# Patient Record
Sex: FEMALE | Race: WHITE | NOT HISPANIC OR LATINO | Employment: UNEMPLOYED | URBAN - METROPOLITAN AREA
[De-identification: names, ages, dates, MRNs, and addresses within clinical notes are randomized per-mention and may not be internally consistent; named-entity substitution may affect disease eponyms.]

---

## 2017-01-22 DIAGNOSIS — M79.7 FIBROMYALGIA: Primary | ICD-10-CM

## 2017-01-22 NOTE — TELEPHONE ENCOUNTER
cyclobenzaprine (FLEXERIL) 10 MG tablet      Last Written Prescription Date:  11/28/2016  Last Fill Quantity: 30,   # refills: 0  Last Office [facility] with [facility] or [facility] prescribing provider: 11/08/2016 Aaseby-Aguilera  Future Office visit:       Routing refill request to provider for review/approval because:  Drug not on the [facility], [facility] or [facility] refill protocol or controlled substance

## 2017-01-23 RX ORDER — CYCLOBENZAPRINE HCL 10 MG
10 TABLET ORAL PRN
Qty: 30 TABLET | Refills: 0 | Status: SHIPPED | OUTPATIENT
Start: 2017-01-23 | End: 2017-03-03

## 2017-03-03 DIAGNOSIS — M79.7 FIBROMYALGIA: ICD-10-CM

## 2017-03-03 RX ORDER — CYCLOBENZAPRINE HCL 10 MG
10 TABLET ORAL PRN
Qty: 30 TABLET | Refills: 0 | Status: SHIPPED | OUTPATIENT
Start: 2017-03-03 | End: 2017-04-07

## 2017-03-03 NOTE — TELEPHONE ENCOUNTER
Routing refill request to provider for review/approval because:  Drug not on the [facility] refill protocol   Kelvin Thrasher RN, BSN

## 2017-03-03 NOTE — TELEPHONE ENCOUNTER
Pending Prescriptions:                       Disp   Refills    cyclobenzaprine (FLEXERIL) 10 MG tablet   30 tab*0            Sig: Take 1 tablet (10 mg) by mouth as needed for           muscle spasms          Last Written Prescription Date:  01/23/2017  Last Fill Quantity: 30,   # refills: 0  Last Office Visit with [facility], [facility] or [facility] prescribing provider: 11/08/2016  Future Office visit:       Routing refill request to provider for review/approval because:  Drug not on the [facility], [facility] or [facility] refill protocol or controlled substance    Myron DAVIS

## 2017-04-07 DIAGNOSIS — M79.7 FIBROMYALGIA: ICD-10-CM

## 2017-04-10 RX ORDER — CYCLOBENZAPRINE HCL 10 MG
TABLET ORAL
Qty: 30 TABLET | Refills: 0 | Status: SHIPPED | OUTPATIENT
Start: 2017-04-10 | End: 2017-05-12

## 2017-04-10 NOTE — TELEPHONE ENCOUNTER
Routing refill request to provider for review/approval because:  Drug not on the [facility] refill protocol -  Pt very past due for DM f/u     Fatuma Lewis RN      flexoril   Last Written Prescription Date: 3/3/17  Last Fill Quantity: 30,  # refills: 0  Last Office Visit with [facility] or [facility] prescribing provider: 6/2016

## 2017-04-13 DIAGNOSIS — E11.9 TYPE 2 DIABETES MELLITUS WITHOUT COMPLICATION, WITH LONG-TERM CURRENT USE OF INSULIN (H): Primary | ICD-10-CM

## 2017-04-13 DIAGNOSIS — Z79.4 TYPE 2 DIABETES MELLITUS WITHOUT COMPLICATION, WITH LONG-TERM CURRENT USE OF INSULIN (H): Primary | ICD-10-CM

## 2017-04-14 RX ORDER — BLOOD SUGAR DIAGNOSTIC
STRIP MISCELLANEOUS
Qty: 200 STRIP | Refills: 1 | Status: SHIPPED | OUTPATIENT
Start: 2017-04-14 | End: 2017-08-21

## 2017-04-14 NOTE — TELEPHONE ENCOUNTER
Pending Prescriptions:                       Disp   Refills    ACCU-CHEK REI PLUS test strip [Pharmacy*[address] st*0            Sig: USE TO CHECK GLUCOSE TWICE DAILY AS DIRECTED             Last Written Prescription Date: 09/14/2016  Last Fill Quantity: 3 months, # refills: 0  Last Office Visit with [facility], [facility] or [facility] prescribing provider:  11/08/2016        BP Readings from Last 3 Encounters:   11/08/16 120/68   06/13/16 131/88   11/03/15 141/78     Lab Results   Component Value Date    MICROL 20 06/13/2016     Lab Results   Component Value Date    UMALCR 13.85 06/13/2016     Creatinine   Date Value Ref Range Status   11/08/2016 0.69 0.52 - 1.04 mg/dL Final   ]  GFR Estimate   Date Value Ref Range Status   11/08/2016 89 >60 mL/min/1.7m2 Final     Comment:     Non  GFR Calc   06/13/2016 82 >60 mL/min/1.7m2 Final     Comment:     Non  GFR Calc   07/03/2015 >90  Non  GFR Calc   >60 mL/min/1.7m2 Final     GFR Estimate If Black   Date Value Ref Range Status   11/08/2016 >90   GFR Calc   >60 mL/min/1.7m2 Final   06/13/2016 >90   GFR Calc   >60 mL/min/1.7m2 Final   07/03/2015 >90   GFR Calc   >60 mL/min/1.7m2 Final     Lab Results   Component Value Date    CHOL 107 06/13/2016     Lab Results   Component Value Date    HDL 44 06/13/2016     Lab Results   Component Value Date    LDL 41 06/13/2016     Lab Results   Component Value Date    TRIG 110 06/13/2016     Lab Results   Component Value Date    CHOLHDLRATIO 2.6 02/10/2015     Lab Results   Component Value Date    AST 24 11/08/2016     Lab Results   Component Value Date    ALT 52 11/08/2016     Lab Results   Component Value Date    A1C 6.6 06/13/2016    A1C 6.9 11/03/2015    A1C 8.5 09/01/2015    A1C 10.1 07/03/2015    A1C 7.2 02/10/2015     Potassium   Date Value Ref Range Status   11/08/2016 3.7 3.4 - 5.3 mmol/L Final     Myron DAVIS

## 2017-04-14 NOTE — TELEPHONE ENCOUNTER
Prescription approved per [facility] Refill Protocol.    Gabriela Pérez, RN, BSN, PHN

## 2017-04-24 ENCOUNTER — TELEPHONE (OUTPATIENT)
Dept: FAMILY MEDICINE | Facility: CLINIC | Age: 57
End: 2017-04-24

## 2017-04-24 NOTE — TELEPHONE ENCOUNTER
Panel Management Review      Patient has the following on her problem list:     Depression / Dysthymia review  PHQ-9 SCORE 9/1/2015 9/1/2015 6/13/2016   Total Score - - -   Total Score 5 5 8      Patient is due for:  PHQ9    Diabetes    ASA: Passed    Last A1C  Lab Results   Component Value Date    A1C 6.6 06/13/2016    A1C 6.9 11/03/2015    A1C 8.5 09/01/2015    A1C 10.1 07/03/2015    A1C 7.2 02/10/2015     A1C tested: Passed    Last LDL:    Lab Results   Component Value Date    CHOL 107 06/13/2016     Lab Results   Component Value Date    HDL 44 06/13/2016     Lab Results   Component Value Date    LDL 41 06/13/2016     Lab Results   Component Value Date    TRIG 110 06/13/2016     Lab Results   Component Value Date    CHOLHDLRATIO 2.6 02/10/2015     Lab Results   Component Value Date    NHDL 63 06/13/2016       Is the patient on a Statin? YES             Is the patient on Aspirin? YES    Medications     HMG CoA Reductase Inhibitors    atorvastatin (LIPITOR) 20 MG tablet    Salicylates    ASPIRIN 81 MG OR TABS          Last three blood pressure readings:  BP Readings from Last 3 Encounters:   11/08/16 120/68   06/13/16 131/88   11/03/15 141/78       Date of last diabetes office visit: 11/06/2016     Tobacco History:     History   Smoking Status     Light Tobacco Smoker     Packs/day: 0.20     Years: 3.00     Types: Cigarettes     Last attempt to quit: 1/15/2013   Smokeless Tobacco     Never Used     Comment: e cig   - three puffs a day            Composite cancer screening  Chart review shows that this patient is due/due soon for the following Mammogram and Colonoscopy  Summary:    Patient is due/failing the following:   COLONOSCOPY, MAMMOGRAM and PHQ9    Action needed:   Patient needs to do PHQ9. and Patient needs referral/order: mammogram and colonosocpy    Type of outreach:    Sent Pin or Peg message.    Questions for provider review:    None                                                                                                                                     db     Chart routed to Provider .

## 2017-05-12 DIAGNOSIS — M79.7 FIBROMYALGIA: ICD-10-CM

## 2017-05-12 RX ORDER — CYCLOBENZAPRINE HCL 10 MG
TABLET ORAL
Qty: 30 TABLET | Refills: 0 | Status: SHIPPED | OUTPATIENT
Start: 2017-05-12 | End: 2017-06-27

## 2017-05-12 NOTE — TELEPHONE ENCOUNTER
Routing refill request to provider for review/approval because:  Drug not on the [facility] refill protocol -LM for call back.  Pt is due for multiple HCM and did not f/u for 6 month DM check.   Do you want to continue with refills?    CONNER Dixon   Last Written Prescription Date: 4/10/17  Last Fill Quantity: 30,  # refills: 0  Last Office Visit with [facility] or [facility] prescribing provider: 11/2016 for per op  Last DM visit 6/ 13/16

## 2017-05-22 DIAGNOSIS — G43.009 MIGRAINE WITHOUT AURA AND WITHOUT STATUS MIGRAINOSUS, NOT INTRACTABLE: ICD-10-CM

## 2017-05-22 RX ORDER — SUMATRIPTAN 100 MG/1
TABLET, FILM COATED ORAL
Qty: 10 TABLET | Refills: 0 | Status: SHIPPED | OUTPATIENT
Start: 2017-05-22 | End: 2017-08-01

## 2017-05-22 NOTE — TELEPHONE ENCOUNTER
Pending Prescriptions:                       Disp   Refills    SUMAtriptan (IMITREX) 100 MG tablet [Phar*10 tab*0            Sig: TAKE 1 TABLET BY MOUTH ONCE DAILY MAY REPEAT IN 2           HOURS IF NEEDED. MAX 2 PER DAY          Last Written Prescription Date: 12/20/2016  Last Fill Quantity: 10, # refills: 1  Last Office Visit with [facility], [facility] or [facility] prescribing provider: 11/08/2016       BP Readings from Last 3 Encounters:   11/08/16 120/68   06/13/16 131/88   11/03/15 141/78     Myron DAVIS

## 2017-05-22 NOTE — TELEPHONE ENCOUNTER
Routing refill request to provider for review/approval because:  Patient needs to be seen because:  Pt has not followed up as previously requested for other refills    Pt due for DM    Kelvin Thrasher RN, BSN

## 2017-06-11 DIAGNOSIS — E11.9 TYPE 2 DIABETES MELLITUS WITHOUT COMPLICATION (H): ICD-10-CM

## 2017-06-11 DIAGNOSIS — I10 BENIGN ESSENTIAL HYPERTENSION: ICD-10-CM

## 2017-06-12 RX ORDER — LISINOPRIL 10 MG/1
TABLET ORAL
Qty: 90 TABLET | Refills: 0 | Status: SHIPPED | OUTPATIENT
Start: 2017-06-12 | End: 2017-06-22

## 2017-06-12 NOTE — TELEPHONE ENCOUNTER
Spoke with patient and scheduled appt for next week.  Pt is out of medication   One time refill sent  Kelvin Thrasher RN, BSN

## 2017-06-12 NOTE — TELEPHONE ENCOUNTER
Pending Prescriptions:                       Disp   Refills    lisinopril (PRINIVIL/ZESTRIL) 10 MG table*90 tab*0            Sig: TAKE ONE TABLET BY MOUTH ONCE DAILY    metFORMIN (GLUCOPHAGE) 500 MG tablet [Pha*360 ta*0            Sig: TAKE 2 TABLETS BY MOUTH TWICE DAILY WITH MEALS    Lisinopril        Last Written Prescription Date: 06/13/2016  Last Fill Quantity: 90, # refills: 3  Last Office Visit with [facility] or [facility] prescribing provider: 11/08/2016       Potassium   Date Value Ref Range Status   11/08/2016 3.7 3.4 - 5.3 mmol/L Final     Creatinine   Date Value Ref Range Status   11/08/2016 0.69 0.52 - 1.04 mg/dL Final     BP Readings from Last 3 Encounters:   11/08/16 120/68   06/13/16 131/88   11/03/15 141/78     Metformin           Last Written Prescription Date: 06/13/2016  Last Fill Quantity: 360, # refills: 3  Last Office Visit with [facility], [facility] or [facility] prescribing provider:          BP Readings from Last 3 Encounters:   11/08/16 120/68   06/13/16 131/88   11/03/15 141/78     Lab Results   Component Value Date    MICROL 20 06/13/2016     Lab Results   Component Value Date    UMALCR 13.85 06/13/2016     Creatinine   Date Value Ref Range Status   11/08/2016 0.69 0.52 - 1.04 mg/dL Final   ]  GFR Estimate   Date Value Ref Range Status   11/08/2016 89 >60 mL/min/1.7m2 Final     Comment:     Non  GFR Calc   06/13/2016 82 >60 mL/min/1.7m2 Final     Comment:     Non  GFR Calc   07/03/2015 >90  Non  GFR Calc   >60 mL/min/1.7m2 Final     GFR Estimate If Black   Date Value Ref Range Status   11/08/2016 >90   GFR Calc   >60 mL/min/1.7m2 Final   06/13/2016 >90   GFR Calc   >60 mL/min/1.7m2 Final   07/03/2015 >90   GFR Calc   >60 mL/min/1.7m2 Final     Lab Results   Component Value Date    CHOL 107 06/13/2016     Lab Results   Component Value Date    HDL 44 06/13/2016     Lab Results   Component Value Date    LDL 41  06/13/2016     Lab Results   Component Value Date    TRIG 110 06/13/2016     Lab Results   Component Value Date    CHOLHDLRATIO 2.6 02/10/2015     Lab Results   Component Value Date    AST 24 11/08/2016     Lab Results   Component Value Date    ALT 52 11/08/2016     Lab Results   Component Value Date    A1C 6.6 06/13/2016    A1C 6.9 11/03/2015    A1C 8.5 09/01/2015    A1C 10.1 07/03/2015    A1C 7.2 02/10/2015     Potassium   Date Value Ref Range Status   11/08/2016 3.7 3.4 - 5.3 mmol/L Final     Myron Hodgson XRT

## 2017-06-18 DIAGNOSIS — E78.5 HYPERLIPIDEMIA LDL GOAL <100: ICD-10-CM

## 2017-06-18 RX ORDER — ATORVASTATIN CALCIUM 20 MG/1
TABLET, FILM COATED ORAL
Qty: 90 TABLET | Refills: 0 | Status: CANCELLED | OUTPATIENT
Start: 2017-06-18

## 2017-06-18 NOTE — TELEPHONE ENCOUNTER
atorvastatin (LIPITOR) 20 MG tablet     Last Written Prescription Date: 06/13/2016  Last Fill Quantity: 90, # refills: 3  Last Office Visit with [facility], [facility] or [facility] prescribing provider: 11/08/2016  Next 5 appointments (look out 90 days)     Jun 20, 2017 11:30 AM CDT   PHYSICAL with Ramona Ann Aaseby-Aguilera, PA-C   [facility]    [address] [place] [id number] [phone]                   Lab Results   Component Value Date    CHOL 107 06/13/2016     Lab Results   Component Value Date    HDL 44 06/13/2016     Lab Results   Component Value Date    LDL 41 06/13/2016     Lab Results   Component Value Date    TRIG 110 06/13/2016     Lab Results   Component Value Date    CHOLHDLRATIO 2.6 02/10/2015

## 2017-06-22 ENCOUNTER — OFFICE VISIT (OUTPATIENT)
Dept: FAMILY MEDICINE | Facility: CLINIC | Age: 57
End: 2017-06-22
Payer: COMMERCIAL

## 2017-06-22 VITALS
OXYGEN SATURATION: 99 % | BODY MASS INDEX: 33.02 KG/M2 | HEART RATE: 71 BPM | SYSTOLIC BLOOD PRESSURE: 121 MMHG | TEMPERATURE: 98.3 F | DIASTOLIC BLOOD PRESSURE: 70 MMHG | WEIGHT: 168.2 LBS | HEIGHT: 60 IN

## 2017-06-22 DIAGNOSIS — M79.7 FIBROMYALGIA: ICD-10-CM

## 2017-06-22 DIAGNOSIS — Z11.51 SCREENING FOR HUMAN PAPILLOMAVIRUS: ICD-10-CM

## 2017-06-22 DIAGNOSIS — L98.9 SKIN LESION: ICD-10-CM

## 2017-06-22 DIAGNOSIS — Z12.31 ENCOUNTER FOR SCREENING MAMMOGRAM FOR BREAST CANCER: ICD-10-CM

## 2017-06-22 DIAGNOSIS — E78.5 HYPERLIPIDEMIA LDL GOAL <100: Primary | ICD-10-CM

## 2017-06-22 DIAGNOSIS — I10 HYPERTENSION GOAL BP (BLOOD PRESSURE) < 140/90: ICD-10-CM

## 2017-06-22 DIAGNOSIS — Z79.4 TYPE 2 DIABETES MELLITUS WITHOUT COMPLICATION, WITH LONG-TERM CURRENT USE OF INSULIN (H): ICD-10-CM

## 2017-06-22 DIAGNOSIS — E11.9 TYPE 2 DIABETES MELLITUS WITHOUT COMPLICATION, WITH LONG-TERM CURRENT USE OF INSULIN (H): ICD-10-CM

## 2017-06-22 DIAGNOSIS — I10 BENIGN ESSENTIAL HYPERTENSION: ICD-10-CM

## 2017-06-22 LAB
ALBUMIN SERPL-MCNC: 4 G/DL (ref 3.4–5)
ALP SERPL-CCNC: 82 U/L (ref 40–150)
ALT SERPL W P-5'-P-CCNC: 44 U/L (ref 0–50)
ANION GAP SERPL CALCULATED.3IONS-SCNC: 6 MMOL/L (ref 3–14)
AST SERPL W P-5'-P-CCNC: 23 U/L (ref 0–45)
BILIRUB SERPL-MCNC: 0.4 MG/DL (ref 0.2–1.3)
BUN SERPL-MCNC: 7 MG/DL (ref 7–30)
CALCIUM SERPL-MCNC: 9.5 MG/DL (ref 8.5–10.1)
CHLORIDE SERPL-SCNC: 104 MMOL/L (ref 94–109)
CHOLEST SERPL-MCNC: 134 MG/DL
CO2 SERPL-SCNC: 30 MMOL/L (ref 20–32)
CREAT SERPL-MCNC: 0.74 MG/DL (ref 0.52–1.04)
ERYTHROCYTE [DISTWIDTH] IN BLOOD BY AUTOMATED COUNT: 13.7 % (ref 10–15)
GFR SERPL CREATININE-BSD FRML MDRD: 81 ML/MIN/1.7M2
GLUCOSE SERPL-MCNC: 141 MG/DL (ref 70–99)
HBA1C MFR BLD: 6.8 % (ref 4.3–6)
HCT VFR BLD AUTO: 41 % (ref 35–47)
HDLC SERPL-MCNC: 48 MG/DL
HGB BLD-MCNC: 13.7 G/DL (ref 11.7–15.7)
LDLC SERPL CALC-MCNC: 58 MG/DL
MCH RBC QN AUTO: 28.5 PG (ref 26.5–33)
MCHC RBC AUTO-ENTMCNC: 33.4 G/DL (ref 31.5–36.5)
MCV RBC AUTO: 85 FL (ref 78–100)
NONHDLC SERPL-MCNC: 86 MG/DL
PLATELET # BLD AUTO: 214 10E9/L (ref 150–450)
POTASSIUM SERPL-SCNC: 3.8 MMOL/L (ref 3.4–5.3)
PROT SERPL-MCNC: 7.4 G/DL (ref 6.8–8.8)
RBC # BLD AUTO: 4.8 10E12/L (ref 3.8–5.2)
SODIUM SERPL-SCNC: 140 MMOL/L (ref 133–144)
TRIGL SERPL-MCNC: 139 MG/DL
TSH SERPL DL<=0.005 MIU/L-ACNC: 1.79 MU/L (ref 0.4–4)
WBC # BLD AUTO: 9 10E9/L (ref 4–11)

## 2017-06-22 PROCEDURE — 80061 LIPID PANEL: CPT | Performed by: PHYSICIAN ASSISTANT

## 2017-06-22 PROCEDURE — 99396 PREV VISIT EST AGE 40-64: CPT | Performed by: PHYSICIAN ASSISTANT

## 2017-06-22 PROCEDURE — G0145 SCR C/V CYTO,THINLAYER,RESCR: HCPCS | Performed by: PHYSICIAN ASSISTANT

## 2017-06-22 PROCEDURE — 85027 COMPLETE CBC AUTOMATED: CPT | Performed by: PHYSICIAN ASSISTANT

## 2017-06-22 PROCEDURE — 36415 COLL VENOUS BLD VENIPUNCTURE: CPT | Performed by: PHYSICIAN ASSISTANT

## 2017-06-22 PROCEDURE — 84443 ASSAY THYROID STIM HORMONE: CPT | Performed by: PHYSICIAN ASSISTANT

## 2017-06-22 PROCEDURE — 80053 COMPREHEN METABOLIC PANEL: CPT | Performed by: PHYSICIAN ASSISTANT

## 2017-06-22 PROCEDURE — 82043 UR ALBUMIN QUANTITATIVE: CPT | Performed by: PHYSICIAN ASSISTANT

## 2017-06-22 PROCEDURE — 83036 HEMOGLOBIN GLYCOSYLATED A1C: CPT | Performed by: PHYSICIAN ASSISTANT

## 2017-06-22 PROCEDURE — 99207 C FOOT EXAM  NO CHARGE: CPT | Mod: 25 | Performed by: PHYSICIAN ASSISTANT

## 2017-06-22 PROCEDURE — 87624 HPV HI-RISK TYP POOLED RSLT: CPT | Performed by: PHYSICIAN ASSISTANT

## 2017-06-22 PROCEDURE — G0124 SCREEN C/V THIN LAYER BY MD: HCPCS | Performed by: PHYSICIAN ASSISTANT

## 2017-06-22 RX ORDER — GABAPENTIN 100 MG/1
200 CAPSULE ORAL 3 TIMES DAILY
Qty: 180 CAPSULE | Refills: 5 | Status: SHIPPED | OUTPATIENT
Start: 2017-06-22 | End: 2017-12-27

## 2017-06-22 RX ORDER — ATORVASTATIN CALCIUM 20 MG/1
20 TABLET, FILM COATED ORAL DAILY
Qty: 90 TABLET | Refills: 3 | Status: SHIPPED | OUTPATIENT
Start: 2017-06-22 | End: 2018-07-16

## 2017-06-22 RX ORDER — GLIPIZIDE 10 MG/1
20 TABLET, FILM COATED, EXTENDED RELEASE ORAL DAILY
Qty: 180 TABLET | Refills: 11 | Status: SHIPPED | OUTPATIENT
Start: 2017-06-22 | End: 2018-07-31

## 2017-06-22 RX ORDER — LISINOPRIL 10 MG/1
10 TABLET ORAL DAILY
Qty: 90 TABLET | Refills: 3 | Status: SHIPPED | OUTPATIENT
Start: 2017-06-22 | End: 2018-01-09

## 2017-06-22 NOTE — Clinical Note
Please abstract the following data from this visit with this patient into the appropriate field in Epic:  Eye exam with ophthalmology on this date: Feb 2016 at [place] [facility]

## 2017-06-22 NOTE — PATIENT INSTRUCTIONS
(E78.5) Hyperlipidemia LDL goal <100  (primary encounter diagnosis)  Comment:   Plan: atorvastatin (LIPITOR) 20 MG tablet            (I10) Hypertension goal BP (blood pressure) < 140/90  Comment:   Plan: stable     (E11.9,  Z79.4) Type 2 diabetes mellitus without complication, with long-term current use of insulin (H)  Comment:   Plan: CBC with platelets, TSH with free T4 reflex,         Comprehensive metabolic panel, Lipid panel         reflex to direct LDL, Albumin Random Urine         Quantitative, Hemoglobin A1c, FOOT EXAM,         metFORMIN (GLUCOPHAGE) 500 MG tablet, glipiZIDE        (GLUCOTROL XL) 10 MG 24 hr tablet, insulin         glargine (LANTUS SOLOSTAR) 100 UNIT/ML         injection            (Z11.51) Screening for human papillomavirus  Comment:   Plan: Pap imaged thin layer screen with HPV -         recommended age 30 - 65 years (select HPV order        below), HPV High Risk Types DNA Cervical            (Z12.31) Encounter for screening mammogram for breast cancer  Comment:   Plan: *MA Screening Digital Bilateral            (L98.9) Skin lesion  Comment: has history of melanoma.  Now has a brwnish/black lesion of labia minor n right  Plan: OB/GYN REFERRAL            (I10) Benign essential hypertension  Comment:   Plan: lisinopril (PRINIVIL/ZESTRIL) 10 MG tablet            (M79.7) Fibromyalgia  Comment:   Plan: gabapentin (NEURONTIN) 100 MG capsule              Preventive Health Recommendations  Female Ages 50 - 64    Yearly exam: See your health care provider every year in order to  o Review health changes.   o Discuss preventive care.    o Review your medicines if your doctor has prescribed any.      Get a Pap test every three years (unless you have an abnormal result and your provider advises testing more often).    If you get Pap tests with HPV test, you only need to test every 5 years, unless you have an abnormal result.     You do not need a Pap test if your uterus was removed (hysterectomy) and  you have not had cancer.    You should be tested each year for STDs (sexually transmitted diseases) if you're at risk.     Have a mammogram every 1 to 2 years.    Have a colonoscopy at age 50, or have a yearly FIT test (stool test). These exams screen for colon cancer.      Have a cholesterol test every 5 years, or more often if advised.    Have a diabetes test (fasting glucose) every three years. If you are at risk for diabetes, you should have this test more often.     If you are at risk for osteoporosis (brittle bone disease), think about having a bone density scan (DEXA).    Shots: Get a flu shot each year. Get a tetanus shot every 10 years.    Nutrition:     Eat at least 5 servings of fruits and vegetables each day.    Eat whole-grain bread, whole-wheat pasta and brown rice instead of white grains and rice.    Talk to your provider about Calcium and Vitamin D.     Lifestyle    Exercise at least 150 minutes a week (30 minutes a day, 5 days a week). This will help you control your weight and prevent disease.    Limit alcohol to one drink per day.    No smoking.     Wear sunscreen to prevent skin cancer.     See your dentist every six months for an exam and cleaning.    See your eye doctor every 1 to 2 years.

## 2017-06-22 NOTE — NURSING NOTE
Chief Complaint   Patient presents with     Physical       Initial /70 (BP Location: Right arm, Patient Position: Chair, Cuff Size: Adult Large)  Pulse 71  Temp 98.3  F (36.8  C) (Oral)  Ht 5' (1.524 m)  Wt 168 lb 3.2 oz (76.3 kg)  SpO2 99%  BMI 32.85 kg/m2 Estimated body mass index is 32.85 kg/(m^2) as calculated from the following:    Height as of this encounter: 5' (1.524 m).    Weight as of this encounter: 168 lb 3.2 oz (76.3 kg).  Medication Reconciliation: complete     Health maintenance   Pt. Declined mammo and colonoscopy at this time  Pt. Had eye exam at [place] feb 2016

## 2017-06-22 NOTE — PROGRESS NOTES
SUBJECTIVE:     CC: Vanessa Mota is an 56 year old woman who presents for preventive health visit.     Healthy Habits:    Do you get at least three servings of calcium containing foods daily (dairy, green leafy vegetables, etc.)? yes    Amount of exercise or daily activities, outside of work: 3 day(s) per week    Problems taking medications regularly No    Medication side effects: No    Have you had an eye exam in the past two years? Feb 2016 at [facility]     Do you see a dentist twice per year? yes    Do you have sleep apnea, excessive snoring or daytime drowsiness?yes - snoring, daytime drowsiness            Today's PHQ-2 Score:   PHQ-2 ( 1999 [employer]) 9/20/2013 9/21/2012   Q1: Little interest or pleasure in doing things 1 3   Q2: Feeling down, depressed or hopeless 1 3   PHQ-2 Score 2 6       Abuse: Current or Past(Physical, Sexual or Emotional)- No  Do you feel safe in your environment - Yes    Social History   Substance Use Topics     Smoking status: Light Tobacco Smoker     Packs/day: 0.20     Years: 3.00     Types: Cigarettes     Last attempt to quit: 1/15/2013     Smokeless tobacco: Never Used      Comment: e cig   - three puffs a day      Alcohol use Yes      Comment: rare     social    Recent Labs   Lab Test  06/13/16   1141  02/10/15   1047  08/26/14   1224   CHOL  107  150  145   HDL  44*  57  49*   LDL  41  71  69   TRIG  110  110  136   CHOLHDLRATIO   --   2.6  3.0   NHDL  63   --    --        Reviewed orders with patient.  Reviewed health maintenance and updated orders accordingly - Yes    Mammo Decision Support:  Patient over age 50, mutual decision to screen reflected in health maintenance.    Pertinent mammograms are reviewed under the imaging tab.  History of abnormal Pap smear: NO - age 30- 65 PAP every 3 years recommended    Reviewed and updated as needed this visit by clinical staff  Tobacco  Allergies  Meds  Med Hx  Surg Hx  Fam Hx  Soc Hx        Reviewed and updated as needed this  visit by Provider            ROS:  C: NEGATIVE for fever, chills, change in weight  I: NEGATIVE for worrisome rashes, moles or lesions  E: NEGATIVE for vision changes or irritation  ENT: NEGATIVE for ear, mouth and throat problems  R: NEGATIVE for significant cough or SOB  B: NEGATIVE for masses, tenderness or discharge  CV: NEGATIVE for chest pain, palpitations or peripheral edema  GI: NEGATIVE for nausea, abdominal pain, heartburn, or change in bowel habits  : NEGATIVE for unusual urinary or vaginal symptoms. No vaginal bleeding.  M: NEGATIVE for significant arthralgias or myalgia  N: NEGATIVE for weakness, dizziness or paresthesias  P: NEGATIVE for changes in mood or affect     BP Readings from Last 3 Encounters:   06/22/17 121/70   11/08/16 120/68   06/13/16 131/88    Wt Readings from Last 3 Encounters:   06/22/17 168 lb 3.2 oz (76.3 kg)   11/08/16 181 lb 1.6 oz (82.1 kg)   06/13/16 187 lb 4.8 oz (85 kg)                  Recent Labs   Lab Test  06/22/17   1221  11/08/16   1320  06/13/16   1141  11/03/15   1152   07/03/15   1137  02/10/15   1047  08/26/14   1224   A1C  6.8*   --   6.6*  6.9*   < >  10.1*  7.2*  7.1*   LDL   --    --   41   --    --    --   71  69   HDL   --    --   44*   --    --    --   57  49*   TRIG   --    --   110   --    --    --   110  136   ALT   --   52*  59*   --    --   95*  90*  77*   CR   --   0.69  0.74   --    --   0.67  0.75  0.78   GFRESTIMATED   --   89  82   --    --   >90  Non  GFR Calc    81  78   GFRESTBLACK   --   >90   GFR Calc    >90   GFR Calc     --    --   >90   GFR Calc    >90   GFR Calc    >90   GFR Calc     POTASSIUM   --   3.7  4.1   --    --   4.0  4.0  3.7   TSH   --    --   1.26   --    --    --   2.56  2.24    < > = values in this interval not displayed.      OBJECTIVE:     /70 (BP Location: Right arm, Patient Position: Chair, Cuff Size: Adult Large)  Pulse  71  Temp 98.3  F (36.8  C) (Oral)  Ht 5' (1.524 m)  Wt 168 lb 3.2 oz (76.3 kg)  SpO2 99%  BMI 32.85 kg/m2  EXAM:  GENERAL APPEARANCE: healthy, alert and no distress  EYES: Eyes grossly normal to inspection, PERRL and conjunctivae and sclerae normal  HENT: ear canals and TM's normal, nose and mouth without ulcers or lesions, oropharynx clear and oral mucous membranes moist  NECK: no adenopathy, no asymmetry, masses, or scars and thyroid normal to palpation  RESP: lungs clear to auscultation - no rales, rhonchi or wheezes  BREAST: normal without masses, tenderness or nipple discharge and no palpable axillary masses or adenopathy  CV: regular rate and rhythm, normal S1 S2, no S3 or S4, no murmur, click or rub, no peripheral edema and peripheral pulses strong  ABDOMEN: soft, nontender, no hepatosplenomegaly, no masses and bowel sounds normal   (female): normal female external genitalia but there is a brownish/black lesion on inner fold of labia on rt, normal urethral meatus, vaginal mucosal atrophy noted, normal cervix, adnexae, and uterus without masses or abnormal discharge  MS: no musculoskeletal defects are noted and gait is age appropriate without ataxia  SKIN: no suspicious lesions or rashes  NEURO: Normal strength and tone, sensory exam grossly normal, mentation intact and speech normal  PSYCH: mentation appears normal and affect normal/bright    ASSESSMENT/PLAN:     1. Hyperlipidemia LDL goal <100    - atorvastatin (LIPITOR) 20 MG tablet; Take 1 tablet (20 mg) by mouth daily  Dispense: 90 tablet; Refill: 3      3. Type 2 diabetes mellitus without complication, with long-term current use of insulin (H)    - CBC with platelets  - TSH with free T4 reflex  - Comprehensive metabolic panel  - Lipid panel reflex to direct LDL  - Albumin Random Urine Quantitative  - Hemoglobin A1c  - FOOT EXAM  - metFORMIN (GLUCOPHAGE) 500 MG tablet; TAKE 2 TABLETS BY MOUTH TWICE DAILY WITH MEALS  Dispense: 360 tablet; Refill:  3  - glipiZIDE (GLUCOTROL XL) 10 MG 24 hr tablet; Take 2 tablets (20 mg) by mouth daily  Dispense: 180 tablet; Refill: 11  - insulin glargine (LANTUS SOLOSTAR) 100 UNIT/ML injection; 78 units at bedtime  Dispense: 1 mL; Refill: 11    4. Screening for human papillomavirus    - Pap imaged thin layer screen with HPV - recommended age 30 - 65 years (select HPV order below)  - HPV High Risk Types DNA Cervical    5. Encounter for screening mammogram for breast cancer    - *MA Screening Digital Bilateral; Future    6. Skin lesion  Because of her former diagnosis of melanoma I would like for her to get the lesion on her vagina evaluated and biopsied  - OB/GYN REFERRAL    7. Benign essential hypertension  Stable and doing well   - lisinopril (PRINIVIL/ZESTRIL) 10 MG tablet; Take 1 tablet (10 mg) by mouth daily  Dispense: 90 tablet; Refill: 3    8. Fibromyalgia  Still having significant pain in feet.  Well increase gabapentin to 2 tabs tid.     - gabapentin (NEURONTIN) 100 MG capsule; Take 2 capsules (200 mg) by mouth 3 times daily  Dispense: 180 capsule; Refill: 5    COUNSELING:   Reviewed preventive health counseling, as reflected in patient instructions       Regular exercise       Healthy diet/nutrition       Vision screening       Hearing screening       Colon cancer screening         reports that she has been smoking Cigarettes.  She has a 0.60 pack-year smoking history. She has never used smokeless tobacco.    Estimated body mass index is 32.85 kg/(m^2) as calculated from the following:    Height as of this encounter: 5' (1.524 m).    Weight as of this encounter: 168 lb 3.2 oz (76.3 kg).   Weight management plan: Discussed healthy diet and exercise guidelines and patient will follow up in 12 months in clinic to re-evaluate.    Counseling Resources:  ATP IV Guidelines  Pooled Cohorts Equation Calculator  Breast Cancer Risk Calculator  FRAX Risk Assessment  ICSI Preventive Guidelines  Dietary Guidelines for Americans,  2010  USDA's MyPlate  ASA Prophylaxis  Lung CA Screening    Ramona Ann Aaseby-Aguilera, PA-C  [facility]

## 2017-06-22 NOTE — MR AVS SNAPSHOT
After Visit Summary   6/22/2017    Vanessa Mota    MRN: [id number]           Patient Information     Date Of Birth          1960        Visit Information        Provider Department      6/22/2017 11:30 AM Aaseby-Aguilera, Ramona Ann, PA-C [facility]        Today's Diagnoses     Hyperlipidemia LDL goal <100    -  1    Hypertension goal BP (blood pressure) < 140/90        Type 2 diabetes mellitus without complication, with long-term current use of insulin (H)        Screening for human papillomavirus        Encounter for screening mammogram for breast cancer        Skin lesion        Benign essential hypertension        Fibromyalgia          Care Instructions      (E78.5) Hyperlipidemia LDL goal <100  (primary encounter diagnosis)  Comment:   Plan: atorvastatin (LIPITOR) 20 MG tablet            (I10) Hypertension goal BP (blood pressure) < 140/90  Comment:   Plan: stable     (E11.9,  Z79.4) Type 2 diabetes mellitus without complication, with long-term current use of insulin (H)  Comment:   Plan: CBC with platelets, TSH with free T4 reflex,         Comprehensive metabolic panel, Lipid panel         reflex to direct LDL, Albumin Random Urine         Quantitative, Hemoglobin A1c, FOOT EXAM,         metFORMIN (GLUCOPHAGE) 500 MG tablet, glipiZIDE        (GLUCOTROL XL) 10 MG 24 hr tablet, insulin         glargine (LANTUS SOLOSTAR) 100 UNIT/ML         injection            (Z11.51) Screening for human papillomavirus  Comment:   Plan: Pap imaged thin layer screen with HPV -         recommended age 30 - 65 years (select HPV order        below), HPV High Risk Types DNA Cervical            (Z12.31) Encounter for screening mammogram for breast cancer  Comment:   Plan: *MA Screening Digital Bilateral            (L98.9) Skin lesion  Comment: has history of melanoma.  Now has a brwnish/black lesion of labia minor n right  Plan: OB/GYN REFERRAL            (I10) Benign essential  hypertension  Comment:   Plan: lisinopril (PRINIVIL/ZESTRIL) 10 MG tablet            (M79.7) Fibromyalgia  Comment:   Plan: gabapentin (NEURONTIN) 100 MG capsule              Preventive Health Recommendations  Female Ages 50 - 64    Yearly exam: See your health care provider every year in order to  o Review health changes.   o Discuss preventive care.    o Review your medicines if your doctor has prescribed any.      Get a Pap test every three years (unless you have an abnormal result and your provider advises testing more often).    If you get Pap tests with HPV test, you only need to test every 5 years, unless you have an abnormal result.     You do not need a Pap test if your uterus was removed (hysterectomy) and you have not had cancer.    You should be tested each year for STDs (sexually transmitted diseases) if you're at risk.     Have a mammogram every 1 to 2 years.    Have a colonoscopy at age 50, or have a yearly FIT test (stool test). These exams screen for colon cancer.      Have a cholesterol test every 5 years, or more often if advised.    Have a diabetes test (fasting glucose) every three years. If you are at risk for diabetes, you should have this test more often.     If you are at risk for osteoporosis (brittle bone disease), think about having a bone density scan (DEXA).    Shots: Get a flu shot each year. Get a tetanus shot every 10 years.    Nutrition:     Eat at least 5 servings of fruits and vegetables each day.    Eat whole-grain bread, whole-wheat pasta and brown rice instead of white grains and rice.    Talk to your provider about Calcium and Vitamin D.     Lifestyle    Exercise at least 150 minutes a week (30 minutes a day, 5 days a week). This will help you control your weight and prevent disease.    Limit alcohol to one drink per day.    No smoking.     Wear sunscreen to prevent skin cancer.     See your dentist every six months for an exam and cleaning.    See your eye doctor every 1 to 2  years.            Follow-ups after your visit        Additional Services     OB/GYN REFERRAL       Your provider has referred you to:  [facility]: [facility] [phone]   [email or web address]    Please be aware that coverage of these services is subject to the terms and limitations of your health insurance plan.  Call member services at your health plan with any benefit or coverage questions.      Please bring the following with you to your appointment:    (1) Any X-Rays, CTs or MRIs which have been performed.  Contact the facility where they were done to arrange for  prior to your scheduled appointment.   (2) List of current medications   (3) This referral request   (4) Any documents/labs given to you for this referral                  Future tests that were ordered for you today     Open Future Orders        Priority Expected Expires Ordered    *MA Screening Digital Bilateral Routine  6/22/2018 6/22/2017            Who to contact     If you have questions or need follow up information about today's clinic visit or your schedule please contact [facility] directly at [phone].  Normal or non-critical lab and imaging results will be communicated to you by [employer], letter or phone within 4 business days after the clinic has received the results. If you do not hear from us within 7 days, please contact the clinic through [employer] or phone. If you have a critical or abnormal lab result, we will notify you by phone as soon as possible.  Submit refill requests through [employer] or call your pharmacy and they will forward the refill request to us. Please allow 3 business days for your refill to be completed.          Additional Information About Your Visit        [employer] Information     [employer] gives you secure access to your electronic health record. If you see a primary care provider, you can also send messages to your care team and make appointments.  If you have questions, please call your primary care clinic.  If you do not have a primary care provider, please call [phone] and they will assist you.        Care EveryWhere ID     This is your Care EveryWhere ID. This could be used by other organizations to access your [facility] medical records  [id number]        Your Vitals Were     Pulse Temperature Height Pulse Oximetry BMI (Body Mass Index)       71 98.3  F (36.8  C) (Oral) 5' (1.524 m) 99% 32.85 kg/m2        Blood Pressure from Last 3 Encounters:   06/22/17 121/70   11/08/16 120/68   06/13/16 131/88    Weight from Last 3 Encounters:   06/22/17 168 lb 3.2 oz (76.3 kg)   11/08/16 181 lb 1.6 oz (82.1 kg)   06/13/16 187 lb 4.8 oz (85 kg)              We Performed the Following     Albumin Random Urine Quantitative     CBC with platelets     Comprehensive metabolic panel     FOOT EXAM     Hemoglobin A1c     HPV High Risk Types DNA Cervical     Lipid panel reflex to direct LDL     OB/GYN REFERRAL     Pap imaged thin layer screen with HPV - recommended age 30 - 65 years (select HPV order below)     TSH with free T4 reflex          Today's Medication Changes          These changes are accurate as of: 6/22/17 12:12 PM.  If you have any questions, ask your nurse or doctor.               These medicines have changed or have updated prescriptions.        Dose/Directions    gabapentin 100 MG capsule   Commonly known as:  NEURONTIN   This may have changed:  how much to take   Used for:  Fibromyalgia   Changed by:  Aaseby-Aguilera, Ramona Ann, PA-C        Dose:  200 mg   Take 2 capsules (200 mg) by mouth 3 times daily   Quantity:  180 capsule   Refills:  5       lisinopril 10 MG tablet   Commonly known as:  PRINIVIL/ZESTRIL   This may have changed:  See the new instructions.   Used for:  Benign essential hypertension   Changed by:  Aaseby-Aguilera, Ramona Ann, PA-C        Dose:  10 mg   Take 1 tablet (10 mg) by mouth daily   Quantity:  90 tablet   Refills:  3        metFORMIN 500 MG tablet   Commonly known as:  GLUCOPHAGE   This may have changed:  See the new instructions.   Used for:  Type 2 diabetes mellitus without complication, with long-term current use of insulin (H)   Changed by:  Aaseby-Aguilera, Ramona Ann, PA-C        TAKE 2 TABLETS BY MOUTH TWICE DAILY WITH MEALS   Quantity:  360 tablet   Refills:  3            Where to get your medicines      These medications were sent to [address] [address] [facility]  [address] [facility] [address]     Phone:  [phone]     atorvastatin 20 MG tablet    gabapentin 100 MG capsule    glipiZIDE 10 MG 24 hr tablet    insulin glargine 100 UNIT/ML injection    lisinopril 10 MG tablet    metFORMIN 500 MG tablet                Primary Care Provider Office Phone # Fax #    Ramona Ann Aaseby-Aguilera, PA-C [phone]       [facility] [address] [facility] [address]        Equal Access to Services     HUSAM ROBERTSON AH: Hadii aad ku hadasho Soomaali, waaxda luqadaha, qaybta kaalmada adeegyada, waxay idiin hayaan trangeg kharamayito noel . So [facility] [phone].    ATENCIÓN: Si habla español, tiene a scott disposición servicios gratuitos de asistencia lingüística. Llame al [phone].    We comply with applicable federal civil rights laws and [place] laws. We do not discriminate on the basis of race, color, national origin, age, disability sex, sexual orientation or gender identity.            Thank you!     Thank you for choosing [facility]  for your care. Our goal is always to provide you with excellent care. Hearing back from our patients is one way we can continue to improve our services. Please take a few minutes to complete the written survey that you may receive in the mail after your visit with us. Thank you!             Your Updated Medication List - Protect others around you: Learn how to safely use, store and throw away your medicines at  www.disposemymeds.org.          This list is accurate as of: 6/22/17 12:12 PM.  Always use your most recent med list.                   Brand Name Dispense Instructions for use Diagnosis    ACE/ARB NOT PRESCRIBED (INTENTIONAL)      by Other route continuous prn.        ALPRAZolam 0.25 MG tablet    XANAX    30 tablet    Take 1 tablet by mouth. As needed    Anxiety       aspirin 81 MG tablet     100    ONE DAILY    Other abnormal glucose       atorvastatin 20 MG tablet    LIPITOR    90 tablet    Take 1 tablet (20 mg) by mouth daily    Hyperlipidemia LDL goal <100       blood glucose lancing device     1 each    Use to test blood sugars 2 times daily or as directed.    Type 2 diabetes, HbA1c goal < 7% (H)       * blood glucose monitoring lancets     1 Box    Use to test blood sugar 2 times daily or as directed.  Dispense per insurance coverage    Type 2 diabetes, HbA1c goal < 7% (H)       * blood glucose monitoring lancets     1 Box    Use to test blood sugar 2 times daily or as directed.  Dispense Accu-check lancets per insurance coverage    Type 2 diabetes, HbA1c goal < 7% (H)       * blood glucose monitoring meter device kit     1 kit    Use to test blood sugars 2 times daily or as directed.    Type 2 diabetes, HbA1c goal < 7% (H)       * ACCU-CHEK COMPLETE Kit     1 Device    1 Device 2 times daily Dispense Accu-Chek meter per insurance coverage    Type 2 diabetes, HbA1c goal < 7% (H)       * blood glucose monitoring test strip    CANDIDA CONTOUR    100 strip    Use to test blood sugars 2 times daily or as directed.    Type 2 diabetes, HbA1c goal < 7% (H)       * blood glucose monitoring test strip    no brand specified    3 Month    Use to test blood sugar 2 times daily or as directed.  Dispense Accu-Chek strips per insurance coverage    Type 2 diabetes mellitus without complication (H)       * ACCU-CHEK ERI PLUS test strip   Generic drug:  blood glucose monitoring     200 strip    USE TO CHECK GLUCOSE TWICE DAILY  AS DIRECTED    Type 2 diabetes mellitus without complication, with long-term current use of insulin (H)       BUSPAR 30 MG Tabs   Generic drug:  BusPIRone HCl     90 tablet    One tablet twice daily  -  60 mg daily    Anxiety, Mild major depression (H)       cyclobenzaprine 10 MG tablet    FLEXERIL    30 tablet    TAKE ONE TABLET BY MOUTH ONCE DAILY AS NEEDED FOR MUSCLE SPASMS    Fibromyalgia       CYMBALTA PO      Take 60 mg by mouth daily Two tablets once a day  -  120 mg        fish oil-omega-3 fatty acids 1000 MG capsule     100    increase to 4000mg per day    Dyslipidemia       gabapentin 100 MG capsule    NEURONTIN    180 capsule    Take 2 capsules (200 mg) by mouth 3 times daily    Fibromyalgia       glipiZIDE 10 MG 24 hr tablet    GLUCOTROL XL    180 tablet    Take 2 tablets (20 mg) by mouth daily    Type 2 diabetes mellitus without complication, with long-term current use of insulin (H)       hydrOXYzine 50 MG capsule    VISTARIL     Take 50 mg by mouth. Two tablets at bedtime        insulin glargine 100 UNIT/ML injection    LANTUS SOLOSTAR    1 mL    78 units at bedtime    Type 2 diabetes mellitus without complication, with long-term current use of insulin (H)       LAMOTRIGINE PO      Take 100 mg by mouth 2 times daily        lisinopril 10 MG tablet    PRINIVIL/ZESTRIL    90 tablet    Take 1 tablet (10 mg) by mouth daily    Benign essential hypertension       metFORMIN 500 MG tablet    GLUCOPHAGE    360 tablet    TAKE 2 TABLETS BY MOUTH TWICE DAILY WITH MEALS    Type 2 diabetes mellitus without complication, with long-term current use of insulin (H)       omeprazole 40 MG capsule    priLOSEC    90 capsule    Take 1 capsule (40 mg) by mouth daily    Heart burn       SUMAtriptan 100 MG tablet    IMITREX    10 tablet    TAKE 1 TABLET BY MOUTH ONCE DAILY MAY REPEAT IN 2 HOURS IF NEEDED. MAX 2 PER DAY    Migraine without aura and without status migrainosus, not intractable       SUMAtriptan 20 MG/ACT nasal  spray    IMITREX    30 Inhaler    Spray 1 spray in nostril as needed for migraine.    Headache(784.0)       ULTICARE MINI 31G X 6 MM   Generic drug:  insulin pen needle     100 each    1 Units daily. Use 1 daily or as directed. At bedtime    Type 2 diabetes, HbA1c goal < 7% (H)       * Notice:  This list has 7 medication(s) that are the same as other medications prescribed for you. Read the directions carefully, and ask your doctor or other care provider to review them with you.

## 2017-06-22 NOTE — LETTER
July 3, 2017    Vanessa Mota  [address] [facility] [address]      Dear ,      This letter is in regards to your recent cervical cancer screening (Pap smear and HPV test).    Your Pap smear result was reported as ASCUS or Atypical Squamous Cells of Undetermined Significance.. This means that there were mildly abnormal cells found in the sample that we collected from your cervix, but no cancer cells were found. The vast majority of patients with this result do not have significant cervical abnormalities.     Your cervical sample was also tested for the presence of Human Papillomavirus (HPV). Your HPV test is NEGATIVE for high risk HPV, meaning that no HPV was found at this time.     Over time, your body can get rid of these abnormal cells, so it is recommended that you repeat your pap and HPV in 3 years.    If you have questions about these results contact [phone]    Please continue to be seen every year for an annual physical exam and other preventative tests.         Sincerely,    Ramona Ann Aaseby-Aguilera, PA-C/[place]

## 2017-06-23 ENCOUNTER — TELEPHONE (OUTPATIENT)
Dept: FAMILY MEDICINE | Facility: CLINIC | Age: 57
End: 2017-06-23

## 2017-06-23 LAB
CREAT UR-MCNC: 264 MG/DL
MICROALBUMIN UR-MCNC: 46 MG/L
MICROALBUMIN/CREAT UR: 17.39 MG/G CR (ref 0–25)

## 2017-06-23 ASSESSMENT — PATIENT HEALTH QUESTIONNAIRE - PHQ9: SUM OF ALL RESPONSES TO PHQ QUESTIONS 1-9: 9

## 2017-06-23 NOTE — TELEPHONE ENCOUNTER
Panel Management Review      Patient has the following on her problem list:     Depression / Dysthymia review  PHQ-9 SCORE 9/1/2015 6/13/2016 6/22/2017   Total Score - - -   Total Score 5 8 9      Patient is due for:  None      Composite cancer screening  Chart review shows that this patient is due/due soon for the following Mammogram and Colonoscopy  Summary:    Patient is due/failing the following:   COLONOSCOPY and MAMMOGRAM    Action needed:   Patient DECLINED mammogram and colonoscopy at this time     Type of outreach:    spoke with patient -  declined mammo and colonoscopy  - did do phq9    Questions for provider review:    None                                                                                                                                    db     Chart routed to  .

## 2017-06-27 DIAGNOSIS — M79.7 FIBROMYALGIA: ICD-10-CM

## 2017-06-27 RX ORDER — CYCLOBENZAPRINE HCL 10 MG
TABLET ORAL
Qty: 30 TABLET | Refills: 0 | Status: SHIPPED | OUTPATIENT
Start: 2017-06-27 | End: 2017-07-23

## 2017-06-27 NOTE — TELEPHONE ENCOUNTER
Pending Prescriptions:                       Disp   Refills    cyclobenzaprine (FLEXERIL) 10 MG tablet [*30 tab*0            Sig: TAKE ONE TABLET BY MOUTH ONCE DAILY AS NEEDED          Last Written Prescription Date:  05/12/2017  Last Fill Quantity: 30,   # refills: 0  Last Office Visit with [facility], [facility] or [facility] prescribing provider: 06/22/2017  Future Office visit:    Next 5 appointments (look out 90 days)     Jun 30, 2017  1:30 PM CDT   Office Visit with Ivanna Vargas CNM   [facility]    [address]  [facility] [address]   [phone]                   Routing refill request to provider for review/approval because:  Drug not on the [facility], [facility] or [facility] refill protocol or controlled substance    Myron SCHULZT

## 2017-06-28 LAB
COPATH REPORT: ABNORMAL
PAP: ABNORMAL

## 2017-06-29 LAB
FINAL DIAGNOSIS: NORMAL
HPV HR 12 DNA CVX QL NAA+PROBE: NEGATIVE
HPV16 DNA SPEC QL NAA+PROBE: NEGATIVE
HPV18 DNA SPEC QL NAA+PROBE: NEGATIVE
SPECIMEN DESCRIPTION: NORMAL

## 2017-06-30 ENCOUNTER — OFFICE VISIT (OUTPATIENT)
Dept: OBGYN | Facility: CLINIC | Age: 57
End: 2017-06-30
Payer: COMMERCIAL

## 2017-06-30 VITALS
WEIGHT: 169 LBS | SYSTOLIC BLOOD PRESSURE: 148 MMHG | BODY MASS INDEX: 33.18 KG/M2 | DIASTOLIC BLOOD PRESSURE: 72 MMHG | HEIGHT: 60 IN

## 2017-06-30 VITALS
HEIGHT: 60 IN | TEMPERATURE: 98.2 F | SYSTOLIC BLOOD PRESSURE: 148 MMHG | BODY MASS INDEX: 33.3 KG/M2 | WEIGHT: 169.6 LBS | DIASTOLIC BLOOD PRESSURE: 72 MMHG

## 2017-06-30 DIAGNOSIS — N90.89 LABIAL LESION: Primary | ICD-10-CM

## 2017-06-30 DIAGNOSIS — N90.89 LESION OF VULVA: Primary | ICD-10-CM

## 2017-06-30 PROCEDURE — 99203 OFFICE O/P NEW LOW 30 MIN: CPT | Mod: 25 | Performed by: ADVANCED PRACTICE MIDWIFE

## 2017-06-30 PROCEDURE — 88305 TISSUE EXAM BY PATHOLOGIST: CPT | Performed by: OBSTETRICS & GYNECOLOGY

## 2017-06-30 PROCEDURE — 56605 BIOPSY OF VULVA/PERINEUM: CPT | Performed by: OBSTETRICS & GYNECOLOGY

## 2017-06-30 NOTE — NURSING NOTE
Chief Complaint   Patient presents with     Cyst     and a lesion in groin        Initial /72 (BP Location: Right arm, Patient Position: Chair, Cuff Size: Adult Large)  Temp 98.2  F (36.8  C) (Oral)  Ht 5' (1.524 m)  Wt 169 lb 9.6 oz (76.9 kg)  BMI 33.12 kg/m2 Estimated body mass index is 33.12 kg/(m^2) as calculated from the following:    Height as of this encounter: 5' (1.524 m).    Weight as of this encounter: 169 lb 9.6 oz (76.9 kg).  BP completed using cuff size: large        The following HM Due: mammogram      The following patient reported/Care Every where data was sent to:  P ABSTRACT QUALITY INITIATIVES [[id number]]       patient has appointment for today

## 2017-06-30 NOTE — MR AVS SNAPSHOT
After Visit Summary   6/30/2017    Vanessa Mota    MRN: [id number]           Patient Information     Date Of Birth          1960        Visit Information        Provider Department      6/30/2017 3:15 PM Brandee Reich MD [facility]        Today's Diagnoses     Labial lesion    -  1       Follow-ups after your visit        Who to contact     If you have questions or need follow up information about today's clinic visit or your schedule please contact [facility] directly at [phone].  Normal or non-critical lab and imaging results will be communicated to you by MyChart, letter or phone within 4 business days after the clinic has received the results. If you do not hear from us within 7 days, please contact the clinic through [employer] or phone. If you have a critical or abnormal lab result, we will notify you by phone as soon as possible.  Submit refill requests through [employer] or call your pharmacy and they will forward the refill request to us. Please allow 3 business days for your refill to be completed.          Additional Information About Your Visit        MyChart Information     [employer] gives you secure access to your electronic health record. If you see a primary care provider, you can also send messages to your care team and make appointments. If you have questions, please call your primary care clinic.  If you do not have a primary care provider, please call [phone] and they will assist you.        Care EveryWhere ID     This is your Care EveryWhere ID. This could be used by other organizations to access your [facility] medical records  [id number]        Your Vitals Were     Height BMI (Body Mass Index)                5' (1.524 m) 33.01 kg/m2           Blood Pressure from Last 3 Encounters:   06/30/17 148/72   06/30/17 148/72   06/22/17 121/70    Weight from Last 3 Encounters:   06/30/17 169 lb (76.7 kg)   06/30/17 169 lb 9.6 oz (76.9 kg)    06/22/17 168 lb 3.2 oz (76.3 kg)              We Performed the Following     BIOPSY VULVA/PERINEUM, ONE LESION        Primary Care Provider Office Phone # Fax #    Ramona Ann Aaseby-Aguilera, PA-C [phone]       [facility] [place] [address]  [facility] [address]        Equal Access to Services     HUSAM ROBERTSON : Hadii aad ku hadasho Soomaali, waaxda luqadaha, qaybta kaalmada adeegyada, waxay idiin hayaan adeeg kharash la'aan ah. So [facility] [phone].    ATENCIÓN: Si habla español, tiene a scott disposición servicios gratuitos de asistencia lingüística. Llame al [phone].    We comply with applicable federal civil rights laws and [place] laws. We do not discriminate on the basis of race, color, national origin, age, disability sex, sexual orientation or gender identity.            Thank you!     Thank you for choosing [facility]  for your care. Our goal is always to provide you with excellent care. Hearing back from our patients is one way we can continue to improve our services. Please take a few minutes to complete the written survey that you may receive in the mail after your visit with us. Thank you!             Your Updated Medication List - Protect others around you: Learn how to safely use, store and throw away your medicines at www.disposemymeds.org.          This list is accurate as of: 6/30/17  3:51 PM.  Always use your most recent med list.                   Brand Name Dispense Instructions for use Diagnosis    ACE/ARB NOT PRESCRIBED (INTENTIONAL)      by Other route continuous prn.        ALPRAZolam 0.25 MG tablet    XANAX    30 tablet    Take 1 tablet by mouth. As needed    Anxiety       aspirin 81 MG tablet     100    ONE DAILY    Other abnormal glucose       atorvastatin 20 MG tablet    LIPITOR    90 tablet    Take 1 tablet (20 mg) by mouth daily    Hyperlipidemia LDL goal <100       blood glucose lancing device     1 each    Use to test blood sugars 2  times daily or as directed.    Type 2 diabetes, HbA1c goal < 7% (H)       * blood glucose monitoring lancets     1 Box    Use to test blood sugar 2 times daily or as directed.  Dispense per insurance coverage    Type 2 diabetes, HbA1c goal < 7% (H)       * blood glucose monitoring lancets     1 Box    Use to test blood sugar 2 times daily or as directed.  Dispense Accu-check lancets per insurance coverage    Type 2 diabetes, HbA1c goal < 7% (H)       * blood glucose monitoring meter device kit     1 kit    Use to test blood sugars 2 times daily or as directed.    Type 2 diabetes, HbA1c goal < 7% (H)       * ACCU-CHEK COMPLETE Kit     1 Device    1 Device 2 times daily Dispense Accu-Chek meter per insurance coverage    Type 2 diabetes, HbA1c goal < 7% (H)       * blood glucose monitoring test strip    EncrypTix CONTOUR    100 strip    Use to test blood sugars 2 times daily or as directed.    Type 2 diabetes, HbA1c goal < 7% (H)       * blood glucose monitoring test strip    no brand specified    3 Month    Use to test blood sugar 2 times daily or as directed.  Dispense Accu-Chek strips per insurance coverage    Type 2 diabetes mellitus without complication (H)       * ACCU-CHEK ERI PLUS test strip   Generic drug:  blood glucose monitoring     200 strip    USE TO CHECK GLUCOSE TWICE DAILY AS DIRECTED    Type 2 diabetes mellitus without complication, with long-term current use of insulin (H)       BUSPAR 30 MG Tabs   Generic drug:  BusPIRone HCl     90 tablet    One tablet twice daily  -  60 mg daily    Anxiety, Mild major depression (H)       cyclobenzaprine 10 MG tablet    FLEXERIL    30 tablet    TAKE ONE TABLET BY MOUTH ONCE DAILY AS NEEDED    Fibromyalgia       CYMBALTA PO      Take 60 mg by mouth daily Two tablets once a day  -  120 mg        fish oil-omega-3 fatty acids 1000 MG capsule     100    increase to 4000mg per day    Dyslipidemia       gabapentin 100 MG capsule    NEURONTIN    180 capsule    Take 2  capsules (200 mg) by mouth 3 times daily    Fibromyalgia       glipiZIDE 10 MG 24 hr tablet    GLUCOTROL XL    180 tablet    Take 2 tablets (20 mg) by mouth daily    Type 2 diabetes mellitus without complication, with long-term current use of insulin (H)       hydrOXYzine 50 MG capsule    VISTARIL     Take 50 mg by mouth. Two tablets at bedtime        insulin glargine 100 UNIT/ML injection    LANTUS SOLOSTAR    1 mL    78 units at bedtime    Type 2 diabetes mellitus without complication, with long-term current use of insulin (H)       LAMOTRIGINE PO      Take 100 mg by mouth 2 times daily        lisinopril 10 MG tablet    PRINIVIL/ZESTRIL    90 tablet    Take 1 tablet (10 mg) by mouth daily    Benign essential hypertension       metFORMIN 500 MG tablet    GLUCOPHAGE    360 tablet    TAKE 2 TABLETS BY MOUTH TWICE DAILY WITH MEALS    Type 2 diabetes mellitus without complication, with long-term current use of insulin (H)       omeprazole 40 MG capsule    priLOSEC    90 capsule    Take 1 capsule (40 mg) by mouth daily    Heart burn       SUMAtriptan 100 MG tablet    IMITREX    10 tablet    TAKE 1 TABLET BY MOUTH ONCE DAILY MAY REPEAT IN 2 HOURS IF NEEDED. MAX 2 PER DAY    Migraine without aura and without status migrainosus, not intractable       SUMAtriptan 20 MG/ACT nasal spray    IMITREX    30 Inhaler    Spray 1 spray in nostril as needed for migraine.    Headache(784.0)       ULTICARE MINI 31G X 6 MM   Generic drug:  insulin pen needle     100 each    1 Units daily. Use 1 daily or as directed. At bedtime    Type 2 diabetes, HbA1c goal < 7% (H)       * Notice:  This list has 7 medication(s) that are the same as other medications prescribed for you. Read the directions carefully, and ask your doctor or other care provider to review them with you.

## 2017-06-30 NOTE — NURSING NOTE
Chief Complaint   Patient presents with     biopsy     vulvar       Initial /72  Ht 5' (1.524 m)  Wt 169 lb (76.7 kg)  BMI 33.01 kg/m2 Estimated body mass index is 33.01 kg/(m^2) as calculated from the following:    Height as of this encounter: 5' (1.524 m).    Weight as of this encounter: 169 lb (76.7 kg).  BP completed using cuff size: regular        The following HM Due: NONE      The following patient reported/Care Every where data was sent to:  P ABSTRACT QUALITY INITIATIVES [[id number]]  NA     patient has appointment for today    Ela AGUILAR

## 2017-06-30 NOTE — PROGRESS NOTES
INDICATIONS:                                                    Vanessa Mota is a 56 year old female that was found to have a new area of hyperpigmentation on the right posterior labia minora. Given that this was an acute change I recommended biopsy for tissue evaluation. Discussed risks, benefits, and alternatives.     Is a pregnancy test required: No.  Was a consent obtained?  Yes     PROCEDURE:                                                    /72  Ht 5' (1.524 m)  Wt 169 lb (76.7 kg)  BMI 33.01 kg/m2   The area was cleansed with lidocaine. the area was injected with 2 cc's of 1%  Lidocaine with epinephrine. After adequate anesthesia was reached, the skin was prepped with betadine and flattened with one hand and a sample of the abnormal area was made with a 4 mm punch biopsy instrument.  The skin disc was elevated and scissors used to cut the underlying tissue.  The specimen was placed in formalin and sent to pathology for evaluation.  Pressure was applied to the biopsy site to control bleeding. Silver nitrate was applied.  Hemostasis was adequate.     POST PROCEDURE:                                                      She was observed.  She tolerated the procedure well. There were no complications. Patient was discharged in stable condition.    Hot Sitz bath BID, no douching, tampons or intercourse.  Call if bleeding, swelling, pain, redness or fever occur.  Patient will be called with pathology results.    Brandee Reich MD

## 2017-06-30 NOTE — PROGRESS NOTES
SUBJECTIVE:                                                   Vanessa Mota is a 56 year old who presents to clinic today for the following health issue(s):  Patient presents with:  Cyst: and a lesion in groin       HPI:    Vanessa presents today for further evaluation of lesion on vulva that was discovered by primary care provider last week at her regular annual exam. She was unaware of the lesion until her provider noticed it during her pelvic exam. She denies any new soaps, lotions, or products. Vanessa does have a history of melanoma, which is why her primary referred her here.     No LMP recorded. Patient is postmenopausal.  Menstrual History: NA  Last pap: 17 ASCUS with negative HPV  .  Health maintenance updated:  yes  STI infx testing offered:  Declined    Last PHQ-9 score on record =   PHQ-9 SCORE 2017   Total Score -   Total Score 9     Last GAD7 score on record =   NATALIIA-7 SCORE 2016   Total Score -   Total Score 7         Problem list and histories reviewed & adjusted, as indicated.  Additional history: as documented.    Patient Active Problem List   Diagnosis     Headache     Myalgia and myositis     ASCUS favor benign     Insomnia     Obesity     Disturbance in sleep behavior     Fatty liver     Anxiety     Mild major depression (H)     Snoring     HYPERLIPIDEMIA LDL GOAL <100     Dry mouth     Fibromyalgia     Migraine headache     obesity     Pure hypercholesterolemia     Hypertension goal BP (blood pressure) < 140/90     Elevated LFTs     BMI 38.0-38.9,adult     Elevated testosterone level in female     Type 2 diabetes mellitus without complication, with long-term current use of insulin (H)     Past Surgical History:   Procedure Laterality Date     C FULL ROUT OBSTE CARE,VAGINAL DELIV       x 4     C INDUCED ABORTN BY D&C      once 12 years ago      Social History   Substance Use Topics     Smoking status: Light Tobacco Smoker     Packs/day: 0.20     Years: 3.00     Types:  Cigarettes     Last attempt to quit: 1/15/2013     Smokeless tobacco: Never Used      Comment: e cig   - three puffs a day      Alcohol use Yes      Comment: rare      Problem (# of Occurrences) Relation (Name,Age of Onset)    Arthritis (1) Maternal Grandmother    Blood Disease (1) Brother: hiv positive, homosextual and alcoholism and drug abuse    DIABETES (3) Brother: 2 brothers, Father, Maternal Grandfather    Family History Negative (2) Mother, Father    Hypertension (1) Maternal Grandmother    Prostate Cancer (1) Maternal Grandfather    Respiratory (1) Brother: sleep apnea, htn and dyslipidmia       Negative family history of: Breast Cancer, Cancer - colorectal            Current Outpatient Prescriptions   Medication Sig     cyclobenzaprine (FLEXERIL) 10 MG tablet TAKE ONE TABLET BY MOUTH ONCE DAILY AS NEEDED     LAMOTRIGINE PO Take 100 mg by mouth 2 times daily     lisinopril (PRINIVIL/ZESTRIL) 10 MG tablet Take 1 tablet (10 mg) by mouth daily     metFORMIN (GLUCOPHAGE) 500 MG tablet TAKE 2 TABLETS BY MOUTH TWICE DAILY WITH MEALS     glipiZIDE (GLUCOTROL XL) 10 MG 24 hr tablet Take 2 tablets (20 mg) by mouth daily     atorvastatin (LIPITOR) 20 MG tablet Take 1 tablet (20 mg) by mouth daily     gabapentin (NEURONTIN) 100 MG capsule Take 2 capsules (200 mg) by mouth 3 times daily     insulin glargine (LANTUS SOLOSTAR) 100 UNIT/ML injection 78 units at bedtime     SUMAtriptan (IMITREX) 100 MG tablet TAKE 1 TABLET BY MOUTH ONCE DAILY MAY REPEAT IN 2 HOURS IF NEEDED. MAX 2 PER DAY     ACCU-CHEK ERI PLUS test strip USE TO CHECK GLUCOSE TWICE DAILY AS DIRECTED     blood glucose monitoring (NO BRAND SPECIFIED) test strip Use to test blood sugar 2 times daily or as directed.  Dispense Accu-Chek strips per insurance coverage     omeprazole (PRILOSEC) 40 MG capsule Take 1 capsule (40 mg) by mouth daily     DULoxetine HCl (CYMBALTA PO) Take 60 mg by mouth daily Two tablets once a day  -  120 mg     Blood Glucose  Monitoring Suppl (ACCU-CHEK COMPLETE) KIT 1 Device 2 times daily Dispense Accu-Chek meter per insurance coverage     blood glucose monitoring (ACCU-CHEK MULTICLIX) lancets Use to test blood sugar 2 times daily or as directed.  Dispense Accu-check lancets per insurance coverage     blood glucose monitoring (CANDIDA MICROLET) lancets Use to test blood sugar 2 times daily or as directed.  Dispense per insurance coverage     blood glucose (CANDIDA CONTOUR) test strip Use to test blood sugars 2 times daily or as directed.     Blood Glucose Monitoring Suppl ([employer] CONTOUR MONITOR) W/DEVICE KIT Use to test blood sugars 2 times daily or as directed.     Lancet Devices (CANDIDA MICROLET 2 LANCING DEVIC) MISC Use to test blood sugars 2 times daily or as directed.     BusPIRone HCl (BUSPAR) 30 MG TABS One tablet twice daily  -  60 mg daily     Insulin Pen Needle (ULTICARE MINI PEN NEEDLES) 31G X 6 MM MISC 1 Units daily. Use 1 daily or as directed. At bedtime     hydrOXYzine (VISTARIL) 50 MG capsule Take 50 mg by mouth. Two tablets at bedtime     ALPRAZolam (XANAX) 0.25 MG tablet Take 1 tablet by mouth. As needed     SUMAtriptan (IMITREX) 20 MG/ACT nasal spray Spray 1 spray in nostril as needed for migraine.     ACE/ARB NOT PRESCRIBED, INTENTIONAL, by Other route continuous prn.     FISH OIL 1000 MG OR CAPS increase to 4000mg per day     ASPIRIN 81 MG OR TABS ONE DAILY     No current facility-administered medications for this visit.      Allergies   Allergen Reactions     Wellbutrin [Bupropion Hcl] Rash       ROS:  C: NEGATIVE for fever, chills, change in weight  I: NEGATIVE for worrisome rashes, moles or lesions  E: NEGATIVE for vision changes or irritation  ENT: NEGATIVE for ear, mouth and throat problems  R: NEGATIVE for significant cough or SOB  B: NEGATIVE for masses, tenderness or discharge  CV: NEGATIVE for chest pain, palpitations or peripheral edema  GI: NEGATIVE for nausea, abdominal pain, heartburn, or change in bowel  habits  : NEGATIVE for unusual urinary or vaginal symptoms. Periods are regular.  M: NEGATIVE for significant arthralgias or myalgia  N: NEGATIVE for weakness, dizziness or paresthesias  E: NEGATIVE for temperature intolerance, skin/hair changes  H: NEGATIVE for bleeding problems  P: NEGATIVE for changes in mood or affect    OBJECTIVE:     /72 (BP Location: Right arm, Patient Position: Chair, Cuff Size: Adult Large)  Temp 98.2  F (36.8  C) (Oral)  Ht 5' (1.524 m)  Wt 169 lb 9.6 oz (76.9 kg)  BMI 33.12 kg/m2  Body mass index is 33.12 kg/(m^2).    PHYSICAL EXAM:  Constitutional:  Appearance: Well nourished, well developed alert, in no acute distress  Chest:  Respiratory Effort:  Breathing unlabored  Neurologic/Psychiatric:  Mental Status:  Oriented X3   Pelvic Exam:  External Genitalia:     Normal appearance for age, no discharge present, no tenderness present, no inflammatory lesions present, color normal  Vagina:     Normal vaginal vault without central or paravaginal defects, no discharge present, slight enlargement of bartholins cyst on left side (1cm in diameter), no masses present  Bladder:     Nontender to palpation  Urethra:   Urethral Body:  Urethra palpation normal, urethra structural support normal   Urethral Meatus:  No erythema or lesions present  Perineum:     Perineum within normal limits, no evidence of trauma, no rashes present. Brownish coloration of lower labia minora on right side.  Anus:     Anus within normal limits, no hemorrhoids present  Inguinal Lymph Nodes:     No lymphadenopathy present  Pubic Hair:     Normal pubic hair distribution for age      In-Clinic Test Results:  No results found for this or any previous visit (from the past 24 hour(s)).    ASSESSMENT/PLAN:                                                        ICD-10-CM    1. Lesion of vulva N90.89        PLAN:    Consult with Dr. Reich. Patient scheduled to see Dr. Reich later in the afternoon for a biopsy of the  lesion. Patient agreed to return to clinic for further evaluation.  Patient given written and verbal information regarding Bartholin's Cyst.      SIA Alaniz, MESSIM

## 2017-06-30 NOTE — MR AVS SNAPSHOT
After Visit Summary   6/30/2017    Vanessa Mota    MRN: [id number]           Patient Information     Date Of Birth          1960        Visit Information        Provider Department      6/30/2017 1:30 PM Ivanna Vargas CNM [facility]        Today's Diagnoses     Lesion of vulva    -  1       Follow-ups after your visit        Follow-up notes from your care team     Return for punch biopsy of vulvar lesion.      Your next 10 appointments already scheduled     Jun 30, 2017  3:15 PM CDT   SHORT with Brandee Reich MD   [facility] ([facility])    [address]  [facility] [id number] [phone]              Who to contact     If you have questions or need follow up information about today's clinic visit or your schedule please contact [facility] directly at [phone].  Normal or non-critical lab and imaging results will be communicated to you by MyChart, letter or phone within 4 business days after the clinic has received the results. If you do not hear from us within 7 days, please contact the clinic through MyChart or phone. If you have a critical or abnormal lab result, we will notify you by phone as soon as possible.  Submit refill requests through [employer] or call your pharmacy and they will forward the refill request to us. Please allow 3 business days for your refill to be completed.          Additional Information About Your Visit        MyChart Information     [employer] gives you secure access to your electronic health record. If you see a primary care provider, you can also send messages to your care team and make appointments. If you have questions, please call your primary care clinic.  If you do not have a primary care provider, please call [phone] and they will assist you.        Care EveryWhere ID     This is your Care EveryWhere ID. This could be used by other organizations to access your  [place] medical records  [id number]        Your Vitals Were     Temperature Height BMI (Body Mass Index)             98.2  F (36.8  C) (Oral) 5' (1.524 m) 33.12 kg/m2          Blood Pressure from Last 3 Encounters:   06/30/17 148/72   06/22/17 121/70   11/08/16 120/68    Weight from Last 3 Encounters:   06/30/17 169 lb 9.6 oz (76.9 kg)   06/22/17 168 lb 3.2 oz (76.3 kg)   11/08/16 181 lb 1.6 oz (82.1 kg)              Today, you had the following     No orders found for display       Primary Care Provider Office Phone # Fax #    Ramona Ann Aaseby-Aguilera, PA-C [phone]       [facility] [address] [address] [facility] [address]        Equal Access to Services     HUSAM ROBERTSON : Hadii aad ku hadasho Soomaali, waaxda luqadaha, qaybta kaalmada adeegyada, waxay idiin hayaan jefferson kharash sadie . So [facility] [phone].    ATENCIÓN: Si habla español, tiene a scott disposición servicios gratuitos de asistencia lingüística. David al [phone].    We comply with applicable federal civil rights laws and [place] laws. We do not discriminate on the basis of race, color, national origin, age, disability sex, sexual orientation or gender identity.            Thank you!     Thank you for choosing [facility]  for your care. Our goal is always to provide you with excellent care. Hearing back from our patients is one way we can continue to improve our services. Please take a few minutes to complete the written survey that you may receive in the mail after your visit with us. Thank you!             Your Updated Medication List - Protect others around you: Learn how to safely use, store and throw away your medicines at www.disposemymeds.org.          This list is accurate as of: 6/30/17  2:46 PM.  Always use your most recent med list.                   Brand Name Dispense Instructions for use Diagnosis    ACE/ARB NOT PRESCRIBED (INTENTIONAL)      by Other route continuous prn.         ALPRAZolam 0.25 MG tablet    XANAX    30 tablet    Take 1 tablet by mouth. As needed    Anxiety       aspirin 81 MG tablet     100    ONE DAILY    Other abnormal glucose       atorvastatin 20 MG tablet    LIPITOR    90 tablet    Take 1 tablet (20 mg) by mouth daily    Hyperlipidemia LDL goal <100       blood glucose lancing device     1 each    Use to test blood sugars 2 times daily or as directed.    Type 2 diabetes, HbA1c goal < 7% (H)       * blood glucose monitoring lancets     1 Box    Use to test blood sugar 2 times daily or as directed.  Dispense per insurance coverage    Type 2 diabetes, HbA1c goal < 7% (H)       * blood glucose monitoring lancets     1 Box    Use to test blood sugar 2 times daily or as directed.  Dispense Accu-check lancets per insurance coverage    Type 2 diabetes, HbA1c goal < 7% (H)       * blood glucose monitoring meter device kit     1 kit    Use to test blood sugars 2 times daily or as directed.    Type 2 diabetes, HbA1c goal < 7% (H)       * ACCU-CHEK COMPLETE Kit     1 Device    1 Device 2 times daily Dispense Accu-Chek meter per insurance coverage    Type 2 diabetes, HbA1c goal < 7% (H)       * blood glucose monitoring test strip    CANDIDA CONTOUR    100 strip    Use to test blood sugars 2 times daily or as directed.    Type 2 diabetes, HbA1c goal < 7% (H)       * blood glucose monitoring test strip    no brand specified    3 Month    Use to test blood sugar 2 times daily or as directed.  Dispense Accu-Chek strips per insurance coverage    Type 2 diabetes mellitus without complication (H)       * ACCU-CHEK ERI PLUS test strip   Generic drug:  blood glucose monitoring     200 strip    USE TO CHECK GLUCOSE TWICE DAILY AS DIRECTED    Type 2 diabetes mellitus without complication, with long-term current use of insulin (H)       BUSPAR 30 MG Tabs   Generic drug:  BusPIRone HCl     90 tablet    One tablet twice daily  -  60 mg daily    Anxiety, Mild major depression (H)        cyclobenzaprine 10 MG tablet    FLEXERIL    30 tablet    TAKE ONE TABLET BY MOUTH ONCE DAILY AS NEEDED    Fibromyalgia       CYMBALTA PO      Take 60 mg by mouth daily Two tablets once a day  -  120 mg        fish oil-omega-3 fatty acids 1000 MG capsule     100    increase to 4000mg per day    Dyslipidemia       gabapentin 100 MG capsule    NEURONTIN    180 capsule    Take 2 capsules (200 mg) by mouth 3 times daily    Fibromyalgia       glipiZIDE 10 MG 24 hr tablet    GLUCOTROL XL    180 tablet    Take 2 tablets (20 mg) by mouth daily    Type 2 diabetes mellitus without complication, with long-term current use of insulin (H)       hydrOXYzine 50 MG capsule    VISTARIL     Take 50 mg by mouth. Two tablets at bedtime        insulin glargine 100 UNIT/ML injection    LANTUS SOLOSTAR    1 mL    78 units at bedtime    Type 2 diabetes mellitus without complication, with long-term current use of insulin (H)       LAMOTRIGINE PO      Take 100 mg by mouth 2 times daily        lisinopril 10 MG tablet    PRINIVIL/ZESTRIL    90 tablet    Take 1 tablet (10 mg) by mouth daily    Benign essential hypertension       metFORMIN 500 MG tablet    GLUCOPHAGE    360 tablet    TAKE 2 TABLETS BY MOUTH TWICE DAILY WITH MEALS    Type 2 diabetes mellitus without complication, with long-term current use of insulin (H)       omeprazole 40 MG capsule    priLOSEC    90 capsule    Take 1 capsule (40 mg) by mouth daily    Heart burn       SUMAtriptan 100 MG tablet    IMITREX    10 tablet    TAKE 1 TABLET BY MOUTH ONCE DAILY MAY REPEAT IN 2 HOURS IF NEEDED. MAX 2 PER DAY    Migraine without aura and without status migrainosus, not intractable       SUMAtriptan 20 MG/ACT nasal spray    IMITREX    30 Inhaler    Spray 1 spray in nostril as needed for migraine.    Headache(784.0)       ULTICARE MINI 31G X 6 MM   Generic drug:  insulin pen needle     100 each    1 Units daily. Use 1 daily or as directed. At bedtime    Type 2 diabetes, HbA1c goal < 7% (H)        * Notice:  This list has 7 medication(s) that are the same as other medications prescribed for you. Read the directions carefully, and ask your doctor or other care provider to review them with you.

## 2017-07-03 LAB — COPATH REPORT: NORMAL

## 2017-07-23 DIAGNOSIS — M79.7 FIBROMYALGIA: ICD-10-CM

## 2017-07-24 RX ORDER — CYCLOBENZAPRINE HCL 10 MG
TABLET ORAL
Qty: 30 TABLET | Refills: 0 | Status: SHIPPED | OUTPATIENT
Start: 2017-07-24 | End: 2017-09-28

## 2017-08-01 DIAGNOSIS — G43.009 MIGRAINE WITHOUT AURA AND WITHOUT STATUS MIGRAINOSUS, NOT INTRACTABLE: ICD-10-CM

## 2017-08-01 RX ORDER — SUMATRIPTAN 100 MG/1
TABLET, FILM COATED ORAL
Qty: 10 TABLET | Refills: 0 | Status: SHIPPED | OUTPATIENT
Start: 2017-08-01 | End: 2017-12-27

## 2017-08-01 NOTE — TELEPHONE ENCOUNTER
Pending Prescriptions:                       Disp   Refills    SUMAtriptan (IMITREX) 100 MG tablet [Phar*10 tab*0            Sig: TAKE ONE TABLET BY MOUTH ONCE DAILY MAY  REPEAT            IN  2  HOURS  IF  NEEDED.  MAX  OF  2  TABLETS            PER  DAY.          Last Written Prescription Date: 05/22/2017  Last Fill Quantity: 10, # refills: 0  Last Office Visit with [facility], [facility] or [facility] prescribing provider: 06/22/2017       BP Readings from Last 3 Encounters:   06/30/17 148/72   06/30/17 148/72   06/22/17 121/70     Myron DAVIS

## 2017-08-01 NOTE — TELEPHONE ENCOUNTER
Routing refill request to provider for review/approval because:  Labs out of range:  BP elevated  Kelvin Thrasher RN, BSN

## 2017-08-21 DIAGNOSIS — E11.9 TYPE 2 DIABETES MELLITUS WITHOUT COMPLICATION (H): ICD-10-CM

## 2017-08-21 DIAGNOSIS — E11.9 TYPE 2 DIABETES, HBA1C GOAL < 7% (H): ICD-10-CM

## 2017-08-21 NOTE — TELEPHONE ENCOUNTER
Pt misplaced glucose meter and needs new one called to pharmacy    RX sent per RN protocol    Fatuma Lewis, RN

## 2017-08-26 ENCOUNTER — HEALTH MAINTENANCE LETTER (OUTPATIENT)
Age: 57
End: 2017-08-26

## 2017-09-08 ENCOUNTER — TELEPHONE (OUTPATIENT)
Dept: FAMILY MEDICINE | Facility: CLINIC | Age: 57
End: 2017-09-08

## 2017-09-08 DIAGNOSIS — E11.9 TYPE 2 DIABETES MELLITUS WITHOUT COMPLICATION (H): ICD-10-CM

## 2017-09-08 DIAGNOSIS — Z79.4 TYPE 2 DIABETES MELLITUS WITHOUT COMPLICATION, WITH LONG-TERM CURRENT USE OF INSULIN (H): ICD-10-CM

## 2017-09-08 DIAGNOSIS — E11.9 TYPE 2 DIABETES MELLITUS WITHOUT COMPLICATION, WITH LONG-TERM CURRENT USE OF INSULIN (H): ICD-10-CM

## 2017-09-08 DIAGNOSIS — E11.9 TYPE 2 DIABETES, HBA1C GOAL < 7% (H): ICD-10-CM

## 2017-09-08 NOTE — TELEPHONE ENCOUNTER
Call from pt she is now needing RX to be sent to [facility].     She is going to set up account and then call back to have meds sent in     Fatuma Lewis RN

## 2017-09-12 ENCOUNTER — TELEPHONE (OUTPATIENT)
Dept: FAMILY MEDICINE | Facility: CLINIC | Age: 57
End: 2017-09-12

## 2017-09-12 NOTE — TELEPHONE ENCOUNTER
Panel Management Review      Patient has the following on her problem list:     Diabetes    ASA: Passed    Last A1C  Lab Results   Component Value Date    A1C 6.8 06/22/2017    A1C 6.6 06/13/2016    A1C 6.9 11/03/2015    A1C 8.5 09/01/2015    A1C 10.1 07/03/2015     A1C tested: Passed    Last LDL:    Lab Results   Component Value Date    CHOL 134 06/22/2017     Lab Results   Component Value Date    HDL 48 06/22/2017     Lab Results   Component Value Date    LDL 58 06/22/2017     Lab Results   Component Value Date    TRIG 139 06/22/2017     Lab Results   Component Value Date    CHOLHDLRATIO 2.6 02/10/2015     Lab Results   Component Value Date    NHDL 86 06/22/2017       Is the patient on a Statin? YES             Is the patient on Aspirin? YES    Medications     HMG CoA Reductase Inhibitors    atorvastatin (LIPITOR) 20 MG tablet    Salicylates    ASPIRIN 81 MG OR TABS          Last three blood pressure readings:  BP Readings from Last 3 Encounters:   06/30/17 148/72   06/30/17 148/72   06/22/17 121/70       Date of last diabetes office visit: 06/22/2017     Tobacco History:     History   Smoking Status     Light Tobacco Smoker     Packs/day: 0.20     Years: 3.00     Types: Cigarettes     Last attempt to quit: 1/15/2013   Smokeless Tobacco     Never Used     Comment: e cig   - three puffs a day              Composite cancer screening  Chart review shows that this patient is due/due soon for the following Mammogram and Colonoscopy  Summary:    Patient is due/failing the following:   COLONOSCOPY and LDL    Action needed:   Patient needs referral/order: mammogram and colonoscopy    Type of outreach:     pt. Declined at this time     Questions for provider review:    None                                                                                                                                    db     Chart routed to  .

## 2017-09-28 DIAGNOSIS — M79.7 FIBROMYALGIA: ICD-10-CM

## 2017-09-28 DIAGNOSIS — G43.009 MIGRAINE WITHOUT AURA AND WITHOUT STATUS MIGRAINOSUS, NOT INTRACTABLE: ICD-10-CM

## 2017-09-28 RX ORDER — CYCLOBENZAPRINE HCL 10 MG
TABLET ORAL
Qty: 30 TABLET | Refills: 0 | Status: SHIPPED | OUTPATIENT
Start: 2017-09-28 | End: 2017-10-28

## 2017-09-28 RX ORDER — SUMATRIPTAN 100 MG/1
TABLET, FILM COATED ORAL
Qty: 10 TABLET | Refills: 0 | Status: SHIPPED | OUTPATIENT
Start: 2017-09-28 | End: 2017-11-11

## 2017-09-28 NOTE — TELEPHONE ENCOUNTER
Routing refill request to provider for review/approval because:  Grazyna given x1 and patient did not follow up, please advise

## 2017-09-28 NOTE — TELEPHONE ENCOUNTER
Pending Prescriptions:                       Disp   Refills    SUMAtriptan (IMITREX) 100 MG tablet [Phar*10 tab*0            Sig: TAKE ONE TABLET BY MOUTH ONCE DAILY MAY  REPEAT            IN  2  HOURS  IF  NEEDED.  MAX  OF  2  TABLETS            PER  DAY.    cyclobenzaprine (FLEXERIL) 10 MG tablet [*30 tab*0            Sig: TAKE ONE TABLET BY MOUTH ONCE DAILY AS NEEDED    Sumatriptan        Last Written Prescription Date: 08/01/2017  Last Fill Quantity: 10, # refills: 0  Last Office Visit with [facility], [facility] or  [facility] prescribing provider: 06/22/2017       BP Readings from Last 3 Encounters:   06/30/17 148/72   06/30/17 148/72   06/22/17 121/70     Cyclobenzaprine        Last Written Prescription Date:  07/24/2017  Last Fill Quantity: 30,   # refills: 0  Last Office Visit with [facility], [facility] or  [facility] prescribing provider:   Future Office visit:       Routing refill request to provider for review/approval because:  Drug not on the [facility], [facility] or  [facility] refill protocol or controlled substance    Myron DAVIS

## 2017-10-28 DIAGNOSIS — M79.7 FIBROMYALGIA: ICD-10-CM

## 2017-10-28 NOTE — TELEPHONE ENCOUNTER
CYCLOBENZAPRINE (FLEXERIL) 10 MG TABLET      Last Written Prescription Date:  9/28/2017  Last Fill Quantity: 30,   # refills: 0  Last Office visit: 6/22/2017, Aaseby-Aguilera Routing refill request to provider for review/approval because:  Drug not on the [facility], [facility] or [facility] refill protocol or controlled substance

## 2017-10-30 RX ORDER — CYCLOBENZAPRINE HCL 10 MG
TABLET ORAL
Qty: 30 TABLET | Refills: 0 | Status: SHIPPED | OUTPATIENT
Start: 2017-10-30 | End: 2017-12-06

## 2017-10-30 NOTE — TELEPHONE ENCOUNTER
Routing refill request to provider for review/approval because:  Grazyna given x2 and patient did not follow up, please advise  Drug not on the [facility] refill protocol   Kelvin Thrasher RN, BSN

## 2017-11-06 ENCOUNTER — TELEPHONE (OUTPATIENT)
Dept: OBGYN | Facility: CLINIC | Age: 57
End: 2017-11-06

## 2017-11-06 NOTE — LETTER
[place] [address]  [facility] [address]  [phone]  November 6, 2017    Vanessa Mota  [address]  [facility] [address]    Dear Vanessa,    I care about your health and have reviewed your health plan. I have reviewed your medical conditions, medication list, and lab results and am making recommendations based on this review, to better manage your health.    You are in particular need of attention regarding:  -Breast Cancer Screening    I am recommending that you:  {recommendations:-schedule a MAMMOGRAM which is due. We have mammogram available at our clinic; Tuesday 8:00am-11:30am or Wednesday 2:00pm-4:15pm- to schedule call [phone].   Please disregard this reminder if you have had this exam elsewhere within the last year.  It would be helpful for us to have a copy of your mammogram report in our file so that we can best coordinate your care.    Here is a list of Health Maintenance topics that are due now or due soon:  Health Maintenance Due   Topic Date Due     FIT Q1 YR  07/09/1971     COLONOSCOPY Q10 YR  07/09/1973     URINE DRUG SCREEN Q1 YR  07/09/1976     MAMMO Q1 YR  07/09/1980     DEXA Q3 YR  07/09/1991     DEPRESSION ACTION PLAN Q1 YR  07/03/2016     ADVANCE DIRECTIVE PLANNING Q5 YRS  07/09/2016     EYE EXAM Q1 YEAR  02/01/2017     INFLUENZA VACCINE (SYSTEM ASSIGNED)  09/01/2017       Please call us at [phone] (or use [employer]) to address the above recommendations.     Thank you for trusting [facility] and we appreciate the opportunity to serve you.  We look forward to supporting your healthcare needs in the future.    Healthy Regards,    Brandee Reich MD

## 2017-11-06 NOTE — TELEPHONE ENCOUNTER
Panel Management Review           Composite cancer screening  Chart review shows that this patient is due/due soon for the following Mammogram  Summary:    Patient is due/failing the following:   MAMMOGRAM    Action needed:   Patient needs referral/order: Mammogram    Type of outreach:    Sent letter.    Questions for provider review:    None                                                                                                                                    Evelyn Marmolejo CMA       Chart Closed .

## 2017-11-11 DIAGNOSIS — G43.009 MIGRAINE WITHOUT AURA AND WITHOUT STATUS MIGRAINOSUS, NOT INTRACTABLE: ICD-10-CM

## 2017-11-13 RX ORDER — SUMATRIPTAN 100 MG/1
TABLET, FILM COATED ORAL
Qty: 10 TABLET | Refills: 0 | Status: SHIPPED | OUTPATIENT
Start: 2017-11-13 | End: 2018-01-09

## 2017-11-13 NOTE — TELEPHONE ENCOUNTER
Routing refill request to provider for review/approval because:  Grazyna given x2 and patient did not follow up, please advise  Kelvin Thrasher RN, BSN

## 2017-12-06 DIAGNOSIS — M79.7 FIBROMYALGIA: ICD-10-CM

## 2017-12-06 NOTE — TELEPHONE ENCOUNTER
Pt has been given 2 aki refills.  Spoke with patient and scheduled for 12/11/17\  Do you want to give any more before appt  Kelvin Thrasher RN, BSN

## 2017-12-07 RX ORDER — CYCLOBENZAPRINE HCL 10 MG
TABLET ORAL
Qty: 30 TABLET | Refills: 0 | Status: SHIPPED | OUTPATIENT
Start: 2017-12-07 | End: 2018-01-09

## 2017-12-22 ENCOUNTER — TELEPHONE (OUTPATIENT)
Dept: FAMILY MEDICINE | Facility: CLINIC | Age: 57
End: 2017-12-22

## 2017-12-22 NOTE — TELEPHONE ENCOUNTER
"12/22/2017      Patient Communication Preferences indicate  Do not contact  and/or communication by \"Phone\" is not preferred. Call not required per Outreach team.      Outreach ,  Alejandra Forte     "

## 2017-12-27 DIAGNOSIS — M79.7 FIBROMYALGIA: ICD-10-CM

## 2017-12-27 DIAGNOSIS — G43.009 MIGRAINE WITHOUT AURA AND WITHOUT STATUS MIGRAINOSUS, NOT INTRACTABLE: ICD-10-CM

## 2017-12-27 NOTE — TELEPHONE ENCOUNTER
Routing refill request to provider for review/approval because:  Grazyna given x3 and patient did not follow up, please advise  Pt was informed she needed to be seen and no showed to her 12/11/17 appt after she got her refill of flexeril    [place] reviewed:  reviewed- recommend provider review    [place]:  [email or web address]      Kelvin Thrasher RN, BSN

## 2017-12-27 NOTE — TELEPHONE ENCOUNTER
gabapentin (NEURONTIN) 100 MG capsule  Controlled Substance Refill Request for   Problem List Complete:  No     PROVIDER TO CONSIDER COMPLETION OF PROBLEM LIST AND OVERVIEW/CONTROLLED SUBSTANCE AGREEMENT    Last Written Prescription Date:  06/22/2017  Last Fill Quantity: 180,   # refills: 5    Last Office Visit with [facility] primary care provider: 06/22/2017    Future Office visit:     Controlled substance agreement on file: No.     Processing:  Fax Rx to [facility] pharmacy     checked in past 6 months?  No, route to RN       SUMAtriptan (IMITREX) 100 MG tablet        Last Written Prescription Date:  11/13/2017  Last Fill Quantity: 10,   # refills: 0  Last Office Visit: 06/22/2017  Future Office visit:       Routing refill request to provider for review/approval because:  Drug not on the [facility], P or  Health refill protocol or controlled substance    Myron DAVIS

## 2017-12-28 RX ORDER — SUMATRIPTAN 100 MG/1
TABLET, FILM COATED ORAL
Qty: 10 TABLET | Refills: 0 | Status: SHIPPED | OUTPATIENT
Start: 2017-12-28 | End: 2018-02-14

## 2017-12-28 RX ORDER — GABAPENTIN 100 MG/1
CAPSULE ORAL
Qty: 180 CAPSULE | Refills: 5 | Status: SHIPPED | OUTPATIENT
Start: 2017-12-28 | End: 2018-06-20

## 2018-01-04 DIAGNOSIS — M79.7 FIBROMYALGIA: ICD-10-CM

## 2018-01-04 RX ORDER — CYCLOBENZAPRINE HCL 10 MG
TABLET ORAL
Qty: 30 TABLET | Refills: 0 | OUTPATIENT
Start: 2018-01-04

## 2018-01-04 NOTE — TELEPHONE ENCOUNTER
Controlled Substance Refill Request for cyclobenzaprine (FLEXERIL) 10 MG tablet  Problem List Complete:  No     PROVIDER TO CONSIDER COMPLETION OF PROBLEM LIST AND OVERVIEW/CONTROLLED SUBSTANCE AGREEMENT    Last Written Prescription Date:  12/07/2017  Last Fill Quantity: 30 TABLET,   # refills: 0    Last Office Visit with [facility] primary care provider: 06/22/2017    Future Office visit:     Controlled substance agreement on file: No.     Processing:  Fax Rx to [facility] pharmacy     checked in past 6 months?  No, route to CONNER DAVIS

## 2018-01-04 NOTE — TELEPHONE ENCOUNTER
LMTRC    Pt is past due for DM appt.  No showed to her appt on 12/11/17 with PCP and have been given 3 aki refills    Kelvin Thrasher RN, BSN

## 2018-01-09 ENCOUNTER — OFFICE VISIT (OUTPATIENT)
Dept: FAMILY MEDICINE | Facility: CLINIC | Age: 58
End: 2018-01-09
Payer: COMMERCIAL

## 2018-01-09 VITALS
HEART RATE: 74 BPM | WEIGHT: 172.7 LBS | DIASTOLIC BLOOD PRESSURE: 78 MMHG | SYSTOLIC BLOOD PRESSURE: 142 MMHG | OXYGEN SATURATION: 97 % | HEIGHT: 60 IN | BODY MASS INDEX: 33.91 KG/M2 | TEMPERATURE: 97.9 F

## 2018-01-09 DIAGNOSIS — E11.9 TYPE 2 DIABETES MELLITUS WITHOUT COMPLICATION, WITH LONG-TERM CURRENT USE OF INSULIN (H): Primary | ICD-10-CM

## 2018-01-09 DIAGNOSIS — I10 ESSENTIAL HYPERTENSION: ICD-10-CM

## 2018-01-09 DIAGNOSIS — Z79.4 TYPE 2 DIABETES MELLITUS WITHOUT COMPLICATION, WITH LONG-TERM CURRENT USE OF INSULIN (H): Primary | ICD-10-CM

## 2018-01-09 DIAGNOSIS — M79.7 FIBROMYALGIA: ICD-10-CM

## 2018-01-09 LAB — HBA1C MFR BLD: 7 % (ref 4.3–6)

## 2018-01-09 PROCEDURE — 36415 COLL VENOUS BLD VENIPUNCTURE: CPT | Performed by: PHYSICIAN ASSISTANT

## 2018-01-09 PROCEDURE — 83036 HEMOGLOBIN GLYCOSYLATED A1C: CPT | Performed by: PHYSICIAN ASSISTANT

## 2018-01-09 PROCEDURE — 99214 OFFICE O/P EST MOD 30 MIN: CPT | Performed by: PHYSICIAN ASSISTANT

## 2018-01-09 PROCEDURE — 80053 COMPREHEN METABOLIC PANEL: CPT | Performed by: PHYSICIAN ASSISTANT

## 2018-01-09 RX ORDER — LISINOPRIL 20 MG/1
20 TABLET ORAL DAILY
Qty: 30 TABLET | Refills: 1 | Status: SHIPPED | OUTPATIENT
Start: 2018-01-09 | End: 2018-02-12

## 2018-01-09 RX ORDER — CYCLOBENZAPRINE HCL 10 MG
TABLET ORAL
Qty: 30 TABLET | Refills: 0 | Status: SHIPPED | OUTPATIENT
Start: 2018-01-09 | End: 2018-02-07

## 2018-01-09 ASSESSMENT — PATIENT HEALTH QUESTIONNAIRE - PHQ9: SUM OF ALL RESPONSES TO PHQ QUESTIONS 1-9: 7

## 2018-01-09 NOTE — PATIENT INSTRUCTIONS
(E11.9,  Z79.4) Type 2 diabetes mellitus without complication, with long-term current use of insulin (H)  (primary encounter diagnosis)  Comment:   Plan: Hemoglobin A1c            (I10) Essential hypertension  Comment: well increase lisinopril from 10 mg to 20 mg .   Will have come in for nurse only BP check x 2 and then follow-up for office visit in 1 month with BP diary.      Plan: lisinopril (PRINIVIL/ZESTRIL) 20 MG tablet,         Comprehensive metabolic panel            (M79.7) Fibromyalgia  Comment:   Plan: cyclobenzaprine (FLEXERIL) 10 MG tablet

## 2018-01-09 NOTE — MR AVS SNAPSHOT
After Visit Summary   1/9/2018    Vanessa Mota    MRN: [id number]           Patient Information     Date Of Birth          1960        Visit Information        Provider Department      1/9/2018 11:30 AM Aaseby-Aguilera, Ramona Ann, PA-C [facility]        Today's Diagnoses     Type 2 diabetes mellitus without complication, with long-term current use of insulin (H)    -  1    Essential hypertension        Fibromyalgia          Care Instructions    (E11.9,  Z79.4) Type 2 diabetes mellitus without complication, with long-term current use of insulin (H)  (primary encounter diagnosis)  Comment:   Plan: Hemoglobin A1c            (I10) Essential hypertension  Comment: well increase lisinopril from 10 mg to 20 mg .   Will have come in for nurse only BP check x 2 and then follow-up for office visit in 1 month with BP diary.      Plan: lisinopril (PRINIVIL/ZESTRIL) 20 MG tablet,         Comprehensive metabolic panel            (M79.7) Fibromyalgia  Comment:   Plan: cyclobenzaprine (FLEXERIL) 10 MG tablet                      Follow-ups after your visit        Who to contact     If you have questions or need follow up information about today's clinic visit or your schedule please contact [facility] directly at [phone].  Normal or non-critical lab and imaging results will be communicated to you by famPlushart, letter or phone within 4 business days after the clinic has received the results. If you do not hear from us within 7 days, please contact the clinic through famPlushart or phone. If you have a critical or abnormal lab result, we will notify you by phone as soon as possible.  Submit refill requests through iCrederity or call your pharmacy and they will forward the refill request to us. Please allow 3 business days for your refill to be completed.          Additional Information About Your Visit        famPlusharHobobe Information     iCrederity gives you secure access to your electronic  health record. If you see a primary care provider, you can also send messages to your care team and make appointments. If you have questions, please call your primary care clinic.  If you do not have a primary care provider, please call [phone] and they will assist you.        Care EveryWhere ID     This is your Care EveryWhere ID. This could be used by other organizations to access your [facility] medical records  [id number]        Your Vitals Were     Pulse Temperature Height Pulse Oximetry BMI (Body Mass Index)       74 97.9  F (36.6  C) (Oral) 5' (1.524 m) 97% 33.73 kg/m2        Blood Pressure from Last 3 Encounters:   01/09/18 142/78   06/30/17 148/72   06/30/17 148/72    Weight from Last 3 Encounters:   01/09/18 172 lb 11.2 oz (78.3 kg)   06/30/17 169 lb (76.7 kg)   06/30/17 169 lb 9.6 oz (76.9 kg)              We Performed the Following     Comprehensive metabolic panel     Hemoglobin A1c          Today's Medication Changes          These changes are accurate as of: 1/9/18 12:12 PM.  If you have any questions, ask your nurse or doctor.               Start taking these medicines.        Dose/Directions    lisinopril 20 MG tablet   Commonly known as:  PRINIVIL/ZESTRIL   Used for:  Essential hypertension   Started by:  Aaseby-Aguilera, Ramona Ann, PA-C        Dose:  20 mg   Take 1 tablet (20 mg) by mouth daily   Quantity:  30 tablet   Refills:  1         These medicines have changed or have updated prescriptions.        Dose/Directions    cyclobenzaprine 10 MG tablet   Commonly known as:  FLEXERIL   This may have changed:  See the new instructions.   Used for:  Fibromyalgia   Changed by:  Aaseby-Aguilera, Ramona Ann, PA-C        TAKE ONE TABLET BY MOUTH ONCE DAILY AS NEEDED   Quantity:  30 tablet   Refills:  0            Where to get your medicines      These medications were sent to [facility] [address] [place] [address] [facility]  [address] [facility] [address]     Phone:  [phone]      cyclobenzaprine 10 MG tablet    lisinopril 20 MG tablet                Primary Care Provider Office Phone # Fax #    Ramona Ann Aaseby-Aguilera, PA-C [phone] [address]  [facility] [address]        Equal Access to Services     HUSAM ROBERTSON : Hadii aad ku hadasho Soomaali, waaxda luqadaha, qaybta kaalmada adeegyada, waxay idiin hayjamiln adebenito maggy sadie marquez. So [facility] [phone].    ATENCIÓN: Si habla español, tiene a scott disposición servicios gratuitos de asistencia lingüística. David al [phone].    We comply with applicable federal civil rights laws and [place] laws. We do not discriminate on the basis of race, color, national origin, age, disability, sex, sexual orientation, or gender identity.            Thank you!     Thank you for choosing [facility]  for your care. Our goal is always to provide you with excellent care. Hearing back from our patients is one way we can continue to improve our services. Please take a few minutes to complete the written survey that you may receive in the mail after your visit with us. Thank you!             Your Updated Medication List - Protect others around you: Learn how to safely use, store and throw away your medicines at www.disposemymeds.org.          This list is accurate as of: 1/9/18 12:12 PM.  Always use your most recent med list.                   Brand Name Dispense Instructions for use Diagnosis    ACE/ARB/ARNI NOT PRESCRIBED (INTENTIONAL)      by Other route continuous prn.        ALPRAZolam 0.25 MG tablet    XANAX    30 tablet    Take 1 tablet by mouth. As needed    Anxiety       aspirin 81 MG tablet     100    ONE DAILY    Other abnormal glucose       atorvastatin 20 MG tablet    LIPITOR    90 tablet    Take 1 tablet (20 mg) by mouth daily    Hyperlipidemia LDL goal <100       blood glucose lancets standard    no brand specified    100 each    Use to test blood sugar 2 times daily or as directed.  Dispense as  covered by insurance    Type 2 diabetes, HbA1c goal < 7% (H), Type 2 diabetes mellitus without complication (H)       blood glucose monitoring meter device kit    no brand specified    1 kit    Use to test blood sugar 2 times daily or as directed.  Dispense brand as covered by insurance    Type 2 diabetes, HbA1c goal < 7% (H), Type 2 diabetes mellitus without complication (H)       * blood glucose monitoring test strip    CANDIDA CONTOUR    100 strip    Use to test blood sugars 2 times daily or as directed.    Type 2 diabetes, HbA1c goal < 7% (H)       * blood glucose monitoring test strip    no brand specified    200 each    Use to test blood sugar 2 times daily or as directed.  Dispense Ultra One touch strips per insurance coverage    Type 2 diabetes mellitus without complication (H), Type 2 diabetes, HbA1c goal < 7% (H), Type 2 diabetes mellitus without complication, with long-term current use of insulin (H)       BUSPAR 30 MG Tabs   Generic drug:  BusPIRone HCl     90 tablet    One tablet twice daily  -  60 mg daily    Anxiety, Mild major depression (H)       cyclobenzaprine 10 MG tablet    FLEXERIL    30 tablet    TAKE ONE TABLET BY MOUTH ONCE DAILY AS NEEDED    Fibromyalgia       CYMBALTA PO      Take 60 mg by mouth daily Two tablets once a day  -  120 mg        fish oil-omega-3 fatty acids 1000 MG capsule     100    increase to 4000mg per day    Dyslipidemia       gabapentin 100 MG capsule    NEURONTIN    180 capsule    TAKE TWO CAPSULES BY MOUTH THREE TIMES DAILY    Fibromyalgia       glipiZIDE 10 MG 24 hr tablet    GLUCOTROL XL    180 tablet    Take 2 tablets (20 mg) by mouth daily    Type 2 diabetes mellitus without complication, with long-term current use of insulin (H)       hydrOXYzine 50 MG capsule    VISTARIL     Take 50 mg by mouth. Two tablets at bedtime        insulin glargine 100 UNIT/ML injection    LANTUS SOLOSTAR    15 mL    78 units at bedtime    Type 2 diabetes mellitus without complication,  with long-term current use of insulin (H), Type 2 diabetes mellitus without complication (H), Type 2 diabetes, HbA1c goal < 7% (H)       insulin pen needle 31G X 6 MM    ULTICARE MINI    100 each    1 Units daily Use 1 daily or as directed. At bedtime    Type 2 diabetes, HbA1c goal < 7% (H), Type 2 diabetes mellitus without complication (H), Type 2 diabetes mellitus without complication, with long-term current use of insulin (H)       LAMOTRIGINE PO      Take 100 mg by mouth 2 times daily        lisinopril 20 MG tablet    PRINIVIL/ZESTRIL    30 tablet    Take 1 tablet (20 mg) by mouth daily    Essential hypertension       metFORMIN 500 MG tablet    GLUCOPHAGE    360 tablet    TAKE 2 TABLETS BY MOUTH TWICE DAILY WITH MEALS    Type 2 diabetes mellitus without complication, with long-term current use of insulin (H)       omeprazole 40 MG capsule    priLOSEC    90 capsule    Take 1 capsule (40 mg) by mouth daily    Heart burn       SUMAtriptan 100 MG tablet    IMITREX    10 tablet    TAKE ONE TABLET BY MOUTH ONCE DAILY. MAY REPEAT IN 2 HOURS IF NEEDED. MAX OF 2 TABLETS PER 24 HOURS    Migraine without aura and without status migrainosus, not intractable       SUMAtriptan 20 MG/ACT nasal spray    IMITREX    30 Inhaler    Spray 1 spray in nostril as needed for migraine.    Headache(454.0)       * Notice:  This list has 2 medication(s) that are the same as other medications prescribed for you. Read the directions carefully, and ask your doctor or other care provider to review them with you.

## 2018-01-09 NOTE — PROGRESS NOTES
SUBJECTIVE:   Vanessa Mota is a 57 year old female who presents to clinic today for the following health issues:      Diabetes Follow-up    Patient is checking blood sugars: once daily.  Results are as follows:       am - 107 - somedays high         Diabetic concerns: None     Symptoms of hypoglycemia (low blood sugar): none     Paresthesias (numbness or burning in feet) or sores: No     Date of last diabetic eye exam:   Due in Feb            yperlipidemia Follow-Up      Rate your low fat/cholesterol diet?: poor    Taking statin?  Yes, no muscle aches from statin    Other lipid medications/supplements?:  none    Hypertension Follow-up      Outpatient blood pressures are not being checked.    Low Salt Diet: low salt  BP Readings from Last 2 Encounters:   06/30/17 148/72   06/30/17 148/72     Hemoglobin A1C (%)   Date Value   06/22/2017 6.8 (H)   06/13/2016 6.6 (H)     LDL Cholesterol Calculated (mg/dL)   Date Value   06/22/2017 58   06/13/2016 41         Amount of exercise or physical activity: 2-3 days/week for an average of 30-45 minutes    Problems taking medications regularly: No    Medication side effects: none    Diet: low salt            Problem list and histories reviewed & adjusted, as indicated.  Additional history: as documented    Current Outpatient Prescriptions   Medication Sig Dispense Refill     lisinopril (PRINIVIL/ZESTRIL) 20 MG tablet Take 1 tablet (20 mg) by mouth daily 30 tablet 1     cyclobenzaprine (FLEXERIL) 10 MG tablet TAKE ONE TABLET BY MOUTH ONCE DAILY AS NEEDED 30 tablet 0     SUMAtriptan (IMITREX) 100 MG tablet TAKE ONE TABLET BY MOUTH ONCE DAILY. MAY REPEAT IN 2 HOURS IF NEEDED. MAX OF 2 TABLETS PER 24 HOURS 10 tablet 0     gabapentin (NEURONTIN) 100 MG capsule TAKE TWO CAPSULES BY MOUTH THREE TIMES DAILY 180 capsule 5     blood glucose monitoring (NO BRAND SPECIFIED) test strip Use to test blood sugar 2 times daily or as directed.  Dispense Ultra One touch strips per insurance  coverage 200 each 3     insulin pen needle (ULTICARE MINI) 31G X 6 MM 1 Units daily Use 1 daily or as directed. At bedtime 100 each 6     insulin glargine (LANTUS SOLOSTAR) 100 UNIT/ML injection 78 units at bedtime 15 mL 3     blood glucose (NO BRAND SPECIFIED) lancets standard Use to test blood sugar 2 times daily or as directed.  Dispense as covered by insurance 100 each 3     blood glucose monitoring (NO BRAND SPECIFIED) meter device kit Use to test blood sugar 2 times daily or as directed.  Dispense brand as covered by insurance 1 kit 0     LAMOTRIGINE PO Take 100 mg by mouth 2 times daily       metFORMIN (GLUCOPHAGE) 500 MG tablet TAKE 2 TABLETS BY MOUTH TWICE DAILY WITH MEALS 360 tablet 3     glipiZIDE (GLUCOTROL XL) 10 MG 24 hr tablet Take 2 tablets (20 mg) by mouth daily 180 tablet 11     atorvastatin (LIPITOR) 20 MG tablet Take 1 tablet (20 mg) by mouth daily 90 tablet 3     omeprazole (PRILOSEC) 40 MG capsule Take 1 capsule (40 mg) by mouth daily 90 capsule 3     DULoxetine HCl (CYMBALTA PO) Take 60 mg by mouth daily Two tablets once a day  -  120 mg       blood glucose (CANDIDA CONTOUR) test strip Use to test blood sugars 2 times daily or as directed. 100 strip 2     BusPIRone HCl (BUSPAR) 30 MG TABS One tablet twice daily  -  60 mg daily 90 tablet 0     hydrOXYzine (VISTARIL) 50 MG capsule Take 50 mg by mouth. Two tablets at bedtime       ALPRAZolam (XANAX) 0.25 MG tablet Take 1 tablet by mouth. As needed 30 tablet 1     SUMAtriptan (IMITREX) 20 MG/ACT nasal spray Spray 1 spray in nostril as needed for migraine. 30 Inhaler 0     ACE/ARB NOT PRESCRIBED, INTENTIONAL, by Other route continuous prn.       FISH OIL 1000 MG OR CAPS increase to 4000mg per day 100 0     ASPIRIN 81 MG OR TABS ONE DAILY 100 3     [DISCONTINUED] lisinopril (PRINIVIL/ZESTRIL) 10 MG tablet Take 1 tablet (10 mg) by mouth daily 90 tablet 3     Recent Labs   Lab Test  01/09/18   1141  06/22/17   1221  11/08/16   1320  06/13/16   1141    02/10/15   1047   A1C  7.0*  6.8*   --   6.6*   < >  7.2*   LDL   --   58   --   41   --   71   HDL   --   48*   --   44*   --   57   TRIG   --   139   --   110   --   110   ALT   --   44  52*  59*   < >  90*   CR   --   0.74  0.69  0.74   < >  0.75   GFRESTIMATED   --   81  89  82   < >  81   GFRESTBLACK   --   >90   GFR Calc    >90   GFR Calc    >90   GFR Calc     < >  >90   GFR Calc     POTASSIUM   --   3.8  3.7  4.1   < >  4.0   TSH   --   1.79   --   1.26   --   2.56    < > = values in this interval not displayed.      BP Readings from Last 3 Encounters:   01/09/18 142/78   06/30/17 148/72   06/30/17 148/72    Wt Readings from Last 3 Encounters:   01/09/18 172 lb 11.2 oz (78.3 kg)   06/30/17 169 lb (76.7 kg)   06/30/17 169 lb 9.6 oz (76.9 kg)                        Reviewed and updated as needed this [facility]  Meds       Reviewed and updated as needed this visit by Provider         ROS:  Constitutional, HEENT, cardiovascular, pulmonary, gi and gu systems are negative, except as otherwise noted.      OBJECTIVE:                                                    /78 (BP Location: Right arm, Patient Position: Chair, Cuff Size: Adult Large)  Pulse 74  Temp 97.9  F (36.6  C) (Oral)  Ht 5' (1.524 m)  Wt 172 lb 11.2 oz (78.3 kg)  SpO2 97%  BMI 33.73 kg/m2  Body mass index is 33.73 kg/(m^2).  GENERAL APPEARANCE: healthy, alert and no distress  HENT: ear canals and TM's normal and nose and mouth without ulcers or lesions  RESP: lungs clear to auscultation - no rales, rhonchi or wheezes  CV: regular rates and rhythm, normal S1 S2, no S3 or S4 and no murmur, click or rub  LYMPHATICS: normal ant/post cervical and supraclavicular nodes  MS: extremities normal- no gross deformities noted         ASSESSMENT/PLAN:                                                    1. Type 2 diabetes mellitus without complication, with  long-term current use of insulin (H)  Stable advised cleaning-up diet.  Got new dentures and can eat better now.     - Hemoglobin A1c    2. Essential hypertension    - lisinopril (PRINIVIL/ZESTRIL) 20 MG tablet; Take 1 tablet (20 mg) by mouth daily  Dispense: 30 tablet; Refill: 1  - Comprehensive metabolic panel    3. Fibromyalgia    - cyclobenzaprine (FLEXERIL) 10 MG tablet; TAKE ONE TABLET BY MOUTH ONCE DAILY AS NEEDED  Dispense: 30 tablet; Refill: 0      Patient Instructions   (E11.9,  Z79.4) Type 2 diabetes mellitus without complication, with long-term current use of insulin (H)  (primary encounter diagnosis)  Comment:   Plan: Hemoglobin A1c            (I10) Essential hypertension  Comment: well increase lisinopril from 10 mg to 20 mg .   Will have come in for nurse only BP check x 2 and then follow-up for office visit in 1 month with BP diary.      Plan: lisinopril (PRINIVIL/ZESTRIL) 20 MG tablet,         Comprehensive metabolic panel            (M79.7) Fibromyalgia  Comment:   Plan: cyclobenzaprine (FLEXERIL) 10 MG tablet                  Ramona Ann Aaseby-Aguilera, PA-C  [facility]

## 2018-01-09 NOTE — NURSING NOTE
Chief Complaint   Patient presents with     Diabetes       Initial /78 (BP Location: Right arm, Patient Position: Chair, Cuff Size: Adult Large)  Pulse 74  Temp 97.9  F (36.6  C) (Oral)  Ht 5' (1.524 m)  Wt 172 lb 11.2 oz (78.3 kg)  SpO2 97%  BMI 33.73 kg/m2 Estimated body mass index is 33.73 kg/(m^2) as calculated from the following:    Height as of this encounter: 5' (1.524 m).    Weight as of this encounter: 172 lb 11.2 oz (78.3 kg).  Medication Reconciliation: complete      Health Maintenance addressed:  Mammogram, Colonoscopy/FIT, PHQ9 and eye    n/a

## 2018-01-10 LAB
ALBUMIN SERPL-MCNC: 4 G/DL (ref 3.4–5)
ALP SERPL-CCNC: 89 U/L (ref 40–150)
ALT SERPL W P-5'-P-CCNC: 34 U/L (ref 0–50)
ANION GAP SERPL CALCULATED.3IONS-SCNC: 7 MMOL/L (ref 3–14)
AST SERPL W P-5'-P-CCNC: 24 U/L (ref 0–45)
BILIRUB SERPL-MCNC: 0.4 MG/DL (ref 0.2–1.3)
BUN SERPL-MCNC: 8 MG/DL (ref 7–30)
CALCIUM SERPL-MCNC: 9.2 MG/DL (ref 8.5–10.1)
CHLORIDE SERPL-SCNC: 104 MMOL/L (ref 94–109)
CO2 SERPL-SCNC: 30 MMOL/L (ref 20–32)
CREAT SERPL-MCNC: 0.66 MG/DL (ref 0.52–1.04)
GFR SERPL CREATININE-BSD FRML MDRD: >90 ML/MIN/1.7M2
GLUCOSE SERPL-MCNC: 134 MG/DL (ref 70–99)
POTASSIUM SERPL-SCNC: 4.4 MMOL/L (ref 3.4–5.3)
PROT SERPL-MCNC: 7.1 G/DL (ref 6.8–8.8)
SODIUM SERPL-SCNC: 141 MMOL/L (ref 133–144)

## 2018-02-07 DIAGNOSIS — M79.7 FIBROMYALGIA: ICD-10-CM

## 2018-02-08 RX ORDER — CYCLOBENZAPRINE HCL 10 MG
TABLET ORAL
Qty: 30 TABLET | Refills: 0 | Status: SHIPPED | OUTPATIENT
Start: 2018-02-08 | End: 2018-03-04

## 2018-02-08 NOTE — TELEPHONE ENCOUNTER
Requested Prescriptions   Pending Prescriptions Disp Refills     cyclobenzaprine (FLEXERIL) 10 MG tablet [Pharmacy Med Name:   CYCLOBENZAPR 10MG   TAB]  Last Written Prescription Date:  01/09/2018  Last Fill Quantity: 30,  # refills: 0   Last Office Visit with [facility] provider:  01/09/2018   Future Office Visit:      30 tablet 0     Sig: TAKE ONE TABLET BY MOUTH ONCE DAILY AS NEEDED    There is no refill protocol information for this order

## 2018-02-14 DIAGNOSIS — G43.009 MIGRAINE WITHOUT AURA AND WITHOUT STATUS MIGRAINOSUS, NOT INTRACTABLE: ICD-10-CM

## 2018-02-14 NOTE — TELEPHONE ENCOUNTER
"Requested Prescriptions   Pending Prescriptions Disp Refills     SUMAtriptan (IMITREX) 100 MG tablet [Pharmacy Med Name: SUMATRIPTAN 100MG TAB] 10 tablet 0     Sig: TAKE ONE TABLET BY MOUTH ONCE DAILY MAY  REPEAT  AFTER  2  HOURS  IF  NEEDED.  MAX  OF  2  TABLETS  PER  24  HOURS.    Serotonin Agonists Failed    2/14/2018  3:02 PM       Failed - Blood pressure under 140/90 in past 12 months    BP Readings from Last 3 Encounters:   01/09/18 142/78   06/30/17 148/72   06/30/17 148/72                Failed - Serotonin Agonist request needs review.    Please review patient's record. If patient has had 8 or more treatments in the past month, please forward to provider.         Passed - Recent or future visit with authorizing provider's specialty    Patient had office visit in the last year or has a visit in the next 30 days with authorizing provider.  See \"Patient Info\" tab in inbasket, or \"Choose Columns\" in Meds & Orders section of the refill encounter.            Passed - Patient is age 18 or older       Passed - No active pregnancy on record       Passed - No positive pregnancy test in past 12 months        Last Written Prescription Date:  12/28/2017  Last Fill Quantity: 10,  # refills: 0   Last office visit: 1/9/2018 with prescribing provider:  1/9/2018   Future Office Visit:   Next 5 appointments (look out 90 days)     Feb 22, 2018  2:00 PM CST   Nurse Only with LV MA/LPN   [facility]    [address] [facility] [address] [phone]                   "

## 2018-02-14 NOTE — TELEPHONE ENCOUNTER
Sumatriptan    Routing refill request to provider for review/approval because:  BP not at goal    Gabriela MCGREGOR RN, BSN, PHN  [place]

## 2018-02-15 RX ORDER — SUMATRIPTAN 100 MG/1
TABLET, FILM COATED ORAL
Qty: 20 TABLET | Refills: 0 | Status: SHIPPED | OUTPATIENT
Start: 2018-02-15 | End: 2018-05-11

## 2018-03-04 DIAGNOSIS — M79.7 FIBROMYALGIA: ICD-10-CM

## 2018-03-04 DIAGNOSIS — I10 ESSENTIAL HYPERTENSION: ICD-10-CM

## 2018-03-04 NOTE — TELEPHONE ENCOUNTER
"Requested Prescriptions   Pending Prescriptions Disp Refills     lisinopril (PRINIVIL/ZESTRIL) 20 MG tablet [Pharmacy Med Name: LISINOPRIL 20MG     TAB]  Medication may not be due for refill  Last Written Prescription Date:  2/15/2018  Last Fill Quantity: 30 tablet,  # refills: 1   Last office visit: 1/9/2018 with prescribing provider:  Aaseby-Aguilera    30 tablet 1     Sig: TAKE ONE TABLET BY MOUTH ONCE DAILY    ACE Inhibitors (Including Combos) Protocol Failed    3/4/2018  6:30 AM       Failed - Blood pressure under 140/90 in past 12 months    BP Readings from Last 3 Encounters:   01/09/18 142/78   06/30/17 148/72   06/30/17 148/72          Passed - Recent (12 mo) or future (30 days) visit within the authorizing provider's specialty    Patient had office visit in the last year or has a visit in the next 30 days with authorizing provider.  See \"Patient Info\" tab in inbasket, or \"Choose Columns\" in Meds & Orders section of the refill encounter.          Passed - Patient is age 18 or older       Passed - No active pregnancy on record       Passed - Normal serum creatinine on file in past 12 months    Recent Labs   Lab Test  01/09/18   1215   CR  0.66          Passed - Normal serum potassium on file in past 12 months    Recent Labs   Lab Test  01/09/18   1215   POTASSIUM  4.4          Passed - No positive pregnancy test in past 12 months          "

## 2018-03-05 RX ORDER — CYCLOBENZAPRINE HCL 10 MG
TABLET ORAL
Qty: 30 TABLET | Refills: 5 | Status: SHIPPED | OUTPATIENT
Start: 2018-03-05 | End: 2018-09-12

## 2018-03-05 RX ORDER — LISINOPRIL 20 MG/1
TABLET ORAL
Qty: 30 TABLET | Refills: 1 | OUTPATIENT
Start: 2018-03-05

## 2018-03-09 NOTE — TELEPHONE ENCOUNTER
Pharmacy did not receive this RX -  Given as verbal to pharmacy today for 30 day supply    Fatuma Lewis RN

## 2018-03-09 NOTE — TELEPHONE ENCOUNTER
Patient made her appointment for Monday.  She is completely out of medication.  She is requesting to get enough sent to the pharmacy to get her to appointment.     [id number] - [place], [place] - [address]

## 2018-04-02 ENCOUNTER — TELEPHONE (OUTPATIENT)
Dept: FAMILY MEDICINE | Facility: CLINIC | Age: 58
End: 2018-04-02

## 2018-04-02 DIAGNOSIS — I10 ESSENTIAL HYPERTENSION: ICD-10-CM

## 2018-04-02 RX ORDER — LISINOPRIL 20 MG/1
TABLET ORAL
Qty: 30 TABLET | Refills: 0 | Status: SHIPPED | OUTPATIENT
Start: 2018-04-02 | End: 2018-04-26

## 2018-04-02 NOTE — TELEPHONE ENCOUNTER
"Requested Prescriptions   Pending Prescriptions Disp Refills     lisinopril (PRINIVIL/ZESTRIL) 20 MG tablet [Pharmacy Med Name: LISINOPRIL 20MG     TAB] 30 tablet 0    Last Written Prescription Date:  02/15/2018  Last Fill Quantity: 30 tablet,  # refills: 1   Last office visit: 1/9/2018 with prescribing provider:  01/09/2018   Future Office Visit:   Next 5 appointments (look out 90 days)     Apr 10, 2018  1:30 PM CDT   SHORT with Ramona Ann Aaseby-Aguilera, PA-C   [facility]    [address] [facility] [address] [phone]                  Sig: TAKE 1 TABLET BY MOUTH ONCE DAILY    ACE Inhibitors (Including Combos) Protocol Failed    4/2/2018 12:59 PM       Failed - Blood pressure under 140/90 in past 12 months    BP Readings from Last 3 Encounters:   01/09/18 142/78   06/30/17 148/72   06/30/17 148/72                Passed - Recent (12 mo) or future (30 days) visit within the authorizing provider's specialty    Patient had office visit in the last 12 months or has a visit in the next 30 days with authorizing provider or within the authorizing provider's specialty.  See \"Patient Info\" tab in inbasket, or \"Choose Columns\" in Meds & Orders section of the refill encounter.           Passed - Patient is age 18 or older       Passed - No active pregnancy on record       Passed - Normal serum creatinine on file in past 12 months    Recent Labs   Lab Test  01/09/18   1215   CR  0.66            Passed - Normal serum potassium on file in past 12 months    Recent Labs   Lab Test  01/09/18   1215   POTASSIUM  4.4            Passed - No positive pregnancy test in past 12 months          Myron Hodgson XRT  "

## 2018-04-02 NOTE — TELEPHONE ENCOUNTER
Panel Management Review      Patient has the following on her problem list:     Depression / Dysthymia review    Measure:  Needs PHQ-9 score of 4 or less during index window.  Administer PHQ-9 and if score is 5 or more, send encounter to provider for next steps.    5 - 7 month window range:     PHQ-9 SCORE 6/13/2016 6/22/2017 1/9/2018   Total Score - - -   Total Score 8 9 7       If PHQ-9 recheck is 5 or more, route to provider for next steps.    Patient is due for:  None    Diabetes    ASA: Passed    Last A1C  Lab Results   Component Value Date    A1C 7.0 01/09/2018    A1C 6.8 06/22/2017    A1C 6.6 06/13/2016    A1C 6.9 11/03/2015    A1C 8.5 09/01/2015     A1C tested:   Last LDL:    Lab Results   Component Value Date    CHOL 134 06/22/2017     Lab Results   Component Value Date    HDL 48 06/22/2017     Lab Results   Component Value Date    LDL 58 06/22/2017     Lab Results   Component Value Date    TRIG 139 06/22/2017     Lab Results   Component Value Date    CHOLHDLRATIO 2.6 02/10/2015     Lab Results   Component Value Date    NHDL 86 06/22/2017       Is the patient on a Statin? YES             Is the patient on Aspirin? YES    Medications     HMG CoA Reductase Inhibitors    atorvastatin (LIPITOR) 20 MG tablet    Salicylates    ASPIRIN 81 MG OR TABS          Last three blood pressure readings:  BP Readings from Last 3 Encounters:   01/09/18 142/78   06/30/17 148/72   06/30/17 148/72       Date of last diabetes office visit: 01/09/32018     Tobacco History:     History   Smoking Status     Light Tobacco Smoker     Packs/day: 0.20     Years: 3.00     Types: Cigarettes     Last attempt to quit: 1/15/2013   Smokeless Tobacco     Never Used     Comment: e cig   - three puffs a day            Composite cancer screening  Chart review shows that this patient is due/due soon for the following Mammogram and Colonoscopy  Summary:    Patient is due/failing the following:   COLONOSCOPY and MAMMOGRAM    Action needed:    Patient needs referral/order: mammogram and colonoscopy    Type of outreach:    Sent [facility] message.    Questions for provider review:    None                                                                                                                                    db     Chart routed to  .

## 2018-04-02 NOTE — TELEPHONE ENCOUNTER
Medication is being filled for 1 time refill only due to:  Patient needs to be seen because needs follow up and scheduled.   Kelvin Thrasher RN, BSN

## 2018-04-26 DIAGNOSIS — I10 ESSENTIAL HYPERTENSION: ICD-10-CM

## 2018-04-26 RX ORDER — LISINOPRIL 20 MG/1
20 TABLET ORAL DAILY
Qty: 30 TABLET | Refills: 0 | Status: SHIPPED | OUTPATIENT
Start: 2018-04-26 | End: 2018-05-14

## 2018-04-26 NOTE — TELEPHONE ENCOUNTER
"Patient had to reschedule due to provider out of clinic on Friday.  Patient has upcoming appointment on 05/14    Requested Prescriptions   Pending Prescriptions Disp Refills     lisinopril (PRINIVIL/ZESTRIL) 20 MG tablet 30 tablet 0     Sig: Take 1 tablet (20 mg) by mouth daily    ACE Inhibitors (Including Combos) Protocol Failed    4/26/2018 11:47 AM       Failed - Blood pressure under 140/90 in past 12 months    BP Readings from Last 3 Encounters:   01/09/18 142/78   06/30/17 148/72   06/30/17 148/72                Passed - Recent (12 mo) or future (30 days) visit within the authorizing provider's specialty    Patient had office visit in the last 12 months or has a visit in the next 30 days with authorizing provider or within the authorizing provider's specialty.  See \"Patient Info\" tab in inbasket, or \"Choose Columns\" in Meds & Orders section of the refill encounter.           Passed - Patient is age 18 or older       Passed - No active pregnancy on record       Passed - Normal serum creatinine on file in past 12 months    Recent Labs   Lab Test  01/09/18   1215   CR  0.66            Passed - Normal serum potassium on file in past 12 months    Recent Labs   Lab Test  01/09/18   1215   POTASSIUM  4.4            Passed - No positive pregnancy test in past 12 months   Last Written Prescription Date:  04/02/18  Last Fill Quantity: 30,  # refills: 0   Last office visit: 1/9/2018 with prescribing provider: 01/09/2018   Future Office Visit:   Next 5 appointments (look out 90 days)     May 14, 2018  2:00 PM CDT   SHORT with Ramona Ann Aaseby-Aguilera, PA-C   [facility]    [address] [address] [phone]                    "

## 2018-04-26 NOTE — TELEPHONE ENCOUNTER
Medication is being filled for 1 time refill only due to:  see message below Kelvin Thrasher RN, BSN

## 2018-05-11 DIAGNOSIS — G43.009 MIGRAINE WITHOUT AURA AND WITHOUT STATUS MIGRAINOSUS, NOT INTRACTABLE: ICD-10-CM

## 2018-05-11 RX ORDER — SUMATRIPTAN 100 MG/1
TABLET, FILM COATED ORAL
Qty: 10 TABLET | Refills: 0 | Status: SHIPPED | OUTPATIENT
Start: 2018-05-11 | End: 2018-09-05

## 2018-05-11 NOTE — TELEPHONE ENCOUNTER
Routing refill request to provider for review/approval because:  Patient needs to be seen because:  Pt past due for OV.  NO showed/cancelled multiple appts.  Pt does have an appt scheduled  Kelvin Thrasher RN, BSN

## 2018-05-11 NOTE — TELEPHONE ENCOUNTER
"Requested Prescriptions   Pending Prescriptions Disp Refills     SUMAtriptan (IMITREX) 100 MG tablet [Pharmacy Med Name: SUMATRIPTAN 100MG TAB] 20 tablet 0    Last Written Prescription Date:  02/15/2018  Last Fill Quantity: 20 tablet,  # refills: 0   Last office visit: 1/9/2018 with prescribing provider:  01/09/2018   Future Office Visit:   Next 5 appointments (look out 90 days)     May 14, 2018  2:00 PM CDT   SHORT with Ramona Ann Aaseby-Aguilera, PA-C   [facility]    [address] [facility] [address] [phone]                  Sig: TAKE 1 TABLET BY MOUTH ONCE DAILY MAY  REPEAT  AFTER  2  HOURS  IF  NEEDED.  MAX  OF  2  TABLETS  PER  24  HOURS.    Serotonin Agonists Failed    5/11/2018  5:30 AM       Failed - Blood pressure under 140/90 in past 12 months    BP Readings from Last 3 Encounters:   01/09/18 142/78   06/30/17 148/72   06/30/17 148/72                Failed - Serotonin Agonist request needs review.    Please review patient's record. If patient has had 8 or more treatments in the past month, please forward to provider.         Passed - Recent (12 mo) or future (30 days) visit within the authorizing provider's specialty    Patient had office visit in the last 12 months or has a visit in the next 30 days with authorizing provider or within the authorizing provider's specialty.  See \"Patient Info\" tab in inbasket, or \"Choose Columns\" in Meds & Orders section of the refill encounter.           Passed - Patient is age 18 or older       Passed - No active pregnancy on record       Passed - No positive pregnancy test in past 12 months          Myron SCHULZT  "

## 2018-05-14 ENCOUNTER — OFFICE VISIT (OUTPATIENT)
Dept: FAMILY MEDICINE | Facility: CLINIC | Age: 58
End: 2018-05-14
Payer: COMMERCIAL

## 2018-05-14 VITALS
TEMPERATURE: 98.1 F | HEIGHT: 60 IN | SYSTOLIC BLOOD PRESSURE: 142 MMHG | BODY MASS INDEX: 34 KG/M2 | OXYGEN SATURATION: 95 % | HEART RATE: 70 BPM | DIASTOLIC BLOOD PRESSURE: 82 MMHG | WEIGHT: 173.2 LBS

## 2018-05-14 DIAGNOSIS — I10 ESSENTIAL HYPERTENSION: Primary | ICD-10-CM

## 2018-05-14 DIAGNOSIS — G43.909 MIGRAINE WITHOUT STATUS MIGRAINOSUS, NOT INTRACTABLE, UNSPECIFIED MIGRAINE TYPE: ICD-10-CM

## 2018-05-14 DIAGNOSIS — E78.5 HYPERLIPIDEMIA LDL GOAL <100: ICD-10-CM

## 2018-05-14 DIAGNOSIS — E11.9 TYPE 2 DIABETES MELLITUS WITHOUT COMPLICATION, WITH LONG-TERM CURRENT USE OF INSULIN (H): ICD-10-CM

## 2018-05-14 DIAGNOSIS — Z79.4 TYPE 2 DIABETES MELLITUS WITHOUT COMPLICATION, WITH LONG-TERM CURRENT USE OF INSULIN (H): ICD-10-CM

## 2018-05-14 PROCEDURE — 99214 OFFICE O/P EST MOD 30 MIN: CPT | Performed by: PHYSICIAN ASSISTANT

## 2018-05-14 RX ORDER — LISINOPRIL 20 MG/1
20 TABLET ORAL DAILY
Qty: 30 TABLET | Refills: 1 | Status: SHIPPED | OUTPATIENT
Start: 2018-05-14 | End: 2018-09-06 | Stop reason: SINTOL

## 2018-05-14 RX ORDER — LISINOPRIL 10 MG/1
10 TABLET ORAL DAILY
Qty: 30 TABLET | Refills: 1 | Status: SHIPPED | OUTPATIENT
Start: 2018-05-14 | End: 2018-07-02

## 2018-05-14 RX ORDER — SUMATRIPTAN 20 MG/1
1 SPRAY NASAL PRN
Qty: 30 EACH | Refills: 0 | Status: SHIPPED | OUTPATIENT
Start: 2018-05-14 | End: 2018-09-05

## 2018-05-14 NOTE — MR AVS SNAPSHOT
After Visit Summary   5/14/2018    Vanessa Mota    MRN: [id number]           Patient Information     Date Of Birth          1960        Visit Information        Provider Department      5/14/2018 2:00 PM Aaseby-Aguilera, Ramona Ann, PA-C [facility]        Today's Diagnoses     Hyperlipidemia LDL goal <100    -  1    Essential hypertension        Type 2 diabetes mellitus without complication, with long-term current use of insulin (H)        Migraine without status migrainosus, not intractable, unspecified migraine type           Follow-ups after your visit        Your next 10 appointments already scheduled     Jun 06, 2018 11:00 AM CDT   LAB with LV LAB   [facility]    [address] [address] [phone]           Please do not eat 10-12 hours before your appointment if you are coming in fasting for labs on lipids, cholesterol, or glucose (sugar). This does not apply to pregnant women. Water, hot tea and black coffee (with nothing added) are okay. Do not drink other fluids, diet soda or chew gum.            Jun 06, 2018 11:00 AM CDT   Nurse Only with LV MA/LPN   [facility]    [address] [address] [phone]              Future tests that were ordered for you today     Open Future Orders        Priority Expected Expires Ordered    CBC with platelets Routine  10/14/2018 5/14/2018    TSH with free T4 reflex Routine  10/14/2018 5/14/2018    Comprehensive metabolic panel Routine  10/14/2018 5/14/2018    Lipid panel reflex to direct LDL Fasting Routine  10/14/2018 5/14/2018    Albumin Random Urine Quantitative with Creat Ratio Routine  10/14/2018 5/14/2018    Hemoglobin A1c Routine  10/14/2018 5/14/2018            Who to contact     If you have questions or need follow up information about today's clinic visit or your schedule please contact  [facility] directly at [phone].  Normal or non-critical lab and imaging results will be communicated to you by MyChart, letter or phone within 4 business days after the clinic has received the results. If you do not hear from us within 7 days, please contact the clinic through [employer] or phone. If you have a critical or abnormal lab result, we will notify you by phone as soon as possible.  Submit refill requests through [employer] or call your pharmacy and they will forward the refill request to us. Please allow 3 business days for your refill to be completed.          Additional Information About Your Visit        [employer] Information     [employer] gives you secure access to your electronic health record. If you see a primary care provider, you can also send messages to your care team and make appointments. If you have questions, please call your primary care clinic.  If you do not have a primary care provider, please call [phone] and they will assist you.        Care EveryWhere ID     This is your Care EveryWhere ID. This could be used by other organizations to access your [facility] medical records  [id number]        Your Vitals Were     Pulse Temperature Height Pulse Oximetry BMI (Body Mass Index)       70 98.1  F (36.7  C) (Oral) 5' (1.524 m) 95% 33.83 kg/m2        Blood Pressure from Last 3 Encounters:   05/14/18 142/82   01/09/18 142/78   06/30/17 148/72    Weight from Last 3 Encounters:   05/14/18 173 lb 3.2 oz (78.6 kg)   01/09/18 172 lb 11.2 oz (78.3 kg)   06/30/17 169 lb (76.7 kg)                 Today's Medication Changes          These changes are accurate as of 5/14/18  2:40 PM.  If you have any questions, ask your nurse or doctor.               These medicines have changed or have updated prescriptions.        Dose/Directions    * lisinopril 10 MG tablet   Commonly known as:  PRINIVIL/ZESTRIL   This may have changed:  You were already taking a medication with the same name, and  this prescription was added. Make sure you understand how and when to take each.   Used for:  Essential hypertension   Changed by:  Aaseby-Aguilera, Ramona Ann, PA-C        Dose:  10 mg   Take 1 tablet (10 mg) by mouth daily Takes with a 20 mg for total of 30 mg daily   Quantity:  30 tablet   Refills:  1       * lisinopril 20 MG tablet   Commonly known as:  PRINIVIL/ZESTRIL   This may have changed:  additional instructions   Used for:  Essential hypertension   Changed by:  Aaseby-Aguilera, Ramona Ann, PA-C        Dose:  20 mg   Take 1 tablet (20 mg) by mouth daily Takes with a 10 mg   Quantity:  30 tablet   Refills:  1       SUMAtriptan 20 MG/ACT nasal spray   Commonly known as:  IMITREX   This may have changed:  how much to take   Used for:  Migraine without status migrainosus, not intractable, unspecified migraine type   Changed by:  Aaseby-Aguilera, Ramona Ann, PA-C        Dose:  1 spray   Spray 1 spray in nostril as needed for migraine   Quantity:  30 each   Refills:  0       * Notice:  This list has 2 medication(s) that are the same as other medications prescribed for you. Read the directions carefully, and ask your doctor or other care provider to review them with you.         Where to get your medicines      These medications were sent to [facility] [address] [place] [address] [id number] [place] [address]  [id number] [place], [place] [address]     Phone:  [phone]     lisinopril 10 MG tablet    lisinopril 20 MG tablet    SUMAtriptan 20 MG/ACT nasal spray                Primary Care Provider Office Phone # Fax #    Ramona Ann Aaseby-Aguilera, PA-C [phone] [address] [id number]        Equal Access to Services     [facility] AH: Hadii aad ku hadasho Soomaali, waaxda luqadaha, qaybta kaalmada adeegyada, waxay michaelin hayaan jefferson kharamayito lasalvatore marquez. So [facility] [phone].    ATENCIÓN: Si habla español, tiene a scott disposición servicios gratuitos de asistencia lingüística. Llame al  [phone].    We comply with applicable federal civil rights laws and [place] laws. We do not discriminate on the basis of race, color, national origin, age, disability, sex, sexual orientation, or gender identity.            Thank you!     Thank you for choosing [facility]  for your care. Our goal is always to provide you with excellent care. Hearing back from our patients is one way we can continue to improve our services. Please take a few minutes to complete the written survey that you may receive in the mail after your visit with us. Thank you!             Your Updated Medication List - Protect others around you: Learn how to safely use, store and throw away your medicines at www.disposemymeds.org.          This list is accurate as of 5/14/18  2:40 PM.  Always use your most recent med list.                   Brand Name Dispense Instructions for use Diagnosis    ACE/ARB/ARNI NOT PRESCRIBED (INTENTIONAL)      by Other route continuous prn.        ALPRAZolam 0.25 MG tablet    XANAX    30 tablet    Take 1 tablet by mouth. As needed    Anxiety       aspirin 81 MG tablet     100    ONE DAILY    Other abnormal glucose       atorvastatin 20 MG tablet    LIPITOR    90 tablet    Take 1 tablet (20 mg) by mouth daily    Hyperlipidemia LDL goal <100       blood glucose lancets standard    no brand specified    100 each    Use to test blood sugar 2 times daily or as directed.  Dispense as covered by insurance    Type 2 diabetes, HbA1c goal < 7% (H), Type 2 diabetes mellitus without complication (H)       blood glucose monitoring meter device kit    no brand specified    1 kit    Use to test blood sugar 2 times daily or as directed.  Dispense brand as covered by insurance    Type 2 diabetes, HbA1c goal < 7% (H), Type 2 diabetes mellitus without complication (H)       * blood glucose monitoring test strip    CANDIDA CONTOUR    100 strip    Use to test blood sugars 2 times daily or as directed.    Type 2  diabetes, HbA1c goal < 7% (H)       * blood glucose monitoring test strip    no brand specified    200 each    Use to test blood sugar 2 times daily or as directed.  Dispense Ultra One touch strips per insurance coverage    Type 2 diabetes mellitus without complication (H), Type 2 diabetes, HbA1c goal < 7% (H), Type 2 diabetes mellitus without complication, with long-term current use of insulin (H)       BUSPAR 30 MG Tabs   Generic drug:  BusPIRone HCl     90 tablet    One tablet twice daily  -  60 mg daily    Anxiety, Mild major depression (H)       cyclobenzaprine 10 MG tablet    FLEXERIL    30 tablet    TAKE 1 TABLET BY MOUTH ONCE DAILY AS NEEDED    Fibromyalgia       CYMBALTA PO      Take 60 mg by mouth daily Two tablets once a day  -  120 mg        fish oil-omega-3 fatty acids 1000 MG capsule     100    increase to 4000mg per day    Dyslipidemia       gabapentin 100 MG capsule    NEURONTIN    180 capsule    TAKE TWO CAPSULES BY MOUTH THREE TIMES DAILY    Fibromyalgia       glipiZIDE 10 MG 24 hr tablet    GLUCOTROL XL    180 tablet    Take 2 tablets (20 mg) by mouth daily    Type 2 diabetes mellitus without complication, with long-term current use of insulin (H)       hydrOXYzine 50 MG capsule    VISTARIL     Take 50 mg by mouth. Two tablets at bedtime        insulin glargine 100 UNIT/ML injection    LANTUS SOLOSTAR    15 mL    78 units at bedtime    Type 2 diabetes mellitus without complication, with long-term current use of insulin (H), Type 2 diabetes mellitus without complication (H), Type 2 diabetes, HbA1c goal < 7% (H)       insulin pen needle 31G X 6 MM    ULTICARE MINI    100 each    1 Units daily Use 1 daily or as directed. At bedtime    Type 2 diabetes, HbA1c goal < 7% (H), Type 2 diabetes mellitus without complication (H), Type 2 diabetes mellitus without complication, with long-term current use of insulin (H)       LAMOTRIGINE PO      Take 100 mg by mouth 2 times daily        * lisinopril 10 MG  tablet    PRINIVIL/ZESTRIL    30 tablet    Take 1 tablet (10 mg) by mouth daily Takes with a 20 mg for total of 30 mg daily    Essential hypertension       * lisinopril 20 MG tablet    PRINIVIL/ZESTRIL    30 tablet    Take 1 tablet (20 mg) by mouth daily Takes with a 10 mg    Essential hypertension       metFORMIN 500 MG tablet    GLUCOPHAGE    360 tablet    TAKE 2 TABLETS BY MOUTH TWICE DAILY WITH MEALS    Type 2 diabetes mellitus without complication, with long-term current use of insulin (H)       omeprazole 40 MG capsule    priLOSEC    90 capsule    Take 1 capsule (40 mg) by mouth daily    Heart burn       SUMAtriptan 100 MG tablet    IMITREX    10 tablet    TAKE 1 TABLET BY MOUTH ONCE DAILY MAY  REPEAT  AFTER  2  HOURS  IF  NEEDED.  MAX  OF  2  TABLETS  PER  24  HOURS.    Migraine without aura and without status migrainosus, not intractable       SUMAtriptan 20 MG/ACT nasal spray    IMITREX    30 each    Spray 1 spray in nostril as needed for migraine    Migraine without status migrainosus, not intractable, unspecified migraine type       * Notice:  This list has 4 medication(s) that are the same as other medications prescribed for you. Read the directions carefully, and ask your doctor or other care provider to review them with you.

## 2018-05-14 NOTE — NURSING NOTE
Chief Complaint   Patient presents with     Hypertension       Initial Pulse 70  Temp 98.1  F (36.7  C) (Oral)  Ht 5' (1.524 m)  Wt 173 lb 3.2 oz (78.6 kg)  SpO2 95%  BMI 33.83 kg/m2 Estimated body mass index is 33.83 kg/(m^2) as calculated from the following:    Height as of this encounter: 5' (1.524 m).    Weight as of this encounter: 173 lb 3.2 oz (78.6 kg).  Medication Reconciliation: complete      Health Maintenance addressed:  Mammogram and Colonoscopy/FIT    n/a

## 2018-05-14 NOTE — PROGRESS NOTES
SUBJECTIVE:   Vanessa Mota is a 57 year old female who presents to clinic today for the following health issues:      Hypertension Follow-up      Outpatient blood pressures are not being checked.    Low Salt Diet: low salt      Amount of exercise or physical activity: 2-3 days/week for an average of 45-60 minutes    Problems taking medications regularly: No    Medication side effects: nausea     Diet: low salt      States bp reading are lower at home.             Problem list and histories reviewed & adjusted, as indicated.  Additional history: as documented    Current Outpatient Prescriptions   Medication Sig Dispense Refill     ACE/ARB NOT PRESCRIBED, INTENTIONAL, by Other route continuous prn.       ALPRAZolam (XANAX) 0.25 MG tablet Take 1 tablet by mouth. As needed 30 tablet 1     ASPIRIN 81 MG OR TABS ONE DAILY 100 3     atorvastatin (LIPITOR) 20 MG tablet Take 1 tablet (20 mg) by mouth daily 90 tablet 3     blood glucose (CANDIDA CONTOUR) test strip Use to test blood sugars 2 times daily or as directed. 100 strip 2     blood glucose (NO BRAND SPECIFIED) lancets standard Use to test blood sugar 2 times daily or as directed.  Dispense as covered by insurance 100 each 3     blood glucose monitoring (NO BRAND SPECIFIED) meter device kit Use to test blood sugar 2 times daily or as directed.  Dispense brand as covered by insurance 1 kit 0     blood glucose monitoring (NO BRAND SPECIFIED) test strip Use to test blood sugar 2 times daily or as directed.  Dispense Ultra One touch strips per insurance coverage 200 each 3     BusPIRone HCl (BUSPAR) 30 MG TABS One tablet twice daily  -  60 mg daily 90 tablet 0     cyclobenzaprine (FLEXERIL) 10 MG tablet TAKE 1 TABLET BY MOUTH ONCE DAILY AS NEEDED 30 tablet 5     DULoxetine HCl (CYMBALTA PO) Take 60 mg by mouth daily Two tablets once a day  -  120 mg       FISH OIL 1000 MG OR CAPS increase to 4000mg per day 100 0     gabapentin (NEURONTIN) 100 MG capsule TAKE TWO CAPSULES  BY MOUTH THREE TIMES DAILY 180 capsule 5     glipiZIDE (GLUCOTROL XL) 10 MG 24 hr tablet Take 2 tablets (20 mg) by mouth daily 180 tablet 11     hydrOXYzine (VISTARIL) 50 MG capsule Take 50 mg by mouth. Two tablets at bedtime       insulin glargine (LANTUS SOLOSTAR) 100 UNIT/ML injection 78 units at bedtime 15 mL 3     insulin pen needle (ULTICARE MINI) 31G X 6 MM 1 Units daily Use 1 daily or as directed. At bedtime 100 each 6     LAMOTRIGINE PO Take 100 mg by mouth 2 times daily       lisinopril (PRINIVIL/ZESTRIL) 10 MG tablet Take 1 tablet (10 mg) by mouth daily Takes with a 20 mg for total of 30 mg daily 30 tablet 1     lisinopril (PRINIVIL/ZESTRIL) 20 MG tablet Take 1 tablet (20 mg) by mouth daily Takes with a 10 mg 30 tablet 1     metFORMIN (GLUCOPHAGE) 500 MG tablet TAKE 2 TABLETS BY MOUTH TWICE DAILY WITH MEALS 360 tablet 3     omeprazole (PRILOSEC) 40 MG capsule Take 1 capsule (40 mg) by mouth daily 90 capsule 3     SUMAtriptan (IMITREX) 100 MG tablet TAKE 1 TABLET BY MOUTH ONCE DAILY MAY  REPEAT  AFTER  2  HOURS  IF  NEEDED.  MAX  OF  2  TABLETS  PER  24  HOURS. 10 tablet 0     SUMAtriptan (IMITREX) 20 MG/ACT nasal spray Spray 1 spray in nostril as needed for migraine 30 each 0     Recent Labs   Lab Test  01/09/18   1215  01/09/18   1141  06/22/17   1221  11/08/16   1320  06/13/16   1141   02/10/15   1047   A1C   --   7.0*  6.8*   --   6.6*   < >  7.2*   LDL   --    --   58   --   41   --   71   HDL   --    --   48*   --   44*   --   57   TRIG   --    --   139   --   110   --   110   ALT  34   --   44  52*  59*   < >  90*   CR  0.66   --   0.74  0.69  0.74   < >  0.75   GFRESTIMATED  >90   --   81  89  82   < >  81   GFRESTBLACK  >90   --   >90   GFR Calc    >90   GFR Calc    >90   GFR Calc     < >  >90   GFR Calc     POTASSIUM  4.4   --   3.8  3.7  4.1   < >  4.0   TSH   --    --   1.79   --   1.26   --   2.56    < > = values in this interval  not displayed.      BP Readings from Last 3 Encounters:   05/14/18 142/82   01/09/18 142/78   06/30/17 148/72    Wt Readings from Last 3 Encounters:   05/14/18 173 lb 3.2 oz (78.6 kg)   01/09/18 172 lb 11.2 oz (78.3 kg)   06/30/17 169 lb (76.7 kg)                    Reviewed and updated as needed this visit by clinical staff  Tobacco  Allergies  Meds  Med Hx  Surg Hx  Fam Hx  Soc Hx      Reviewed and updated as needed this visit by Provider         ROS:  Constitutional, HEENT, cardiovascular, pulmonary, gi and gu systems are negative, except as otherwise noted.    OBJECTIVE:                                                    /82 (BP Location: Right arm, Patient Position: Chair, Cuff Size: Adult Large)  Pulse 70  Temp 98.1  F (36.7  C) (Oral)  Ht 5' (1.524 m)  Wt 173 lb 3.2 oz (78.6 kg)  SpO2 95%  BMI 33.83 kg/m2  Body mass index is 33.83 kg/(m^2).  GENERAL APPEARANCE: healthy, alert and no distress  HENT: ear canals and TM's normal and nose and mouth without ulcers or lesions  RESP: lungs clear to auscultation - no rales, rhonchi or wheezes  CV: regular rates and rhythm, normal S1 S2, no S3 or S4 and no murmur, click or rub  LYMPHATICS: no cervical adenopathy  MS: extremities normal- no gross deformities noted    Diagnostic test results:  Diagnostic Test Results:  none      ASSESSMENT/PLAN:                                                    1. Essential hypertension  Stable   - lisinopril (PRINIVIL/ZESTRIL) 10 MG tablet; Take 1 tablet (10 mg) by mouth daily Takes with a 20 mg for total of 30 mg daily  Dispense: 30 tablet; Refill: 1  - lisinopril (PRINIVIL/ZESTRIL) 20 MG tablet; Take 1 tablet (20 mg) by mouth daily Takes with a 10 mg  Dispense: 30 tablet; Refill: 1  - CBC with platelets; Future  - TSH with free T4 reflex; Future  - Comprehensive metabolic panel; Future  - Lipid panel reflex to direct LDL Fasting; Future  - Albumin Random Urine Quantitative with Creat Ratio; Future  - Hemoglobin A1c;  Future    2. Hyperlipidemia LDL goal <100    - Comprehensive metabolic panel; Future  - Lipid panel reflex to direct LDL Fasting; Future    3. Type 2 diabetes mellitus without complication, with long-term current use of insulin (H)  Stable   - Comprehensive metabolic panel; Future  - Hemoglobin A1c; Future    4. Migraine without status migrainosus, not intractable, unspecified migraine type  Stable and doing well   - SUMAtriptan (IMITREX) 20 MG/ACT nasal spray; Spray 1 spray in nostril as needed for migraine  Dispense: 30 each; Refill: 0          Ramona Ann Aaseby-Aguilera, PA-C  [facility]  There are no Patient Instructions on file for this visit.

## 2018-06-20 DIAGNOSIS — M79.7 FIBROMYALGIA: ICD-10-CM

## 2018-06-20 RX ORDER — ZOLPIDEM TARTRATE 10 MG/1
TABLET ORAL
COMMUNITY
Start: 2018-06-20 | End: 2021-09-13

## 2018-06-20 NOTE — TELEPHONE ENCOUNTER
[place] reviewed:  reviewed- recommend provider review    Pt has been getting Ambien but this was not on her medication list (it has now been added)    [place]  [email or web address]

## 2018-06-20 NOTE — TELEPHONE ENCOUNTER
Controlled Substance Refill Request for gabapentin (NEURONTIN) 100 MG capsule  Problem List Complete:  No     PROVIDER TO CONSIDER COMPLETION OF PROBLEM LIST AND OVERVIEW/CONTROLLED SUBSTANCE AGREEMENT    Last Written Prescription Date:  12/28/2017  Last Fill Quantity: 180 capsule,   # refills: 5    [place] [facility] [place]: 05/14/2018    Future Office visit:     Controlled substance agreement on file: No.     Processing:  Fax Rx to [facility]   checked in past 6 months?  No, route to CONNER DAVIS

## 2018-06-21 RX ORDER — GABAPENTIN 100 MG/1
CAPSULE ORAL
Qty: 180 CAPSULE | Refills: 5 | Status: SHIPPED | OUTPATIENT
Start: 2018-06-21 | End: 2019-01-09

## 2018-07-02 DIAGNOSIS — E11.9 TYPE 2 DIABETES MELLITUS WITHOUT COMPLICATION, WITH LONG-TERM CURRENT USE OF INSULIN (H): ICD-10-CM

## 2018-07-02 DIAGNOSIS — I10 ESSENTIAL HYPERTENSION: ICD-10-CM

## 2018-07-02 DIAGNOSIS — Z79.4 TYPE 2 DIABETES MELLITUS WITHOUT COMPLICATION, WITH LONG-TERM CURRENT USE OF INSULIN (H): ICD-10-CM

## 2018-07-02 NOTE — LETTER
Vanessa Mota  [address]   [facility] [address]      Dear Vanessa,    Our records indicate that you are due for fasting labs for further refills. One month supply was sent to your pharmacy.  Please make an appointment or call to set up the following tests as recommended.      Labs - Our records show that you may be due for cholesterol screening or other periodic lab testing.  Please schedule a fasting lab visit when you are able to do so.                                                                                                                                              Thank you for choosing [facility]. We appreciate the opportunity to serve you and look forward to supporting your healthcare needs in the future.    If you have any questions or concerns, please contact us at [phone]      Sincerely,       Ramona Aaseby-Aguilera PA-C/Savanah Melgoza

## 2018-07-03 RX ORDER — LISINOPRIL 10 MG/1
TABLET ORAL
Qty: 30 TABLET | Refills: 0 | Status: SHIPPED | OUTPATIENT
Start: 2018-07-03 | End: 2018-08-08

## 2018-07-03 NOTE — TELEPHONE ENCOUNTER
Routing refill request to provider for review/approval because:  Labs out of range:  BP not at goal and Dm labs not up to date  Kelvin Thrasher RN, BSN

## 2018-07-03 NOTE — TELEPHONE ENCOUNTER
Needs to get fasting lab done asap.  We need  know kidney function  Gave 1 month wont refill until labs done

## 2018-07-03 NOTE — TELEPHONE ENCOUNTER
"Requested Prescriptions   Pending Prescriptions Disp Refills     lisinopril (PRINIVIL/ZESTRIL) 10 MG tablet [Pharmacy Med Name: LISINOPRIL 10MG  TAB] 30 tablet 1    Last Written Prescription Date:  05/14/2018  Last Fill Quantity: 30 tablet,  # refills: 1   Last office visit: No previous visit found with prescribing provider:  05/14/2018   Future Office Visit:     Sig: TAKE 1 TABLET BY MOUTH ONCE DAILY WITH  A  20MG  TABLET  FOR  A  TOTAL  OF  30MG  DAILY.    ACE Inhibitors (Including Combos) Protocol Failed    7/2/2018  5:00 PM       Failed - Blood pressure under 140/90 in past 12 months    BP Readings from Last 3 Encounters:   05/14/18 142/82   01/09/18 142/78   06/30/17 148/72                Passed - Recent (12 mo) or future (30 days) visit within the authorizing provider's specialty    Patient had office visit in the last 12 months or has a visit in the next 30 days with authorizing provider or within the authorizing provider's specialty.  See \"Patient Info\" tab in inbasket, or \"Choose Columns\" in Meds & Orders section of the refill encounter.           Passed - Patient is age 18 or older       Passed - No active pregnancy on record       Passed - Normal serum creatinine on file in past 12 months    Recent Labs   Lab Test  01/09/18   1215   CR  0.66            Passed - Normal serum potassium on file in past 12 months    Recent Labs   Lab Test  01/09/18   1215   POTASSIUM  4.4            Passed - No positive pregnancy test in past 12 months              metFORMIN (GLUCOPHAGE) 500 MG tablet [Pharmacy Med Name: METFORMIN 500MG TAB] 120 tablet 11    Last Written Prescription Date:  06/22/2017  Last Fill Quantity: 360 tablet,  # refills: 3   Last office visit: No previous visit found with prescribing provider:  05/14/2018   Future Office Visit:     Sig: TAKE TWO TABLETS BY MOUTH TWICE DAILY WITH MEALS    Biguanide Agents Failed    7/2/2018  5:00 PM       Failed - Blood pressure less than 140/90 in past 6 months    BP " "Readings from Last 3 Encounters:   05/14/18 142/82   01/09/18 142/78   06/30/17 148/72                Failed - Patient has documented LDL within the past 12 mos.    Recent Labs   Lab Test  06/22/17   1221   LDL  58            Passed - Patient has had a Microalbumin in the past 12 mos.    Recent Labs   Lab Test  06/22/17   1222   MICROL  46   UMALCR  17.39            Passed - Patient is age 10 or older       Passed - Patient has documented A1c within the specified period of time.    If HgbA1C is 8 or greater, it needs to be on file within the past 3 months.  If less than 8, must be on file within the past 6 months.     Recent Labs   Lab Test  01/09/18   1141   A1C  7.0*            Passed - Patient's CR is NOT>1.4 OR Patient's EGFR is NOT<45 within past 12 mos.    Recent Labs   Lab Test  01/09/18   1215   GFRESTIMATED  >90   GFRESTBLACK  >90       Recent Labs   Lab Test  01/09/18   1215   CR  0.66            Passed - Patient does NOT have a diagnosis of CHF.       Passed - Patient is not pregnant       Passed - Patient has not had a positive pregnancy test within the past 12 mos.        Passed - Recent (6 mo) or future (30 days) visit within the authorizing provider's specialty    Patient had office visit in the last 6 months or has a visit in the next 30 days with authorizing provider or within the authorizing provider's specialty.  See \"Patient Info\" tab in inbasket, or \"Choose Columns\" in Meds & Orders section of the refill encounter.              Myron Hodgson XRT  "

## 2018-07-09 DIAGNOSIS — E11.9 TYPE 2 DIABETES MELLITUS WITHOUT COMPLICATION, WITH LONG-TERM CURRENT USE OF INSULIN (H): ICD-10-CM

## 2018-07-09 DIAGNOSIS — Z79.4 TYPE 2 DIABETES MELLITUS WITHOUT COMPLICATION, WITH LONG-TERM CURRENT USE OF INSULIN (H): ICD-10-CM

## 2018-07-09 RX ORDER — INSULIN GLARGINE 100 [IU]/ML
INJECTION, SOLUTION SUBCUTANEOUS
Qty: 3 ML | Refills: 1 | Status: SHIPPED | OUTPATIENT
Start: 2018-07-09 | End: 2018-10-08

## 2018-07-09 NOTE — TELEPHONE ENCOUNTER
Routing refill request to provider for review/approval because:  Labs out of range:  BP  Pt has not gotten labs done   Kelvin Thrasher RN, BSN

## 2018-07-09 NOTE — TELEPHONE ENCOUNTER
"Requested Prescriptions   Pending Prescriptions Disp Refills     LANTUS SOLOSTAR 100 UNIT/ML soln [Pharmacy Med Name: LANTUS SOLOSTAR     INJ]  11    Last Written Prescription Date:  09/11/2017  Last Fill Quantity: 15 milliliter,  # refills: 3   Last office visit: No previous visit found with prescribing provider:  05/14/2017   Futue Office Visit:   Next 5 appointments (look out 90 days)     Jul 13, 2018  2:00 PM CDT   Office Visit with Ramona Ann Aaseby-Aguilera, PA-C   [facility]    [address] [facility] [address] [phone]                  Sig: INJECT 78 UNITS AT BEDTIME    Long Acting Insulin Protocol Failed    7/9/2018 10:31 AM       Failed - Blood pressure less than 140/90 in past 6 months    BP Readings from Last 3 Encounters:   05/14/18 142/82   01/09/18 142/78   06/30/17 148/72                Failed - LDL on file in past 12 months    Recent Labs   Lab Test  06/22/17   1221   LDL  58            Failed - HgbA1C in past 3 or 6 months    If HgbA1C is 8 or greater, it needs to be on file within the past 3 months.  If less than 8, must be on file within the past 6 months.     Recent Labs   Lab Test  01/09/18   1141   A1C  7.0*            Passed - Microalbumin on file in past 12 months    Recent Labs   Lab Test  06/22/17   1222   MICROL  46   UMALCR  17.39            Passed - Serum creatinine on file in past 12 months    Recent Labs   Lab Test  01/09/18   1215   CR  0.66            Passed - Patient is age 18 or older       Passed - Recent (6 mo) or future (30 days) visit within the authorizing provider's specialty    Patient had office visit in the last 6 months or has a visit in the next 30 days with authorizing provider or within the authorizing provider's specialty.  See \"Patient Info\" tab in inbasket, or \"Choose Columns\" in Meds & Orders section of the refill encounter.              Myron Hodgson XRT  "

## 2018-07-09 NOTE — TELEPHONE ENCOUNTER
LVM-explaining to patient her lab orders are fasting and she might want to change her lab appointment to morning appointment. Patient is scheduled 07/11 at 1:45pm.  Waleska Vila

## 2018-07-16 DIAGNOSIS — E78.5 HYPERLIPIDEMIA LDL GOAL <100: ICD-10-CM

## 2018-07-16 NOTE — TELEPHONE ENCOUNTER
"Last Written Prescription Date:  Atorvastatin 6/22/17  Last Fill Quantity:   90  # refills: 3   Last office visit: No previous visit found with prescribing provider:   5/14/18  Future Office Visit:      Requested Prescriptions   Pending Prescriptions Disp Refills     atorvastatin (LIPITOR) 20 MG tablet [Pharmacy Med Name: ATORVASTATIN 20MG   TAB] 30 tablet 11     Sig: TAKE ONE TABLET BY MOUTH ONCE DAILY    Statins Protocol Failed    7/16/2018  3:35 PM       Failed - LDL on file in past 12 months    Recent Labs   Lab Test  06/22/17   1221   LDL  58            Passed - No abnormal creatine kinase in past 12 months    Recent Labs   Lab Test  04/25/14   1226   CKT  92               Passed - Recent (12 mo) or future (30 days) visit within the authorizing provider's specialty    Patient had office visit in the last 12 months or has a visit in the next 30 days with authorizing provider or within the authorizing provider's specialty.  See \"Patient Info\" tab in inbasket, or \"Choose Columns\" in Meds & Orders section of the refill encounter.           Passed - Patient is age 18 or older       Passed - No active pregnancy on record       Passed - No positive pregnancy test in past 12 months          "

## 2018-07-17 ENCOUNTER — TELEPHONE (OUTPATIENT)
Dept: FAMILY MEDICINE | Facility: CLINIC | Age: 58
End: 2018-07-17

## 2018-07-17 RX ORDER — ATORVASTATIN CALCIUM 20 MG/1
TABLET, FILM COATED ORAL
Qty: 30 TABLET | Refills: 0 | Status: SHIPPED | OUTPATIENT
Start: 2018-07-17 | End: 2018-08-13

## 2018-07-17 NOTE — LETTER
August 2, 2018      Vanessa Mota  [address] [place] [id number]        Dear Vanessa,     Our records indicate that you may be due for preventative health care services.  Please make an appointment or call to set up the following tests as recommended.  If you have already had this testing done at another clinic please contact us to help us update our records to reflect the preventative care that you have had.    Breast cancer screen (mammogram) - Recommended every 1-2 years for women age 50 and older. Mammograms help detect breast cancer, which is the most common cancer among women in the [place]. You may need to start having mammograms earlier and more often if you have had breast cancer, breast problems, or have a family history of breast cancer.                                                                                                                                    Colon cancer screen (colonoscopy) - Recommended every ten years for everyone age 50 and older. Screening is done by a colonoscopy every 10 years starting at age 50 or earlier if you have a family history of colon cancer.  If you cannot or do not want to have a colonoscopy you can opt to do an annual fecal occult blood immunoassay test (FIT stool test). We strongly urge our patients to consider having a colonoscopy done, which is the best screening test available and only needs to be done every 10 years if normal.                                                                                                                                               Thank you for choosing [facility]. We appreciate the opportunity to serve you and look forward to supporting your healthcare needs in the future.    If you have any questions or concerns, please contact us at [phone]        Sincerely,          Ramona Ann Aaseby-Aguilera, PA-C

## 2018-07-17 NOTE — TELEPHONE ENCOUNTER
Panel Management Review      Patient has the following on her problem list:     Depression / Dysthymia review    Measure:  Needs PHQ-9 score of 4 or less during index window.  Administer PHQ-9 and if score is 5 or more, send encounter to provider for next steps.    5 - 7 month window range:     PHQ-9 SCORE 6/13/2016 6/22/2017 1/9/2018   Total Score - - -   Total Score 8 9 7       If PHQ-9 recheck is 5 or more, route to provider for next steps.    Patient is due for:  PHQ9    Diabetes    ASA: Passed    Last A1C  Lab Results   Component Value Date    A1C 7.0 01/09/2018    A1C 6.8 06/22/2017    A1C 6.6 06/13/2016    A1C 6.9 11/03/2015    A1C 8.5 09/01/2015     A1C tested: Passed    Last LDL:    Lab Results   Component Value Date    CHOL 134 06/22/2017     Lab Results   Component Value Date    HDL 48 06/22/2017     Lab Results   Component Value Date    LDL 58 06/22/2017     Lab Results   Component Value Date    TRIG 139 06/22/2017     Lab Results   Component Value Date    CHOLHDLRATIO 2.6 02/10/2015     Lab Results   Component Value Date    NHDL 86 06/22/2017       Is the patient on a Statin? YES             Is the patient on Aspirin? YES    Medications     HMG CoA Reductase Inhibitors    atorvastatin (LIPITOR) 20 MG tablet    Salicylates    ASPIRIN 81 MG OR TABS          Last three blood pressure readings:  BP Readings from Last 3 Encounters:   05/14/18 142/82   01/09/18 142/78   06/30/17 148/72       Date of last diabetes office visit:      Tobacco History:     History   Smoking Status     Light Tobacco Smoker     Packs/day: 0.20     Years: 3.00     Types: Cigarettes     Last attempt to quit: 1/15/2013   Smokeless Tobacco     Never Used     Comment: e cig   - three puffs a day            IVD   ASA: Passed    Last LDL:    Lab Results   Component Value Date    CHOL 134 06/22/2017     Lab Results   Component Value Date    HDL 48 06/22/2017     Lab Results   Component Value Date    LDL 58 06/22/2017     Lab Results    Component Value Date    TRIG 139 06/22/2017        Lab Results   Component Value Date    CHOLHDLRATIO 2.6 02/10/2015        Is the patient on a Statin? YES   Is the patient on Aspirin? YES                  Medications     HMG CoA Reductase Inhibitors    atorvastatin (LIPITOR) 20 MG tablet    Salicylates    ASPIRIN 81 MG OR TABS          Last three blood pressure readings:  BP Readings from Last 3 Encounters:   05/14/18 142/82   01/09/18 142/78   06/30/17 148/72        Tobacco History:     History   Smoking Status     Light Tobacco Smoker     Packs/day: 0.20     Years: 3.00     Types: Cigarettes     Last attempt to quit: 1/15/2013   Smokeless Tobacco     Never Used     Comment: e cig   - three puffs a day        Hypertension   Last three blood pressure readings:  BP Readings from Last 3 Encounters:   05/14/18 142/82   01/09/18 142/78   06/30/17 148/72     Blood pressure: FAILED    HTN Guidelines:  Age 18-59 BP range:  Less than 140/90  Age 60-85 with Diabetes:  Less than 140/90  Age 60-85 without Diabetes:  less than 150/90      Composite cancer screening  Chart review shows that this patient is due/due soon for the following Mammogram and Colonoscopy  Summary:    Patient is due/failing the following:   A1C, COLONOSCOPY and MAMMOGRAM    Action needed:   Patient needs office visit for DM., Patient needs to do PHQ9. and Patient needs referral/order: mammogram and colonoscopy     Type of outreach:    Phone, left message for patient to call back.     Questions for provider review:    None                                                                                                                                    LAUREN Monroe       Chart routed to  .

## 2018-07-17 NOTE — TELEPHONE ENCOUNTER
Left message with Yuik, pharmacist at pharmacy, to inform patient that she needs to be seen before any more refills will be given since pt does not return calls to clinic    Kelvin Thrasher RN, BSN

## 2018-07-17 NOTE — TELEPHONE ENCOUNTER
Routing refill request to provider for review/approval because:  Patient needs to be seen because:  Due for OV and has no showed to several appt and has not done labs  Kelvin Thrasher RN, BSN

## 2018-07-31 DIAGNOSIS — Z79.4 TYPE 2 DIABETES MELLITUS WITHOUT COMPLICATION, WITH LONG-TERM CURRENT USE OF INSULIN (H): ICD-10-CM

## 2018-07-31 DIAGNOSIS — E11.9 TYPE 2 DIABETES MELLITUS WITHOUT COMPLICATION, WITH LONG-TERM CURRENT USE OF INSULIN (H): ICD-10-CM

## 2018-07-31 RX ORDER — GLIPIZIDE 10 MG/1
TABLET, EXTENDED RELEASE ORAL
Qty: 60 TABLET | Refills: 35 | Status: SHIPPED | OUTPATIENT
Start: 2018-07-31 | End: 2019-05-28

## 2018-07-31 NOTE — TELEPHONE ENCOUNTER
Requested Prescriptions   Pending Prescriptions Disp Refills     metFORMIN (GLUCOPHAGE) 500 MG tablet [Pharmacy Med Name: METFORMIN 500MG TAB] 120 tablet 0    Last Written Prescription Date:  07/03/2018  Last Fill Quantity: 120 tablet,  # refills: 0   Last office visit: No previous visit found with prescribing provider:  07/13/2018   Future Office Visit:   Next 5 appointments (look out 90 days)     Aug 02, 2018 11:45 AM CDT   SHORT with Ramona Ann Aaseby-Aguilera, PA-C   [facility]    [address] [address] [phone]                  Sig: TAKE 2 TABLETS BY MOUTH TWICE DAILY WITH MEALS    Biguanide Agents Failed    7/31/2018  5:30 AM       Failed - Blood pressure less than 140/90 in past 6 months    BP Readings from Last 3 Encounters:   05/14/18 142/82   01/09/18 142/78   06/30/17 148/72                Failed - Patient has documented LDL within the past 12 mos.    Recent Labs   Lab Test  06/22/17   1221   LDL  58            Failed - Patient has documented A1c within the specified period of time.    If HgbA1C is 8 or greater, it needs to be on file within the past 3 months.  If less than 8, must be on file within the past 6 months.     Recent Labs   Lab Test  01/09/18   1141   A1C  7.0*            Passed - Patient has had a Microalbumin in the past 12 mos.    Recent Labs   Lab Test  06/22/17   1222   MICROL  46   UMALCR  17.39            Passed - Patient is age 10 or older       Passed - Patient's CR is NOT>1.4 OR Patient's EGFR is NOT<45 within past 12 mos.    Recent Labs   Lab Test  01/09/18   1215   GFRESTIMATED  >90   GFRESTBLACK  >90       Recent Labs   Lab Test  01/09/18   1215   CR  0.66            Passed - Patient does NOT have a diagnosis of CHF.       Passed - Patient is not pregnant       Passed - Patient has not had a positive pregnancy test within the past 12 mos.        Passed - Recent (6 mo) or future (30 days) visit within the  "authorizing provider's specialty    Patient had office visit in the last 6 months or has a visit in the next 30 days with authorizing provider or within the authorizing provider's specialty.  See \"Patient Info\" tab in inbasket, or \"Choose Columns\" in Meds & Orders section of the refill encounter.                GLIPIZIDE XL 10 MG 24 hr tablet [Pharmacy Med Name: GLIPIZIDE XL 10MG   TAB] 60 tablet 35    Last Written Prescription Date:  06/22/2017  Last Fill Quantity: 180 tablet,  # refills: 11   Last office visit: No previous visit found with prescribing provider:  07/13/2018   Future Office Visit:   Next 5 appointments (look out 90 days)     Aug 02, 2018 11:45 AM CDT   SHORT with Ramona Ann Aaseby-Aguilera, PA-C   [facility]    [address] [facility] [facility] [address] [phone]                  Sig: TAKE TWO TABLETS BY MOUTH ONCE DAILY    Sulfonylurea Agents Failed    7/31/2018  5:30 AM       Failed - Blood pressure less than 140/90 in past 6 months    BP Readings from Last 3 Encounters:   05/14/18 142/82   01/09/18 142/78   06/30/17 148/72                Failed - Patient has documented LDL within the past 12 mos.    Recent Labs   Lab Test  06/22/17   1221   LDL  58            Failed - Patient has documented A1c within the specified period of time.    If HgbA1C is 8 or greater, it needs to be on file within the past 3 months.  If less than 8, must be on file within the past 6 months.     Recent Labs   Lab Test  01/09/18   1141   A1C  7.0*            Passed - Patient has had a Microalbumin in the past 12 mos.    Recent Labs   Lab Test  06/22/17   1222   MICROL  46   UMALCR  17.39            Passed - Patient is age 18 or older       Passed - No active pregnancy on record       Passed - Patient has a recent creatinine (normal) within the past 12 mos.    Recent Labs   Lab Test  01/09/18   1215   CR  0.66            Passed - Patient has not had a positive pregnancy " "test within the past 12 mos.       Passed - Recent (6 mo) or future (30 days) visit within the authorizing provider's specialty    Patient had office visit in the last 6 months or has a visit in the next 30 days with authorizing provider or within the authorizing provider's specialty.  See \"Patient Info\" tab in inbasket, or \"Choose Columns\" in Meds & Orders section of the refill encounter.              Myron Hodgson XRT  "

## 2018-07-31 NOTE — TELEPHONE ENCOUNTER
Routing refill request to provider for review/approval because:  Patient needs to be seen because:  Pt past due for appt and no showed to last scheduled appt  Kelvin Thrasher RN, BSN

## 2018-08-08 DIAGNOSIS — I10 ESSENTIAL HYPERTENSION: ICD-10-CM

## 2018-08-08 RX ORDER — LISINOPRIL 10 MG/1
TABLET ORAL
Qty: 30 TABLET | Refills: 0 | Status: SHIPPED | OUTPATIENT
Start: 2018-08-08 | End: 2018-09-06 | Stop reason: SINTOL

## 2018-08-08 NOTE — TELEPHONE ENCOUNTER
"Requested Prescriptions   Pending Prescriptions Disp Refills     lisinopril (PRINIVIL/ZESTRIL) 10 MG tablet [Pharmacy Med Name: LISINOPRIL 10MG  TAB] 30 tablet 0    Last Written Prescription Date:  07/03/2018  Last Fill Quantity: 30 TABLET,  # refills: 0   Last office visit: No previous visit found with prescribing provider:  07/13/2018   Future Office Visit:   Next 5 appointments (look out 90 days)     Aug 13, 2018  1:00 PM CDT   Office Visit with Ramona Ann Aaseby-Aguilera, PA-C   [facility]    [address] [facility] [address] [phone]                  Sig: TAKE 1 TABLET BY MOUTH ONCE DAILY WITH  A  20MG  TABLET  FOR  A  TOTAL  OF  30MG  DAILY.    ACE Inhibitors (Including Combos) Protocol Failed    8/8/2018  5:30 AM       Failed - Blood pressure under 140/90 in past 12 months    BP Readings from Last 3 Encounters:   05/14/18 142/82   01/09/18 142/78   06/30/17 148/72                Passed - Recent (12 mo) or future (30 days) visit within the authorizing provider's specialty    Patient had office visit in the last 12 months or has a visit in the next 30 days with authorizing provider or within the authorizing provider's specialty.  See \"Patient Info\" tab in inbasket, or \"Choose Columns\" in Meds & Orders section of the refill encounter.           Passed - Patient is age 18 or older       Passed - No active pregnancy on record       Passed - Normal serum creatinine on file in past 12 months    Recent Labs   Lab Test  01/09/18   1215   CR  0.66            Passed - Normal serum potassium on file in past 12 months    Recent Labs   Lab Test  01/09/18   1215   POTASSIUM  4.4            Passed - No positive pregnancy test in past 12 months          Myron SCHULZT  "

## 2018-08-08 NOTE — TELEPHONE ENCOUNTER
Routing refill request to provider for review/approval because:  Grazyna given x1 and patient did not follow up, please advise  BP not at goal    Looks like there is an upcoming OV next week. Ok for limited supply? Please sign.     Gabriela MCGREGOR RN, BSN, PHN  [facility] RN

## 2018-08-20 DIAGNOSIS — E78.5 HYPERLIPIDEMIA LDL GOAL <100: ICD-10-CM

## 2018-08-20 RX ORDER — ATORVASTATIN CALCIUM 20 MG/1
TABLET, FILM COATED ORAL
Qty: 30 TABLET | Refills: 0 | OUTPATIENT
Start: 2018-08-20

## 2018-08-20 NOTE — TELEPHONE ENCOUNTER
"Requested Prescriptions   Pending Prescriptions Disp Refills     atorvastatin (LIPITOR) 20 MG tablet [Pharmacy Med Name: ATORVASTATIN 20MG   TAB] 30 tablet 0    Last Written Prescription Date:  08/14/2018  Last Fill Quantity: 30 TABLET,  # refills: 0   Last office visit: No previous visit found with prescribing provider:  07/13/2018   Future Office Visit:     Sig: TAKE 1 TABLET BY MOUTH ONCE DAILY    Statins Protocol Failed    8/20/2018  5:30 AM       Failed - LDL on file in past 12 months    Recent Labs   Lab Test  06/22/17   1221   LDL  58            Passed - No abnormal creatine kinase in past 12 months    Recent Labs   Lab Test  04/25/14   1226   CKT  92               Passed - Recent (12 mo) or future (30 days) visit within the authorizing provider's specialty    Patient had office visit in the last 12 months or has a visit in the next 30 days with authorizing provider or within the authorizing provider's specialty.  See \"Patient Info\" tab in inbasket, or \"Choose Columns\" in Meds & Orders section of the refill encounter.           Passed - Patient is age 18 or older       Passed - No active pregnancy on record       Passed - No positive pregnancy test in past 12 months          Myron SCHULZT  "

## 2018-08-20 NOTE — TELEPHONE ENCOUNTER
Pharmacy requesting 90 day.  90 day not appropriate as pt is past due for OV and no showed  Kelvin Thrasher RN, BSN

## 2018-08-27 ENCOUNTER — HEALTH MAINTENANCE LETTER (OUTPATIENT)
Age: 58
End: 2018-08-27

## 2018-09-05 DIAGNOSIS — G43.909 MIGRAINE WITHOUT STATUS MIGRAINOSUS, NOT INTRACTABLE, UNSPECIFIED MIGRAINE TYPE: ICD-10-CM

## 2018-09-05 DIAGNOSIS — G43.009 MIGRAINE WITHOUT AURA AND WITHOUT STATUS MIGRAINOSUS, NOT INTRACTABLE: ICD-10-CM

## 2018-09-05 NOTE — TELEPHONE ENCOUNTER
Routing refill request to provider for review/approval because:  Labs out of range:  BP  Pt has appt for tomorrow but has no showed to multiple appts  Kelvin Thrasher RN, BSN

## 2018-09-05 NOTE — TELEPHONE ENCOUNTER
"Requested Prescriptions   Pending Prescriptions Disp Refills     SUMAtriptan (IMITREX) 100 MG tablet [Pharmacy Med Name: SUMATRIPTAN 100MG TAB] 10 tablet 0    Last Written Prescription Date:  05/11/2018  Last Fill Quantity: 10 TABLET,  # refills: 0   Last office visit: No previous visit found with prescribing provider:  07/13/2018   Future Office Visit:   Next 5 appointments (look out 90 days)     Sep 06, 2018  2:30 PM CDT   SHORT with Ramona Ann Aaseby-Aguilera, PA-C   [facility]    [address] [facility] [address] [phone]                  Sig: TAKE ONE TABLET BY MOUTH ONCE DAILY. MAY REPEAT AFTER 2 HOURS IF NEEDED. MAX OF 2 TABLETS PER 24 HOURS    Serotonin Agonists Failed    9/5/2018  5:30 AM       Failed - Blood pressure under 140/90 in past 12 months    BP Readings from Last 3 Encounters:   05/14/18 142/82   01/09/18 142/78   06/30/17 148/72                Failed - Serotonin Agonist request needs review.    Please review patient's record. If patient has had 8 or more treatments in the past month, please forward to provider.         Passed - Recent (12 mo) or future (30 days) visit within the authorizing provider's specialty    Patient had office visit in the last 12 months or has a visit in the next 30 days with authorizing provider or within the authorizing provider's specialty.  See \"Patient Info\" tab in inbasket, or \"Choose Columns\" in Meds & Orders section of the refill encounter.           Passed - Patient is age 18 or older       Passed - No active pregnancy on record       Passed - No positive pregnancy test in past 12 months            SUMAtriptan (IMITREX) 20 MG/ACT nasal spray [Pharmacy Med Name: SUMATRIPTAN 20MG/ACT SOL] 30 each 0    Last Written Prescription Date:  05/14/2018  Last Fill Quantity: 3 BOTTLES,  # refills: 0   Last office visit: No previous visit found with prescribing provider:  07/13/2018   Future Office Visit:   Next 5 " "appointments (look out 90 days)     Sep 06, 2018  2:30 PM CDT   SHORT with Ramona Ann Aaseby-Aguilera, PA-C   [facility]    [address] [facility] [address] [phone]                  Sig: USE 1 SPRAY IN NOSTRIL AS NEEDED FOR MIGRAINE.    Serotonin Agonists Failed    9/5/2018  5:30 AM       Failed - Blood pressure under 140/90 in past 12 months    BP Readings from Last 3 Encounters:   05/14/18 142/82   01/09/18 142/78   06/30/17 148/72                Failed - Serotonin Agonist request needs review.    Please review patient's record. If patient has had 8 or more treatments in the past month, please forward to provider.         Passed - Recent (12 mo) or future (30 days) visit within the authorizing provider's specialty    Patient had office visit in the last 12 months or has a visit in the next 30 days with authorizing provider or within the authorizing provider's specialty.  See \"Patient Info\" tab in inbasket, or \"Choose Columns\" in Meds & Orders section of the refill encounter.           Passed - Patient is age 18 or older       Passed - No active pregnancy on record       Passed - No positive pregnancy test in past 12 months          Myron SCHULZT  "

## 2018-09-06 ENCOUNTER — OFFICE VISIT (OUTPATIENT)
Dept: FAMILY MEDICINE | Facility: CLINIC | Age: 58
End: 2018-09-06
Payer: COMMERCIAL

## 2018-09-06 VITALS
WEIGHT: 170 LBS | SYSTOLIC BLOOD PRESSURE: 130 MMHG | OXYGEN SATURATION: 96 % | BODY MASS INDEX: 33.38 KG/M2 | HEART RATE: 66 BPM | RESPIRATION RATE: 18 BRPM | HEIGHT: 60 IN | DIASTOLIC BLOOD PRESSURE: 82 MMHG | TEMPERATURE: 97.9 F

## 2018-09-06 DIAGNOSIS — Z79.4 TYPE 2 DIABETES MELLITUS WITHOUT COMPLICATION, WITH LONG-TERM CURRENT USE OF INSULIN (H): ICD-10-CM

## 2018-09-06 DIAGNOSIS — Z12.31 VISIT FOR SCREENING MAMMOGRAM: ICD-10-CM

## 2018-09-06 DIAGNOSIS — G43.009 MIGRAINE WITHOUT AURA AND WITHOUT STATUS MIGRAINOSUS, NOT INTRACTABLE: ICD-10-CM

## 2018-09-06 DIAGNOSIS — G43.909 MIGRAINE WITHOUT STATUS MIGRAINOSUS, NOT INTRACTABLE, UNSPECIFIED MIGRAINE TYPE: ICD-10-CM

## 2018-09-06 DIAGNOSIS — Z13.89 SCREENING FOR DIABETIC PERIPHERAL NEUROPATHY: ICD-10-CM

## 2018-09-06 DIAGNOSIS — Z12.11 SCREEN FOR COLON CANCER: ICD-10-CM

## 2018-09-06 DIAGNOSIS — E11.9 TYPE 2 DIABETES MELLITUS WITHOUT COMPLICATION, WITH LONG-TERM CURRENT USE OF INSULIN (H): ICD-10-CM

## 2018-09-06 DIAGNOSIS — E78.5 HYPERLIPIDEMIA LDL GOAL <100: Primary | ICD-10-CM

## 2018-09-06 DIAGNOSIS — Z78.0 ASYMPTOMATIC POSTMENOPAUSAL STATUS: ICD-10-CM

## 2018-09-06 DIAGNOSIS — I10 HYPERTENSION GOAL BP (BLOOD PRESSURE) < 140/90: ICD-10-CM

## 2018-09-06 LAB — HBA1C MFR BLD: 6.9 % (ref 0–5.6)

## 2018-09-06 PROCEDURE — 82043 UR ALBUMIN QUANTITATIVE: CPT | Performed by: PHYSICIAN ASSISTANT

## 2018-09-06 PROCEDURE — 83036 HEMOGLOBIN GLYCOSYLATED A1C: CPT | Performed by: PHYSICIAN ASSISTANT

## 2018-09-06 PROCEDURE — 80053 COMPREHEN METABOLIC PANEL: CPT | Performed by: PHYSICIAN ASSISTANT

## 2018-09-06 PROCEDURE — 36415 COLL VENOUS BLD VENIPUNCTURE: CPT | Performed by: PHYSICIAN ASSISTANT

## 2018-09-06 PROCEDURE — 80061 LIPID PANEL: CPT | Performed by: PHYSICIAN ASSISTANT

## 2018-09-06 PROCEDURE — 99214 OFFICE O/P EST MOD 30 MIN: CPT | Performed by: PHYSICIAN ASSISTANT

## 2018-09-06 RX ORDER — SUMATRIPTAN 100 MG/1
TABLET, FILM COATED ORAL
Qty: 10 TABLET | Refills: 0 | Status: SHIPPED | OUTPATIENT
Start: 2018-09-06 | End: 2018-09-06

## 2018-09-06 RX ORDER — SUMATRIPTAN 100 MG/1
TABLET, FILM COATED ORAL
Qty: 10 TABLET | Refills: 1 | Status: SHIPPED | OUTPATIENT
Start: 2018-09-06 | End: 2019-02-11

## 2018-09-06 RX ORDER — SUMATRIPTAN 20 MG/1
SPRAY NASAL
Qty: 30 EACH | Refills: 0 | Status: SHIPPED | OUTPATIENT
Start: 2018-09-06 | End: 2020-01-13

## 2018-09-06 RX ORDER — LOSARTAN POTASSIUM 50 MG/1
50 TABLET ORAL DAILY
Qty: 30 TABLET | Refills: 1 | Status: SHIPPED | OUTPATIENT
Start: 2018-09-06 | End: 2018-10-04

## 2018-09-06 RX ORDER — ATORVASTATIN CALCIUM 20 MG/1
20 TABLET, FILM COATED ORAL DAILY
Qty: 90 TABLET | Refills: 1 | Status: SHIPPED | OUTPATIENT
Start: 2018-09-06 | End: 2019-02-27

## 2018-09-06 RX ORDER — FLUOXETINE 20 MG/1
40 TABLET, FILM COATED ORAL DAILY
COMMUNITY
End: 2022-08-31 | Stop reason: DRUGHIGH

## 2018-09-06 RX ORDER — SUMATRIPTAN 20 MG/1
SPRAY NASAL
Qty: 30 EACH | Refills: 0 | Status: SHIPPED | OUTPATIENT
Start: 2018-09-06 | End: 2018-09-06

## 2018-09-06 ASSESSMENT — ANXIETY QUESTIONNAIRES
5. BEING SO RESTLESS THAT IT IS HARD TO SIT STILL: SEVERAL DAYS
3. WORRYING TOO MUCH ABOUT DIFFERENT THINGS: SEVERAL DAYS
IF YOU CHECKED OFF ANY PROBLEMS ON THIS QUESTIONNAIRE, HOW DIFFICULT HAVE THESE PROBLEMS MADE IT FOR YOU TO DO YOUR WORK, TAKE CARE OF THINGS AT HOME, OR GET ALONG WITH OTHER PEOPLE: SOMEWHAT DIFFICULT
GAD7 TOTAL SCORE: 8
6. BECOMING EASILY ANNOYED OR IRRITABLE: MORE THAN HALF THE DAYS
2. NOT BEING ABLE TO STOP OR CONTROL WORRYING: SEVERAL DAYS
7. FEELING AFRAID AS IF SOMETHING AWFUL MIGHT HAPPEN: SEVERAL DAYS
1. FEELING NERVOUS, ANXIOUS, OR ON EDGE: SEVERAL DAYS

## 2018-09-06 ASSESSMENT — PATIENT HEALTH QUESTIONNAIRE - PHQ9: 5. POOR APPETITE OR OVEREATING: SEVERAL DAYS

## 2018-09-06 NOTE — PATIENT INSTRUCTIONS
(E78.5) Hyperlipidemia LDL goal <100  (primary encounter diagnosis)  Comment:   Plan: Comprehensive metabolic panel, Lipid panel         reflex to direct LDL Fasting, atorvastatin         (LIPITOR) 20 MG tablet            (Z12.11) Screen for colon cancer  Comment:   Plan: Fecal colorectal cancer screen FIT - Future         (S+30), GASTROENTEROLOGY ADULT REF PROCEDURE         ONLY            (Z78.0) Asymptomatic postmenopausal status  Comment:   Plan: DEXA HIP/PELVIS/SPINE - Future            (Z12.31) Visit for screening mammogram  Comment:   Plan: MA SCREENING DIGITAL BILAT - Future  (s+30)            (Z13.89) Screening for diabetic peripheral neuropathy  Comment:   Plan: FOOT EXAM  NO CHARGE [45481.114]            (I10) Hypertension goal BP (blood pressure) < 140/90  Comment:   Plan: Albumin Random Urine Quantitative with Creat         Ratio, Comprehensive metabolic panel, Lipid         panel reflex to direct LDL Fasting, losartan         (COZAAR) 50 MG tablet            (E11.9,  Z79.4) Type 2 diabetes mellitus without complication, with long-term current use of insulin (H)  Comment: stable and doing well   Plan: FOOT EXAM  NO CHARGE [04603.114], HEMOGLOBIN         A1C, Albumin Random Urine Quantitative with         Creat Ratio, OPTOMETRY REFERRAL, insulin pen         needle (ULTICARE MINI) 31G X 6 MM, metFORMIN         (GLUCOPHAGE) 500 MG tablet            (G43.909) Migraine without status migrainosus, not intractable, unspecified migraine type  Comment:   Plan: SUMAtriptan (IMITREX) 20 MG/ACT nasal spray            (G43.009) Migraine without aura and without status migrainosus, not intractable  Comment:   Plan: SUMAtriptan (IMITREX) 100 MG tablet

## 2018-09-06 NOTE — PROGRESS NOTES
SUBJECTIVE:   Vanessa Mota is a 58 year old female who presents to clinic today for the following health issues:    Diabetes Follow-up      Patient is checking blood sugars: About Once a Day before Breakfast. Pt states numbers are approximately around 100.    Diabetic concerns: None     Symptoms of hypoglycemia (low blood sugar): Shaky, clammy, and fatigued.      Paresthesias (numbness or burning in feet) or sores: No     Date of last diabetic eye exam: Approximately February of 2016    Diabetes Management Resources    Hyperlipidemia Follow-Up       Rate your low fat/cholesterol diet?: fair-Once in awhile watches what she eats.    Taking statin?  Yes, no muscle aches from statin  Lipitor 20 mg    Other lipid medications/supplements?:  Fish oil/Omega 3     Hypertension Follow-up      Outpatient blood pressures are not being checked.    Low Salt Diet: no added salt    BP Readings from Last 2 Encounters:   09/06/18 130/82   05/14/18 142/82     Hemoglobin A1C (%)   Date Value   01/09/2018 7.0 (H)   06/22/2017 6.8 (H)     LDL Cholesterol Calculated (mg/dL)   Date Value   06/22/2017 58   06/13/2016 41     Depression and Anxiety Follow-Up    Status since last visit: Improved Pt was recently added fluoxetine 20 mg. She says that seems to had helped.     Other associated symptoms:None    Complicating factors:     Significant life event: No     Current substance abuse: None    PHQ-9 6/13/2016 6/22/2017 1/9/2018   Total Score - 9 7   Q9: Suicide Ideation Not at all Not at all Not at all     NATALIIA-7 SCORE 7/3/2015 9/1/2015 6/13/2016   Total Score 9 - -   Total Score - 4 7       PHQ-9  English  PHQ-9   Any Language  NATALIIA-7  Suicide Assessment Five-step Evaluation and Treatment (SAFE-T)    Amount of exercise or physical activity: Some-Housework    Problems taking medications regularly: No    Medication side effects: none    Diet: regular (no restrictions), low fat/cholesterol, diabetic and carbohydrate counting          Problem  list and histories reviewed & adjusted, as indicated.  Additional history: as documented    Current Outpatient Prescriptions   Medication Sig Dispense Refill     ACE/ARB NOT PRESCRIBED, INTENTIONAL, by Other route continuous prn.       ASPIRIN 81 MG OR TABS ONE DAILY 100 3     atorvastatin (LIPITOR) 20 MG tablet Take 1 tablet (20 mg) by mouth daily 90 tablet 1     blood glucose (CANDIDA CONTOUR) test strip Use to test blood sugars 2 times daily or as directed. 100 strip 2     blood glucose (NO BRAND SPECIFIED) lancets standard Use to test blood sugar 2 times daily or as directed.  Dispense as covered by insurance 100 each 3     blood glucose monitoring (NO BRAND SPECIFIED) meter device kit Use to test blood sugar 2 times daily or as directed.  Dispense brand as covered by insurance 1 kit 0     blood glucose monitoring (NO BRAND SPECIFIED) test strip Use to test blood sugar 2 times daily or as directed.  Dispense Ultra One touch strips per insurance coverage 200 each 3     BusPIRone HCl (BUSPAR) 30 MG TABS One tablet twice daily  -  60 mg daily 90 tablet 0     cyclobenzaprine (FLEXERIL) 10 MG tablet TAKE 1 TABLET BY MOUTH ONCE DAILY AS NEEDED 30 tablet 5     DULoxetine HCl (CYMBALTA PO) Take 60 mg by mouth daily Two tablets once a day  -  120 mg       FISH OIL 1000 MG OR CAPS increase to 4000mg per day 100 0     FLUoxetine 20 MG tablet Take 20 mg by mouth daily       gabapentin (NEURONTIN) 100 MG capsule TAKE TWO CAPSULES BY MOUTH THREE TIMES DAILY 180 capsule 5     GLIPIZIDE XL 10 MG 24 hr tablet TAKE TWO TABLETS BY MOUTH ONCE DAILY 60 tablet 35     hydrOXYzine (VISTARIL) 50 MG capsule Take 50 mg by mouth. Two tablets at bedtime       insulin glargine (LANTUS SOLOSTAR) 100 UNIT/ML injection 78 units at bedtime 15 mL 3     insulin pen needle (ULTICARE MINI) 31G X 6 MM 1 Units daily Use 1 daily or as directed. At bedtime 100 each 6     LAMOTRIGINE PO Take 100 mg by mouth 2 times daily       LANTUS SOLOSTAR 100 UNIT/ML  soln INJECT 78 UNITS AT BEDTIME 3 mL 1     losartan (COZAAR) 50 MG tablet Take 1 tablet (50 mg) by mouth daily 30 tablet 1     metFORMIN (GLUCOPHAGE) 500 MG tablet TAKE 2 TABLETS BY MOUTH TWICE DAILY WITH MEALS 120 tablet 5     SUMAtriptan (IMITREX) 100 MG tablet TAKE ONE TABLET BY MOUTH ONCE DAILY. MAY REPEAT AFTER 2 HOURS IF NEEDED. MAX OF 2 TABLETS PER 24 HOURS 10 tablet 1     SUMAtriptan (IMITREX) 20 MG/ACT nasal spray USE 1 SPRAY IN NOSTRIL AS NEEDED FOR MIGRAINE. 30 each 0     zolpidem (AMBIEN) 10 MG tablet        omeprazole (PRILOSEC) 40 MG capsule Take 1 capsule (40 mg) by mouth daily (Patient not taking: Reported on 9/6/2018) 90 capsule 3     Recent Labs   Lab Test  09/06/18   1441  01/09/18   1215  01/09/18   1141  06/22/17   1221  11/08/16   1320  06/13/16   1141   02/10/15   1047   A1C  6.9*   --   7.0*  6.8*   --   6.6*   < >  7.2*   LDL   --    --    --   58   --   41   --   71   HDL   --    --    --   48*   --   44*   --   57   TRIG   --    --    --   139   --   110   --   110   ALT   --   34   --   44  52*  59*   < >  90*   CR   --   0.66   --   0.74  0.69  0.74   < >  0.75   GFRESTIMATED   --   >90   --   81  89  82   < >  81   GFRESTBLACK   --   >90   --   >90   GFR Calc    >90   GFR Calc    >90   GFR Calc     < >  >90   GFR Calc     POTASSIUM   --   4.4   --   3.8  3.7  4.1   < >  4.0   TSH   --    --    --   1.79   --   1.26   --   2.56    < > = values in this interval not displayed.      BP Readings from Last 3 Encounters:   09/06/18 130/82   05/14/18 142/82   01/09/18 142/78    Wt Readings from Last 3 Encounters:   09/06/18 170 lb (77.1 kg)   05/14/18 173 lb 3.2 oz (78.6 kg)   01/09/18 172 lb 11.2 oz (78.3 kg)                    Reviewed and updated as needed this visit by clinical staff  Tobacco  Allergies  Meds  Med Hx  Surg Hx  Fam Hx  Soc Hx      Reviewed and updated as needed this visit by Provider          ROS:  Constitutional, HEENT, cardiovascular, pulmonary, gi and gu systems are negative, except as otherwise noted.    OBJECTIVE:                                                    /82 (BP Location: Right arm, Patient Position: Chair, Cuff Size: Adult Regular)  Pulse 66  Temp 97.9  F (36.6  C) (Oral)  Resp 18  Ht 5' (1.524 m)  Wt 170 lb (77.1 kg)  SpO2 96%  BMI 33.2 kg/m2  Body mass index is 33.2 kg/(m^2).  GENERAL APPEARANCE: healthy, alert and no distress  HENT: ear canals and TM's normal and nose and mouth without ulcers or lesions  RESP: lungs clear to auscultation - no rales, rhonchi or wheezes  CV: regular rates and rhythm, normal S1 S2, no S3 or S4 and no murmur, click or rub  LYMPHATICS: no cervical adenopathy  MS: extremities normal- no gross deformities noted  SKIN: no suspicious lesions or rashes  DIABETIC FOOT EXAM: normal DP and PT pulses, no trophic changes or ulcerative lesions and normal sensory exam  PSYCH: mentation appears normal and affect normal/bright    Diagnostic test results:  Diagnostic Test Results:  none      ASSESSMENT/PLAN:                                                    1. Screen for colon cancer    - Fecal colorectal cancer screen FIT - Future (S+30); Future  - GASTROENTEROLOGY ADULT REF PROCEDURE ONLY    2. Asymptomatic postmenopausal status    - DEXA HIP/PELVIS/SPINE - Future; Future    3. Visit for screening mammogram    - MA SCREENING DIGITAL BILAT - Future  (s+30); Future    4. Screening for diabetic peripheral neuropathy    - FOOT EXAM  NO CHARGE [25433.064]    5. Hyperlipidemia LDL goal <100    - Comprehensive metabolic panel  - Lipid panel reflex to direct LDL Fasting  - atorvastatin (LIPITOR) 20 MG tablet; Take 1 tablet (20 mg) by mouth daily  Dispense: 90 tablet; Refill: 1    6. Hypertension goal BP (blood pressure) < 140/90    - Albumin Random Urine Quantitative with Creat Ratio  - Comprehensive metabolic panel  - Lipid panel reflex to direct LDL  Fasting  - losartan (COZAAR) 50 MG tablet; Take 1 tablet (50 mg) by mouth daily  Dispense: 30 tablet; Refill: 1    7. Type 2 diabetes mellitus without complication, with long-term current use of insulin (H)    - FOOT EXAM  NO CHARGE [16135.114]  - HEMOGLOBIN A1C  - Albumin Random Urine Quantitative with Creat Ratio  - OPTOMETRY REFERRAL  - insulin pen needle (ULTICARE MINI) 31G X 6 MM; 1 Units daily Use 1 daily or as directed. At bedtime  Dispense: 100 each; Refill: 6  - metFORMIN (GLUCOPHAGE) 500 MG tablet; TAKE 2 TABLETS BY MOUTH TWICE DAILY WITH MEALS  Dispense: 120 tablet; Refill: 5    8. Migraine without status migrainosus, not intractable, unspecified migraine type    - SUMAtriptan (IMITREX) 20 MG/ACT nasal spray; USE 1 SPRAY IN NOSTRIL AS NEEDED FOR MIGRAINE.  Dispense: 30 each; Refill: 0    9. Migraine without aura and without status migrainosus, not intractable    - SUMAtriptan (IMITREX) 100 MG tablet; TAKE ONE TABLET BY MOUTH ONCE DAILY. MAY REPEAT AFTER 2 HOURS IF NEEDED. MAX OF 2 TABLETS PER 24 HOURS  Dispense: 10 tablet; Refill: 1      See Patient Instructions    Ramona Ann Aaseby-Aguilera, PA-C  [facility]  Patient Instructions   (E78.5) Hyperlipidemia LDL goal <100  (primary encounter diagnosis)  Comment:   Plan: Comprehensive metabolic panel, Lipid panel         reflex to direct LDL Fasting, atorvastatin         (LIPITOR) 20 MG tablet            (Z12.11) Screen for colon cancer  Comment:   Plan: Fecal colorectal cancer screen FIT - Future         (S+30), GASTROENTEROLOGY ADULT REF PROCEDURE         ONLY            (Z78.0) Asymptomatic postmenopausal status  Comment:   Plan: DEXA HIP/PELVIS/SPINE - Future            (Z12.31) Visit for screening mammogram  Comment:   Plan: MA SCREENING DIGITAL BILAT - Future  (s+30)            (Z13.89) Screening for diabetic peripheral neuropathy  Comment:   Plan: FOOT EXAM  NO CHARGE [80542.114]            (I10) Hypertension goal BP (blood pressure) <  140/90  Comment:   Plan: Albumin Random Urine Quantitative with Creat         Ratio, Comprehensive metabolic panel, Lipid         panel reflex to direct LDL Fasting, losartan         (COZAAR) 50 MG tablet            (E11.9,  Z79.4) Type 2 diabetes mellitus without complication, with long-term current use of insulin (H)  Comment: stable and doing well   Plan: FOOT EXAM  NO CHARGE [46011.114], HEMOGLOBIN         A1C, Albumin Random Urine Quantitative with         Creat Ratio, OPTOMETRY REFERRAL, insulin pen         needle (ULTICARE MINI) 31G X 6 MM, metFORMIN         (GLUCOPHAGE) 500 MG tablet            (G43.909) Migraine without status migrainosus, not intractable, unspecified migraine type  Comment: uses both tablet and nasal spray.  States nasal spray works faster.   Plan: SUMAtriptan (IMITREX) 20 MG/ACT nasal spray            (G43.009) Migraine without aura and without status migrainosus, not intractable  Comment:   Plan: SUMAtriptan (IMITREX) 100 MG tablet

## 2018-09-06 NOTE — MR AVS SNAPSHOT
After Visit Summary   9/6/2018    Vanessa Mota    MRN: [id number]           Patient Information     Date Of Birth          1960        Visit Information        Provider Department      9/6/2018 2:30 PM Aaseby-Aguilera, Ramona Ann, PA-C [facility]        Today's Diagnoses     Hyperlipidemia LDL goal <100    -  1    Screen for colon cancer        Asymptomatic postmenopausal status        Visit for screening mammogram        Screening for diabetic peripheral neuropathy        Hypertension goal BP (blood pressure) < 140/90        Type 2 diabetes mellitus without complication, with long-term current use of insulin (H)        Migraine without status migrainosus, not intractable, unspecified migraine type        Migraine without aura and without status migrainosus, not intractable          Care Instructions    (E78.5) Hyperlipidemia LDL goal <100  (primary encounter diagnosis)  Comment:   Plan: Comprehensive metabolic panel, Lipid panel         reflex to direct LDL Fasting, atorvastatin         (LIPITOR) 20 MG tablet            (Z12.11) Screen for colon cancer  Comment:   Plan: Fecal colorectal cancer screen FIT - Future         (S+30), GASTROENTEROLOGY ADULT REF PROCEDURE         ONLY            (Z78.0) Asymptomatic postmenopausal status  Comment:   Plan: DEXA HIP/PELVIS/SPINE - Future            (Z12.31) Visit for screening mammogram  Comment:   Plan: MA SCREENING DIGITAL BILAT - Future  (s+30)            (Z13.89) Screening for diabetic peripheral neuropathy  Comment:   Plan: FOOT EXAM  NO CHARGE [67762.114]            (I10) Hypertension goal BP (blood pressure) < 140/90  Comment:   Plan: Albumin Random Urine Quantitative with Creat         Ratio, Comprehensive metabolic panel, Lipid         panel reflex to direct LDL Fasting, losartan         (COZAAR) 50 MG tablet            (E11.9,  Z79.4) Type 2 diabetes mellitus without complication, with long-term current use of insulin  (H)  Comment: stable and doing well   Plan: FOOT EXAM  NO CHARGE [53840.114], HEMOGLOBIN         A1C, Albumin Random Urine Quantitative with         Creat Ratio, OPTOMETRY REFERRAL, insulin pen         needle (ULTICARE MINI) 31G X 6 MM, metFORMIN         (GLUCOPHAGE) 500 MG tablet            (G43.909) Migraine without status migrainosus, not intractable, unspecified migraine type  Comment:   Plan: SUMAtriptan (IMITREX) 20 MG/ACT nasal spray            (G43.009) Migraine without aura and without status migrainosus, not intractable  Comment:   Plan: SUMAtriptan (IMITREX) 100 MG tablet                      Follow-ups after your visit        Additional Services     GASTROENTEROLOGY ADULT REF PROCEDURE ONLY       Last Lab Result: Creatinine (mg/dL)       Date                     Value                 01/09/2018               0.66             ----------  There is no height or weight on file to calculate BMI.     Needed:  No  Language:  English    Patient will be contacted to schedule procedure.     Please be aware that coverage of these services is subject to the terms and limitations of your health insurance plan.  Call member services at your health plan with any benefit or coverage questions.  Any procedures must be performed at a [facility] facility OR coordinated by your clinic's referral office.    Please bring the following with you to your appointment:    (1) Any X-Rays, CTs or MRIs which have been performed.  Contact the facility where they were done to arrange for  prior to your scheduled appointment.    (2) List of current medications   (3) This referral request   (4) Any documents/labs given to you for this referral            OPTOMETRY REFERRAL       Your provider has referred you to:  [facility] [place] [phone]   [email or web address]    Please be aware that coverage of these services is subject to the terms and limitations of your health insurance plan.   Call member services at your health plan with any benefit or coverage questions.      Please bring the following to your appointment:  >>   Any x-rays, CTs or MRIs which have been performed.  Contact the facility where they were done to arrange for  prior to your scheduled appointment.  Any new CT, MRI or other procedures ordered by your specialist must be performed at a [place] facility or coordinated by your clinic's referral office.    >>   List of current medications   >>   This referral request   >>   Any documents/labs given to you for this referral                  Future tests that were ordered for you today     Open Future Orders        Priority Expected Expires Ordered    DEXA HIP/PELVIS/SPINE - Future Routine  9/6/2019 9/6/2018    MA SCREENING DIGITAL BILAT - Future  (s+30) Routine  9/6/2019 9/6/2018    Fecal colorectal cancer screen FIT - Future (S+30) Routine 9/27/2018 10/6/2018 9/6/2018            Who to contact     If you have questions or need follow up information about today's clinic visit or your schedule please contact [facility] directly at [phone].  Normal or non-critical lab and imaging results will be communicated to you by [employer], letter or phone within 4 business days after the clinic has received the results. If you do not hear from us within 7 days, please contact the clinic through [employer] or phone. If you have a critical or abnormal lab result, we will notify you by phone as soon as possible.  Submit refill requests through [employer] or call your pharmacy and they will forward the refill request to us. Please allow 3 business days for your refill to be completed.          Additional Information About Your Visit        [employer] Information     [employer] gives you secure access to your electronic health record. If you see a primary care provider, you can also send messages to your care team and make appointments. If you have questions, please call your primary  care clinic.  If you do not have a primary care provider, please call [phone] and they will assist you.        Care EveryWhere ID     This is your Care EveryWhere ID. This could be used by other organizations to access your [facility] medical records  [id number]        Your Vitals Were     Pulse Temperature Respirations Height Pulse Oximetry BMI (Body Mass Index)    66 97.9  F (36.6  C) (Oral) 18 5' (1.524 m) 96% 33.2 kg/m2       Blood Pressure from Last 3 Encounters:   09/06/18 130/82   05/14/18 142/82   01/09/18 142/78    Weight from Last 3 Encounters:   09/06/18 170 lb (77.1 kg)   05/14/18 173 lb 3.2 oz (78.6 kg)   01/09/18 172 lb 11.2 oz (78.3 kg)              We Performed the Following     Albumin Random Urine Quantitative with Creat Ratio     Comprehensive metabolic panel     FOOT EXAM  NO CHARGE [88575.114]     GASTROENTEROLOGY ADULT REF PROCEDURE ONLY     HEMOGLOBIN A1C     Lipid panel reflex to direct LDL Fasting     OPTOMETRY REFERRAL          Today's Medication Changes          These changes are accurate as of 9/6/18  3:12 PM.  If you have any questions, ask your nurse or doctor.               Start taking these medicines.        Dose/Directions    losartan 50 MG tablet   Commonly known as:  COZAAR   Used for:  Hypertension goal BP (blood pressure) < 140/90   Started by:  Aaseby-Aguilera, Ramona Ann, PA-C        Dose:  50 mg   Take 1 tablet (50 mg) by mouth daily   Quantity:  30 tablet   Refills:  1         Stop taking these medicines if you haven't already. Please contact your care team if you have questions.     lisinopril 10 MG tablet   Commonly known as:  PRINIVIL/ZESTRIL   Stopped by:  Aaseby-Aguilera, Ramona Ann, PA-C           lisinopril 20 MG tablet   Commonly known as:  PRINIVIL/ZESTRIL   Stopped by:  Aaseby-Aguilera, Ramona Ann, PA-C                Where to get your medicines      These medications were sent to [facility] [address] [place] [address] [facility]  [address] [facility]  [facility] [address]     Phone:  [phone]     atorvastatin 20 MG tablet    insulin pen needle 31G X 6 MM    losartan 50 MG tablet    metFORMIN 500 MG tablet    SUMAtriptan 100 MG tablet    SUMAtriptan 20 MG/ACT nasal spray                Primary Care Provider Office Phone # Fax #    Yessi Ann Aaseby-Aguilera, PA-C [phone]       [address]  [facility] [address]        Equal Access to Services     HUSAM ROBERTSON : Hadii aad ku hadasho Soomaali, waaxda luqadaha, qaybta kaalmada adeegyada, waxay idiin hayaan adeeg kharash la'aan . [facility] [phone].    ATENCIÓN: Si habla español, tiene a scott disposición servicios gratuitos de asistencia lingüística. [facility] [phone].    We comply with applicable federal civil rights laws and [place] laws. We do not discriminate on the basis of race, color, national origin, age, disability, sex, sexual orientation, or gender identity.            Thank you!     Thank you for choosing [facility]  for your care. Our goal is always to provide you with excellent care. Hearing back from our patients is one way we can continue to improve our services. Please take a few minutes to complete the written survey that you may receive in the mail after your visit with us. Thank you!             Your Updated Medication List - Protect others around you: Learn how to safely use, store and throw away your medicines at www.disposemymeds.org.          This list is accurate as of 9/6/18  3:12 PM.  Always use your most recent med list.                   Brand Name Dispense Instructions for use Diagnosis    ACE/ARB/ARNI NOT PRESCRIBED (INTENTIONAL)      by Other route continuous prn.        aspirin 81 MG tablet     100    ONE DAILY    Other abnormal glucose       atorvastatin 20 MG tablet    LIPITOR    90 tablet    Take 1 tablet (20 mg) by mouth daily    Hyperlipidemia LDL goal <100       blood glucose lancets standard    no brand specified    100 each    Use to  test blood sugar 2 times daily or as directed.  Dispense as covered by insurance    Type 2 diabetes, HbA1c goal < 7% (H), Type 2 diabetes mellitus without complication (H)       blood glucose monitoring meter device kit    no brand specified    1 kit    Use to test blood sugar 2 times daily or as directed.  Dispense brand as covered by insurance    Type 2 diabetes, HbA1c goal < 7% (H), Type 2 diabetes mellitus without complication (H)       * blood glucose monitoring test strip    CANDIDA CONTOUR    100 strip    Use to test blood sugars 2 times daily or as directed.    Type 2 diabetes, HbA1c goal < 7% (H)       * blood glucose monitoring test strip    no brand specified    200 each    Use to test blood sugar 2 times daily or as directed.  Dispense Ultra One touch strips per insurance coverage    Type 2 diabetes mellitus without complication (H), Type 2 diabetes, HbA1c goal < 7% (H), Type 2 diabetes mellitus without complication, with long-term current use of insulin (H)       BUSPAR 30 MG Tabs   Generic drug:  BusPIRone HCl     90 tablet    One tablet twice daily  -  60 mg daily    Anxiety, Mild major depression (H)       cyclobenzaprine 10 MG tablet    FLEXERIL    30 tablet    TAKE 1 TABLET BY MOUTH ONCE DAILY AS NEEDED    Fibromyalgia       CYMBALTA PO      Take 60 mg by mouth daily Two tablets once a day  -  120 mg        fish oil-omega-3 fatty acids 1000 MG capsule     100    increase to 4000mg per day    Dyslipidemia       FLUoxetine 20 MG tablet      Take 20 mg by mouth daily        gabapentin 100 MG capsule    NEURONTIN    180 capsule    TAKE TWO CAPSULES BY MOUTH THREE TIMES DAILY    Fibromyalgia       glipiZIDE XL 10 MG 24 hr tablet   Generic drug:  glipiZIDE     60 tablet    TAKE TWO TABLETS BY MOUTH ONCE DAILY    Type 2 diabetes mellitus without complication, with long-term current use of insulin (H)       hydrOXYzine 50 MG capsule    VISTARIL     Take 50 mg by mouth. Two tablets at bedtime        * insulin  glargine 100 UNIT/ML injection    LANTUS SOLOSTAR    15 mL    78 units at bedtime    Type 2 diabetes mellitus without complication, with long-term current use of insulin (H), Type 2 diabetes mellitus without complication (H), Type 2 diabetes, HbA1c goal < 7% (H)       * LANTUS SOLOSTAR 100 UNIT/ML injection   Generic drug:  insulin glargine     3 mL    INJECT 78 UNITS AT BEDTIME    Type 2 diabetes mellitus without complication, with long-term current use of insulin (H)       insulin pen needle 31G X 6 MM    ULTICARE MINI    100 each    1 Units daily Use 1 daily or as directed. At bedtime    Type 2 diabetes mellitus without complication, with long-term current use of insulin (H)       LAMOTRIGINE PO      Take 100 mg by mouth 2 times daily        losartan 50 MG tablet    COZAAR    30 tablet    Take 1 tablet (50 mg) by mouth daily    Hypertension goal BP (blood pressure) < 140/90       metFORMIN 500 MG tablet    GLUCOPHAGE    120 tablet    TAKE 2 TABLETS BY MOUTH TWICE DAILY WITH MEALS    Type 2 diabetes mellitus without complication, with long-term current use of insulin (H)       omeprazole 40 MG capsule    priLOSEC    90 capsule    Take 1 capsule (40 mg) by mouth daily    Heart burn       SUMAtriptan 100 MG tablet    IMITREX    10 tablet    TAKE ONE TABLET BY MOUTH ONCE DAILY. MAY REPEAT AFTER 2 HOURS IF NEEDED. MAX OF 2 TABLETS PER 24 HOURS    Migraine without aura and without status migrainosus, not intractable       SUMAtriptan 20 MG/ACT nasal spray    IMITREX    30 each    USE 1 SPRAY IN NOSTRIL AS NEEDED FOR MIGRAINE.    Migraine without status migrainosus, not intractable, unspecified migraine type       zolpidem 10 MG tablet    AMBIEN          * Notice:  This list has 4 medication(s) that are the same as other medications prescribed for you. Read the directions carefully, and ask your doctor or other care provider to review them with you.

## 2018-09-07 LAB
ALBUMIN SERPL-MCNC: 4 G/DL (ref 3.4–5)
ALP SERPL-CCNC: 83 U/L (ref 40–150)
ALT SERPL W P-5'-P-CCNC: 52 U/L (ref 0–50)
ANION GAP SERPL CALCULATED.3IONS-SCNC: 6 MMOL/L (ref 3–14)
AST SERPL W P-5'-P-CCNC: 33 U/L (ref 0–45)
BILIRUB SERPL-MCNC: 0.5 MG/DL (ref 0.2–1.3)
BUN SERPL-MCNC: 6 MG/DL (ref 7–30)
CALCIUM SERPL-MCNC: 9.3 MG/DL (ref 8.5–10.1)
CHLORIDE SERPL-SCNC: 104 MMOL/L (ref 94–109)
CHOLEST SERPL-MCNC: 129 MG/DL
CO2 SERPL-SCNC: 31 MMOL/L (ref 20–32)
CREAT SERPL-MCNC: 0.74 MG/DL (ref 0.52–1.04)
CREAT UR-MCNC: 285 MG/DL
GFR SERPL CREATININE-BSD FRML MDRD: 81 ML/MIN/1.7M2
GLUCOSE SERPL-MCNC: 107 MG/DL (ref 70–99)
HDLC SERPL-MCNC: 54 MG/DL
LDLC SERPL CALC-MCNC: 49 MG/DL
MICROALBUMIN UR-MCNC: 66 MG/L
MICROALBUMIN/CREAT UR: 23.09 MG/G CR (ref 0–25)
NONHDLC SERPL-MCNC: 75 MG/DL
POTASSIUM SERPL-SCNC: 3.9 MMOL/L (ref 3.4–5.3)
PROT SERPL-MCNC: 7.3 G/DL (ref 6.8–8.8)
SODIUM SERPL-SCNC: 141 MMOL/L (ref 133–144)
TRIGL SERPL-MCNC: 132 MG/DL

## 2018-09-07 ASSESSMENT — ANXIETY QUESTIONNAIRES: GAD7 TOTAL SCORE: 8

## 2018-09-07 ASSESSMENT — PATIENT HEALTH QUESTIONNAIRE - PHQ9: SUM OF ALL RESPONSES TO PHQ QUESTIONS 1-9: 15

## 2018-09-12 DIAGNOSIS — M79.7 FIBROMYALGIA: ICD-10-CM

## 2018-09-12 NOTE — TELEPHONE ENCOUNTER
Routing refill request to provider for review/approval because:  Drug not on the [facility] refill protocol     Pharmacy is asking for 90 day RX    Fatuma Lewis RN

## 2018-09-12 NOTE — TELEPHONE ENCOUNTER
Controlled Substance Refill Request for cyclobenzaprine (FLEXERIL) 10 MG tablet  Problem List Complete:  No     PROVIDER TO CONSIDER COMPLETION OF PROBLEM LIST AND OVERVIEW/CONTROLLED SUBSTANCE AGREEMENT    Last Written Prescription Date:  03/05/2018  Last Fill Quantity: 30 tablet,   # refills: 5    Last Office Visit with [facility] primary care provider: 09/06/2018    Future Office visit:   Next 5 appointments (look out 90 days)     Sep 19, 2018  2:00 PM CDT   Nurse Only with LV MA[facility]LPN   [facility]    [address] [facility] [address]   [phone]            Sep 26, 2018  3:00 PM CDT   Nurse Only with LV MA/LPN   [facility]    [address] [facility] [address]   [phone]            Oct 04, 2018  3:00 PM CDT   SHORT with Ramona Ann Aaseby-Aguilera, PA-C   [facility]    [address] [facility] [address]   [phone]                  Controlled substance agreement on file: No.     Processing:  Fax Rx to [facility]   checked in past 3 months?  No, route to CONNER DAVIS

## 2018-09-13 RX ORDER — CYCLOBENZAPRINE HCL 10 MG
TABLET ORAL
Qty: 30 TABLET | Refills: 1 | Status: SHIPPED | OUTPATIENT
Start: 2018-09-13 | End: 2018-12-11

## 2018-10-04 ENCOUNTER — OFFICE VISIT (OUTPATIENT)
Dept: FAMILY MEDICINE | Facility: CLINIC | Age: 58
End: 2018-10-04
Payer: COMMERCIAL

## 2018-10-04 VITALS
SYSTOLIC BLOOD PRESSURE: 133 MMHG | WEIGHT: 172.4 LBS | DIASTOLIC BLOOD PRESSURE: 70 MMHG | BODY MASS INDEX: 33.85 KG/M2 | HEIGHT: 60 IN | TEMPERATURE: 98.3 F | HEART RATE: 69 BPM | OXYGEN SATURATION: 96 %

## 2018-10-04 DIAGNOSIS — E11.9 TYPE 2 DIABETES MELLITUS WITHOUT COMPLICATION, WITH LONG-TERM CURRENT USE OF INSULIN (H): ICD-10-CM

## 2018-10-04 DIAGNOSIS — I10 HYPERTENSION GOAL BP (BLOOD PRESSURE) < 140/90: ICD-10-CM

## 2018-10-04 DIAGNOSIS — Z23 NEED FOR PROPHYLACTIC VACCINATION AND INOCULATION AGAINST INFLUENZA: Primary | ICD-10-CM

## 2018-10-04 DIAGNOSIS — Z79.4 TYPE 2 DIABETES MELLITUS WITHOUT COMPLICATION, WITH LONG-TERM CURRENT USE OF INSULIN (H): ICD-10-CM

## 2018-10-04 PROCEDURE — 90686 IIV4 VACC NO PRSV 0.5 ML IM: CPT | Performed by: PHYSICIAN ASSISTANT

## 2018-10-04 PROCEDURE — 90471 IMMUNIZATION ADMIN: CPT | Performed by: PHYSICIAN ASSISTANT

## 2018-10-04 PROCEDURE — 99213 OFFICE O/P EST LOW 20 MIN: CPT | Mod: 25 | Performed by: PHYSICIAN ASSISTANT

## 2018-10-04 RX ORDER — LOSARTAN POTASSIUM 50 MG/1
50 TABLET ORAL DAILY
Qty: 90 TABLET | Refills: 1 | Status: SHIPPED | OUTPATIENT
Start: 2018-10-04 | End: 2019-05-28

## 2018-10-04 NOTE — PATIENT INSTRUCTIONS
(I10) Hypertension goal BP (blood pressure) < 140/90  Comment:   Plan: losartan (COZAAR) 50 MG tablet        Stable and doing well.    Follow-up in  6 months

## 2018-10-04 NOTE — PROGRESS NOTES
SUBJECTIVE:   Vanessa Mota is a 58 year old female who presents to clinic today for the following health issues:      Medication Followup of losartan    Taking Medication as prescribed: yes    Side Effects:  None    Medication Helping Symptoms:  unsure               Problem list and histories reviewed & adjusted, as indicated.  Additional history: as documented    Current Outpatient Prescriptions   Medication Sig Dispense Refill     ACE/ARB NOT PRESCRIBED, INTENTIONAL, by Other route continuous prn.       ASPIRIN 81 MG OR TABS ONE DAILY 100 3     atorvastatin (LIPITOR) 20 MG tablet Take 1 tablet (20 mg) by mouth daily 90 tablet 1     blood glucose (CANDIDA CONTOUR) test strip Use to test blood sugars 2 times daily or as directed. 100 strip 2     blood glucose (NO BRAND SPECIFIED) lancets standard Use to test blood sugar 2 times daily or as directed.  Dispense as covered by insurance 100 each 3     blood glucose monitoring (NO BRAND SPECIFIED) meter device kit Use to test blood sugar 2 times daily or as directed.  Dispense brand as covered by insurance 1 kit 0     blood glucose monitoring (NO BRAND SPECIFIED) test strip Use to test blood sugar 2 times daily or as directed.  Dispense Ultra One touch strips per insurance coverage 200 each 3     BusPIRone HCl (BUSPAR) 30 MG TABS One tablet twice daily  -  60 mg daily 90 tablet 0     cyclobenzaprine (FLEXERIL) 10 MG tablet TAKE 1 TABLET BY MOUTH ONCE DAILY AS NEEDED 30 tablet 1     DULoxetine HCl (CYMBALTA PO) Take 60 mg by mouth daily Two tablets once a day  -  120 mg       FISH OIL 1000 MG OR CAPS increase to 4000mg per day 100 0     FLUoxetine 20 MG tablet Take 20 mg by mouth daily       gabapentin (NEURONTIN) 100 MG capsule TAKE TWO CAPSULES BY MOUTH THREE TIMES DAILY 180 capsule 5     GLIPIZIDE XL 10 MG 24 hr tablet TAKE TWO TABLETS BY MOUTH ONCE DAILY 60 tablet 35     hydrOXYzine (VISTARIL) 50 MG capsule Take 50 mg by mouth. Two tablets at bedtime       insulin  glargine (LANTUS SOLOSTAR) 100 UNIT/ML pen 78 units at bedtime 15 mL 3     insulin pen needle (ULTICARE MINI) 31G X 6 MM 1 Units daily Use 1 daily or as directed. At bedtime 100 each 6     LAMOTRIGINE PO Take 100 mg by mouth 2 times daily       LANTUS SOLOSTAR 100 UNIT/ML soln INJECT 78 UNITS AT BEDTIME 3 mL 1     losartan (COZAAR) 50 MG tablet Take 1 tablet (50 mg) by mouth daily 90 tablet 1     metFORMIN (GLUCOPHAGE) 500 MG tablet TAKE 2 TABLETS BY MOUTH TWICE DAILY WITH MEALS 120 tablet 5     omeprazole (PRILOSEC) 40 MG capsule Take 1 capsule (40 mg) by mouth daily 90 capsule 3     SUMAtriptan (IMITREX) 100 MG tablet TAKE ONE TABLET BY MOUTH ONCE DAILY. MAY REPEAT AFTER 2 HOURS IF NEEDED. MAX OF 2 TABLETS PER 24 HOURS 10 tablet 1     SUMAtriptan (IMITREX) 20 MG/ACT nasal spray USE 1 SPRAY IN NOSTRIL AS NEEDED FOR MIGRAINE. 30 each 0     zolpidem (AMBIEN) 10 MG tablet        Recent Labs   Lab Test  09/06/18   1441  01/09/18   1215  01/09/18   1141  06/22/17   1221   06/13/16   1141   A1C  6.9*   --   7.0*  6.8*   --   6.6*   LDL  49   --    --   58   --   41   HDL  54   --    --   48*   --   44*   TRIG  132   --    --   139   --   110   ALT  52*  34   --   44   < >  59*   CR  0.74  0.66   --   0.74   < >  0.74   GFRESTIMATED  81  >90   --   81   < >  82   GFRESTBLACK  >90  >90   --   >90   GFR Calc     < >  >90   GFR Calc     POTASSIUM  3.9  4.4   --   3.8   < >  4.1   TSH   --    --    --   1.79   --   1.26    < > = values in this interval not displayed.      BP Readings from Last 3 Encounters:   10/04/18 133/70   09/06/18 130/82   05/14/18 142/82    Wt Readings from Last 3 Encounters:   10/04/18 172 lb 6.4 oz (78.2 kg)   09/06/18 170 lb (77.1 kg)   05/14/18 173 lb 3.2 oz (78.6 kg)                    Reviewed and updated as needed this visit by clinical staff  Allergies  Meds       Reviewed and updated as needed this visit by Provider         ROS:  Constitutional, HEENT,  cardiovascular, pulmonary, gi and gu systems are negative, except as otherwise noted.    OBJECTIVE:                                                    /70 (BP Location: Right arm, Patient Position: Chair, Cuff Size: Adult Large)  Pulse 69  Temp 98.3  F (36.8  C) (Oral)  Ht 5' (1.524 m)  Wt 172 lb 6.4 oz (78.2 kg)  SpO2 96%  BMI 33.67 kg/m2  Body mass index is 33.67 kg/(m^2).  GENERAL APPEARANCE: healthy, alert and no distress  RESP: lungs clear to auscultation - no rales, rhonchi or wheezes  CV: regular rates and rhythm, normal S1 S2, no S3 or S4 and no murmur, click or rub  MS: extremities normal- no gross deformities noted  PSYCH: mentation appears normal and affect normal/bright    Diagnostic test results:  Diagnostic Test Results:  none      ASSESSMENT/PLAN:                                                    1. Hypertension goal BP (blood pressure) < 140/90  Stable and doing well refilled x 6 months   - losartan (COZAAR) 50 MG tablet; Take 1 tablet (50 mg) by mouth daily  Dispense: 90 tablet; Refill: 1    2. Type 2 diabetes mellitus without complication, with long-term current use of insulin (H)    - insulin glargine (LANTUS SOLOSTAR) 100 UNIT/ML pen; 78 units at bedtime  Dispense: 15 mL; Refill: 3    3. Need for prophylactic vaccination and inoculation against influenza    - FLU VACCINE, SPLIT VIRUS, IM (QUADRIVALENT) [03366]- >3 YRS  - Vaccine Administration, Initial [93083]      Patient Instructions   (I10) Hypertension goal BP (blood pressure) < 140/90  Comment:   Plan: losartan (COZAAR) 50 MG tablet        Stable and doing well.    Follow-up in  6 months           Ramona Ann Aaseby-Aguilera, PA-C  [facility]      Injectable Influenza Immunization Documentation    1.  Is the person to be vaccinated sick today?   No    2. Does the person to be vaccinated have an allergy to a component   of the vaccine?   No  Egg Allergy Algorithm Link    3. Has the person to be vaccinated ever had  a serious reaction   to influenza vaccine in the past?   No    4. Has the person to be vaccinated ever had Guillain-Barré syndrome?   No    Form completed by Alyson       Patient Instructions   (I10) Hypertension goal BP (blood pressure) < 140/90  Comment:   Plan: losartan (COZAAR) 50 MG tablet        Stable and doing well.    Follow-up in  6 months

## 2018-10-04 NOTE — MR AVS SNAPSHOT
After Visit Summary   10/4/2018    Vanessa Mota    MRN: [id number]           Patient Information     Date Of Birth          1960        Visit Information        Provider Department      10/4/2018 3:00 PM Aaseby-Aguilera, Ramona Ann, PA-C [facility]        Today's Diagnoses     Hypertension goal BP (blood pressure) < 140/90          Care Instructions    (I10) Hypertension goal BP (blood pressure) < 140/90  Comment:   Plan: losartan (COZAAR) 50 MG tablet        Stable and doing well.    Follow-up in  6 months               Follow-ups after your visit        Follow-up notes from your care team     Return in about 6 months (around 4/4/2019) for Physical Exam.      Who to contact     If you have questions or need follow up information about today's clinic visit or your schedule please contact [facility] directly at [phone].  Normal or non-critical lab and imaging results will be communicated to you by cVidyahart, letter or phone within 4 business days after the clinic has received the results. If you do not hear from us within 7 days, please contact the clinic through cVidyahart or phone. If you have a critical or abnormal lab result, we will notify you by phone as soon as possible.  Submit refill requests through Hi-Dis(Mosen) or call your pharmacy and they will forward the refill request to us. Please allow 3 business days for your refill to be completed.          Additional Information About Your Visit        MyChart Information     [employer]) gives you secure access to your electronic health record. If you see a primary care provider, you can also send messages to your care team and make appointments. If you have questions, please call your primary care clinic.  If you do not have a primary care provider, please call [phone] and they will assist you.        Care EveryWhere ID     This is your Care EveryWhere ID. This could be used by other organizations to access your  [facility] medical records  [id number]        Your Vitals Were     Pulse Temperature Height Pulse Oximetry BMI (Body Mass Index)       69 98.3  F (36.8  C) (Oral) 5' (1.524 m) 96% 33.67 kg/m2        Blood Pressure from Last 3 Encounters:   10/04/18 133/70   09/06/18 130/82   05/14/18 142/82    Weight from Last 3 Encounters:   10/04/18 172 lb 6.4 oz (78.2 kg)   09/06/18 170 lb (77.1 kg)   05/14/18 173 lb 3.2 oz (78.6 kg)              Today, you had the following     No orders found for display         Where to get your medicines      These medications were sent to [facility] [address] [place] [address] [facility]  [address] [facility] [address]     Phone:  [phone]     losartan 50 MG tablet          Primary Care Provider Office Phone # Fax #    Yessi Ann Aaseby-Aguilera, PA-C [phone] [address] [facility] [address]        Equal Access to Services     [facility] AILYN : Hadii blanco ku hadasho Soomaali, waaxda luqadaha, qaybta kaalmada adeegyada, marybel noel . So [facility] [phone].    ATENCIÓN: Si habla español, tiene a scott disposición servicios gratuitos de asistencia lingüística. David al [phone].    We comply with applicable federal civil rights laws and [place] laws. We do not discriminate on the basis of race, color, national origin, age, disability, sex, sexual orientation, or gender identity.            Thank you!     Thank you for choosing [facility]  for your care. Our goal is always to provide you with excellent care. Hearing back from our patients is one way we can continue to improve our services. Please take a few minutes to complete the written survey that you may receive in the mail after your visit with us. Thank you!             Your Updated Medication List - Protect others around you: Learn how to safely use, store and throw away your medicines at www.disposemymeds.org.          This list is accurate as of  10/4/18  3:39 PM.  Always use your most recent med list.                   Brand Name Dispense Instructions for use Diagnosis    ACE/ARB/ARNI NOT PRESCRIBED (INTENTIONAL)      by Other route continuous prn.        aspirin 81 MG tablet     100    ONE DAILY    Other abnormal glucose       atorvastatin 20 MG tablet    LIPITOR    90 tablet    Take 1 tablet (20 mg) by mouth daily    Hyperlipidemia LDL goal <100       blood glucose lancets standard    no brand specified    100 each    Use to test blood sugar 2 times daily or as directed.  Dispense as covered by insurance    Type 2 diabetes, HbA1c goal < 7% (H), Type 2 diabetes mellitus without complication (H)       blood glucose monitoring meter device kit    no brand specified    1 kit    Use to test blood sugar 2 times daily or as directed.  Dispense brand as covered by insurance    Type 2 diabetes, HbA1c goal < 7% (H), Type 2 diabetes mellitus without complication (H)       * blood glucose monitoring test strip    CANDIDA CONTOUR    100 strip    Use to test blood sugars 2 times daily or as directed.    Type 2 diabetes, HbA1c goal < 7% (H)       * blood glucose monitoring test strip    no brand specified    200 each    Use to test blood sugar 2 times daily or as directed.  Dispense Ultra One touch strips per insurance coverage    Type 2 diabetes mellitus without complication (H), Type 2 diabetes, HbA1c goal < 7% (H), Type 2 diabetes mellitus without complication, with long-term current use of insulin (H)       BUSPAR 30 MG Tabs   Generic drug:  BusPIRone HCl     90 tablet    One tablet twice daily  -  60 mg daily    Anxiety, Mild major depression (H)       cyclobenzaprine 10 MG tablet    FLEXERIL    30 tablet    TAKE 1 TABLET BY MOUTH ONCE DAILY AS NEEDED    Fibromyalgia       CYMBALTA PO      Take 60 mg by mouth daily Two tablets once a day  -  120 mg        fish oil-omega-3 fatty acids 1000 MG capsule     100    increase to 4000mg per day    Dyslipidemia        FLUoxetine 20 MG tablet      Take 20 mg by mouth daily        gabapentin 100 MG capsule    NEURONTIN    180 capsule    TAKE TWO CAPSULES BY MOUTH THREE TIMES DAILY    Fibromyalgia       glipiZIDE XL 10 MG 24 hr tablet   Generic drug:  glipiZIDE     60 tablet    TAKE TWO TABLETS BY MOUTH ONCE DAILY    Type 2 diabetes mellitus without complication, with long-term current use of insulin (H)       hydrOXYzine 50 MG capsule    VISTARIL     Take 50 mg by mouth. Two tablets at bedtime        * insulin glargine 100 UNIT/ML injection    LANTUS SOLOSTAR    15 mL    78 units at bedtime    Type 2 diabetes mellitus without complication, with long-term current use of insulin (H), Type 2 diabetes mellitus without complication (H), Type 2 diabetes, HbA1c goal < 7% (H)       * LANTUS SOLOSTAR 100 UNIT/ML injection   Generic drug:  insulin glargine     3 mL    INJECT 78 UNITS AT BEDTIME    Type 2 diabetes mellitus without complication, with long-term current use of insulin (H)       insulin pen needle 31G X 6 MM    ULTICARE MINI    100 each    1 Units daily Use 1 daily or as directed. At bedtime    Type 2 diabetes mellitus without complication, with long-term current use of insulin (H)       LAMOTRIGINE PO      Take 100 mg by mouth 2 times daily        losartan 50 MG tablet    COZAAR    90 tablet    Take 1 tablet (50 mg) by mouth daily    Hypertension goal BP (blood pressure) < 140/90       metFORMIN 500 MG tablet    GLUCOPHAGE    120 tablet    TAKE 2 TABLETS BY MOUTH TWICE DAILY WITH MEALS    Type 2 diabetes mellitus without complication, with long-term current use of insulin (H)       omeprazole 40 MG capsule    priLOSEC    90 capsule    Take 1 capsule (40 mg) by mouth daily    Heart burn       SUMAtriptan 100 MG tablet    IMITREX    10 tablet    TAKE ONE TABLET BY MOUTH ONCE DAILY. MAY REPEAT AFTER 2 HOURS IF NEEDED. MAX OF 2 TABLETS PER 24 HOURS    Migraine without aura and without status migrainosus, not intractable        SUMAtriptan 20 MG/ACT nasal spray    IMITREX    30 each    USE 1 SPRAY IN NOSTRIL AS NEEDED FOR MIGRAINE.    Migraine without status migrainosus, not intractable, unspecified migraine type       zolpidem 10 MG tablet    AMBIEN          * Notice:  This list has 4 medication(s) that are the same as other medications prescribed for you. Read the directions carefully, and ask your doctor or other care provider to review them with you.

## 2018-10-08 DIAGNOSIS — E11.9 TYPE 2 DIABETES MELLITUS WITHOUT COMPLICATION, WITH LONG-TERM CURRENT USE OF INSULIN (H): ICD-10-CM

## 2018-10-08 DIAGNOSIS — Z79.4 TYPE 2 DIABETES MELLITUS WITHOUT COMPLICATION, WITH LONG-TERM CURRENT USE OF INSULIN (H): ICD-10-CM

## 2018-10-08 RX ORDER — INSULIN GLARGINE 100 [IU]/ML
INJECTION, SOLUTION SUBCUTANEOUS
Qty: 24 ML | Refills: 1 | Status: SHIPPED | OUTPATIENT
Start: 2018-10-08 | End: 2018-10-12

## 2018-10-08 NOTE — TELEPHONE ENCOUNTER
"Requested Prescriptions   Pending Prescriptions Disp Refills     LANTUS SOLOSTAR 100 UNIT/ML soln [Pharmacy Med Name: LANT SOLOSTAR     INJ]  Last Written Prescription Date:  10/4/18  Last Fill Quantity: 15 ML,  # refills: 3   Last office visit: 10/4/2018 with prescribing provider:  AASEBY-AGUILERA   Future Office Visit:      1     Sig: INJECT 78 UNITS SUBCUTANEOUSLY AT BEDTIME (MUST  BE  SEEN)    Long Acting Insulin Protocol Passed    10/8/2018 12:42 PM       Passed - Blood pressure less than 140/90 in past 6 months    BP Readings from Last 3 Encounters:   10/04/18 133/70   09/06/18 130/82   05/14/18 142/82                Passed - LDL on file in past 12 months    Recent Labs   Lab Test  09/06/18   1441   LDL  49            Passed - Microalbumin on file in past 12 months    Recent Labs   Lab Test  09/06/18   1445   MICROL  66   UMALCR  23.09            Passed - Serum creatinine on file in past 12 months    Recent Labs   Lab Test  09/06/18   1441   CR  0.74            Passed - HgbA1C in past 3 or 6 months    If HgbA1C is 8 or greater, it needs to be on file within the past 3 months.  If less than 8, must be on file within the past 6 months.     Recent Labs   Lab Test  09/06/18   1441   A1C  6.9*            Passed - Patient is age 18 or older       Passed - Recent (6 mo) or future (30 days) visit within the authorizing provider's specialty    Patient had office visit in the last 6 months or has a visit in the next 30 days with authorizing provider or within the authorizing provider's specialty.  See \"Patient Info\" tab in inbasket, or \"Choose Columns\" in Meds & Orders section of the refill encounter.              "

## 2018-10-12 RX ORDER — INSULIN GLARGINE 100 [IU]/ML
78 INJECTION, SOLUTION SUBCUTANEOUS AT BEDTIME
Qty: 24 ML | Refills: 1 | Status: SHIPPED | OUTPATIENT
Start: 2018-10-12 | End: 2019-01-09

## 2018-11-09 ENCOUNTER — TELEPHONE (OUTPATIENT)
Dept: FAMILY MEDICINE | Facility: CLINIC | Age: 58
End: 2018-11-09

## 2018-11-09 NOTE — TELEPHONE ENCOUNTER
Panel Management Review      Patient has the following on her problem list:     Diabetes    ASA: Passed    Last A1C  Lab Results   Component Value Date    A1C 6.9 09/06/2018    A1C 7.0 01/09/2018    A1C 6.8 06/22/2017    A1C 6.6 06/13/2016    A1C 6.9 11/03/2015     A1C tested: Passed    Last LDL:    Lab Results   Component Value Date    CHOL 129 09/06/2018     Lab Results   Component Value Date    HDL 54 09/06/2018     Lab Results   Component Value Date    LDL 49 09/06/2018     Lab Results   Component Value Date    TRIG 132 09/06/2018     Lab Results   Component Value Date    CHOLHDLRATIO 2.6 02/10/2015     Lab Results   Component Value Date    NHDL 75 09/06/2018       Is the patient on a Statin? YES             Is the patient on Aspirin? YES    Medications     HMG CoA Reductase Inhibitors    atorvastatin (LIPITOR) 20 MG tablet    Salicylates    ASPIRIN 81 MG OR TABS          Last three blood pressure readings:  BP Readings from Last 3 Encounters:   10/04/18 133/70   09/06/18 130/82   05/14/18 142/82       Date of last diabetes office visit: 10/04/2018     Tobacco History:     History   Smoking Status     Light Tobacco Smoker     Packs/day: 0.20     Years: 3.00     Types: Cigarettes     Last attempt to quit: 1/15/2013   Smokeless Tobacco     Never Used     Comment: e cig   - three puffs a day            Composite cancer screening  Chart review shows that this patient is due/due soon for the following Mammogram and Colonoscopy  Summary:    Patient is due/failing the following:   COLONOSCOPY and MAMMOGRAM    Action needed:   Patient needs referral/order: mammogram and colonoscopy  Patient was given a fit test - but has not used it yet  Type of outreach:    Phone, left message for patient to call back.     Questions for provider review:    None                                                                                                                                    db     Chart routed to  .

## 2018-12-11 DIAGNOSIS — M79.7 FIBROMYALGIA: ICD-10-CM

## 2018-12-11 RX ORDER — CYCLOBENZAPRINE HCL 10 MG
TABLET ORAL
Qty: 30 TABLET | Refills: 1 | Status: SHIPPED | OUTPATIENT
Start: 2018-12-11 | End: 2019-02-09

## 2018-12-11 NOTE — TELEPHONE ENCOUNTER
cyclobenzaprine (FLEXERIL) 10 MG tablet      Last Written Prescription Date:  09/13/2018  Last Fill Quantity: 30 tablet,   # refills: 1  Last Office Visit: 10/04/2018  Future Office visit:       Routing refill request to provider for review/approval because:  Drug not on the [facility], [facility] or [facility] refill protocol or controlled substance      Myron DAVIS

## 2019-01-09 DIAGNOSIS — M79.7 FIBROMYALGIA: ICD-10-CM

## 2019-01-09 DIAGNOSIS — E11.9 TYPE 2 DIABETES MELLITUS WITHOUT COMPLICATION, WITH LONG-TERM CURRENT USE OF INSULIN (H): ICD-10-CM

## 2019-01-09 DIAGNOSIS — Z79.4 TYPE 2 DIABETES MELLITUS WITHOUT COMPLICATION, WITH LONG-TERM CURRENT USE OF INSULIN (H): ICD-10-CM

## 2019-01-09 NOTE — TELEPHONE ENCOUNTER
"Requested Prescriptions   Pending Prescriptions Disp Refills     insulin glargine (BASAGLAR KWIKPEN) 100 UNIT/ML pen [Pharmacy Med Name: BASAGLAR 100UNIT    INJ]  Last Written Prescription Date:  10/12/2018  Last Fill Quantity: 24ml,  # refills: 1   Last office visit: 10/4/2018 with prescribing provider:  10/04/2018   Future Office Visit:      1     Sig: INJECT 78 UNIT SUBCUTANEOUSLY AT BEDTIME    Long Acting Insulin Protocol Passed - 1/9/2019  5:07 PM       Passed - Blood pressure less than 140/90 in past 6 months    BP Readings from Last 3 Encounters:   10/04/18 133/70   09/06/18 130/82   05/14/18 142/82                Passed - LDL on file in past 12 months    Recent Labs   Lab Test 09/06/18  1441   LDL 49            Passed - Microalbumin on file in past 12 months    Recent Labs   Lab Test 09/06/18  1445   MICROL 66   UMALCR 23.09            Passed - Serum creatinine on file in past 12 months    Recent Labs   Lab Test 09/06/18  1441   CR 0.74            Passed - HgbA1C in past 3 or 6 months    If HgbA1C is 8 or greater, it needs to be on file within the past 3 months.  If less than 8, must be on file within the past 6 months.     Recent Labs   Lab Test 09/06/18  1441   A1C 6.9*            Passed - Medication is active on med list       Passed - Patient is age 18 or older       Passed - Recent (6 mo) or future (30 days) visit within the authorizing provider's specialty    Patient had office visit in the last 6 months or has a visit in the next 30 days with authorizing provider or within the authorizing provider's specialty.  See \"Patient Info\" tab in inbasket, or \"Choose Columns\" in Meds & Orders section of the refill encounter.                              Controlled Substance Refill Request for gabapentin (NEURONTIN) 100 MG capsule     Problem List Complete:  No     PROVIDER TO CONSIDER COMPLETION OF PROBLEM LIST AND OVERVIEW/CONTROLLED SUBSTANCE AGREEMENT    Last Written Prescription Date:  06/21/2018  Last Fill " Quantity: 180,   # refills: 5    THE MOST RECENT OFFICE VISIT MUST BE WITHIN THE PAST 3 MONTHS. (AT LEAST ONE FACE TO FACE VISIT MUST OCCUR EVERY 6 MONTHS. ADDITIONAL VISITS CAN BE VIRTUAL.)    Last Office Visit with [facility] primary care provider: 10/04/2018    Future Office visit:     Controlled substance agreement: [unfilled]    Last Urine Drug Screen: No results found for: CDAUT, No results found for: COMDAT, No results found for: THC13, PCP13, COC13, MAMP13, OPI13, AMP13, BZO13, TCA13, MTD13, BAR13, OXY13, PPX13, BUP13     Processing:  Patient will  in clinic     [email or web address]       checked in past 3 months?  No, route to RN

## 2019-01-10 RX ORDER — ALPRAZOLAM 0.25 MG
TABLET ORAL
Refills: 2 | COMMUNITY
Start: 2018-12-23 | End: 2020-12-30

## 2019-01-10 RX ORDER — INSULIN GLARGINE 100 [IU]/ML
INJECTION, SOLUTION SUBCUTANEOUS
Qty: 24 ML | Refills: 1 | Status: SHIPPED | OUTPATIENT
Start: 2019-01-10 | End: 2019-01-14

## 2019-01-10 RX ORDER — GABAPENTIN 100 MG/1
CAPSULE ORAL
Qty: 180 CAPSULE | Refills: 5 | Status: SHIPPED | OUTPATIENT
Start: 2019-01-10 | End: 2019-05-28

## 2019-01-10 NOTE — TELEPHONE ENCOUNTER
Prescription approved per [facility] Refill Protocol.      Return in about 6 months (around 4/4/2019) for Physical Exam.   [place]:  reviewed- recommend provider review    Alprazolam was not on pt medication list but has now been added    [place]  [email or web address]

## 2019-01-14 DIAGNOSIS — E11.9 TYPE 2 DIABETES MELLITUS WITHOUT COMPLICATION, WITH LONG-TERM CURRENT USE OF INSULIN (H): Primary | ICD-10-CM

## 2019-01-14 DIAGNOSIS — Z79.4 TYPE 2 DIABETES MELLITUS WITHOUT COMPLICATION, WITH LONG-TERM CURRENT USE OF INSULIN (H): Primary | ICD-10-CM

## 2019-01-14 NOTE — TELEPHONE ENCOUNTER
Received notification from pharmacy that insurance prefers lantus instead of basaglar.      Kelvin Thrasher RN, BSN

## 2019-01-16 DIAGNOSIS — G43.909 MIGRAINE WITHOUT STATUS MIGRAINOSUS, NOT INTRACTABLE, UNSPECIFIED MIGRAINE TYPE: ICD-10-CM

## 2019-01-16 RX ORDER — SUMATRIPTAN 20 MG/1
SPRAY NASAL
Qty: 1 EACH | Refills: 0 | Status: SHIPPED | OUTPATIENT
Start: 2019-01-16 | End: 2019-03-26

## 2019-01-16 NOTE — TELEPHONE ENCOUNTER
"Requested Prescriptions   Pending Prescriptions Disp Refills     SUMAtriptan (IMITREX) 20 MG/ACT nasal spray [Pharmacy Med Name: SUMATRIPTAN 20MG/ACT SOL] 30 each 0    Last Written Prescription Date:  09/06/2018  Last Fill Quantity: 1 bottle,  # refills: 0   Last office visit: 10/4/2018 with prescribing provider:  10/04/2018   Future Office Visit:     Sig: USE 1 SPRAY IN NOSTRIL AS NEEDED FOR MIGRAINE.    Serotonin Agonists Failed - 1/16/2019  5:30 AM       Failed - Serotonin Agonist request needs review.    Please review patient's record. If patient has had 8 or more treatments in the past month, please forward to provider.         Passed - Blood pressure under 140/90 in past 12 months    BP Readings from Last 3 Encounters:   10/04/18 133/70   09/06/18 130/82   05/14/18 142/82                Passed - Recent (12 mo) or future (30 days) visit within the authorizing provider's specialty    Patient had office visit in the last 12 months or has a visit in the next 30 days with authorizing provider or within the authorizing provider's specialty.  See \"Patient Info\" tab in inbasket, or \"Choose Columns\" in Meds & Orders section of the refill encounter.             Passed - Medication is active on med list       Passed - Patient is age 18 or older       Passed - No active pregnancy on record       Passed - No positive pregnancy test in past 12 months        Myron SCHULZT  "

## 2019-02-09 DIAGNOSIS — M79.7 FIBROMYALGIA: ICD-10-CM

## 2019-02-10 NOTE — TELEPHONE ENCOUNTER
Controlled Substance Refill Request for cyclobenzaprine (FLEXERIL) 10 MG tablet    Problem List Complete:  No     PROVIDER TO CONSIDER COMPLETION OF PROBLEM LIST AND OVERVIEW/CONTROLLED SUBSTANCE AGREEMENT    Last Written Prescription Date:  12/11/2018  Last Fill Quantity: 30,   # refills: 1    Last Office Visit with [facility] primary care provider: 10/04/2018    Future Office visit:     Controlled substance agreement:   Encounter-Level CSA:    There are no encounter-level csa.     Patient-Level CSA:    There are no patient-level csa.         Last Urine Drug Screen: No results found for: CDAUT, No results found for: COMDAT, No results found for: THC13, PCP13, COC13, MAMP13, OPI13, AMP13, BZO13, TCA13, MTD13, BAR13, OXY13, PPX13, BUP13     Processing:  Patient will  in clinic     [email or web address]       checked in past 3 months?  No, route to RN

## 2019-02-11 DIAGNOSIS — G43.009 MIGRAINE WITHOUT AURA AND WITHOUT STATUS MIGRAINOSUS, NOT INTRACTABLE: ICD-10-CM

## 2019-02-11 RX ORDER — SUMATRIPTAN 100 MG/1
TABLET, FILM COATED ORAL
Qty: 10 TABLET | Refills: 1 | Status: SHIPPED | OUTPATIENT
Start: 2019-02-11 | End: 2019-05-08

## 2019-02-11 RX ORDER — CYCLOBENZAPRINE HCL 10 MG
TABLET ORAL
Qty: 30 TABLET | Refills: 1 | Status: SHIPPED | OUTPATIENT
Start: 2019-02-11 | End: 2019-04-05

## 2019-02-18 ENCOUNTER — TELEPHONE (OUTPATIENT)
Dept: FAMILY MEDICINE | Facility: CLINIC | Age: 59
End: 2019-02-18

## 2019-02-18 NOTE — TELEPHONE ENCOUNTER
Panel Management Review      Patient has the following on her problem list: None      Composite cancer screening  Chart review shows that this patient is due/due soon for the following Mammogram and Colonoscopy  Summary:    Patient is due/failing the following:   COLONOSCOPY and MAMMOGRAM    Action needed:   Patient needs referral/order: mammogram and  Colonoscopy     Type of outreach:    Sent [employer] message.    Questions for provider review:    None                                                                                                                                    db     Chart routed to  .

## 2019-02-27 DIAGNOSIS — E78.5 HYPERLIPIDEMIA LDL GOAL <100: ICD-10-CM

## 2019-02-28 RX ORDER — ATORVASTATIN CALCIUM 20 MG/1
TABLET, FILM COATED ORAL
Qty: 90 TABLET | Refills: 1 | Status: SHIPPED | OUTPATIENT
Start: 2019-02-28 | End: 2019-05-28

## 2019-02-28 NOTE — TELEPHONE ENCOUNTER
"Requested Prescriptions   Pending Prescriptions Disp Refills     atorvastatin (LIPITOR) 20 MG tablet [Pharmacy Med Name: ATORVASTATIN 20MG   TAB] 90 tablet 1    Last Written Prescription Date:  09/06/2018  Last Fill Quantity: 90 tablet,  # refills: 1   Last office visit: 10/4/2018 with prescribing provider:  10/04/2018   Future Office Visit:     Sig: TAKE 1 TABLET BY MOUTH ONCE DAILY    Statins Protocol Passed - 2/27/2019  8:29 PM       Passed - LDL on file in past 12 months    Recent Labs   Lab Test 09/06/18  1441   LDL 49            Passed - No abnormal creatine kinase in past 12 months    Recent Labs   Lab Test 04/25/14  1226   CKT 92               Passed - Recent (12 mo) or future (30 days) visit within the authorizing provider's specialty    Patient had office visit in the last 12 months or has a visit in the next 30 days with authorizing provider or within the authorizing provider's specialty.  See \"Patient Info\" tab in inbasket, or \"Choose Columns\" in Meds & Orders section of the refill encounter.             Passed - Medication is active on med list       Passed - Patient is age 18 or older       Passed - No active pregnancy on record       Passed - No positive pregnancy test in past 12 months        Myron SCHULZT  "

## 2019-02-28 NOTE — TELEPHONE ENCOUNTER
Prescription approved per [facility] Refill Protocol.  Kelvin Thrasher RN, BSN

## 2019-03-26 DIAGNOSIS — G43.909 MIGRAINE WITHOUT STATUS MIGRAINOSUS, NOT INTRACTABLE, UNSPECIFIED MIGRAINE TYPE: ICD-10-CM

## 2019-03-26 DIAGNOSIS — Z79.4 TYPE 2 DIABETES MELLITUS WITHOUT COMPLICATION, WITH LONG-TERM CURRENT USE OF INSULIN (H): ICD-10-CM

## 2019-03-26 DIAGNOSIS — E11.9 TYPE 2 DIABETES MELLITUS WITHOUT COMPLICATION, WITH LONG-TERM CURRENT USE OF INSULIN (H): ICD-10-CM

## 2019-03-26 NOTE — TELEPHONE ENCOUNTER
"Requested Prescriptions   Pending Prescriptions Disp Refills     metFORMIN (GLUCOPHAGE) 500 MG tablet [Pharmacy Med Name: METFORMIN 500MG TAB]  Last Written Prescription Date:  9/6/2018  Last Fill Quantity: 120 tablet,  # refills: 5   Last office visit: 10/4/2018 with prescribing provider:  Aaseby-Aguilera      120 tablet 5     Sig: TAKE 2 TABLETS BY MOUTH TWICE DAILY WITH MEALS    Biguanide Agents Failed - 3/26/2019  3:53 PM       Failed - Patient has documented A1c within the specified period of time.    If HgbA1C is 8 or greater, it needs to be on file within the past 3 months.  If less than 8, must be on file within the past 6 months.     Recent Labs   Lab Test 09/06/18  1441   A1C 6.9*            Passed - Blood pressure less than 140/90 in past 6 months    BP Readings from Last 3 Encounters:   10/04/18 133/70   09/06/18 130/82   05/14/18 142/82                Passed - Patient has documented LDL within the past 12 mos.    Recent Labs   Lab Test 09/06/18  1441   LDL 49            Passed - Patient has had a Microalbumin in the past 15 mos.    Recent Labs   Lab Test 09/06/18  1445   MICROL 66   UMALCR 23.09            Passed - Patient is age 10 or older       Passed - Patient's CR is NOT>1.4 OR Patient's EGFR is NOT<45 within past 12 mos.    Recent Labs   Lab Test 09/06/18  1441   GFRESTIMATED 81   GFRESTBLACK >90       Recent Labs   Lab Test 09/06/18  1441   CR 0.74            Passed - Patient does NOT have a diagnosis of CHF.       Passed - Medication is active on med list       Passed - Patient is not pregnant       Passed - Patient has not had a positive pregnancy test within the past 12 mos.        Passed - Recent (6 mo) or future (30 days) visit within the authorizing provider's specialty    Patient had office visit in the last 6 months or has a visit in the next 30 days with authorizing provider or within the authorizing provider's specialty.  See \"Patient Info\" tab in inbasket, or \"Choose Columns\" in Meds & " "Orders section of the refill encounter.            SUMAtriptan (IMITREX) 20 MG/ACT nasal spray [Pharmacy Med Name: SUMATRIPTAN 20MG/ACT SOL]  Last Written Prescription Date:  1/16/2019  Last Fill Quantity: 1 each,  # refills: 0   Last office visit: 10/4/2018 with prescribing provider:  Aaseby-Aguilera      6 each 0     Sig: USE 1 SPRAY IN NOSTRIL AS NEEDED FOR MIGRAINE    Serotonin Agonists Failed - 3/26/2019  3:53 PM       Failed - Serotonin Agonist request needs review.    Please review patient's record. If patient has had 8 or more treatments in the past month, please forward to provider.         Passed - Blood pressure under 140/90 in past 12 months    BP Readings from Last 3 Encounters:   10/04/18 133/70   09/06/18 130/82   05/14/18 142/82                Passed - Recent (12 mo) or future (30 days) visit within the authorizing provider's specialty    Patient had office visit in the last 12 months or has a visit in the next 30 days with authorizing provider or within the authorizing provider's specialty.  See \"Patient Info\" tab in inbasket, or \"Choose Columns\" in Meds & Orders section of the refill encounter.             Passed - Medication is active on med list       Passed - Patient is age 18 or older       Passed - No active pregnancy on record       Passed - No positive pregnancy test in past 12 months          "

## 2019-03-27 RX ORDER — SUMATRIPTAN 20 MG/1
SPRAY NASAL
Qty: 6 EACH | Refills: 0 | Status: SHIPPED | OUTPATIENT
Start: 2019-03-27 | End: 2019-05-08

## 2019-03-27 NOTE — TELEPHONE ENCOUNTER
LMTRC    Pt due for Dm appt in the next couple weeks  Is pt using the imitrex nasal spray or pills?    Kelvin Thrasher RN, BSN

## 2019-03-28 ENCOUNTER — TELEPHONE (OUTPATIENT)
Dept: FAMILY MEDICINE | Facility: CLINIC | Age: 59
End: 2019-03-28

## 2019-03-28 NOTE — TELEPHONE ENCOUNTER
Prior Authorization Retail Medication Request    Medication/Dose: SUMAtriptan (IMITREX) 20 MG/ACT nasal spray  ICD code (if different than what is on RX): Migraine without status migrainosus, not intractable, unspecified migraine type [G43.909]    Previously Tried and Failed:  None  Rationale:  Patient has been taking this medication since 2011 and remians stable while on this medication.    COVERMYMEDS    KEY: [id number]  PATIENT LAST NAME: ZULMA  : 1960    Pharmacy Information (if different than what is on RX)  Name: [facility] [address] [place], [place] - [address]   Phone:  [phone]    Myron DAVIS

## 2019-04-02 NOTE — TELEPHONE ENCOUNTER
PA Initiation    Medication: SUMAtriptan (IMITREX) 20 MG/ACT nasal spray- INITIATED  Insurance Company: [employer] - Phone [phone] Fax [phone]  Pharmacy Filling the Rx: [facility] [address] [place] [address] [address]  Filling Pharmacy Phone: [phone]  Filling Pharmacy Fax:    Start Date: 4/2/2019

## 2019-04-03 NOTE — TELEPHONE ENCOUNTER
It was approved. Coverage effective date 3/3/2019-4/2/2020. Office will receive a fax with approval information.    Notifying pharmacy of approval.    Monika Swenson  FWF - TC/FD  4/3/2019 10:15 AM

## 2019-04-03 NOTE — TELEPHONE ENCOUNTER
Prior Authorization Approval    Authorization Effective Date: 3/3/2019  Authorization Expiration Date: 4/2/2020  Medication: SUMAtriptan (IMITREX) 20 MG/ACT nasal spray- APPROVED  Approved Dose/Quantity:   Reference #: [id number]   Insurance Company: [employer] - Phone [phone] Fax [phone]  Expected CoPay:       CoPay Card Available:      Foundation Assistance Needed:    Which Pharmacy is filling the prescription (Not needed for infusion/clinic administered): [facility] [address] [place], [place] - [address] [facility]  Pharmacy Notified: Yes  Patient Notified: Yes

## 2019-04-04 DIAGNOSIS — Z79.4 TYPE 2 DIABETES MELLITUS WITHOUT COMPLICATION, WITH LONG-TERM CURRENT USE OF INSULIN (H): ICD-10-CM

## 2019-04-04 DIAGNOSIS — E11.9 TYPE 2 DIABETES MELLITUS WITHOUT COMPLICATION, WITH LONG-TERM CURRENT USE OF INSULIN (H): ICD-10-CM

## 2019-04-04 NOTE — TELEPHONE ENCOUNTER
"Requested Prescriptions   Pending Prescriptions Disp Refills     insulin glargine (BASAGLAR KWIKPEN) 100 UNIT/ML pen [Pharmacy Med Name: BASAGLAR 100UNIT    INJ]  Medication has been discontinued in patient chart  Last Written Prescription Date:  1/10/2019  Last Fill Quantity: 24 mL,  # refills: 1   Last office visit: 10/4/2018 with prescribing provider:  Aaseby-Aguilera     Future Office Visit:   Next 5 appointments (look out 90 days)    Apr 08, 2019  2:30 PM CDT  Office Visit with Ramona Ann Aaseby-Aguilera, [facility]-C  [facility] [address] [facility] [address] [phone]           1     Sig: INJECT 78 UNITS SUBCUTANEOUSLY AT BEDTIME    Long Acting Insulin Protocol Failed - 4/4/2019  2:52 PM       Failed - Blood pressure less than 140/90 in past 6 months    BP Readings from Last 3 Encounters:   10/04/18 133/70   09/06/18 130/82   05/14/18 142/82                Failed - HgbA1C in past 3 or 6 months    If HgbA1C is 8 or greater, it needs to be on file within the past 3 months.  If less than 8, must be on file within the past 6 months.     Recent Labs   Lab Test 09/06/18  1441   A1C 6.9*            Passed - LDL on file in past 12 months    Recent Labs   Lab Test 09/06/18  1441   LDL 49            Passed - Microalbumin on file in past 12 months    Recent Labs   Lab Test 09/06/18  1445   MICROL 66   UMALCR 23.09            Passed - Serum creatinine on file in past 12 months    Recent Labs   Lab Test 09/06/18  1441   CR 0.74            Passed - Medication is active on med list       Passed - Patient is age 18 or older       Passed - Recent (6 mo) or future (30 days) visit within the authorizing provider's specialty    Patient had office visit in the last 6 months or has a visit in the next 30 days with authorizing provider or within the authorizing provider's specialty.  See \"Patient Info\" tab in inbasket, or \"Choose Columns\" in Meds & Orders section of the refill " encounter.

## 2019-04-05 DIAGNOSIS — Z79.4 TYPE 2 DIABETES MELLITUS WITHOUT COMPLICATION, WITH LONG-TERM CURRENT USE OF INSULIN (H): ICD-10-CM

## 2019-04-05 DIAGNOSIS — E11.9 TYPE 2 DIABETES MELLITUS WITHOUT COMPLICATION, WITH LONG-TERM CURRENT USE OF INSULIN (H): ICD-10-CM

## 2019-04-05 DIAGNOSIS — M79.7 FIBROMYALGIA: ICD-10-CM

## 2019-04-05 RX ORDER — INSULIN GLARGINE 100 [IU]/ML
INJECTION, SOLUTION SUBCUTANEOUS
Qty: 24 ML | Refills: 0 | Status: SHIPPED | OUTPATIENT
Start: 2019-04-05 | End: 2019-05-19

## 2019-04-05 RX ORDER — CYCLOBENZAPRINE HCL 10 MG
TABLET ORAL
Qty: 30 TABLET | Refills: 1 | Status: SHIPPED | OUTPATIENT
Start: 2019-04-05 | End: 2019-07-24

## 2019-04-05 RX ORDER — INSULIN GLARGINE 100 [IU]/ML
INJECTION, SOLUTION SUBCUTANEOUS
Refills: 1 | OUTPATIENT
Start: 2019-04-05

## 2019-04-05 NOTE — TELEPHONE ENCOUNTER
Controlled Substance Refill Request for cyclobenzaprine (FLEXERIL) 10 MG tablet  Problem List Complete:  No     PROVIDER TO CONSIDER COMPLETION OF PROBLEM LIST AND OVERVIEW/CONTROLLED SUBSTANCE AGREEMENT    Last Written Prescription Date:  02/11/2019  Last Fill Quantity: 30 tablet,   # refills: 1    THE MOST RECENT OFFICE VISIT MUST BE WITHIN THE PAST 3 MONTHS. AT LEAST ONE FACE TO FACE VISIT MUST OCCUR EVERY 6 MONTHS. ADDITIONAL VISITS CAN BE VIRTUAL.  (THIS STATEMENT SHOULD BE DELETED.)    Last Office Visit with [facility] primary care provider: 10/04/2018    Future Office visit:   Next 5 appointments (look out 90 days)    Apr 08, 2019  2:30 PM CDT  Office Visit with Ramona Ann Aaseby-Aguilera, PA-C  [facility] [id number] [place] [address] [phone]          Controlled substance agreement:   Encounter-Level CSA:    There are no encounter-level csa.     Patient-Level CSA:    There are no patient-level csa.         Last Urine Drug Screen: No results found for: CDAUT, No results found for: COMDAT, No results found for: THC13, PCP13, COC13, MAMP13, OPI13, AMP13, BZO13, TCA13, MTD13, BAR13, OXY13, PPX13, BUP13     Processing:  Fax Rx to [facility] [address] [place], [place] - [address] [facility]     [email or web address]       checked in past 3 months?  No, route to CONNER DAVIS

## 2019-04-05 NOTE — TELEPHONE ENCOUNTER
Prescription approved per [facility] Refill Protocol.  For one time fill     Fatuma Lewis RN

## 2019-04-05 NOTE — TELEPHONE ENCOUNTER
"Requested Prescriptions   Pending Prescriptions Disp Refills       PHARMACY REQUESTING THE MEDICATION BASAGLAR BECAUSE LANTUS IN NOT COVERED BY INSURANCE.     insulin glargine (BASAGLAR KWIKPEN) 100 UNIT/ML pen    Last Written Prescription Date:  10/12/2018  Last Fill Quantity: 24ml,  # refills: 1   Last office visit: 10/4/2018 with prescribing provider:  10/04/2018   Future Office Visit:   Next 5 appointments (look out 90 days)    Apr 08, 2019  2:30 PM CDT  Office Visit with Ramona Ann Aaseby-Aguilera, PA-C  [facility] [address] [facility] [address] [phone]            24 mL 1    Long Acting Insulin Protocol Failed - 4/5/2019 10:04 AM       Failed - Blood pressure less than 140/90 in past 6 months    BP Readings from Last 3 Encounters:   10/04/18 133/70   09/06/18 130/82   05/14/18 142/82                Failed - HgbA1C in past 3 or 6 months    If HgbA1C is 8 or greater, it needs to be on file within the past 3 months.  If less than 8, must be on file within the past 6 months.     Recent Labs   Lab Test 09/06/18  1441   A1C 6.9*            Passed - LDL on file in past 12 months    Recent Labs   Lab Test 09/06/18  1441   LDL 49            Passed - Microalbumin on file in past 12 months    Recent Labs   Lab Test 09/06/18  1445   MICROL 66   UMALCR 23.09            Passed - Serum creatinine on file in past 12 months    Recent Labs   Lab Test 09/06/18  1441   CR 0.74            Passed - Medication is active on med list       Passed - Patient is age 18 or older       Passed - Recent (6 mo) or future (30 days) visit within the authorizing provider's specialty    Patient had office visit in the last 6 months or has a visit in the next 30 days with authorizing provider or within the authorizing provider's specialty.  See \"Patient Info\" tab in inbasket, or \"Choose Columns\" in Meds & Orders section of the refill encounter.            Myron Hodgson XRT  "

## 2019-04-22 DIAGNOSIS — G43.009 MIGRAINE WITHOUT AURA AND WITHOUT STATUS MIGRAINOSUS, NOT INTRACTABLE: ICD-10-CM

## 2019-04-22 NOTE — TELEPHONE ENCOUNTER
"Requested Prescriptions   Pending Prescriptions Disp Refills     SUMAtriptan (IMITREX) 100 MG tablet [Pharmacy Med Name: SUMATRIPTAN 100MG TAB]  Last Written Prescription Date:  3/27/2019  Last Fill Quantity: 6 each,  # refills: 0   Last office visit: 10/4/2018 with prescribing provider:  Aaseby-Aguilera   Future Office Visit:     10 tablet 1     Sig: TAKE 1 TABLET BY MOUTH ONCE DAILY, MAY  REPEAT  AFTER  2  HOURS  IF  NEEDED *MAX  OF  2  TABLETS  PER  24  HOURS*       Serotonin Agonists Failed - 4/22/2019  5:21 PM        Failed - Serotonin Agonist request needs review.     Please review patient's record. If patient has had 8 or more treatments in the past month, please forward to provider.          Passed - Blood pressure under 140/90 in past 12 months     BP Readings from Last 3 Encounters:   10/04/18 133/70   09/06/18 130/82   05/14/18 142/82                 Passed - Recent (12 mo) or future (30 days) visit within the authorizing provider's specialty     Patient had office visit in the last 12 months or has a visit in the next 30 days with authorizing provider or within the authorizing provider's specialty.  See \"Patient Info\" tab in inbasket, or \"Choose Columns\" in Meds & Orders section of the refill encounter.              Passed - Medication is active on med list        Passed - Patient is age 18 or older        Passed - No active pregnancy on record        Passed - No positive pregnancy test in past 12 months          "

## 2019-04-23 NOTE — TELEPHONE ENCOUNTER
LM pt needs 6 month DM check and if she has used all 10 plus RF needs appt to discuss MERCER's     Fatuma Lewis RN

## 2019-04-24 RX ORDER — SUMATRIPTAN 100 MG/1
TABLET, FILM COATED ORAL
Qty: 10 TABLET | Refills: 1 | OUTPATIENT
Start: 2019-04-24

## 2019-04-29 DIAGNOSIS — E11.9 TYPE 2 DIABETES MELLITUS WITHOUT COMPLICATION, WITH LONG-TERM CURRENT USE OF INSULIN (H): ICD-10-CM

## 2019-04-29 DIAGNOSIS — I10 HYPERTENSION GOAL BP (BLOOD PRESSURE) < 140/90: ICD-10-CM

## 2019-04-29 DIAGNOSIS — Z79.4 TYPE 2 DIABETES MELLITUS WITHOUT COMPLICATION, WITH LONG-TERM CURRENT USE OF INSULIN (H): ICD-10-CM

## 2019-04-29 NOTE — LETTER
April 30, 2019      Vanessa Mota  [address] [place] [address]        Dear Vanessa,     We have been trying to reach you but you have not gotten back to us.     You are over due for follow up on your diabetes and high blood pressure.   You were last seen in October of 2018, and I had wanted you to follow in the first part of April.       We sent in a limited refill of you prescriptions to allow you time to call and schedule an appointment.     Please call [phone] to schedule this visit.       Sincerely,        Ramona Ann Aaseby-Aguilera, PA-C/Fatuma Lewis RN

## 2019-04-30 RX ORDER — LOSARTAN POTASSIUM 50 MG/1
TABLET ORAL
Qty: 15 TABLET | Refills: 1 | Status: SHIPPED | OUTPATIENT
Start: 2019-04-30 | End: 2019-05-28

## 2019-04-30 NOTE — TELEPHONE ENCOUNTER
Routing refill request to provider for review/approval because:  Pt was to be seen in early April for DM check -  See cancelled 4/8 appt     She was given 30 day fill last month and was told to f/u     LM several time and has received my chart advising f/u    Letter sent today     Fatuma Lewis RN

## 2019-05-03 DIAGNOSIS — G43.009 MIGRAINE WITHOUT AURA AND WITHOUT STATUS MIGRAINOSUS, NOT INTRACTABLE: ICD-10-CM

## 2019-05-03 DIAGNOSIS — G43.909 MIGRAINE WITHOUT STATUS MIGRAINOSUS, NOT INTRACTABLE, UNSPECIFIED MIGRAINE TYPE: ICD-10-CM

## 2019-05-03 RX ORDER — SUMATRIPTAN 20 MG/1
SPRAY NASAL
Qty: 6 EACH | Refills: 0 | OUTPATIENT
Start: 2019-05-03

## 2019-05-03 RX ORDER — SUMATRIPTAN 100 MG/1
TABLET, FILM COATED ORAL
Qty: 10 TABLET | Refills: 1 | OUTPATIENT
Start: 2019-05-03

## 2019-05-03 NOTE — TELEPHONE ENCOUNTER
Patient called back scheduled OV but not is asking if they can refill her Imitrex until her appt as she is out.     Penelope Nunez/SRAVANI

## 2019-05-03 NOTE — TELEPHONE ENCOUNTER
"Requested Prescriptions   Pending Prescriptions Disp Refills     SUMAtriptan (IMITREX) 100 MG tablet [Pharmacy Med Name: SUMATRIPTAN 100MG TAB] 10 tablet 1     Sig: TAKE 1 TABLET BY MOUTH ONCE DAILY, MAY  REPEAT  AFTER  2  HOURS  IF  NEEDED *MAX  OF  2  TABLETS  PER  24  HOURS*     Last Written Prescription Date:  02/11/2019  Last Fill Quantity: 10 TABLET,  # refills: 1   Last office visit: 10/4/2018 with prescribing provider:  10/04/2018   Future Office Visit:          Serotonin Agonists Failed - 5/3/2019  5:30 AM        Failed - Serotonin Agonist request needs review.     Please review patient's record. If patient has had 8 or more treatments in the past month, please forward to provider.          Passed - Blood pressure under 140/90 in past 12 months     BP Readings from Last 3 Encounters:   10/04/18 133/70   09/06/18 130/82   05/14/18 142/82                 Passed - Recent (12 mo) or future (30 days) visit within the authorizing provider's specialty     Patient had office visit in the last 12 months or has a visit in the next 30 days with authorizing provider or within the authorizing provider's specialty.  See \"Patient Info\" tab in inbasket, or \"Choose Columns\" in Meds & Orders section of the refill encounter.              Passed - Medication is active on med list        Passed - Patient is age 18 or older        Passed - No active pregnancy on record        Passed - No positive pregnancy test in past 12 months            SUMAtriptan (IMITREX) 20 MG/ACT nasal spray [Pharmacy Med Name: SUMATRIPTAN 20MG/ACT SOL] 6 each 0     Sig: USE 1 SPRAY IN NOSTRIL AS NEEDED FOR MIGRAINE     Last Written Prescription Date:  03/27/2019  Last Fill Quantity: 6 BOTTLES,  # refills: 0   Last office visit: 10/4/2018 with prescribing provider:  10/04/2018   Future Office Visit:          Serotonin Agonists Failed - 5/3/2019  5:30 AM        Failed - Serotonin Agonist request needs review.     Please review patient's record. If patient " "has had 8 or more treatments in the past month, please forward to provider.          Passed - Blood pressure under 140/90 in past 12 months     BP Readings from Last 3 Encounters:   10/04/18 133/70   09/06/18 130/82   05/14/18 142/82                 Passed - Recent (12 mo) or future (30 days) visit within the authorizing provider's specialty     Patient had office visit in the last 12 months or has a visit in the next 30 days with authorizing provider or within the authorizing provider's specialty.  See \"Patient Info\" tab in inbasket, or \"Choose Columns\" in Meds & Orders section of the refill encounter.              Passed - Medication is active on med list        Passed - Patient is age 18 or older        Passed - No active pregnancy on record        Passed - No positive pregnancy test in past 12 months          Myron Hodgson XRT  "

## 2019-05-03 NOTE — TELEPHONE ENCOUNTER
Rx'd denied as pt needs follow up for MERCER and DM  Pt has been contacted multiple times via phone and [employer] with no response    Letter was also mailed on 4/30/19    Kelvin Thrasher RN, BSN

## 2019-05-03 NOTE — TELEPHONE ENCOUNTER
Pt informed.  Yes.  She has used all pills from Feb and March refills.  Call forwarded to scheduling.   Nikita De Leon RN

## 2019-05-06 ENCOUNTER — TELEPHONE (OUTPATIENT)
Dept: FAMILY MEDICINE | Facility: CLINIC | Age: 59
End: 2019-05-06

## 2019-05-06 NOTE — TELEPHONE ENCOUNTER
"Pt's  calling \"she needs refill on Imitrex\"     He states pt does have appt on 5/14 \"but she can not go that long with out the Imitrex\"     Writer advised pt was given RX on 3/27/19 for 6 nasal sprays and on 2/11/19 for 100 mg tabs # 10 with 1 RF.       Pt needs to call and discuss her migraines with the clinic.  We have been trying to reach out to her for over month to discuss sumatriptan use with pt.     If she is having migraines often needs to be seen sooner than 5/14,        will advise pt to call to clinic.        Sumatriptan 100 mg tab's 20 tabs since 2/11   Sumatriptan nasal - 6 sprays since 3/27    Fatuma Lewis RN          "

## 2019-05-08 RX ORDER — SUMATRIPTAN 100 MG/1
TABLET, FILM COATED ORAL
Qty: 10 TABLET | Refills: 1 | Status: SHIPPED | OUTPATIENT
Start: 2019-05-08 | End: 2019-07-24

## 2019-05-08 RX ORDER — SUMATRIPTAN 20 MG/1
SPRAY NASAL
Qty: 6 EACH | Refills: 0 | Status: SHIPPED | OUTPATIENT
Start: 2019-05-08 | End: 2019-05-28

## 2019-05-10 ENCOUNTER — TELEPHONE (OUTPATIENT)
Dept: FAMILY MEDICINE | Facility: CLINIC | Age: 59
End: 2019-05-10

## 2019-05-10 NOTE — TELEPHONE ENCOUNTER
Panel Management Review      Patient has the following on her problem list:     Depression / Dysthymia review    Measure:  Needs PHQ-9 score of 4 or less during index window.  Administer PHQ-9 and if score is 5 or more, send encounter to provider for next steps.    5 - 7 month window range:     PHQ-9 SCORE 6/22/2017 1/9/2018 9/6/2018   PHQ-9 Total Score - - -   PHQ-9 Total Score 9 7 15       If PHQ-9 recheck is 5 or more, route to provider for next steps.    Patient is due for:  PHQ9      Composite cancer screening  Chart review shows that this patient is due/due soon for the following Mammogram and Colonoscopy  Summary:    Patient is due/failing the following:   COLONOSCOPY, MAMMOGRAM and PHQ9    Action needed:   Patient needs to do PHQ9. and Patient needs referral/order: colonoscopy and mammogram    Type of outreach:    pt. has an appt. May 14 - will address mammo and colon and phq9    Questions for provider review:    None                                                                                                                                    db     Chart routed to  .

## 2019-05-11 ENCOUNTER — NURSE TRIAGE (OUTPATIENT)
Dept: NURSING | Facility: CLINIC | Age: 59
End: 2019-05-11

## 2019-05-11 NOTE — TELEPHONE ENCOUNTER
Patient returning call to clinic.  FNA advised call may be related to refill of Imitrex.  FNA advised patient to call clinic back or talk to provider when she see her on 5/14/19.  Caller verbalizes understanding.    Reason for Disposition    [1] Follow-up call to recent contact AND [2] information only call, no triage required    Additional Information    Negative: [1] Caller is not with the adult (patient) AND [2] reporting urgent symptoms    Negative: Lab result questions    Negative: Medication questions    Negative: Caller can't be reached by phone    Negative: Caller has already spoken to PCP or another triager    Negative: RN needs further essential information from caller in order to complete triage    Negative: Requesting regular office appointment    Negative: [1] Caller requesting NON-URGENT health information AND [2] PCP's office is the best resource    Negative: Health Information question, no triage required and triager able to answer question    Negative: General information question, no triage required and triager able to answer question    Negative: Question about upcoming scheduled test, no triage required and triager able to answer question    Negative: [1] Caller is not with the adult (patient) AND [2] probable NON-URGENT symptoms    Protocols used: INFORMATION ONLY CALL-A-

## 2019-05-21 DIAGNOSIS — Z79.4 TYPE 2 DIABETES MELLITUS WITHOUT COMPLICATION, WITH LONG-TERM CURRENT USE OF INSULIN (H): ICD-10-CM

## 2019-05-21 DIAGNOSIS — E11.9 TYPE 2 DIABETES MELLITUS WITHOUT COMPLICATION, WITH LONG-TERM CURRENT USE OF INSULIN (H): ICD-10-CM

## 2019-05-21 NOTE — TELEPHONE ENCOUNTER
Pharmacy requesting a 90 day supply which is not appropriate.  30 day was sent on 4/30/19 so pt should have enough until her 5/28/19 appt  Kelvin Thrasher RN, BSN

## 2019-05-21 NOTE — TELEPHONE ENCOUNTER
Requested Prescriptions   Pending Prescriptions Disp Refills     metFORMIN (GLUCOPHAGE) 500 MG tablet [Pharmacy Med Name: METFORMIN 500MG TAB] 60 tablet 0     Sig: TAKE 2 TABLETS BY MOUTH TWICE DAILY WITH MEALS     Last Written Prescription Date:  04/30/2019  Last Fill Quantity: 60 tablet,  # refills: 0   Last office visit: 10/4/2018 with prescribing provider:  10/04/2018   Future Office Visit:   Next 5 appointments (look out 90 days)    May 28, 2019  1:00 PM CDT  Office Visit with Ramona Ann Aaseby-Aguilera, PA-C  [facility]) [address] [facility] [id number] [phone]               Biguanide Agents Failed - 5/21/2019  5:30 AM        Failed - Blood pressure less than 140/90 in past 6 months     BP Readings from Last 3 Encounters:   10/04/18 133/70   09/06/18 130/82   05/14/18 142/82                 Failed - Patient has documented A1c within the specified period of time.     If HgbA1C is 8 or greater, it needs to be on file within the past 3 months.  If less than 8, must be on file within the past 6 months.     Recent Labs   Lab Test 09/06/18  1441   A1C 6.9*             Passed - Patient has documented LDL within the past 12 mos.     Recent Labs   Lab Test 09/06/18  1441   LDL 49             Passed - Patient has had a Microalbumin in the past 15 mos.     Recent Labs   Lab Test 09/06/18  1445   MICROL 66   UMALCR 23.09             Passed - Patient is age 10 or older        Passed - Patient's CR is NOT>1.4 OR Patient's EGFR is NOT<45 within past 12 mos.     Recent Labs   Lab Test 09/06/18  1441   GFRESTIMATED 81   GFRESTBLACK >90       Recent Labs   Lab Test 09/06/18  1441   CR 0.74             Passed - Patient does NOT have a diagnosis of CHF.        Passed - Medication is active on med list        Passed - Patient is not pregnant        Passed - Patient has not had a positive pregnancy test within the past 12 mos.         Passed - Recent (6 mo) or future (30  "days) visit within the authorizing provider's specialty     Patient had office visit in the last 6 months or has a visit in the next 30 days with authorizing provider or within the authorizing provider's specialty.  See \"Patient Info\" tab in inbasket, or \"Choose Columns\" in Meds & Orders section of the refill encounter.              Myron SCHULZT  "

## 2019-05-26 DIAGNOSIS — E11.9 TYPE 2 DIABETES MELLITUS WITHOUT COMPLICATION, WITH LONG-TERM CURRENT USE OF INSULIN (H): ICD-10-CM

## 2019-05-26 DIAGNOSIS — Z79.4 TYPE 2 DIABETES MELLITUS WITHOUT COMPLICATION, WITH LONG-TERM CURRENT USE OF INSULIN (H): ICD-10-CM

## 2019-05-26 NOTE — TELEPHONE ENCOUNTER
Requested Prescriptions   Pending Prescriptions Disp Refills     metFORMIN (GLUCOPHAGE) 500 MG tablet [Pharmacy Med Name: METFORMIN 500MG TAB]  Last Written Prescription Date:  4/30/2019  Last Fill Quantity: 60 tablet,  # refills: 0   Last office visit: 10/4/2018 with prescribing provider:  Aaseby-Aguilera     Future Office Visit:   Next 5 appointments (look out 90 days)    May 28, 2019  1:00 PM CDT  Office Visit with Ramona Ann Aaseby-Aguilera, PA-C  [facility] [address] [facility] [address] [phone]          60 tablet 0     Sig: TAKE 2 TABLETS BY MOUTH TWICE DAILY WITH MEALS       Biguanide Agents Failed - 5/26/2019  6:30 AM        Failed - Blood pressure less than 140/90 in past 6 months     BP Readings from Last 3 Encounters:   10/04/18 133/70   09/06/18 130/82   05/14/18 142/82                 Failed - Patient has documented A1c within the specified period of time.     If HgbA1C is 8 or greater, it needs to be on file within the past 3 months.  If less than 8, must be on file within the past 6 months.     Recent Labs   Lab Test 09/06/18  1441   A1C 6.9*             Passed - Patient has documented LDL within the past 12 mos.     Recent Labs   Lab Test 09/06/18  1441   LDL 49             Passed - Patient has had a Microalbumin in the past 15 mos.     Recent Labs   Lab Test 09/06/18  1445   MICROL 66   UMALCR 23.09             Passed - Patient is age 10 or older        Passed - Patient's CR is NOT>1.4 OR Patient's EGFR is NOT<45 within past 12 mos.     Recent Labs   Lab Test 09/06/18  1441   GFRESTIMATED 81   GFRESTBLACK >90       Recent Labs   Lab Test 09/06/18  1441   CR 0.74             Passed - Patient does NOT have a diagnosis of CHF.        Passed - Medication is active on med list        Passed - Patient is not pregnant        Passed - Patient has not had a positive pregnancy test within the past 12 mos.         Passed - Recent (6 mo) or future  "(30 days) visit within the authorizing provider's specialty     Patient had office visit in the last 6 months or has a visit in the next 30 days with authorizing provider or within the authorizing provider's specialty.  See \"Patient Info\" tab in inbasket, or \"Choose Columns\" in Meds & Orders section of the refill encounter.              "

## 2019-05-28 ENCOUNTER — OFFICE VISIT (OUTPATIENT)
Dept: FAMILY MEDICINE | Facility: CLINIC | Age: 59
End: 2019-05-28
Payer: COMMERCIAL

## 2019-05-28 VITALS
BODY MASS INDEX: 35.14 KG/M2 | DIASTOLIC BLOOD PRESSURE: 70 MMHG | OXYGEN SATURATION: 96 % | HEART RATE: 83 BPM | HEIGHT: 60 IN | TEMPERATURE: 98.5 F | RESPIRATION RATE: 18 BRPM | WEIGHT: 179 LBS | SYSTOLIC BLOOD PRESSURE: 160 MMHG

## 2019-05-28 DIAGNOSIS — M79.7 FIBROMYALGIA: ICD-10-CM

## 2019-05-28 DIAGNOSIS — Z79.4 TYPE 2 DIABETES MELLITUS WITHOUT COMPLICATION, WITH LONG-TERM CURRENT USE OF INSULIN (H): Primary | ICD-10-CM

## 2019-05-28 DIAGNOSIS — I10 HYPERTENSION GOAL BP (BLOOD PRESSURE) < 140/90: ICD-10-CM

## 2019-05-28 DIAGNOSIS — I10 ESSENTIAL HYPERTENSION: ICD-10-CM

## 2019-05-28 DIAGNOSIS — E11.9 TYPE 2 DIABETES MELLITUS WITHOUT COMPLICATION, WITH LONG-TERM CURRENT USE OF INSULIN (H): Primary | ICD-10-CM

## 2019-05-28 DIAGNOSIS — E78.5 HYPERLIPIDEMIA LDL GOAL <100: ICD-10-CM

## 2019-05-28 LAB — HBA1C MFR BLD: 7.5 % (ref 0–5.6)

## 2019-05-28 PROCEDURE — 83036 HEMOGLOBIN GLYCOSYLATED A1C: CPT | Performed by: PHYSICIAN ASSISTANT

## 2019-05-28 PROCEDURE — 36415 COLL VENOUS BLD VENIPUNCTURE: CPT | Performed by: PHYSICIAN ASSISTANT

## 2019-05-28 PROCEDURE — 99214 OFFICE O/P EST MOD 30 MIN: CPT | Performed by: PHYSICIAN ASSISTANT

## 2019-05-28 RX ORDER — LAMOTRIGINE 100 MG/1
TABLET ORAL
Refills: 0 | COMMUNITY
Start: 2019-05-01 | End: 2021-09-13

## 2019-05-28 RX ORDER — GLIPIZIDE 10 MG/1
10 TABLET, FILM COATED, EXTENDED RELEASE ORAL EVERY 12 HOURS
Qty: 180 TABLET | Refills: 3 | Status: SHIPPED | OUTPATIENT
Start: 2019-05-28 | End: 2020-02-28

## 2019-05-28 RX ORDER — LOSARTAN POTASSIUM 100 MG/1
100 TABLET ORAL DAILY
Qty: 30 TABLET | Refills: 1 | Status: SHIPPED | OUTPATIENT
Start: 2019-05-28 | End: 2019-06-25

## 2019-05-28 RX ORDER — LOSARTAN POTASSIUM 50 MG/1
50 TABLET ORAL DAILY
Qty: 90 TABLET | Refills: 1 | Status: SHIPPED | OUTPATIENT
Start: 2019-05-28 | End: 2019-05-28 | Stop reason: DRUGHIGH

## 2019-05-28 RX ORDER — GABAPENTIN 100 MG/1
CAPSULE ORAL
Qty: 180 CAPSULE | Refills: 5 | Status: SHIPPED | OUTPATIENT
Start: 2019-05-28 | End: 2019-12-12

## 2019-05-28 RX ORDER — INSULIN GLARGINE 100 [IU]/ML
INJECTION, SOLUTION SUBCUTANEOUS
Qty: 3 ML | Refills: 1 | Status: SHIPPED | OUTPATIENT
Start: 2019-05-28 | End: 2019-06-10

## 2019-05-28 RX ORDER — ATORVASTATIN CALCIUM 20 MG/1
20 TABLET, FILM COATED ORAL DAILY
Qty: 90 TABLET | Refills: 1 | Status: SHIPPED | OUTPATIENT
Start: 2019-05-28 | End: 2020-02-28

## 2019-05-28 ASSESSMENT — PATIENT HEALTH QUESTIONNAIRE - PHQ9: SUM OF ALL RESPONSES TO PHQ QUESTIONS 1-9: 12

## 2019-05-28 ASSESSMENT — MIFFLIN-ST. JEOR: SCORE: 1318.44

## 2019-05-28 NOTE — PATIENT INSTRUCTIONS
(E11.9,  Z79.4) Type 2 diabetes mellitus without complication, with long-term current use of insulin (H)  (primary encounter diagnosis)  Comment: a1c is elevated 7.6  Advised closer watch on diet and exercise and follow-up in 6 months   Plan: glipiZIDE (GLIPIZIDE XL) 10 MG 24 hr tablet,         metFORMIN (GLUCOPHAGE) 500 MG tablet            (I10) Essential hypertension  Comment:   Plan: elevated today.  Well increas losartant to 100 mg .   Will have come in for nurse only BP check x 2 and then follow-up for office visit in 1 month with BP diary.        (I10) Hypertension goal BP (blood pressure) < 140/90  Comment:   Plan: CBC with platelets, TSH with free T4 reflex,         Comprehensive metabolic panel, Lipid panel         reflex to direct LDL Fasting, losartan (COZAAR)        100 MG tablet, DISCONTINUED: losartan (COZAAR)         50 MG tablet            (M79.7) Fibromyalgia  Comment:stable    Plan: gabapentin (NEURONTIN) 100 MG capsule            (E78.5) Hyperlipidemia LDL goal <100  Comment:   Plan: atorvastatin (LIPITOR) 20 MG tablet

## 2019-05-28 NOTE — PROGRESS NOTES
Subjective     Vanessa Mota is a 58 year old female who presents to clinic today for the following health issues:    HPI   Diabetes Follow-up      How often are you checking your blood sugar? Two times daily    What time of day are you checking your blood sugars (select all that apply)?  Before meals    Have you had any blood sugars above 200?  No    Have you had any blood sugars below 70?  Yes     What symptoms do you notice when your blood sugar is low?  Shaky and Other: hot    What concerns do you have today about your diabetes? None and Other: A1c result. Need motivation      Do you have any of these symptoms? (Select all that apply)  Weight gain     Have you had a diabetic eye exam in the last 12 months? Yes- Date of last eye exam: over a yr     Diabetes Management Resources    Hyperlipidemia Follow-Up      Are you having any of the following symptoms? (Select all that apply)  No complaints of shortness of breath, chest pain or pressure.  No increased sweating or nausea with activity.  No left-sided neck or arm pain.  No complaints of pain in calves when walking 1-2 blocks.    Are you regularly taking any medication or supplement to lower your cholesterol?   Yes- statin     Are you having muscle aches or other side effects that you think could be caused by your cholesterol lowering medication?  No    Hypertension Follow-up      Do you check your blood pressure regularly outside of the clinic? No     Are you following a low salt diet? No, sometimes     Are your blood pressures ever more than 140 on the top number (systolic) OR more   than 90 on the bottom number (diastolic), for example 140/90? Yes    BP Readings from Last 2 Encounters:   05/28/19 160/70   10/04/18 133/70     Hemoglobin A1C (%)   Date Value   09/06/2018 6.9 (H)   01/09/2018 7.0 (H)     LDL Cholesterol Calculated (mg/dL)   Date Value   09/06/2018 49   06/22/2017 58       Amount of exercise or physical activity: house work and yoga     Problems  taking medications regularly: No    Medication side effects: none    Diet: regular (no restrictions)          Current Outpatient Medications   Medication Sig Dispense Refill     ACE/ARB NOT PRESCRIBED, INTENTIONAL, by Other route continuous prn.       ALPRAZolam (XANAX) 0.25 MG tablet   2     ASPIRIN 81 MG OR TABS ONE DAILY 100 3     atorvastatin (LIPITOR) 20 MG tablet TAKE 1 TABLET BY MOUTH ONCE DAILY 90 tablet 1     blood glucose (CANDIDA CONTOUR) test strip Use to test blood sugars 2 times daily or as directed. 100 strip 2     blood glucose (NO BRAND SPECIFIED) lancets standard Use to test blood sugar 2 times daily or as directed.  Dispense as covered by insurance 100 each 3     blood glucose monitoring (NO BRAND SPECIFIED) meter device kit Use to test blood sugar 2 times daily or as directed.  Dispense brand as covered by insurance 1 kit 0     blood glucose monitoring (NO BRAND SPECIFIED) test strip Use to test blood sugar 2 times daily or as directed.  Dispense Ultra One touch strips per insurance coverage 200 each 3     BusPIRone HCl (BUSPAR) 30 MG TABS One tablet twice daily  -  60 mg daily 90 tablet 0     cyclobenzaprine (FLEXERIL) 10 MG tablet TAKE 1 TABLET BY MOUTH ONCE DAILY AS NEEDED 30 tablet 1     DULoxetine HCl (CYMBALTA PO) Take 60 mg by mouth daily Two tablets once a day  -  120 mg       FISH OIL 1000 MG OR CAPS increase to 4000mg per day 100 0     FLUoxetine 20 MG tablet Take 20 mg by mouth daily       gabapentin (NEURONTIN) 100 MG capsule TAKE 2 CAPSULES BY MOUTH THREE TIMES DAILY 180 capsule 5     GLIPIZIDE XL 10 MG 24 hr tablet TAKE TWO TABLETS BY MOUTH ONCE DAILY 60 tablet 35     hydrOXYzine (VISTARIL) 50 MG capsule Take 50 mg by mouth. Two tablets at bedtime       insulin glargine (BASAGLAR KWIKPEN) 100 UNIT/ML pen INJECT 78 UNITS SUBCUTANEOUSLY  AT BEDTIME 3 mL 1     insulin pen needle (ULTICARE MINI) 31G X 6 MM 1 Units daily Use 1 daily or as directed. At bedtime 100 each 6     lamoTRIgine  (LAMICTAL) 100 MG tablet   0     losartan (COZAAR) 50 MG tablet Take 1 tablet (50 mg) by mouth daily 90 tablet 1     metFORMIN (GLUCOPHAGE) 500 MG tablet TAKE 2 TABLETS BY MOUTH TWICE DAILY WITH MEALS 60 tablet 0     SUMAtriptan (IMITREX) 100 MG tablet TAKE 1 TABLET BY MOUTH ONCE DAILY. MAY REPEAT AFTER 2 HOURS IF NEEDED. MAX OF 2 TABLETS PER 24 HOURS. 10 tablet 1     SUMAtriptan (IMITREX) 20 MG/ACT nasal spray USE 1 SPRAY IN NOSTRIL AS NEEDED FOR MIGRAINE. 30 each 0     zolpidem (AMBIEN) 10 MG tablet        omeprazole (PRILOSEC) 40 MG capsule Take 1 capsule (40 mg) by mouth daily (Patient not taking: Reported on 5/28/2019) 90 capsule 3     Recent Labs   Lab Test 05/28/19  1327 09/06/18  1441 01/09/18  1215 01/09/18  1141 06/22/17  1221  06/13/16  1141   A1C 7.5* 6.9*  --  7.0* 6.8*  --  6.6*   LDL  --  49  --   --  58  --  41   HDL  --  54  --   --  48*  --  44*   TRIG  --  132  --   --  139  --  110   ALT  --  52* 34  --  44   < > 59*   CR  --  0.74 0.66  --  0.74   < > 0.74   GFRESTIMATED  --  81 >90  --  81   < > 82   GFRESTBLACK  --  >90 >90  --  >90  African American GFR Calc     < > >90   GFR Calc     POTASSIUM  --  3.9 4.4  --  3.8   < > 4.1   TSH  --   --   --   --  1.79  --  1.26    < > = values in this interval not displayed.          Reviewed and updated as needed this visit by Provider         Review of Systems   ROS COMP: Constitutional, HEENT, cardiovascular, pulmonary, gi and gu systems are negative, except as otherwise noted.      Objective    /70 (BP Location: Right arm, Patient Position: Chair, Cuff Size: Adult Large)   Pulse 83   Temp 98.5  F (36.9  C) (Oral)   Resp 18   Ht 1.524 m (5')   Wt 81.2 kg (179 lb)   SpO2 96%   Breastfeeding? No   BMI 34.96 kg/m    Body mass index is 34.96 kg/m .  Physical Exam   GENERAL: healthy, alert and no distress  EYES: Eyes grossly normal to inspection, PERRL and conjunctivae and sclerae normal  HENT: ear canals and TM's normal, nose  and mouth without ulcers or lesions  NECK: no adenopathy, no asymmetry, masses, or scars and thyroid normal to palpation  RESP: lungs clear to auscultation - no rales, rhonchi or wheezes  BREAST: normal without masses, tenderness or nipple discharge and no palpable axillary masses or adenopathy  CV: regular rate and rhythm, normal S1 S2, no S3 or S4, no murmur, click or rub, no peripheral edema and peripheral pulses strong  ABDOMEN: soft, nontender, no hepatosplenomegaly, no masses and bowel sounds normal  MS: no gross musculoskeletal defects noted, no edema  SKIN: no suspicious lesions or rashes  NEURO: Normal strength and tone, mentation intact and speech normal  PSYCH: mentation appears normal, affect normal/bright            Assessment & Plan     1. Type 2 diabetes mellitus without complication, with long-term current use of insulin (H)  a1 c has gone up to 7.5     Advised to work on diet and exercise and follow-up in 3 months for recheck   - Hemoglobin A1c  - glipiZIDE (GLIPIZIDE XL) 10 MG 24 hr tablet; Take 1 tablet (10 mg) by mouth every 12 hours  Dispense: 180 tablet; Refill: 3  - metFORMIN (GLUCOPHAGE) 500 MG tablet; Take 2 tablets (1,000 mg) by mouth 2 times daily (with meals)  Dispense: 360 tablet; Refill: 3  - insulin glargine (BASAGLAR KWIKPEN) 100 UNIT/ML pen; INJECT 78 UNITS SUBCUTANEOUSLY  AT BEDTIME  Dispense: 3 mL; Refill: 1    2. Essential hypertension    - Hemoglobin A1c    3. Hypertension goal BP (blood pressure) < 140/90  Elevated today.  Well increase losartan to 100 mg .   Will have come in for nurse only BP check x 2 and then follow-up for office visit in 1 month with BP diary.      - CBC with platelets; Future  - TSH with free T4 reflex; Future  - Comprehensive metabolic panel; Future  - Lipid panel reflex to direct LDL Fasting; Future  - losartan (COZAAR) 100 MG tablet; Take 1 tablet (100 mg) by mouth daily  Dispense: 30 tablet; Refill: 1    4. Fibromyalgia  Stable   - gabapentin  (NEURONTIN) 100 MG capsule; TAKE 2 CAPSULES BY MOUTH THREE TIMES DAILY  Dispense: 180 capsule; Refill: 5    5. Hyperlipidemia LDL goal <100  Stable   - atorvastatin (LIPITOR) 20 MG tablet; Take 1 tablet (20 mg) by mouth daily  Dispense: 90 tablet; Refill: 1     Tobacco Cessation:   reports that she has been smoking cigarettes.  She has a 0.60 pack-year smoking history. She has never used smokeless tobacco.        BMI:   Estimated body mass index is 34.96 kg/m  as calculated from the following:    Height as of this encounter: 1.524 m (5').    Weight as of this encounter: 81.2 kg (179 lb).   Weight management plan: Discussed healthy diet and exercise guidelines        Patient Instructions   (E11.9,  Z79.4) Type 2 diabetes mellitus without complication, with long-term current use of insulin (H)  (primary encounter diagnosis)  Comment: a1c is elevated 7.6  Advised closer watch on diet and exercise and follow-up in 6 months   Plan: glipiZIDE (GLIPIZIDE XL) 10 MG 24 hr tablet,         metFORMIN (GLUCOPHAGE) 500 MG tablet            (I10) Essential hypertension  Comment:   Plan: elevated today.  Well increas losartant to 100 mg .   Will have come in for nurse only BP check x 2 and then follow-up for office visit in 1 month with BP diary.        (I10) Hypertension goal BP (blood pressure) < 140/90  Comment:   Plan: CBC with platelets, TSH with free T4 reflex,         Comprehensive metabolic panel, Lipid panel         reflex to direct LDL Fasting, losartan (COZAAR)        100 MG tablet, DISCONTINUED: losartan (COZAAR)         50 MG tablet            (M79.7) Fibromyalgia  Comment:stable    Plan: gabapentin (NEURONTIN) 100 MG capsule            (E78.5) Hyperlipidemia LDL goal <100  Comment:   Plan: atorvastatin (LIPITOR) 20 MG tablet                  Return in about 1 month (around 6/25/2019) for BP Recheck.    Ramona Ann Aaseby-Aguilera, PA-C  [facility]

## 2019-06-07 ENCOUNTER — ALLIED HEALTH/NURSE VISIT (OUTPATIENT)
Dept: NURSING | Facility: CLINIC | Age: 59
End: 2019-06-07
Payer: COMMERCIAL

## 2019-06-07 ENCOUNTER — TELEPHONE (OUTPATIENT)
Dept: FAMILY MEDICINE | Facility: CLINIC | Age: 59
End: 2019-06-07

## 2019-06-07 VITALS — SYSTOLIC BLOOD PRESSURE: 151 MMHG | DIASTOLIC BLOOD PRESSURE: 77 MMHG | HEART RATE: 76 BPM

## 2019-06-07 DIAGNOSIS — Z01.30 BP CHECK: Primary | ICD-10-CM

## 2019-06-07 NOTE — TELEPHONE ENCOUNTER
Vanessa Mota is a 58 year old patient who comes in today for a Blood Pressure check.  Initial BP:  /77 (BP Location: Right arm, Patient Position: Sitting, Cuff Size: Adult Large)   Pulse 76   Breastfeeding? No      76  Disposition: results routed to provider     Roque Domínguez CMA       Pt states some balance issues with increase of Cozaar . Did not want to wait to consult with RN. Advised pt that is this increases to come in for evaluation.      Roque Domínguez CMA

## 2019-06-10 DIAGNOSIS — Z79.4 TYPE 2 DIABETES MELLITUS WITHOUT COMPLICATION, WITH LONG-TERM CURRENT USE OF INSULIN (H): ICD-10-CM

## 2019-06-10 DIAGNOSIS — E11.9 TYPE 2 DIABETES MELLITUS WITHOUT COMPLICATION, WITH LONG-TERM CURRENT USE OF INSULIN (H): ICD-10-CM

## 2019-06-10 RX ORDER — INSULIN GLARGINE 100 [IU]/ML
INJECTION, SOLUTION SUBCUTANEOUS
Qty: 24 ML | Refills: 0 | Status: SHIPPED | OUTPATIENT
Start: 2019-06-10 | End: 2020-07-30

## 2019-06-10 NOTE — TELEPHONE ENCOUNTER
"Patient has an appointment with Yessi on 6/25.  Needing more until then.     Requested Prescriptions   Pending Prescriptions Disp Refills     insulin glargine (BASAGLAR KWIKPEN) 100 UNIT/ML pen 3 mL 1     Sig: INJECT 78 UNITS SUBCUTANEOUSLY  AT BEDTIME       Long Acting Insulin Protocol Failed - 6/10/2019  3:53 PM        Failed - Blood pressure less than 140/90 in past 6 months     BP Readings from Last 3 Encounters:   06/07/19 151/77   05/28/19 160/70   10/04/18 133/70                 Passed - LDL on file in past 12 months     Recent Labs   Lab Test 09/06/18  1441   LDL 49             Passed - Microalbumin on file in past 12 months     Recent Labs   Lab Test 09/06/18  1445   MICROL 66   UMALCR 23.09             Passed - Serum creatinine on file in past 12 months     Recent Labs   Lab Test 09/06/18  1441   CR 0.74             Passed - HgbA1C in past 3 or 6 months     If HgbA1C is 8 or greater, it needs to be on file within the past 3 months.  If less than 8, must be on file within the past 6 months.     Recent Labs   Lab Test 05/28/19  1327   A1C 7.5*             Passed - Medication is active on med list        Passed - Patient is age 18 or older        Passed - Recent (6 mo) or future (30 days) visit within the authorizing provider's specialty     Patient had office visit in the last 6 months or has a visit in the next 30 days with authorizing provider or within the authorizing provider's specialty.  See \"Patient Info\" tab in inbasket, or \"Choose Columns\" in Meds & Orders section of the refill encounter.            Last Written Prescription Date:  05/28/19  Last Fill Quantity: 3 ml,  # refills: 1   Last office visit: 5/28/2019 with prescribing provider:  Aaseby-Aguilera, Ramona Ann     Future Office Visit:   Next 5 appointments (look out 90 days)    Jun 12, 2019  2:00 PM CDT  [facility]urse [facility] with [facility]LPN  [facility] [id number] [address] " [id number]  [phone]   Jun 25, 2019  2:15 PM CDT  Office Visit with Ramona Ann Aaseby-Aguilera, PA-C  [facility] [id number] [facility] [id number] [phone]

## 2019-06-12 ENCOUNTER — ALLIED HEALTH/NURSE VISIT (OUTPATIENT)
Dept: NURSING | Facility: CLINIC | Age: 59
End: 2019-06-12
Payer: COMMERCIAL

## 2019-06-12 ENCOUNTER — TELEPHONE (OUTPATIENT)
Dept: FAMILY MEDICINE | Facility: CLINIC | Age: 59
End: 2019-06-12

## 2019-06-12 VITALS — SYSTOLIC BLOOD PRESSURE: 134 MMHG | HEART RATE: 72 BPM | OXYGEN SATURATION: 97 % | DIASTOLIC BLOOD PRESSURE: 76 MMHG

## 2019-06-12 DIAGNOSIS — Z01.30 BP CHECK: Primary | ICD-10-CM

## 2019-06-12 NOTE — TELEPHONE ENCOUNTER
"Vital Signs 1/9/2018 5/14/2018 9/6/2018 10/4/2018   Systolic 142 142 130 133   Diastolic 78 82 82 70   Pulse 74 70 66 69   Temperature 97.9 98.1 97.9 98.3   Respirations   18    Weight (LB) 172 lb 11.2 oz 173 lb 3.2 oz 170 lb 172 lb 6.4 oz   Height 5' 0\" 5' 0\" 5' 0\" 5' 0\"   BMI (Calculated) 33.8 33.9 33.27 33.74   O2 97 95 96 96     Vital Signs 5/28/2019 6/7/2019 6/12/2019   Systolic 160 151 134   Diastolic 70 77 76   Pulse 83 76 72   Temperature 98.5     Respirations 18     Weight (LB) 179 lb     Height 5' 0\"     BMI (Calculated) 34.96     O2 96  97     Pt sat for 10 minutes and took on right arm at 2pm on 6/12/2019. Pt is seeing Yessi for Office visit in 2 weeks, NO need to call.Perez Samuel CMA    "

## 2019-06-25 ENCOUNTER — OFFICE VISIT (OUTPATIENT)
Dept: FAMILY MEDICINE | Facility: CLINIC | Age: 59
End: 2019-06-25
Payer: COMMERCIAL

## 2019-06-25 VITALS
OXYGEN SATURATION: 97 % | BODY MASS INDEX: 34.96 KG/M2 | WEIGHT: 178.1 LBS | SYSTOLIC BLOOD PRESSURE: 130 MMHG | TEMPERATURE: 98.7 F | DIASTOLIC BLOOD PRESSURE: 78 MMHG | HEIGHT: 60 IN | HEART RATE: 76 BPM

## 2019-06-25 DIAGNOSIS — Z79.4 TYPE 2 DIABETES MELLITUS WITHOUT COMPLICATION, WITH LONG-TERM CURRENT USE OF INSULIN (H): ICD-10-CM

## 2019-06-25 DIAGNOSIS — E11.9 TYPE 2 DIABETES MELLITUS WITHOUT COMPLICATION, WITH LONG-TERM CURRENT USE OF INSULIN (H): ICD-10-CM

## 2019-06-25 DIAGNOSIS — I10 HYPERTENSION GOAL BP (BLOOD PRESSURE) < 140/90: ICD-10-CM

## 2019-06-25 DIAGNOSIS — Z11.4 SCREENING FOR HIV (HUMAN IMMUNODEFICIENCY VIRUS): Primary | ICD-10-CM

## 2019-06-25 LAB
ERYTHROCYTE [DISTWIDTH] IN BLOOD BY AUTOMATED COUNT: 13.7 % (ref 10–15)
HCT VFR BLD AUTO: 39.5 % (ref 35–47)
HGB BLD-MCNC: 13.3 G/DL (ref 11.7–15.7)
MCH RBC QN AUTO: 28.4 PG (ref 26.5–33)
MCHC RBC AUTO-ENTMCNC: 33.7 G/DL (ref 31.5–36.5)
MCV RBC AUTO: 84 FL (ref 78–100)
PLATELET # BLD AUTO: 210 10E9/L (ref 150–450)
RBC # BLD AUTO: 4.68 10E12/L (ref 3.8–5.2)
WBC # BLD AUTO: 9.2 10E9/L (ref 4–11)

## 2019-06-25 PROCEDURE — 84443 ASSAY THYROID STIM HORMONE: CPT | Performed by: PHYSICIAN ASSISTANT

## 2019-06-25 PROCEDURE — 85027 COMPLETE CBC AUTOMATED: CPT | Performed by: PHYSICIAN ASSISTANT

## 2019-06-25 PROCEDURE — 80053 COMPREHEN METABOLIC PANEL: CPT | Performed by: PHYSICIAN ASSISTANT

## 2019-06-25 PROCEDURE — 36415 COLL VENOUS BLD VENIPUNCTURE: CPT | Performed by: PHYSICIAN ASSISTANT

## 2019-06-25 PROCEDURE — 87389 HIV-1 AG W/HIV-1&-2 AB AG IA: CPT | Performed by: PHYSICIAN ASSISTANT

## 2019-06-25 PROCEDURE — 99214 OFFICE O/P EST MOD 30 MIN: CPT | Performed by: PHYSICIAN ASSISTANT

## 2019-06-25 PROCEDURE — 80061 LIPID PANEL: CPT | Performed by: PHYSICIAN ASSISTANT

## 2019-06-25 RX ORDER — LOSARTAN POTASSIUM 100 MG/1
100 TABLET ORAL DAILY
Qty: 90 TABLET | Refills: 1 | Status: SHIPPED | OUTPATIENT
Start: 2019-06-25 | End: 2020-01-17

## 2019-06-25 ASSESSMENT — MIFFLIN-ST. JEOR: SCORE: 1314.36

## 2019-06-25 NOTE — PATIENT INSTRUCTIONS
(I10) Hypertension goal BP (blood pressure) < 140/90  Comment: stable on losartan 100mg.  Refilled x 6 months and request follow-up then   Plan: losartan (COZAAR) 100 MG tablet            (E11.9,  Z79.4) Type 2 diabetes mellitus without complication, with long-term current use of insulin (H)  Comment:   Plan: metFORMIN (GLUCOPHAGE) 500 MG tablet

## 2019-06-25 NOTE — PROGRESS NOTES
Subjective     Vanessa Mota is a 58 year old female who presents to clinic today for the following health issues:    HPI   Diabetes Follow-up      How often are you checking your blood sugar? Two times daily    What time of day are you checking your blood sugars (select all that apply)?  Before meals    Have you had any blood sugars above 200?  Yes     Have you had any blood sugars below 70?  No    What symptoms do you notice when your blood sugar is low?  Shaky and anxious    What concerns do you have today about your diabetes? hoping it is going in the right direction     Do you have any of these symptoms? (Select all that apply)  No numbness or tingling in feet.  No redness, sores or blisters on feet.  No complaints of excessive thirst.  No reports of blurry vision.  No significant changes to weight.     Have you had a diabetic eye exam in the last 12 months? No    BP Readings from Last 2 Encounters:   06/25/19 130/78   06/12/19 134/76     Hemoglobin A1C (%)   Date Value   05/28/2019 7.5 (H)   09/06/2018 6.9 (H)     LDL Cholesterol Calculated (mg/dL)   Date Value   09/06/2018 49   06/22/2017 58       Diabetes Management Resources    Amount of exercise or physical activity: 2-3 days/week for an average of 30-45 minutes    Problems taking medications regularly: No    Medication side effects: dizzy and headache    Diet: regular (no restrictions)      Hypertension Follow-up      Do you check your blood pressure regularly outside of the clinic? No     Are you following a low salt diet? Yes    Are your blood pressures ever more than 140 on the top number (systolic) OR more   than 90 on the bottom number (diastolic), for example 140/90? Yes    Current Outpatient Medications   Medication Sig Dispense Refill     ACE/ARB NOT PRESCRIBED, INTENTIONAL, by Other route continuous prn.       ALPRAZolam (XANAX) 0.25 MG tablet   2     ASPIRIN 81 MG OR TABS ONE DAILY 100 3     atorvastatin (LIPITOR) 20 MG tablet Take 1 tablet (20  mg) by mouth daily 90 tablet 1     blood glucose (CANDIDA CONTOUR) test strip Use to test blood sugars 2 times daily or as directed. 100 strip 2     blood glucose (NO BRAND SPECIFIED) lancets standard Use to test blood sugar 2 times daily or as directed.  Dispense as covered by insurance 100 each 3     blood glucose monitoring (NO BRAND SPECIFIED) meter device kit Use to test blood sugar 2 times daily or as directed.  Dispense brand as covered by insurance 1 kit 0     blood glucose monitoring (NO BRAND SPECIFIED) test strip Use to test blood sugar 2 times daily or as directed.  Dispense Ultra One touch strips per insurance coverage 200 each 3     BusPIRone HCl (BUSPAR) 30 MG TABS One tablet twice daily  -  60 mg daily 90 tablet 0     cyclobenzaprine (FLEXERIL) 10 MG tablet TAKE 1 TABLET BY MOUTH ONCE DAILY AS NEEDED 30 tablet 1     DULoxetine HCl (CYMBALTA PO) Take 60 mg by mouth daily Two tablets once a day  -  120 mg       FISH OIL 1000 MG OR CAPS increase to 4000mg per day 100 0     FLUoxetine 20 MG tablet Take 20 mg by mouth daily       gabapentin (NEURONTIN) 100 MG capsule TAKE 2 CAPSULES BY MOUTH THREE TIMES DAILY 180 capsule 5     glipiZIDE (GLIPIZIDE XL) 10 MG 24 hr tablet Take 1 tablet (10 mg) by mouth every 12 hours 180 tablet 3     hydrOXYzine (VISTARIL) 50 MG capsule Take 50 mg by mouth. Two tablets at bedtime       insulin glargine (BASAGLAR KWIKPEN) 100 UNIT/ML pen INJECT 78 UNITS SUBCUTANEOUSLY  AT BEDTIME 24 mL 0     insulin pen needle (ULTICARE MINI) 31G X 6 MM 1 Units daily Use 1 daily or as directed. At bedtime 100 each 6     lamoTRIgine (LAMICTAL) 100 MG tablet   0     losartan (COZAAR) 100 MG tablet Take 1 tablet (100 mg) by mouth daily 30 tablet 1     metFORMIN (GLUCOPHAGE) 500 MG tablet Take 2 tablets (1,000 mg) by mouth 2 times daily (with meals) 360 tablet 3     omeprazole (PRILOSEC) 40 MG capsule Take 1 capsule (40 mg) by mouth daily 90 capsule 3     SUMAtriptan (IMITREX) 100 MG tablet TAKE 1  TABLET BY MOUTH ONCE DAILY. MAY REPEAT AFTER 2 HOURS IF NEEDED. MAX OF 2 TABLETS PER 24 HOURS. 10 tablet 1     SUMAtriptan (IMITREX) 20 MG/ACT nasal spray USE 1 SPRAY IN NOSTRIL AS NEEDED FOR MIGRAINE. 30 each 0     zolpidem (AMBIEN) 10 MG tablet        Recent Labs   Lab Test 05/28/19  1327 09/06/18  1441 01/09/18  1215 01/09/18  1141 06/22/17  1221  06/13/16  1141   A1C 7.5* 6.9*  --  7.0* 6.8*  --  6.6*   LDL  --  49  --   --  58  --  41   HDL  --  54  --   --  48*  --  44*   TRIG  --  132  --   --  139  --  110   ALT  --  52* 34  --  44   < > 59*   CR  --  0.74 0.66  --  0.74   < > 0.74   GFRESTIMATED  --  81 >90  --  81   < > 82   GFRESTBLACK  --  >90 >90  --  >90  African American GFR Calc     < > >90   GFR Calc     POTASSIUM  --  3.9 4.4  --  3.8   < > 4.1   TSH  --   --   --   --  1.79  --  1.26    < > = values in this interval not displayed.      BP Readings from Last 3 Encounters:   06/25/19 130/78   06/12/19 134/76   06/07/19 151/77    Wt Readings from Last 3 Encounters:   06/25/19 80.8 kg (178 lb 1.6 oz)   05/28/19 81.2 kg (179 lb)   10/04/18 78.2 kg (172 lb 6.4 oz)                      Reviewed and updated as needed this visit by Provider         Review of Systems   ROS COMP: Constitutional, HEENT, cardiovascular, pulmonary, gi and gu systems are negative, except as otherwise noted.      Objective    /78 (BP Location: Right arm, Patient Position: Chair, Cuff Size: Adult Large)   Pulse 76   Temp 98.7  F (37.1  C) (Oral)   Ht 1.524 m (5')   Wt 80.8 kg (178 lb 1.6 oz)   SpO2 97%   BMI 34.78 kg/m    Body mass index is 34.78 kg/m .  Physical Exam   GENERAL: healthy, alert and no distress  NECK: no adenopathy, no asymmetry, masses, or scars and thyroid normal to palpation  RESP: lungs clear to auscultation - no rales, rhonchi or wheezes  CV: regular rate and rhythm, normal S1 S2, no S3 or S4, no murmur, click or rub, no peripheral edema and peripheral pulses strong  MS: no gross  musculoskeletal defects noted, no edema  NEURO: Normal strength and tone, mentation intact and speech normal  PSYCH: mentation appears normal, affect normal/bright    Diagnostic Test Results:  Labs reviewed in Epic        Assessment & Plan     1. Hypertension goal BP (blood pressure) < 140/90    - Lipid panel reflex to direct LDL Fasting  - Comprehensive metabolic panel  - TSH with free T4 reflex  - CBC with platelets  - losartan (COZAAR) 100 MG tablet; Take 1 tablet (100 mg) by mouth daily  Dispense: 90 tablet; Refill: 1    2. Type 2 diabetes mellitus without complication, with long-term current use of insulin (H)    - metFORMIN (GLUCOPHAGE) 500 MG tablet; Take 2 tablets (1,000 mg) by mouth 2 times daily (with meals)  Dispense: 360 tablet; Refill: 3    3. Screening for HIV (human immunodeficiency virus)    - HIV Antigen Antibody Combo     Tobacco Cessation:   reports that she has been smoking cigarettes.  She has a 0.60 pack-year smoking history. She has never used smokeless tobacco.        BMI:   Estimated body mass index is 34.78 kg/m  as calculated from the following:    Height as of this encounter: 1.524 m (5').    Weight as of this encounter: 80.8 kg (178 lb 1.6 oz).   Weight management plan: Discussed healthy diet and exercise guidelines        Patient Instructions   (I10) Hypertension goal BP (blood pressure) < 140/90  Comment: stable on losartan 100mg.  Refilled x 6 months and request follow-up then   Plan: losartan (COZAAR) 100 MG tablet            (E11.9,  Z79.4) Type 2 diabetes mellitus without complication, with long-term current use of insulin (H)  Comment:   Plan: metFORMIN (GLUCOPHAGE) 500 MG tablet                  No follow-ups on file.    Ramona Ann Aaseby-Aguilera, PA-C  [facility]

## 2019-06-26 LAB
ALBUMIN SERPL-MCNC: 3.9 G/DL (ref 3.4–5)
ALP SERPL-CCNC: 109 U/L (ref 40–150)
ALT SERPL W P-5'-P-CCNC: 46 U/L (ref 0–50)
ANION GAP SERPL CALCULATED.3IONS-SCNC: 6 MMOL/L (ref 3–14)
AST SERPL W P-5'-P-CCNC: 29 U/L (ref 0–45)
BILIRUB SERPL-MCNC: 0.4 MG/DL (ref 0.2–1.3)
BUN SERPL-MCNC: 7 MG/DL (ref 7–30)
CALCIUM SERPL-MCNC: 9.2 MG/DL (ref 8.5–10.1)
CHLORIDE SERPL-SCNC: 103 MMOL/L (ref 94–109)
CHOLEST SERPL-MCNC: 125 MG/DL
CO2 SERPL-SCNC: 30 MMOL/L (ref 20–32)
CREAT SERPL-MCNC: 0.77 MG/DL (ref 0.52–1.04)
GFR SERPL CREATININE-BSD FRML MDRD: 85 ML/MIN/{1.73_M2}
GLUCOSE SERPL-MCNC: 105 MG/DL (ref 70–99)
HDLC SERPL-MCNC: 53 MG/DL
LDLC SERPL CALC-MCNC: 47 MG/DL
NONHDLC SERPL-MCNC: 72 MG/DL
POTASSIUM SERPL-SCNC: 4.1 MMOL/L (ref 3.4–5.3)
PROT SERPL-MCNC: 7.1 G/DL (ref 6.8–8.8)
SODIUM SERPL-SCNC: 139 MMOL/L (ref 133–144)
TRIGL SERPL-MCNC: 125 MG/DL
TSH SERPL DL<=0.005 MIU/L-ACNC: 3.54 MU/L (ref 0.4–4)

## 2019-06-27 LAB — HIV 1+2 AB+HIV1 P24 AG SERPL QL IA: NONREACTIVE

## 2019-07-07 DIAGNOSIS — G43.909 MIGRAINE WITHOUT STATUS MIGRAINOSUS, NOT INTRACTABLE, UNSPECIFIED MIGRAINE TYPE: ICD-10-CM

## 2019-07-07 NOTE — TELEPHONE ENCOUNTER
"Requested Prescriptions   Pending Prescriptions Disp Refills     SUMAtriptan (IMITREX) 20 MG/ACT nasal spray [Pharmacy Med Name: SUMATRIPTAN 20MG/ACT SOL]  Last Written Prescription Date:  9/6/2018  Last Fill Quantity: 30 each,  # refills: 0   Last office visit: 6/25/2019 with prescribing provider:  Aaseby-Aguilera   Future Office Visit:     6 each 0     Sig: USE ONE SPRAY IN NOSTRIL AS NEEDED FOR MIGRAINE.       Serotonin Agonists Failed - 7/7/2019  6:30 AM        Failed - Serotonin Agonist request needs review.     Please review patient's record. If patient has had 8 or more treatments in the past month, please forward to provider.          Passed - Blood pressure under 140/90 in past 12 months     BP Readings from Last 3 Encounters:   06/25/19 130/78   06/12/19 134/76   06/07/19 151/77                 Passed - Recent (12 mo) or future (30 days) visit within the authorizing provider's specialty     Patient had office visit in the last 12 months or has a visit in the next 30 days with authorizing provider or within the authorizing provider's specialty.  See \"Patient Info\" tab in inbasket, or \"Choose Columns\" in Meds & Orders section of the refill encounter.              Passed - Medication is active on med list        Passed - Patient is age 18 or older        Passed - No active pregnancy on record        Passed - No positive pregnancy test in past 12 months          "

## 2019-07-08 RX ORDER — SUMATRIPTAN 20 MG/1
SPRAY NASAL
Qty: 6 EACH | Refills: 0 | Status: SHIPPED | OUTPATIENT
Start: 2019-07-08 | End: 2019-08-24

## 2019-07-09 ENCOUNTER — TELEPHONE (OUTPATIENT)
Dept: FAMILY MEDICINE | Facility: CLINIC | Age: 59
End: 2019-07-09

## 2019-07-09 NOTE — TELEPHONE ENCOUNTER
Form received regarding DM for [place] [employer] public [employer].    LMRTC for patient please ask patient when she started the medication insulin so we can  on the form    Form in the [place] folder at desk      Savanah Melgoza

## 2019-07-22 DIAGNOSIS — Z79.4 TYPE 2 DIABETES MELLITUS WITHOUT COMPLICATION, WITH LONG-TERM CURRENT USE OF INSULIN (H): ICD-10-CM

## 2019-07-22 DIAGNOSIS — E11.9 TYPE 2 DIABETES MELLITUS WITHOUT COMPLICATION, WITH LONG-TERM CURRENT USE OF INSULIN (H): ICD-10-CM

## 2019-07-22 RX ORDER — INSULIN GLARGINE 100 [IU]/ML
INJECTION, SOLUTION SUBCUTANEOUS
Qty: 24 ML | Refills: 1 | Status: SHIPPED | OUTPATIENT
Start: 2019-07-22 | End: 2020-02-20

## 2019-07-22 NOTE — TELEPHONE ENCOUNTER
"Requested Prescriptions   Pending Prescriptions Disp Refills     insulin glargine (BASAGLAR KWIKPEN) 100 UNIT/ML pen [Pharmacy Med Name: BASAGLAR 100UNIT    INJ]  0     Sig: INJECT 78 UNITS SUBCUTANEOUSLY  AT BEDTIME     Last Written Prescription Date:  06/10/2019  Last Fill Quantity: 24ml,  # refills: 0   Last office visit: 6/25/2019 with prescribing provider:  06/25/2019   Future Office Visit:          Long Acting Insulin Protocol Passed - 7/22/2019  5:30 AM        Passed - Blood pressure less than 140/90 in past 6 months     BP Readings from Last 3 Encounters:   06/25/19 130/78   06/12/19 134/76   06/07/19 151/77                 Passed - LDL on file in past 12 months     Recent Labs   Lab Test 06/25/19  1436   LDL 47             Passed - Microalbumin on file in past 12 months     Recent Labs   Lab Test 09/06/18  1445   MICROL 66   UMALCR 23.09             Passed - Serum creatinine on file in past 12 months     Recent Labs   Lab Test 06/25/19  1436   CR 0.77             Passed - HgbA1C in past 3 or 6 months     If HgbA1C is 8 or greater, it needs to be on file within the past 3 months.  If less than 8, must be on file within the past 6 months.     Recent Labs   Lab Test 05/28/19  1327   A1C 7.5*             Passed - Medication is active on med list        Passed - Patient is age 18 or older        Passed - Recent (6 mo) or future (30 days) visit within the authorizing provider's specialty     Patient had office visit in the last 6 months or has a visit in the next 30 days with authorizing provider or within the authorizing provider's specialty.  See \"Patient Info\" tab in inbasket, or \"Choose Columns\" in Meds & Orders section of the refill encounter.            Myron Hodgson XRT  "

## 2019-07-24 DIAGNOSIS — M79.7 FIBROMYALGIA: ICD-10-CM

## 2019-07-24 RX ORDER — CYCLOBENZAPRINE HCL 10 MG
TABLET ORAL
Qty: 30 TABLET | Refills: 1 | Status: SHIPPED | OUTPATIENT
Start: 2019-07-24 | End: 2019-10-20

## 2019-07-24 NOTE — TELEPHONE ENCOUNTER
Controlled Substance Refill Request for cyclobenzaprine (FLEXERIL) 10 MG tablet  Problem List Complete:  No     PROVIDER TO CONSIDER COMPLETION OF PROBLEM LIST AND OVERVIEW/CONTROLLED SUBSTANCE AGREEMENT    Last Written Prescription Date:  04/05/2019  Last Fill Quantity: 30 tablet,   # refills: 1    THE MOST RECENT OFFICE VISIT MUST BE WITHIN THE PAST 3 MONTHS. AT LEAST ONE FACE TO FACE VISIT MUST OCCUR EVERY 6 MONTHS. ADDITIONAL VISITS CAN BE VIRTUAL.  (THIS STATEMENT SHOULD BE DELETED.)    Last Office Visit with [facility] primary care provider: 06/24/2019    Future Office visit:     Controlled substance agreement:   Encounter-Level CSA:    There are no encounter-level csa.     Patient-Level CSA:    There are no patient-level csa.         Last Urine Drug Screen: No results found for: CDAUT, No results found for: COMDAT, No results found for: THC13, PCP13, COC13, MAMP13, OPI13, AMP13, BZO13, TCA13, MTD13, BAR13, OXY13, PPX13, BUP13     Processing:  Fax Rx to [facility] [address] [place], [place] - [address] [facility]     [email or web address]       checked in past 3 months?  No, route to CONNER DAVIS

## 2019-08-24 DIAGNOSIS — G43.909 MIGRAINE WITHOUT STATUS MIGRAINOSUS, NOT INTRACTABLE, UNSPECIFIED MIGRAINE TYPE: ICD-10-CM

## 2019-08-25 NOTE — TELEPHONE ENCOUNTER
"Requested Prescriptions   Pending Prescriptions Disp Refills     SUMAtriptan (IMITREX) 20 MG/ACT nasal spray [Pharmacy Med Name: SUMATRIPTAN 20MG/ACT SOL]  Last Written Prescription Date:  07/08/2019  Last Fill Quantity: 6,  # refills: 0   Last office visit: 6/25/2019 with prescribing provider:  06/25/2019   Future Office Visit:     6 each 0     Sig:  USE ONE SPRAY IN NOSTRIL AS NEEDED FOR MIGRAINE       Serotonin Agonists Failed - 8/24/2019  6:53 PM        Failed - Serotonin Agonist request needs review.     Please review patient's record. If patient has had 8 or more treatments in the past month, please forward to provider.          Passed - Blood pressure under 140/90 in past 12 months     BP Readings from Last 3 Encounters:   06/25/19 130/78   06/12/19 134/76   06/07/19 151/77                 Passed - Recent (12 mo) or future (30 days) visit within the authorizing provider's specialty     Patient had office visit in the last 12 months or has a visit in the next 30 days with authorizing provider or within the authorizing provider's specialty.  See \"Patient Info\" tab in inbasket, or \"Choose Columns\" in Meds & Orders section of the refill encounter.              Passed - Medication is active on med list        Passed - Patient is age 18 or older        Passed - No active pregnancy on record        Passed - No positive pregnancy test in past 12 months          "

## 2019-08-28 RX ORDER — SUMATRIPTAN 20 MG/1
SPRAY NASAL
Qty: 6 EACH | Refills: 0 | Status: SHIPPED | OUTPATIENT
Start: 2019-08-28 | End: 2020-01-13

## 2019-08-28 NOTE — TELEPHONE ENCOUNTER
Prescription approved per [facility] Refill Protocol.  Fatuma Lewis RN

## 2019-10-20 DIAGNOSIS — M79.7 FIBROMYALGIA: ICD-10-CM

## 2019-10-20 NOTE — TELEPHONE ENCOUNTER
Controlled Substance Refill Request for cyclobenzaprine (FLEXERIL) 10 MG tablet     Problem List Complete:  No     PROVIDER TO CONSIDER COMPLETION OF PROBLEM LIST AND OVERVIEW/CONTROLLED SUBSTANCE AGREEMENT    Last Written Prescription Date:  07/24/2019  Last Fill Quantity: 30,   # refills: 1    Last Office Visit with [facility] primary care provider: 06/25/2019    Future Office visit:     Controlled substance agreement:   Encounter-Level CSA:    There are no encounter-level csa.     Patient-Level CSA:    There are no patient-level csa.         Last Urine Drug Screen: No results found for: CDAUT, No results found for: COMDAT, No results found for: THC13, PCP13, COC13, MAMP13, OPI13, AMP13, BZO13, TCA13, MTD13, BAR13, OXY13, PPX13, BUP13     Processing: Patient will  in clinic.    [email or web address]       checked in past 3 months?  No, route to RN

## 2019-10-21 RX ORDER — CYCLOBENZAPRINE HCL 10 MG
TABLET ORAL
Qty: 30 TABLET | Refills: 1 | Status: SHIPPED | OUTPATIENT
Start: 2019-10-21 | End: 2020-02-20

## 2019-11-01 DIAGNOSIS — G43.009 MIGRAINE WITHOUT AURA AND WITHOUT STATUS MIGRAINOSUS, NOT INTRACTABLE: ICD-10-CM

## 2019-11-01 RX ORDER — SUMATRIPTAN 100 MG/1
TABLET, FILM COATED ORAL
Qty: 10 TABLET | Refills: 1 | Status: SHIPPED | OUTPATIENT
Start: 2019-11-01 | End: 2020-01-21

## 2019-11-01 NOTE — TELEPHONE ENCOUNTER
Routing refill request to provider for review/approval because:  Pt continues to use more than allowed per RN protocol of sumatriptan    5/8 #10 with 1 RF  7/25 # 10 with 1 RF  8/28 # 6 of the nasal sumatriptan      And requesting RF today    Fatuma Lewis RN

## 2019-11-01 NOTE — TELEPHONE ENCOUNTER
"Requested Prescriptions   Pending Prescriptions Disp Refills     SUMAtriptan (IMITREX) 100 MG tablet [Pharmacy Med Name: SUMATRIPTAN 100MG TAB] 10 tablet 1     Sig: TAKE 1 TABLET BY MOUTH ONCE DAILY, MAY REPEAT AFTER 2 HOURS IF NEEDED *MAX OF 2 TABLETS PER 24 HOURS*     Last Written Prescription Date:  07/25/2019  Last Fill Quantity: 10 tablet,  # refills: 1   Last office visit: 6/25/2019 with prescribing provider:  06/25/2019   Future Office Visit:          Serotonin Agonists Failed - 11/1/2019  5:30 AM        Failed - Serotonin Agonist request needs review.     Please review patient's record. If patient has had 8 or more treatments in the past month, please forward to provider.          Passed - Blood pressure under 140/90 in past 12 months     BP Readings from Last 3 Encounters:   06/25/19 130/78   06/12/19 134/76   06/07/19 151/77                 Passed - Recent (12 mo) or future (30 days) visit within the authorizing provider's specialty     Patient has had an office visit with the authorizing provider or a provider within the authorizing providers department within the previous 12 mos or has a future within next 30 days. See \"Patient Info\" tab in inbasket, or \"Choose Columns\" in Meds & Orders section of the refill encounter.              Passed - Medication is active on med list        Passed - Patient is age 18 or older        Passed - No active pregnancy on record        Passed - No positive pregnancy test in past 12 months          Myron SCHULZT  "

## 2019-11-07 ENCOUNTER — HEALTH MAINTENANCE LETTER (OUTPATIENT)
Age: 59
End: 2019-11-07

## 2019-11-29 ENCOUNTER — TELEPHONE (OUTPATIENT)
Dept: FAMILY MEDICINE | Facility: CLINIC | Age: 59
End: 2019-11-29

## 2019-11-29 NOTE — TELEPHONE ENCOUNTER
Panel Management Review      Patient has the following on her problem list:     Depression / Dysthymia review    Measure:  Needs PHQ-9 score of 4 or less during index window.  Administer PHQ-9 and if score is 5 or more, send encounter to provider for next steps.    5 - 7 month window range:     PHQ-9 SCORE 1/9/2018 9/6/2018 5/28/2019   PHQ-9 Total Score - - -   PHQ-9 Total Score 7 15 12       If PHQ-9 recheck is 5 or more, route to provider for next steps.    Patient is due for:  PHQ9    Diabetes    ASA: Passed    Last A1C  Lab Results   Component Value Date    A1C 7.5 05/28/2019    A1C 6.9 09/06/2018    A1C 7.0 01/09/2018    A1C 6.8 06/22/2017    A1C 6.6 06/13/2016     A1C tested:     Last LDL:    Lab Results   Component Value Date    CHOL 125 06/25/2019     Lab Results   Component Value Date    HDL 53 06/25/2019     Lab Results   Component Value Date    LDL 47 06/25/2019     Lab Results   Component Value Date    TRIG 125 06/25/2019     Lab Results   Component Value Date    CHOLHDLRATIO 2.6 02/10/2015     Lab Results   Component Value Date    NHDL 72 06/25/2019       Is the patient on a Statin? YES             Is the patient on Aspirin? YES    Medications     HMG CoA Reductase Inhibitors     atorvastatin (LIPITOR) 20 MG tablet       Salicylates     ASPIRIN 81 MG OR TABS             Last three blood pressure readings:  BP Readings from Last 3 Encounters:   06/25/19 130/78   06/12/19 134/76   06/07/19 151/77       Date of last diabetes office visit: June 25,2019   Tobacco History:     History   Smoking Status     Light Tobacco Smoker     Packs/day: 0.20     Years: 3.00     Types: Cigarettes     Last attempt to quit: 1/15/2013   Smokeless Tobacco     Never Used     Comment: e cig   - three puffs a day            Composite cancer screening  Chart review shows that this patient is due/due soon for the following Mammogram  Summary:    Patient is due/failing the following:   MAMMOGRAM and PHQ9    Action needed:    Patient needs to do PHQ9. and Patient needs referral/order: mammogram    Type of outreach:    Sent [employer] message.    Questions for provider review:    None                                                                                                                                    db     Chart routed to  .

## 2019-12-18 DIAGNOSIS — G43.909 MIGRAINE WITHOUT STATUS MIGRAINOSUS, NOT INTRACTABLE, UNSPECIFIED MIGRAINE TYPE: ICD-10-CM

## 2019-12-18 RX ORDER — SUMATRIPTAN 20 MG/1
SPRAY NASAL
Refills: 0 | OUTPATIENT
Start: 2019-12-18

## 2019-12-18 NOTE — TELEPHONE ENCOUNTER
Sumatriptan was filled 11/1 for #10 with RF    Denied to pharmacy if pt needs RF she will need OV     Fatuma Lewis RN

## 2020-01-13 DIAGNOSIS — G43.909 MIGRAINE WITHOUT STATUS MIGRAINOSUS, NOT INTRACTABLE, UNSPECIFIED MIGRAINE TYPE: ICD-10-CM

## 2020-01-13 RX ORDER — SUMATRIPTAN 20 MG/1
SPRAY NASAL
Qty: 9 EACH | Refills: 0 | Status: SHIPPED | OUTPATIENT
Start: 2020-01-13 | End: 2020-04-30

## 2020-01-13 NOTE — TELEPHONE ENCOUNTER
Pt is using more imitrex than RN protocol.  She is getting the pills and nasal spray  Kelvin Thrasher RN, BSN

## 2020-01-17 DIAGNOSIS — I10 HYPERTENSION GOAL BP (BLOOD PRESSURE) < 140/90: ICD-10-CM

## 2020-01-17 RX ORDER — LOSARTAN POTASSIUM 100 MG/1
TABLET ORAL
Qty: 90 TABLET | Refills: 1 | Status: SHIPPED | OUTPATIENT
Start: 2020-01-17 | End: 2020-06-11

## 2020-01-17 NOTE — TELEPHONE ENCOUNTER
Prescription approved per [facility] [place] Protocol.  Kelvin Thrasher RN, BSN

## 2020-01-21 DIAGNOSIS — G43.009 MIGRAINE WITHOUT AURA AND WITHOUT STATUS MIGRAINOSUS, NOT INTRACTABLE: ICD-10-CM

## 2020-01-21 RX ORDER — SUMATRIPTAN 100 MG/1
100 TABLET, FILM COATED ORAL
Qty: 10 TABLET | Refills: 0 | Status: SHIPPED | OUTPATIENT
Start: 2020-01-21 | End: 2020-03-17

## 2020-02-17 ENCOUNTER — HEALTH MAINTENANCE LETTER (OUTPATIENT)
Age: 60
End: 2020-02-17

## 2020-02-28 ENCOUNTER — OFFICE VISIT (OUTPATIENT)
Dept: FAMILY MEDICINE | Facility: CLINIC | Age: 60
End: 2020-02-28
Payer: COMMERCIAL

## 2020-02-28 VITALS
DIASTOLIC BLOOD PRESSURE: 78 MMHG | WEIGHT: 176 LBS | HEART RATE: 80 BPM | OXYGEN SATURATION: 95 % | BODY MASS INDEX: 34.55 KG/M2 | TEMPERATURE: 97.4 F | SYSTOLIC BLOOD PRESSURE: 138 MMHG | HEIGHT: 60 IN

## 2020-02-28 DIAGNOSIS — E78.5 HYPERLIPIDEMIA LDL GOAL <100: ICD-10-CM

## 2020-02-28 DIAGNOSIS — Z23 NEED FOR PROPHYLACTIC VACCINATION AND INOCULATION AGAINST INFLUENZA: ICD-10-CM

## 2020-02-28 DIAGNOSIS — Z79.4 TYPE 2 DIABETES MELLITUS WITHOUT COMPLICATION, WITH LONG-TERM CURRENT USE OF INSULIN (H): Primary | ICD-10-CM

## 2020-02-28 DIAGNOSIS — E11.9 TYPE 2 DIABETES MELLITUS WITHOUT COMPLICATION, WITH LONG-TERM CURRENT USE OF INSULIN (H): Primary | ICD-10-CM

## 2020-02-28 LAB — HBA1C MFR BLD: 7 % (ref 0–5.6)

## 2020-02-28 PROCEDURE — 99214 OFFICE O/P EST MOD 30 MIN: CPT | Mod: 25 | Performed by: PHYSICIAN ASSISTANT

## 2020-02-28 PROCEDURE — 36415 COLL VENOUS BLD VENIPUNCTURE: CPT | Performed by: PHYSICIAN ASSISTANT

## 2020-02-28 PROCEDURE — 83036 HEMOGLOBIN GLYCOSYLATED A1C: CPT | Performed by: PHYSICIAN ASSISTANT

## 2020-02-28 PROCEDURE — 90682 RIV4 VACC RECOMBINANT DNA IM: CPT | Performed by: PHYSICIAN ASSISTANT

## 2020-02-28 PROCEDURE — 90471 IMMUNIZATION ADMIN: CPT | Performed by: PHYSICIAN ASSISTANT

## 2020-02-28 RX ORDER — ATORVASTATIN CALCIUM 20 MG/1
20 TABLET, FILM COATED ORAL DAILY
Qty: 90 TABLET | Refills: 1 | Status: SHIPPED | OUTPATIENT
Start: 2020-02-28 | End: 2020-08-31

## 2020-02-28 RX ORDER — GLIPIZIDE 10 MG/1
10 TABLET, FILM COATED, EXTENDED RELEASE ORAL EVERY 12 HOURS
Qty: 180 TABLET | Refills: 3 | Status: SHIPPED | OUTPATIENT
Start: 2020-02-28 | End: 2020-12-30

## 2020-02-28 ASSESSMENT — MIFFLIN-ST. JEOR: SCORE: 1299.83

## 2020-02-28 NOTE — PROGRESS NOTES
Subjective     Vanessa Mota is a 59 year old female who presents to clinic today for the following health issues:    HPI   Diabetes Follow-up    How often are you checking your blood sugar? One time daily  What time of day are you checking your blood sugars (select all that apply)?  Before meals  Have you had any blood sugars above 200?  No  Have you had any blood sugars below 70?  No    What symptoms do you notice when your blood sugar is low?  Shaky and Dizzy    What concerns do you have today about your diabetes? None     Do you have any of these symptoms? (Select all that apply)  No numbness or tingling in feet.  No redness, sores or blisters on feet.  No complaints of excessive thirst.  No reports of blurry vision.  No significant changes to weight.    Have you had a diabetic eye exam in the last 12 months? No        BP Readings from Last 2 Encounters:   02/28/20 138/78   06/25/19 130/78     Hemoglobin A1C (%)   Date Value   02/28/2020 7.0 (H)   05/28/2019 7.5 (H)     LDL Cholesterol Calculated (mg/dL)   Date Value   06/25/2019 47   09/06/2018 49           How many servings of fruits and vegetables do you eat daily?  2-3    On average, how many sweetened beverages do you drink each day (Examples: soda, juice, sweet tea, etc.  Do NOT count diet or artificially sweetened beverages)?   3    How many days per week do you exercise enough to make your heart beat faster? 5    How many minutes a day do you exercise enough to make your heart beat faster? 60 or more    How many days per week do you miss taking your medication? 0        Current Outpatient Medications   Medication Sig Dispense Refill     ACE/ARB NOT PRESCRIBED, INTENTIONAL, by Other route continuous prn.       ALPRAZolam (XANAX) 0.25 MG tablet   2     ASPIRIN 81 MG OR TABS ONE DAILY 100 3     atorvastatin (LIPITOR) 20 MG tablet Take 1 tablet (20 mg) by mouth daily 90 tablet 1     blood glucose (CANDIDA CONTOUR) test strip Use to test blood sugars 2 times  daily or as directed. 100 strip 2     blood glucose (NO BRAND SPECIFIED) lancets standard Use to test blood sugar 2 times daily or as directed.  Dispense as covered by insurance 100 each 3     blood glucose monitoring (NO BRAND SPECIFIED) meter device kit Use to test blood sugar 2 times daily or as directed.  Dispense brand as covered by insurance 1 kit 0     blood glucose monitoring (NO BRAND SPECIFIED) test strip Use to test blood sugar 2 times daily or as directed.  Dispense Ultra One touch strips per insurance coverage 200 each 3     BusPIRone HCl (BUSPAR) 30 MG TABS One tablet twice daily  -  60 mg daily 90 tablet 0     cyclobenzaprine 10 MG PO tablet Take 1 tablet (10 mg) by mouth nightly as needed for muscle spasms 30 tablet 0     DULoxetine HCl (CYMBALTA PO) Take 60 mg by mouth daily Two tablets once a day  -  120 mg       FISH OIL 1000 MG OR CAPS increase to 4000mg per day 100 0     FLUoxetine 20 MG tablet Take 20 mg by mouth daily       glipiZIDE (GLIPIZIDE XL) 10 MG 24 hr tablet Take 1 tablet (10 mg) by mouth every 12 hours 180 tablet 3     hydrOXYzine (VISTARIL) 50 MG capsule Take 50 mg by mouth. Two tablets at bedtime       insulin glargine (BASAGLAR KWIKPEN) 100 UNIT/ML pen INJECT 78 UNITS SUBCUTANEOUSLY  AT BEDTIME 24 mL 0     INSULIN GLARGINE 100 UNIT/ML SC pen INJECT 78 UNITS SUBCUTANEOUSLY AT BEDTIME 72 mL 0     insulin pen needle (ULTICARE MINI) 31G X 6 MM 1 Units daily Use 1 daily or as directed. At bedtime 100 each 6     lamoTRIgine (LAMICTAL) 100 MG tablet   0     losartan (COZAAR) 100 MG tablet TAKE 1 TABLET BY MOUTH ONCE DAILY 90 tablet 1     metFORMIN (GLUCOPHAGE) 500 MG tablet Take 2 tablets (1,000 mg) by mouth 2 times daily (with meals) 360 tablet 3     omeprazole (PRILOSEC) 40 MG capsule Take 1 capsule (40 mg) by mouth daily 90 capsule 3     SUMAtriptan (IMITREX) 100 MG tablet Take 1 tablet (100 mg) by mouth at onset of headache for migraine TAKE 1 TABLET BY MOUTH ONCE DAILY ,  MAY   REPEAT  AFTER  2  HOURS  IF  NEEDED.  MAX  OF  2  TABLETS  PER  24  HOURS 10 tablet 0     SUMAtriptan (IMITREX) 20 MG/ACT nasal spray USE 1 SPRAY(S) AS NEEDED FOR MIGRAINE HEADACHE 9 each 0     zolpidem (AMBIEN) 10 MG tablet        gabapentin (NEURONTIN) 100 MG capsule Take 2 capsules (200 mg) by mouth 3 times daily 180 capsule 5     Recent Labs   Lab Test 02/28/20  1554 06/25/19  1436 05/28/19  1327 09/06/18  1441 01/09/18  1215  06/22/17  1221   A1C 7.0*  --  7.5* 6.9*  --    < > 6.8*   LDL  --  47  --  49  --   --  58   HDL  --  53  --  54  --   --  48*   TRIG  --  125  --  132  --   --  139   ALT  --  46  --  52* 34  --  44   CR  --  0.77  --  0.74 0.66  --  0.74   GFRESTIMATED  --  85  --  81 >90  --  81   GFRESTBLACK  --  >90  --  >90 >90  --  >90  African American GFR Calc     POTASSIUM  --  4.1  --  3.9 4.4  --  3.8   TSH  --  3.54  --   --   --   --  1.79    < > = values in this interval not displayed.      BP Readings from Last 3 Encounters:   02/28/20 138/78   06/25/19 130/78   06/12/19 134/76    Wt Readings from Last 3 Encounters:   02/28/20 79.8 kg (176 lb)   06/25/19 80.8 kg (178 lb 1.6 oz)   05/28/19 81.2 kg (179 lb)                      Reviewed and updated as needed this visit by Provider         Review of Systems   ROS COMP: Constitutional, HEENT, cardiovascular, pulmonary, GI, , musculoskeletal, neuro, skin, endocrine and psych systems are negative, except as otherwise noted.      Objective    /78 (BP Location: Right arm, Patient Position: Chair, Cuff Size: Adult Regular)   Pulse 80   Temp 97.4  F (36.3  C)   Ht 1.524 m (5')   Wt 79.8 kg (176 lb)   SpO2 95%   BMI 34.37 kg/m    Body mass index is 34.37 kg/m .  Physical Exam   GENERAL: healthy, alert and no distress  HENT: ear canals and TM's normal, nose and mouth without ulcers or lesions  RESP: lungs clear to auscultation - no rales, rhonchi or wheezes  CV: regular rate and rhythm, normal S1 S2, no S3 or S4, no murmur, click or rub,  no peripheral edema and peripheral pulses strong  MS: no gross musculoskeletal defects noted, no edema  Diabetic foot exam: normal DP and PT pulses, no trophic changes or ulcerative lesions and normal sensory exam    Diagnostic Test Results:  Labs reviewed in Epic        Assessment & Plan     (E11.9,  Z79.4) Type 2 diabetes mellitus without complication, with long-term current use of insulin (H)  (primary encounter diagnosis)  Comment: stable   Plan: Hemoglobin A1c, glipiZIDE (GLIPIZIDE XL) 10 MG         24 hr tablet, metFORMIN (GLUCOPHAGE) 500 MG         tablet, CBC with platelets, TSH with free T4         reflex, Comprehensive metabolic panel,         Hemoglobin A1c, CANCELED: Albumin Random Urine         Quantitative with Creat Ratio            (E78.5) Hyperlipidemia LDL goal <100  Comment:   Plan: atorvastatin (LIPITOR) 20 MG tablet, Lipid         panel reflex to direct LDL Fasting            (Z23) Need for prophylactic vaccination and inoculation against influenza  Comment:   Plan: INFLUENZA QUAD, RECOMBINANT, P-FREE (RIV4)         (FLUBLOCK) [48036], Vaccine Administration,         Initial [25239]                 Tobacco Cessation:   reports that she has been smoking cigarettes. She has a 0.60 pack-year smoking history. She has never used smokeless tobacco.        BMI:   Estimated body mass index is 34.37 kg/m  as calculated from the following:    Height as of this encounter: 1.524 m (5').    Weight as of this encounter: 79.8 kg (176 lb).   Weight management plan: Discussed healthy diet and exercise guidelines        There are no Patient Instructions on file for this visit.    No follow-ups on file.    Ramona Ann Aaseby-Aguilera, PA-C  [facility]

## 2020-03-16 DIAGNOSIS — G43.009 MIGRAINE WITHOUT AURA AND WITHOUT STATUS MIGRAINOSUS, NOT INTRACTABLE: ICD-10-CM

## 2020-03-16 NOTE — TELEPHONE ENCOUNTER
"Requested Prescriptions   Pending Prescriptions Disp Refills     SUMAtriptan (IMITREX) 100 MG tablet [Pharmacy Med Name: SUMAtriptan  Last Written Prescription Date:  1/21/20  Last Fill Quantity: 10,  # refills: 0   Last Office Visit: 2/28/2020   Future Office Visit:      Succinate 100 MG Oral Tablet] 10 tablet 0     Sig: TAKE ONE TABLET BY MOUTH AT ONSET OF HEADACHE FOR MIGRAINE. MAY REPEAT AFTER 2 HOURS IF NEEDED. MAX OF 2 TABLETS PER 24 HOURS       Serotonin Agonists Failed - 3/16/2020  3:44 PM        Failed - Serotonin Agonist request needs review.     Please review patient's record. If patient has had 8 or more treatments in the past month, please forward to provider.          Passed - Blood pressure under 140/90 in past 12 months     BP Readings from Last 3 Encounters:   02/28/20 138/78   06/25/19 130/78   06/12/19 134/76             Passed - Recent (12 mo) or future (30 days) visit within the authorizing provider's specialty     Patient has had an office visit with the authorizing provider or a provider within the authorizing providers department within the previous 12 mos or has a future within next 30 days. See \"Patient Info\" tab in inbasket, or \"Choose Columns\" in Meds & Orders section of the refill encounter.              Passed - Medication is active on med list        Passed - Patient is age 18 or older        Passed - No active pregnancy on record        Passed - No positive pregnancy test in past 12 months             "

## 2020-03-17 RX ORDER — SUMATRIPTAN 100 MG/1
TABLET, FILM COATED ORAL
Qty: 10 TABLET | Refills: 0 | Status: SHIPPED | OUTPATIENT
Start: 2020-03-17 | End: 2020-05-15

## 2020-03-17 NOTE — TELEPHONE ENCOUNTER
Prescription approved per [facility] Refill Protocol.  Kelvin Thrasher RN, BSN

## 2020-03-26 ENCOUNTER — TELEPHONE (OUTPATIENT)
Dept: FAMILY MEDICINE | Facility: CLINIC | Age: 60
End: 2020-03-26

## 2020-03-26 NOTE — TELEPHONE ENCOUNTER
Panel Management Review      Patient has the following on her problem list:     Depression / Dysthymia review    Measure:  Needs PHQ-9 score of 4 or less during index window.  Administer PHQ-9 and if score is 5 or more, send encounter to provider for next steps.    5 - 7 month window range: no    PHQ-9 SCORE 1/9/2018 9/6/2018 5/28/2019   PHQ-9 Total Score - - -   PHQ-9 Total Score 7 15 12       If PHQ-9 recheck is 5 or more, route to provider for next steps.    Patient is due for:  PHQ9      Composite cancer screening  Chart review shows that this patient is due/due soon for the following Mammogram and Colonoscopy  Summary:    Patient is due/failing the following:   COLONOSCOPY and MAMMOGRAM    Action needed:   Mammogram and colonoscopy reminder    Type of outreach:    Sent letter.    Questions for provider review:    None                                                                                                                                    Roque Domínguez [facility]           Chart routed to none .

## 2020-03-26 NOTE — LETTER
Vanessa Mota  [address] [place] [address]      Dear Vanessa,    Our records indicate that you may be due for preventative health care services.  Please make an appointment or call to set up the following tests as recommended.  If you have already had this testing done at another clinic please contact us to help us update our records to reflect the preventative care that you have had.    Breast cancer screen (mammogram) - Recommended every 1-2 years for women age 50 and older. Mammograms help detect breast cancer, which is the most common cancer among women in the [place]. You may need to start having mammograms earlier and more often if you have had breast cancer, breast problems, or have a family history of breast cancer.                                                                                                                                    Colon cancer screen (colonoscopy) - Recommended every ten years for everyone age 50 and older. Screening is done by a colonoscopy every 10 years starting at age 50 or earlier if you have a family history of colon cancer.  If you cannot or do not want to have a colonoscopy you can opt to do an annual fecal occult blood immunoassay test (FIT stool test). We strongly urge our patients to consider having a colonoscopy done, which is the best screening test available and only needs to be done every 10 years if normal.                                                                                                                                                 Thank you for choosing [facility]. We appreciate the opportunity to serve you and look forward to supporting your healthcare needs in the future.    If you have any questions or concerns, please contact us at [phone]      Sincerely,       Ramona Aaseby-Aguilera PA-C

## 2020-04-30 DIAGNOSIS — G43.909 MIGRAINE WITHOUT STATUS MIGRAINOSUS, NOT INTRACTABLE, UNSPECIFIED MIGRAINE TYPE: ICD-10-CM

## 2020-04-30 DIAGNOSIS — M79.7 FIBROMYALGIA: ICD-10-CM

## 2020-04-30 RX ORDER — CYCLOBENZAPRINE HCL 10 MG
TABLET ORAL
Qty: 30 TABLET | Refills: 0 | Status: SHIPPED | OUTPATIENT
Start: 2020-04-30 | End: 2020-07-13

## 2020-04-30 RX ORDER — SUMATRIPTAN 20 MG/1
SPRAY NASAL
Qty: 9 EACH | Refills: 0 | Status: SHIPPED | OUTPATIENT
Start: 2020-04-30 | End: 2020-09-18

## 2020-04-30 NOTE — TELEPHONE ENCOUNTER
Routing refill request to provider for review/approval because:  Drug not on the [facility] refill protocol: cyclobenzaprine    Stuart Callejas RN, BSN

## 2020-05-03 DIAGNOSIS — E11.9 TYPE 2 DIABETES MELLITUS WITHOUT COMPLICATION, WITH LONG-TERM CURRENT USE OF INSULIN (H): ICD-10-CM

## 2020-05-03 DIAGNOSIS — Z79.4 TYPE 2 DIABETES MELLITUS WITHOUT COMPLICATION, WITH LONG-TERM CURRENT USE OF INSULIN (H): ICD-10-CM

## 2020-05-04 RX ORDER — INSULIN GLARGINE 100 [IU]/ML
INJECTION, SOLUTION SUBCUTANEOUS
Qty: 72 ML | Refills: 0 | Status: SHIPPED | OUTPATIENT
Start: 2020-05-04 | End: 2020-12-30

## 2020-05-15 DIAGNOSIS — G43.009 MIGRAINE WITHOUT AURA AND WITHOUT STATUS MIGRAINOSUS, NOT INTRACTABLE: ICD-10-CM

## 2020-05-15 RX ORDER — SUMATRIPTAN 100 MG/1
TABLET, FILM COATED ORAL
Qty: 10 TABLET | Refills: 0 | Status: SHIPPED | OUTPATIENT
Start: 2020-05-15 | End: 2020-06-18

## 2020-05-15 NOTE — TELEPHONE ENCOUNTER
Prescription approved per [facility] Refill Protocol.  Kelvin Thrasher RN, BSN

## 2020-06-11 DIAGNOSIS — I10 HYPERTENSION GOAL BP (BLOOD PRESSURE) < 140/90: ICD-10-CM

## 2020-06-11 RX ORDER — LOSARTAN POTASSIUM 100 MG/1
TABLET ORAL
Qty: 90 TABLET | Refills: 0 | Status: SHIPPED | OUTPATIENT
Start: 2020-06-11 | End: 2020-10-19

## 2020-06-11 NOTE — TELEPHONE ENCOUNTER
Lab are due this month so RN only able to give 30 day supply.  Do you want labs or just refill until next appt?    Kelvin Thrasher RN, BSN

## 2020-06-17 DIAGNOSIS — G43.009 MIGRAINE WITHOUT AURA AND WITHOUT STATUS MIGRAINOSUS, NOT INTRACTABLE: ICD-10-CM

## 2020-06-18 RX ORDER — SUMATRIPTAN 100 MG/1
TABLET, FILM COATED ORAL
Qty: 10 TABLET | Refills: 0 | Status: SHIPPED | OUTPATIENT
Start: 2020-06-18 | End: 2020-08-03

## 2020-06-18 NOTE — TELEPHONE ENCOUNTER
Routing refill request to provider for review/approval because:  Pt using to treat more than 9 per month     Fatuma Lewis RN

## 2020-07-10 DIAGNOSIS — M79.7 FIBROMYALGIA: ICD-10-CM

## 2020-07-13 RX ORDER — CYCLOBENZAPRINE HCL 10 MG
TABLET ORAL
Qty: 30 TABLET | Refills: 0 | Status: SHIPPED | OUTPATIENT
Start: 2020-07-13 | End: 2020-08-31

## 2020-07-13 RX ORDER — GABAPENTIN 100 MG/1
CAPSULE ORAL
Qty: 180 CAPSULE | Refills: 0 | Status: SHIPPED | OUTPATIENT
Start: 2020-07-13 | End: 2020-08-14

## 2020-07-13 NOTE — TELEPHONE ENCOUNTER
Flexeril  Last Written Prescription Date:  4/30/20  Last Fill Quantity: 30,  # refills: 0   Last office visit: 2/28/2020 with prescribing provider:  Yessi        Gabapentin  Last Written Prescription Date:  12/12/19  Last Fill Quantity: 180,  # refills: 5   Last office visit: 2/28/2020 with prescribing provider:  Yessi     [place] reviewed:  reviewed- no concerns    [place]:  [email or web address]

## 2020-08-06 DIAGNOSIS — G43.009 MIGRAINE WITHOUT AURA AND WITHOUT STATUS MIGRAINOSUS, NOT INTRACTABLE: ICD-10-CM

## 2020-08-06 RX ORDER — SUMATRIPTAN 100 MG/1
TABLET, FILM COATED ORAL
Qty: 10 TABLET | Refills: 0 | OUTPATIENT
Start: 2020-08-06

## 2020-08-10 DIAGNOSIS — G43.009 MIGRAINE WITHOUT AURA AND WITHOUT STATUS MIGRAINOSUS, NOT INTRACTABLE: ICD-10-CM

## 2020-08-10 RX ORDER — SUMATRIPTAN 100 MG/1
TABLET, FILM COATED ORAL
Qty: 10 TABLET | Refills: 0 | OUTPATIENT
Start: 2020-08-10

## 2020-08-14 DIAGNOSIS — M79.7 FIBROMYALGIA: ICD-10-CM

## 2020-08-14 RX ORDER — GABAPENTIN 100 MG/1
CAPSULE ORAL
Qty: 180 CAPSULE | Refills: 0 | Status: SHIPPED | OUTPATIENT
Start: 2020-08-14 | End: 2020-10-21

## 2020-08-14 NOTE — TELEPHONE ENCOUNTER
RX [place] ([place]) reviewed:  reviewed- no concerns    Last fill 7/13    Fatuma Lewis RN    Controlled Substance Refill Request for gabapentin   Problem List Complete:  No     PROVIDER TO CONSIDER COMPLETION OF PROBLEM LIST AND OVERVIEW/CONTROLLED SUBSTANCE AGREEMENT    Last Written Prescription Date:  7/13/2020  Last Fill Quantity: 180 NO RF     Last Office Visit with [facility] primary care provider:2/28/2020    Future Office visit:     Controlled substance agreement:   Encounter-Level CSA:    There are no encounter-level csa.     Patient-Level CSA:    There are no patient-level csa.         Last Urine Drug Screen: No results found for: CDAUT, No results found for: COMDAT, No results found for: THC13, PCP13, COC13, MAMP13, OPI13, AMP13, BZO13, TCA13, MTD13, BAR13, OXY13, PPX13, BUP13     Processing:  Rx to be electronically transmitted to pharmacy by provider      [email or web address]       checked in past 3 months?  Yes today

## 2020-08-20 ENCOUNTER — TELEPHONE (OUTPATIENT)
Dept: FAMILY MEDICINE | Facility: CLINIC | Age: 60
End: 2020-08-20

## 2020-08-28 DIAGNOSIS — E78.5 HYPERLIPIDEMIA LDL GOAL <100: ICD-10-CM

## 2020-08-28 DIAGNOSIS — M79.7 FIBROMYALGIA: ICD-10-CM

## 2020-08-28 NOTE — LETTER
August 31, 2020      Vanessa Mota  [address] [place] [address]        Dear Vanessa,       Our records indicate that you may be due for preventative health care services.  Please  call the clinic at [phone] to make an appointment or to set up the following tests as recommended.  If you have already had this testing done at another clinic please contact us so that we can update your chart to reflect your most up to date preventative.    Diabetic recheck and lab work     Thank you for choosing [facility]. We appreciate the opportunity to serve you and look forward to supporting your healthcare needs in the future.    If you have any questions or concerns, please contact us at [phone]        Sincerely,          Ramona Ann Aaseby-Aguilera, PA-C

## 2020-08-31 RX ORDER — CYCLOBENZAPRINE HCL 10 MG
TABLET ORAL
Qty: 30 TABLET | Refills: 0 | Status: SHIPPED | OUTPATIENT
Start: 2020-08-31 | End: 2020-10-07

## 2020-08-31 RX ORDER — ATORVASTATIN CALCIUM 20 MG/1
TABLET, FILM COATED ORAL
Qty: 90 TABLET | Refills: 0 | Status: SHIPPED | OUTPATIENT
Start: 2020-08-31 | End: 2020-10-21

## 2020-08-31 NOTE — TELEPHONE ENCOUNTER
Routing refill request to provider for review/approval because:  Drug not on the [facility] refill protocol   No LDL on file     Pt due for 6 month f/u this month-  Multiple HCM due     Letter sent and LM     Fatuma Lewis RN

## 2020-09-18 DIAGNOSIS — G43.909 MIGRAINE WITHOUT STATUS MIGRAINOSUS, NOT INTRACTABLE, UNSPECIFIED MIGRAINE TYPE: ICD-10-CM

## 2020-09-18 RX ORDER — SUMATRIPTAN 20 MG/1
1 SPRAY NASAL PRN
Qty: 9 EACH | Refills: 0 | Status: SHIPPED | OUTPATIENT
Start: 2020-09-18 | End: 2021-09-24

## 2020-10-07 ENCOUNTER — TELEPHONE (OUTPATIENT)
Dept: FAMILY MEDICINE | Facility: CLINIC | Age: 60
End: 2020-10-07

## 2020-10-07 DIAGNOSIS — G43.009 MIGRAINE WITHOUT AURA AND WITHOUT STATUS MIGRAINOSUS, NOT INTRACTABLE: ICD-10-CM

## 2020-10-07 DIAGNOSIS — M79.7 FIBROMYALGIA: ICD-10-CM

## 2020-10-07 RX ORDER — SUMATRIPTAN 100 MG/1
TABLET, FILM COATED ORAL
Qty: 10 TABLET | Refills: 0 | Status: SHIPPED | OUTPATIENT
Start: 2020-10-07 | End: 2020-12-14

## 2020-10-07 RX ORDER — CYCLOBENZAPRINE HCL 10 MG
TABLET ORAL
Qty: 30 TABLET | Refills: 0 | Status: SHIPPED | OUTPATIENT
Start: 2020-10-07 | End: 2020-11-11

## 2020-10-07 NOTE — TELEPHONE ENCOUNTER
Routing refill request to provider for review/approval because:  Pt is over due for f/u was to follow up back in August Return in about 6 months (around 8/28/2020) for Physical Exam.    She received # 10 sumapriptan 8/30 and # 30 flexeril 8/31     LM for call back --   Do you want to RF?    Fatuma Lewis, RN

## 2020-10-18 DIAGNOSIS — I10 HYPERTENSION GOAL BP (BLOOD PRESSURE) < 140/90: ICD-10-CM

## 2020-10-19 DIAGNOSIS — E11.9 TYPE 2 DIABETES MELLITUS WITHOUT COMPLICATION, WITH LONG-TERM CURRENT USE OF INSULIN (H): ICD-10-CM

## 2020-10-19 DIAGNOSIS — Z79.4 TYPE 2 DIABETES MELLITUS WITHOUT COMPLICATION, WITH LONG-TERM CURRENT USE OF INSULIN (H): ICD-10-CM

## 2020-10-19 RX ORDER — LOSARTAN POTASSIUM 100 MG/1
TABLET ORAL
Qty: 90 TABLET | Refills: 0 | Status: SHIPPED | OUTPATIENT
Start: 2020-10-19 | End: 2020-12-30

## 2020-10-19 NOTE — TELEPHONE ENCOUNTER
Routing refill request to provider for review/approval because:  Labs not current:  rachel Mays RN, BSN

## 2020-10-20 DIAGNOSIS — E78.5 HYPERLIPIDEMIA LDL GOAL <100: ICD-10-CM

## 2020-10-20 NOTE — TELEPHONE ENCOUNTER
Routing refill request to provider for review/approval because:  Labs not current:      VV scheduled the end of Dec. 2020.     Angela Li RN Flex

## 2020-10-20 NOTE — TELEPHONE ENCOUNTER
Routing refill request to provider for review/approval because:  Labs not current:  ldl  Kelvin Thrasher RN, BSN

## 2020-10-20 NOTE — TELEPHONE ENCOUNTER
Routing refill request to provider for review/approval because:  Labs are not current     Pt does have video visit at the end of December

## 2020-10-21 DIAGNOSIS — M79.7 FIBROMYALGIA: ICD-10-CM

## 2020-10-21 RX ORDER — INSULIN GLARGINE 100 [IU]/ML
INJECTION, SOLUTION SUBCUTANEOUS
Qty: 30 ML | Refills: 0 | Status: SHIPPED | OUTPATIENT
Start: 2020-10-21 | End: 2020-12-14

## 2020-10-21 RX ORDER — GABAPENTIN 100 MG/1
CAPSULE ORAL
Qty: 180 CAPSULE | Refills: 1 | Status: SHIPPED | OUTPATIENT
Start: 2020-10-21 | End: 2020-12-30

## 2020-10-21 RX ORDER — ATORVASTATIN CALCIUM 20 MG/1
TABLET, FILM COATED ORAL
Qty: 90 TABLET | Refills: 0 | Status: SHIPPED | OUTPATIENT
Start: 2020-10-21 | End: 2020-12-30

## 2020-10-21 NOTE — TELEPHONE ENCOUNTER
Routing refill request to provider for review/approval because:  Drug not on the [facility] refill protocol     gabapentin (NEURONTIN) 100 MG capsule 180 capsule 0 8/14/2020  No   Sig: TAKE 2 CAPSULES BY MOUTH THREE TIMES DAILY   Sent to pharmacy as: Gabapentin 100 MG Oral Capsule (NEURONTIN)   Class: E-Prescribe   Order: [id number]   E-Prescribing Status: Receipt confirmed by pharmacy (8/14/2020  9:11 AM CDT)     Angela Li RN Flex

## 2020-10-30 ENCOUNTER — ALLIED HEALTH/NURSE VISIT (OUTPATIENT)
Dept: FAMILY MEDICINE | Facility: CLINIC | Age: 60
End: 2020-10-30
Payer: COMMERCIAL

## 2020-10-30 DIAGNOSIS — Z23 NEED FOR PROPHYLACTIC VACCINATION AND INOCULATION AGAINST INFLUENZA: Primary | ICD-10-CM

## 2020-10-30 DIAGNOSIS — Z79.4 TYPE 2 DIABETES MELLITUS WITHOUT COMPLICATION, WITH LONG-TERM CURRENT USE OF INSULIN (H): ICD-10-CM

## 2020-10-30 DIAGNOSIS — E11.9 TYPE 2 DIABETES MELLITUS WITHOUT COMPLICATION, WITH LONG-TERM CURRENT USE OF INSULIN (H): ICD-10-CM

## 2020-10-30 DIAGNOSIS — E78.5 HYPERLIPIDEMIA LDL GOAL <100: ICD-10-CM

## 2020-10-30 LAB
ERYTHROCYTE [DISTWIDTH] IN BLOOD BY AUTOMATED COUNT: 14.3 % (ref 10–15)
HBA1C MFR BLD: 7.1 % (ref 0–5.6)
HCT VFR BLD AUTO: 40.2 % (ref 35–47)
HGB BLD-MCNC: 13.5 G/DL (ref 11.7–15.7)
MCH RBC QN AUTO: 28.4 PG (ref 26.5–33)
MCHC RBC AUTO-ENTMCNC: 33.6 G/DL (ref 31.5–36.5)
MCV RBC AUTO: 85 FL (ref 78–100)
PLATELET # BLD AUTO: 213 10E9/L (ref 150–450)
RBC # BLD AUTO: 4.76 10E12/L (ref 3.8–5.2)
WBC # BLD AUTO: 10.7 10E9/L (ref 4–11)

## 2020-10-30 PROCEDURE — 90471 IMMUNIZATION ADMIN: CPT

## 2020-10-30 PROCEDURE — 83036 HEMOGLOBIN GLYCOSYLATED A1C: CPT | Performed by: PHYSICIAN ASSISTANT

## 2020-10-30 PROCEDURE — 99207 PR NO CHARGE NURSE ONLY: CPT

## 2020-10-30 PROCEDURE — 85027 COMPLETE CBC AUTOMATED: CPT | Performed by: PHYSICIAN ASSISTANT

## 2020-10-30 PROCEDURE — 80061 LIPID PANEL: CPT | Performed by: PHYSICIAN ASSISTANT

## 2020-10-30 PROCEDURE — 84443 ASSAY THYROID STIM HORMONE: CPT | Performed by: PHYSICIAN ASSISTANT

## 2020-10-30 PROCEDURE — 36415 COLL VENOUS BLD VENIPUNCTURE: CPT | Performed by: PHYSICIAN ASSISTANT

## 2020-10-30 PROCEDURE — 80053 COMPREHEN METABOLIC PANEL: CPT | Performed by: PHYSICIAN ASSISTANT

## 2020-10-30 PROCEDURE — 90682 RIV4 VACC RECOMBINANT DNA IM: CPT

## 2020-10-31 LAB
ALBUMIN SERPL-MCNC: 3.9 G/DL (ref 3.4–5)
ALP SERPL-CCNC: 105 U/L (ref 40–150)
ALT SERPL W P-5'-P-CCNC: 60 U/L (ref 0–50)
ANION GAP SERPL CALCULATED.3IONS-SCNC: 5 MMOL/L (ref 3–14)
AST SERPL W P-5'-P-CCNC: 37 U/L (ref 0–45)
BILIRUB SERPL-MCNC: 0.5 MG/DL (ref 0.2–1.3)
BUN SERPL-MCNC: 6 MG/DL (ref 7–30)
CALCIUM SERPL-MCNC: 9.6 MG/DL (ref 8.5–10.1)
CHLORIDE SERPL-SCNC: 104 MMOL/L (ref 94–109)
CHOLEST SERPL-MCNC: 131 MG/DL
CO2 SERPL-SCNC: 30 MMOL/L (ref 20–32)
CREAT SERPL-MCNC: 0.77 MG/DL (ref 0.52–1.04)
GFR SERPL CREATININE-BSD FRML MDRD: 84 ML/MIN/{1.73_M2}
GLUCOSE SERPL-MCNC: 81 MG/DL (ref 70–99)
HDLC SERPL-MCNC: 52 MG/DL
LDLC SERPL CALC-MCNC: 55 MG/DL
NONHDLC SERPL-MCNC: 79 MG/DL
POTASSIUM SERPL-SCNC: 3.5 MMOL/L (ref 3.4–5.3)
PROT SERPL-MCNC: 7.2 G/DL (ref 6.8–8.8)
SODIUM SERPL-SCNC: 139 MMOL/L (ref 133–144)
TRIGL SERPL-MCNC: 122 MG/DL
TSH SERPL DL<=0.005 MIU/L-ACNC: 2.28 MU/L (ref 0.4–4)

## 2020-11-11 DIAGNOSIS — M79.7 FIBROMYALGIA: ICD-10-CM

## 2020-11-11 RX ORDER — CYCLOBENZAPRINE HCL 10 MG
TABLET ORAL
Qty: 30 TABLET | Refills: 0 | Status: SHIPPED | OUTPATIENT
Start: 2020-11-11 | End: 2020-12-14

## 2020-11-29 ENCOUNTER — HEALTH MAINTENANCE LETTER (OUTPATIENT)
Age: 60
End: 2020-11-29

## 2020-12-12 DIAGNOSIS — E11.9 TYPE 2 DIABETES MELLITUS WITHOUT COMPLICATION, WITH LONG-TERM CURRENT USE OF INSULIN (H): ICD-10-CM

## 2020-12-12 DIAGNOSIS — G43.009 MIGRAINE WITHOUT AURA AND WITHOUT STATUS MIGRAINOSUS, NOT INTRACTABLE: ICD-10-CM

## 2020-12-12 DIAGNOSIS — M79.7 FIBROMYALGIA: ICD-10-CM

## 2020-12-12 DIAGNOSIS — Z79.4 TYPE 2 DIABETES MELLITUS WITHOUT COMPLICATION, WITH LONG-TERM CURRENT USE OF INSULIN (H): ICD-10-CM

## 2020-12-14 RX ORDER — CYCLOBENZAPRINE HCL 10 MG
TABLET ORAL
Qty: 30 TABLET | Refills: 0 | Status: SHIPPED | OUTPATIENT
Start: 2020-12-14 | End: 2021-01-12

## 2020-12-14 RX ORDER — INSULIN GLARGINE 100 [IU]/ML
78 INJECTION, SOLUTION SUBCUTANEOUS AT BEDTIME
Qty: 45 ML | Refills: 0 | Status: SHIPPED | OUTPATIENT
Start: 2020-12-14 | End: 2021-09-13 | Stop reason: DRUGHIGH

## 2020-12-14 RX ORDER — SUMATRIPTAN 100 MG/1
TABLET, FILM COATED ORAL
Qty: 10 TABLET | Refills: 0 | Status: SHIPPED | OUTPATIENT
Start: 2020-12-14 | End: 2021-01-14

## 2020-12-14 NOTE — TELEPHONE ENCOUNTER
Prescription approved per [facility] Refill Protocol.  Kelvin Thrasher RN, BSN

## 2020-12-29 NOTE — PROGRESS NOTES
Pre-Visit Planning - Video Visit  12/30/2020  4:15pm  Aaseby-Aguilera, Ramona Ann, PA-C    Appointment Notes for this encounter:      ph: [phone]  med review    Questionnaires Reviewed/Assigned  No additional questionnaires are needed        Patient preferred phone number: [phone]    Unable to reach. Left voicemail. Advised patient to call clinic back at [phone].

## 2020-12-30 ENCOUNTER — VIRTUAL VISIT (OUTPATIENT)
Dept: FAMILY MEDICINE | Facility: CLINIC | Age: 60
End: 2020-12-30
Payer: COMMERCIAL

## 2020-12-30 DIAGNOSIS — E78.5 HYPERLIPIDEMIA LDL GOAL <100: ICD-10-CM

## 2020-12-30 DIAGNOSIS — Z79.4 TYPE 2 DIABETES MELLITUS WITHOUT COMPLICATION, WITH LONG-TERM CURRENT USE OF INSULIN (H): ICD-10-CM

## 2020-12-30 DIAGNOSIS — E11.9 TYPE 2 DIABETES MELLITUS WITHOUT COMPLICATION, WITH LONG-TERM CURRENT USE OF INSULIN (H): ICD-10-CM

## 2020-12-30 DIAGNOSIS — I10 HYPERTENSION GOAL BP (BLOOD PRESSURE) < 140/90: ICD-10-CM

## 2020-12-30 DIAGNOSIS — M79.7 FIBROMYALGIA: ICD-10-CM

## 2020-12-30 PROCEDURE — 99214 OFFICE O/P EST MOD 30 MIN: CPT | Mod: 95 | Performed by: PHYSICIAN ASSISTANT

## 2020-12-30 RX ORDER — GLIPIZIDE 10 MG/1
10 TABLET, FILM COATED, EXTENDED RELEASE ORAL EVERY 12 HOURS
Qty: 180 TABLET | Refills: 3 | Status: SHIPPED | OUTPATIENT
Start: 2020-12-30 | End: 2021-09-24

## 2020-12-30 RX ORDER — ATORVASTATIN CALCIUM 20 MG/1
20 TABLET, FILM COATED ORAL DAILY
Qty: 90 TABLET | Refills: 1 | Status: SHIPPED | OUTPATIENT
Start: 2020-12-30 | End: 2021-09-21

## 2020-12-30 RX ORDER — INSULIN GLARGINE 100 [IU]/ML
78 INJECTION, SOLUTION SUBCUTANEOUS EVERY MORNING
Qty: 72 ML | Refills: 11 | Status: SHIPPED | OUTPATIENT
Start: 2020-12-30 | End: 2021-02-15

## 2020-12-30 RX ORDER — LOSARTAN POTASSIUM 100 MG/1
100 TABLET ORAL DAILY
Qty: 90 TABLET | Refills: 3 | Status: SHIPPED | OUTPATIENT
Start: 2020-12-30 | End: 2021-09-24

## 2020-12-30 RX ORDER — GABAPENTIN 100 MG/1
CAPSULE ORAL
Qty: 180 CAPSULE | Refills: 1 | Status: SHIPPED | OUTPATIENT
Start: 2020-12-30 | End: 2021-03-26

## 2020-12-30 ASSESSMENT — ANXIETY QUESTIONNAIRES
1. FEELING NERVOUS, ANXIOUS, OR ON EDGE: SEVERAL DAYS
GAD7 TOTAL SCORE: 3
7. FEELING AFRAID AS IF SOMETHING AWFUL MIGHT HAPPEN: SEVERAL DAYS
6. BECOMING EASILY ANNOYED OR IRRITABLE: NOT AT ALL
5. BEING SO RESTLESS THAT IT IS HARD TO SIT STILL: SEVERAL DAYS
2. NOT BEING ABLE TO STOP OR CONTROL WORRYING: NOT AT ALL
3. WORRYING TOO MUCH ABOUT DIFFERENT THINGS: NOT AT ALL
IF YOU CHECKED OFF ANY PROBLEMS ON THIS QUESTIONNAIRE, HOW DIFFICULT HAVE THESE PROBLEMS MADE IT FOR YOU TO DO YOUR WORK, TAKE CARE OF THINGS AT HOME, OR GET ALONG WITH OTHER PEOPLE: SOMEWHAT DIFFICULT

## 2020-12-30 ASSESSMENT — PATIENT HEALTH QUESTIONNAIRE - PHQ9
5. POOR APPETITE OR OVEREATING: NOT AT ALL
SUM OF ALL RESPONSES TO PHQ QUESTIONS 1-9: 11

## 2020-12-30 NOTE — PROGRESS NOTES
"Vanessa Mtoa is a 60 year old female who is being evaluated via a billable video visit.      The patient has been notified of following:     \"This video visit will be conducted via a call between you and your physician/provider. We have found that certain health care needs can be provided without the need for an in-person physical exam.  This service lets us provide the care you need with a video conversation.  If a prescription is necessary we can send it directly to your pharmacy.  If lab work is needed we can place an order for that and you can then stop by our lab to have the test done at a later time.    Video visits are billed at different rates depending on your insurance coverage.  Please reach out to your insurance provider with any questions.    If during the course of the call the physician/provider feels a video visit is not appropriate, you will not be charged for this service.\"    Patient has given verbal consent for Video visit? Yes  How would you like to obtain your AVS? MyChart  If you are dropped from the video visit, the video invite should be resent to: Text to cell phone: [phone]   SEND TEXT LINK FOR VISIT TODAY.  Will anyone else be joining your video visit? No    Subjective     Vanessa Mota is a 60 year old female who presents today via video visit for the following health issues:    HPI     Diabetes Follow-up    How often are you checking your blood sugar? One time daily  What time of day are you checking your blood sugars (select all that apply)?  Morning  Have you had any blood sugars above 200?  Yes - over 300 after [employer] didn't have insulin, went without for a few day.  Have you had any blood sugars below 70?  No    What symptoms do you notice when your blood sugar is low?  Shaky, Weak, Lethargy and Confusion    What concerns do you have today about your diabetes? None     Do you have any of these symptoms? (Select all that apply)  Burning in feet    Have you had a diabetic eye " exam in the last 12 months? No                Hyperlipidemia Follow-Up      Are you regularly taking any medication or supplement to lower your cholesterol?   Yes- Lipitor    Are you having muscle aches or other side effects that you think could be caused by your cholesterol lowering medication?  No    Hypertension Follow-up      Do you check your blood pressure regularly outside of the clinic? No     Are you following a low salt diet? No    Are your blood pressures ever more than 140 on the top number (systolic) OR more   than 90 on the bottom number (diastolic), for example 140/90? No    BP Readings from Last 2 Encounters:   20 138/78   19 130/78     Hemoglobin A1C (%)   Date Value   10/30/2020 7.1 (H)   2020 7.0 (H)     LDL Cholesterol Calculated (mg/dL)   Date Value   10/30/2020 55   2019 47       Depression and Anxiety Follow-Up    Sees a mental health provider: Esther Teresa    How are you doing with your depression since your last visit? Worsened    How are you doing with your anxiety since your last visit?  Worsened    Are you having other symptoms that might be associated with depression or anxiety? Yes:  Insomnia, 6am to 2pm.    Have you had a significant life event? No     Do you have any concerns with your use of alcohol or other drugs? No    Social History     Tobacco Use     Smoking status: Light Tobacco Smoker     Years: 3.00     Last attempt to quit: 1/15/2013     Years since quittin.9     Smokeless tobacco: Never Used     Tobacco comment: e cig   - three puffs a day    Substance Use Topics     Alcohol use: Yes     Comment: rare     Drug use: No     PHQ 2020   PHQ-9 Total Score 15 12 11   Q9: Thoughts of better off dead/self-harm past 2 weeks Not at all Not at all Not at all     NATALIIA-7 SCORE 2020   Total Score - - -   Total Score 7 8 3     Last PHQ-9 2020   1.  Little interest or pleasure in doing things 3   2.   Feeling down, depressed, or hopeless 0   3.  Trouble falling or staying asleep, or sleeping too much 3   4.  Feeling tired or having little energy 3   5.  Poor appetite or overeating 1   6.  Feeling bad about yourself 1   7.  Trouble concentrating 0   8.  Moving slowly or restless 0   Q9: Thoughts of better off dead/self-harm past 2 weeks 0   PHQ-9 Total Score 11   Difficulty at work, home, or with people Somewhat difficult     NATALIIA-7  12/30/2020   1. Feeling nervous, anxious, or on edge 1   2. Not being able to stop or control worrying 0   3. Worrying too much about different things 0   4. Trouble relaxing 0   5. Being so restless that it is hard to sit still 1   6. Becoming easily annoyed or irritable 0   7. Feeling afraid, as if something awful might happen 1   NATALIIA-7 Total Score 3   If you checked any problems, how difficult have they made it for you to do your work, take care of things at home, or get along with other people? Somewhat difficult       Suicide Assessment Five-step Evaluation and Treatment (SAFE-T)      How many servings of fruits and vegetables do you eat daily?  0-1    On average, how many sweetened beverages do you drink each day (Examples: soda, juice, sweet tea, etc.  Do NOT count diet or artificially sweetened beverages)?   5    How many days per week do you exercise enough to make your heart beat faster? 3 or less    How many minutes a day do you exercise enough to make your heart beat faster? 9 or less    How many days per week do you miss taking your medication? 0        Video Start Time: 4:35        Review of Systems   Constitutional, HEENT, cardiovascular, pulmonary, gi and gu systems are negative, except as otherwise noted.      Objective    Vitals - Patient Reported  Weight (Patient Reported): 74.8 kg (165 lb)  Height (Patient Reported): 152.4 cm (5')  BMI (Based on Pt Reported Ht/Wt): 32.22        Physical Exam     GENERAL: Healthy, alert and no distress  EYES: Eyes grossly normal to  inspection.  No discharge or erythema, or obvious scleral/conjunctival abnormalities.  RESP: No audible wheeze, cough, or visible cyanosis.  No visible retractions or increased work of breathing.    SKIN: Visible skin clear. No significant rash, abnormal pigmentation or lesions.  NEURO: Cranial nerves grossly intact.  Mentation and speech appropriate for age.  PSYCH: Mentation appears normal, affect normal/bright, judgement and insight intact, normal speech and appearance well-groomed.              Assessment & Plan     Hyperlipidemia LDL goal <100  Stable   - atorvastatin (LIPITOR) 20 MG tablet; Take 1 tablet (20 mg) by mouth daily    Fibromyalgia  Stable   - gabapentin (NEURONTIN) 100 MG capsule; TAKE 2 CAPSULES BY MOUTH THREE TIMES DAILY    Type 2 diabetes mellitus without complication, with long-term current use of insulin (H)  Stable   - glipiZIDE (GLIPIZIDE XL) 10 MG 24 hr tablet; Take 1 tablet (10 mg) by mouth every 12 hours  - insulin glargine (BASAGLAR KWIKPEN) 100 UNIT/ML pen; Inject 78 Units Subcutaneous every morning  - metFORMIN (GLUCOPHAGE) 500 MG tablet; Take 2 tablets (1,000 mg) by mouth 2 times daily (with meals)    Hypertension goal BP (blood pressure) < 140/90  Stable   - losartan (COZAAR) 100 MG tablet; Take 1 tablet (100 mg) by mouth daily     Tobacco Cessation:   reports that she has been smoking. She has smoked for the past 3.00 years. She has never used smokeless tobacco.  Tobacco Cessation Action Plan: Information offered: Patient not interested at this time      BMI:   Estimated body mass index is 34.37 kg/m  as calculated from the following:    Height as of 2/28/20: 1.524 m (5').    Weight as of 2/28/20: 79.8 kg (176 lb).   Weight management plan: Discussed healthy diet and exercise guidelines             No follow-ups on file.    Ramona Ann Aaseby-Aguilera, PA-C  [facility]      Video-Visit Details    Type of service:  Video Visit    Video End Time:4:49  PM    Originating Location (pt. Location): Home    Distant Location (provider location):  [facility]     Platform used for Video Visit: Gael

## 2020-12-31 ASSESSMENT — ANXIETY QUESTIONNAIRES: GAD7 TOTAL SCORE: 3

## 2021-01-07 NOTE — TELEPHONE ENCOUNTER
Patient informed an declined. Savanah Melgoza    Initial Decision For Surgery?: No Risk Assessment Explanation (Does Not Render In The Note): Clinical determination of the probability and/or consequences of an event, such as surgery. Clinical assessment of the level of risk is affected by the nature of the event under consideration for the patient. Modifier 57 is used to indicate an Evaluation and Management (E/M) service resulted in the initial decision to perform surgery either the day before a major surgery (90 day global) or the day of a major surgery.

## 2021-01-12 DIAGNOSIS — M79.7 FIBROMYALGIA: ICD-10-CM

## 2021-01-12 DIAGNOSIS — G43.909 MIGRAINE WITHOUT STATUS MIGRAINOSUS, NOT INTRACTABLE, UNSPECIFIED MIGRAINE TYPE: ICD-10-CM

## 2021-01-12 DIAGNOSIS — G43.009 MIGRAINE WITHOUT AURA AND WITHOUT STATUS MIGRAINOSUS, NOT INTRACTABLE: ICD-10-CM

## 2021-01-12 DIAGNOSIS — I10 HYPERTENSION GOAL BP (BLOOD PRESSURE) < 140/90: ICD-10-CM

## 2021-01-12 RX ORDER — SUMATRIPTAN 20 MG/1
SPRAY NASAL
Refills: 0 | OUTPATIENT
Start: 2021-01-12

## 2021-01-12 RX ORDER — CYCLOBENZAPRINE HCL 10 MG
TABLET ORAL
Qty: 30 TABLET | Refills: 0 | Status: SHIPPED | OUTPATIENT
Start: 2021-01-12 | End: 2021-03-04

## 2021-01-12 RX ORDER — LOSARTAN POTASSIUM 100 MG/1
TABLET ORAL
Qty: 90 TABLET | Refills: 0 | OUTPATIENT
Start: 2021-01-12

## 2021-01-12 RX ORDER — SUMATRIPTAN 100 MG/1
TABLET, FILM COATED ORAL
Qty: 10 TABLET | Refills: 0 | OUTPATIENT
Start: 2021-01-12

## 2021-01-12 NOTE — TELEPHONE ENCOUNTER
These were recently sent  E-Prescribing Status: Receipt confirmed by pharmacy (12/30/2020  4:47 PM CST)

## 2021-01-12 NOTE — TELEPHONE ENCOUNTER
Routing refill request to provider for review/approval because:  Drug not on the [facility] refill protocol   Confirm ongoing rx  Ivanna Bryan RN, BSN  Message handled by CLINIC NURSE.

## 2021-01-14 DIAGNOSIS — G43.009 MIGRAINE WITHOUT AURA AND WITHOUT STATUS MIGRAINOSUS, NOT INTRACTABLE: ICD-10-CM

## 2021-01-14 RX ORDER — SUMATRIPTAN 100 MG/1
TABLET, FILM COATED ORAL
Qty: 10 TABLET | Refills: 0 | Status: SHIPPED | OUTPATIENT
Start: 2021-01-14 | End: 2021-03-05

## 2021-01-14 NOTE — TELEPHONE ENCOUNTER
Routing refill request to provider for review/approval because:  Labs out of range:  Number of migraines  Kelvin Thrasher RN, BSN

## 2021-02-12 DIAGNOSIS — E11.9 TYPE 2 DIABETES MELLITUS WITHOUT COMPLICATION, WITH LONG-TERM CURRENT USE OF INSULIN (H): ICD-10-CM

## 2021-02-12 DIAGNOSIS — Z79.4 TYPE 2 DIABETES MELLITUS WITHOUT COMPLICATION, WITH LONG-TERM CURRENT USE OF INSULIN (H): ICD-10-CM

## 2021-02-15 RX ORDER — INSULIN GLARGINE 100 [IU]/ML
INJECTION, SOLUTION SUBCUTANEOUS
Qty: 30 ML | Refills: 0 | Status: SHIPPED | OUTPATIENT
Start: 2021-02-15 | End: 2021-09-24

## 2021-03-04 DIAGNOSIS — M79.7 FIBROMYALGIA: ICD-10-CM

## 2021-03-04 DIAGNOSIS — G43.009 MIGRAINE WITHOUT AURA AND WITHOUT STATUS MIGRAINOSUS, NOT INTRACTABLE: ICD-10-CM

## 2021-03-04 DIAGNOSIS — G47.00 INSOMNIA, UNSPECIFIED TYPE: Primary | ICD-10-CM

## 2021-03-04 RX ORDER — ZOLPIDEM TARTRATE 10 MG/1
TABLET ORAL
Status: CANCELLED | OUTPATIENT
Start: 2021-03-04

## 2021-03-05 RX ORDER — ZOLPIDEM TARTRATE 5 MG/1
5 TABLET ORAL
Qty: 30 TABLET | Refills: 0 | Status: SHIPPED | OUTPATIENT
Start: 2021-03-05 | End: 2021-04-19

## 2021-03-05 RX ORDER — CYCLOBENZAPRINE HCL 10 MG
TABLET ORAL
Qty: 30 TABLET | Refills: 0 | Status: SHIPPED | OUTPATIENT
Start: 2021-03-05 | End: 2021-04-19

## 2021-03-05 RX ORDER — SUMATRIPTAN 100 MG/1
TABLET, FILM COATED ORAL
Qty: 10 TABLET | Refills: 0 | Status: SHIPPED | OUTPATIENT
Start: 2021-03-05 | End: 2021-04-21

## 2021-03-25 DIAGNOSIS — M79.7 FIBROMYALGIA: ICD-10-CM

## 2021-03-26 RX ORDER — GABAPENTIN 100 MG/1
CAPSULE ORAL
Qty: 180 CAPSULE | Refills: 1 | Status: SHIPPED | OUTPATIENT
Start: 2021-03-26 | End: 2021-05-25

## 2021-04-18 DIAGNOSIS — M79.7 FIBROMYALGIA: ICD-10-CM

## 2021-04-18 DIAGNOSIS — G47.00 INSOMNIA, UNSPECIFIED TYPE: ICD-10-CM

## 2021-04-19 RX ORDER — ZOLPIDEM TARTRATE 5 MG/1
TABLET ORAL
Qty: 30 TABLET | Refills: 0 | Status: SHIPPED | OUTPATIENT
Start: 2021-04-19 | End: 2021-05-18

## 2021-04-19 RX ORDER — CYCLOBENZAPRINE HCL 10 MG
TABLET ORAL
Qty: 30 TABLET | Refills: 0 | Status: SHIPPED | OUTPATIENT
Start: 2021-04-19 | End: 2021-05-18

## 2021-04-21 ENCOUNTER — TELEPHONE (OUTPATIENT)
Dept: FAMILY MEDICINE | Facility: CLINIC | Age: 61
End: 2021-04-21

## 2021-04-21 DIAGNOSIS — G43.009 MIGRAINE WITHOUT AURA AND WITHOUT STATUS MIGRAINOSUS, NOT INTRACTABLE: ICD-10-CM

## 2021-04-21 RX ORDER — SUMATRIPTAN 100 MG/1
TABLET, FILM COATED ORAL
Qty: 10 TABLET | Refills: 0 | Status: SHIPPED | OUTPATIENT
Start: 2021-04-21 | End: 2021-07-21

## 2021-04-21 NOTE — TELEPHONE ENCOUNTER
Routing refill request to provider for review/approval because:  Vitals not current:  BP last BP > one year ago.         Rerun Protocol (4/21/2021 4:07 AM)     Blood pressure under 140/90 in past 12 months        BP Readings from Last 3 Encounters:   02/28/20 138/78   06/25/19 130/78   06/12/19 134/76           Suzette Bradford R.N.

## 2021-04-22 ENCOUNTER — TELEPHONE (OUTPATIENT)
Dept: FAMILY MEDICINE | Facility: CLINIC | Age: 61
End: 2021-04-22

## 2021-04-22 NOTE — TELEPHONE ENCOUNTER
MTM referral from: [facility] visit (referral by provider)    MTM referral outreach attempt #1 on April 22, 2021 at 2:19 PM      Outcome: Patient declined. Patient would like pcp to tell her why she needs this appt., will route to MTM Pharmacist/Provider as an FYI. Thank you for the referral.     Johnathan Leo, MTM coordinator

## 2021-04-23 DIAGNOSIS — G43.009 MIGRAINE WITHOUT AURA AND WITHOUT STATUS MIGRAINOSUS, NOT INTRACTABLE: ICD-10-CM

## 2021-04-23 RX ORDER — SUMATRIPTAN 100 MG/1
TABLET, FILM COATED ORAL
Qty: 10 TABLET | Refills: 0 | OUTPATIENT
Start: 2021-04-23

## 2021-04-23 NOTE — TELEPHONE ENCOUNTER
Too soon for refill  Just sent 2 days ago    E-Prescribing Status: Receipt confirmed by pharmacy (4/21/2021 12:15 PM CDT)

## 2021-04-27 NOTE — TELEPHONE ENCOUNTER
Please let her know that we offer this when patient is on multple meds as pharmacist can review and make sure taking correctly and review side effects and dosing.  Actually a great resource.

## 2021-05-18 DIAGNOSIS — M79.7 FIBROMYALGIA: ICD-10-CM

## 2021-05-18 DIAGNOSIS — G47.00 INSOMNIA, UNSPECIFIED TYPE: ICD-10-CM

## 2021-05-18 RX ORDER — CYCLOBENZAPRINE HCL 10 MG
TABLET ORAL
Qty: 30 TABLET | Refills: 0 | Status: SHIPPED | OUTPATIENT
Start: 2021-05-18 | End: 2021-05-25

## 2021-05-18 NOTE — TELEPHONE ENCOUNTER
Routing refill request to provider for review/approval because:  Drug not on the [facility] refill protocol     Last fill 4/19    Fatuma Lewis, RN

## 2021-05-19 RX ORDER — ZOLPIDEM TARTRATE 5 MG/1
TABLET ORAL
Qty: 30 TABLET | Refills: 5 | Status: SHIPPED | OUTPATIENT
Start: 2021-05-19 | End: 2021-11-11

## 2021-05-23 DIAGNOSIS — M79.7 FIBROMYALGIA: ICD-10-CM

## 2021-05-25 RX ORDER — GABAPENTIN 100 MG/1
CAPSULE ORAL
Qty: 180 CAPSULE | Refills: 1 | Status: SHIPPED | OUTPATIENT
Start: 2021-05-25 | End: 2021-09-24

## 2021-05-25 RX ORDER — CYCLOBENZAPRINE HCL 10 MG
TABLET ORAL
Qty: 30 TABLET | Refills: 0 | Status: SHIPPED | OUTPATIENT
Start: 2021-05-25 | End: 2021-06-18

## 2021-06-05 ENCOUNTER — HEALTH MAINTENANCE LETTER (OUTPATIENT)
Age: 61
End: 2021-06-05

## 2021-06-17 DIAGNOSIS — M79.7 FIBROMYALGIA: ICD-10-CM

## 2021-06-17 NOTE — TELEPHONE ENCOUNTER
Routing refill request to provider for review/approval because:  Drug not on the [facility] refill protocol   Has appt 6/22/21    Ana Perez RN

## 2021-06-18 RX ORDER — CYCLOBENZAPRINE HCL 10 MG
TABLET ORAL
Qty: 30 TABLET | Refills: 0 | Status: SHIPPED | OUTPATIENT
Start: 2021-06-18 | End: 2021-07-20

## 2021-07-16 DIAGNOSIS — M79.7 FIBROMYALGIA: ICD-10-CM

## 2021-07-19 DIAGNOSIS — G43.009 MIGRAINE WITHOUT AURA AND WITHOUT STATUS MIGRAINOSUS, NOT INTRACTABLE: ICD-10-CM

## 2021-07-20 RX ORDER — CYCLOBENZAPRINE HCL 10 MG
TABLET ORAL
Qty: 30 TABLET | Refills: 0 | Status: SHIPPED | OUTPATIENT
Start: 2021-07-20 | End: 2021-09-10

## 2021-07-20 NOTE — TELEPHONE ENCOUNTER
Attempted to reach patient to check if they have used 8 or more treatments in a 30 day period. LVMTCB.     Cliff LANDON RN

## 2021-07-21 RX ORDER — SUMATRIPTAN 100 MG/1
TABLET, FILM COATED ORAL
Qty: 10 TABLET | Refills: 0 | Status: SHIPPED | OUTPATIENT
Start: 2021-07-21 | End: 2021-09-24

## 2021-07-21 NOTE — TELEPHONE ENCOUNTER
Routing refill request to provider for review/approval because:  A break in medication-no refills since 9/2020  Pt has not returned our calls to confirm, ok to refill    Ivanna Bryan RN, BSN  Message handled by CLINIC NURSE.

## 2021-09-09 DIAGNOSIS — M79.7 FIBROMYALGIA: ICD-10-CM

## 2021-09-10 RX ORDER — CYCLOBENZAPRINE HCL 10 MG
TABLET ORAL
Qty: 30 TABLET | Refills: 0 | Status: SHIPPED | OUTPATIENT
Start: 2021-09-10 | End: 2021-10-12

## 2021-09-10 NOTE — TELEPHONE ENCOUNTER
Routing refill request to provider for review/approval because:  Drug not on the [facility] refill protocol     Tiffani Dan RN   [facility]  -- Triage Nurse

## 2021-09-12 NOTE — PROGRESS NOTES
Medication Therapy Management (MTM) Encounter    ASSESSMENT:                            Medication Adherence/Access: No issues identified    Type 2 Diabetes: Patient is not meeting A1c goal of < 7%. Self monitoring of blood glucose is at goal of fasting  mg/dL. Patient is meeting average glucose goal of <150mg/dl Patient would benefit from minimum SMBG: Check blood sugars fasting, and occasionally 2 hours after starting a meal.  Continue current doses of Metformin, Basaglar, Glipizide as is .  Due for A1c lab recheck 9-24-21.    Hyperlipidemia: Stable.  Patient is on moderate intensity statin which is indicated based on 2019 ACC/AHA guidelines for lipid management.    Recheck fasting lipids 9-24-21.       Anxiety/Depression:   Stable on current meds.       DPNP:  Stable.     Hypertension: Stable. Patient is meeting blood pressure goal of < 140/90mmHg.    Constipation:  Lets trial daily Miralax ---see if that helps --see plan for details.     GERD: needs improvement --unsure if meds like melatonin, or nicotine lozenges are cause of her nausea/vomiting -- lets retrial daily 40mg. Omeprazole qam --see if this helps?    Insomnia: Patient may benefit from stopping melatonin due to potential nausea SE and Zolpidem staying at the same dose.    PLAN:                            1.  FYI--Lets re-try your Omeprazole 40mg. Pill-- 1 pill daily first thing in AM before any food(about 30 minutes or so). See if this calms your stomach and stops the nausea/vomiting.    2. FYI-- Please try some generic brand of Miralax - take this daily but control amount you take daily --place powder in glass of water or even your coke and drink daily --you want to take it regularly enough to have a BM at least every other day .    3. FYI--Melatonin can cause these SE's:  The most common melatonin side effects include:  Headache  Dizziness  Nausea  Drowsiness    Use your generic Ambien daily for sleep.     4. FYI--Just a reminder proper way to  use your Nicotine lozenge:  To use properly, put the lozenge in your mouth between your gums and your cheek. You may feel a warm or tingling sensation. Allow the lozenge to dissolve slowly over 20-30 minutes, moving it around every so often from one side of your mouth to the other. Do not chew, suck, or swallow it.      Follow-up: Return in about 23 days (around 10/6/2021), or 4 PM., for Medication Therapy Management Visit, GERD, constipation.  See Yessi for office visit on 21 --fast 10-12 hours on water only - for lab rechecks.     SUBJECTIVE/OBJECTIVE:                          Vanessa Mota is a 61 year old female called for an initial visit. She was referred to me from Aaseby-Aguilera, Ramona Ann  .      Reason for visit:   Reason for Referral:  Failing BPA:Please let her know that we offer this when patient is on multple meds as pharmacist can review and make sure taking correctly and review side effects and dosing.    Asking about melatonin for sleep.       Allergies/ADRs: Reviewed in chart  Past Medical History: Reviewed in chart; new issue is severe constipation, stomach cramps. Some N@V as well since 2 months ago(occurs afternoon/pm).   Tobacco: She reports that she has been smoking. She has smoked for the past 3.00 years. She has never used smokeless tobacco.Tobacco Cessation Action Plan:   Pharmacotherapies : other Nicotine replacement--uses lozenges - 8/day .   Alcohol: 1-3 beverages / week  Caffeine: drinks regular coke --5 cans /day .   Social: retired  Personal Healthcare Goals: correct nausea/vomiting issue  Activity: not real active , gentle yoga , walk stairs at home.     Medication Adherence/Access: not adherent with fish oils.     Type 2 Diabetes:  Currently taking : glipizide 10mg. ER twice daily(2nd dose at bedtime), Basaglar -55 units daily in AM, Metformin --2 tabs twice daily . Patient is not experiencing side effects.  Blood sugar monitorin time(s) daily. Ranges (patient reported):  Fasting- 110  Symptoms of low blood sugar? Weak, grouchy, anxious, hot flash (bs's in the 70's triggers these symptoms).   Symptoms of high blood sugar? fatigue  Eye exam: up to date  Foot exam: up to date  Diet/Exercise:     Aspirin: Taking 81mg daily at bedtime and denies side effects  Statin: Yes: Atorvastatin   ACEi/ARB: Yes: Losartan.   Urine Albumin:   Lab Results   Component Value Date    UMALCR 23.09 09/06/2018      Lab Results   Component Value Date    A1C 7.1 10/30/2020    A1C 7.0 02/28/2020    A1C 7.5 05/28/2019    A1C 6.9 09/06/2018    A1C 7.0 01/09/2018       Hyperlipidemia: Current therapy includes :Atorvastatin 20mg daily, fish oils -4 pills /day but admits takes 2-3 x week. .  Patient reports no significant myalgias or other side effects.  The ASCVD Risk score (Wacolien SIEGEL Jr., et al., 2013) failed to calculate for the following reasons:    The systolic blood pressure is missing  Recent Labs   Lab Test 10/30/20  1424 06/25/19  1436 02/10/15  1047 08/26/14  1224   CHOL 131 125 150 145   HDL 52 53 57 49*   LDL 55 47 71 69   TRIG 122 125 110 136   CHOLHDLRATIO  --   --  2.6 3.0         Anxiety/Depression:     Taking buspirone 30mg. Bid -it helps , prozac 20mg./day.    She stopped this drug last October(Duloxetine 60mg.)    DPNP:  Takes 5l056vs Gabapentin . Pills --3 x day for general pain relief -they work well.     Hypertension: Current medications include : Losartan 100mg./day .  Patient does not self-monitor blood pressure.  Patient reports no current medication side effects.  BP Readings from Last 3 Encounters:   02/28/20 138/78   06/25/19 130/78   06/12/19 134/76     GERD: Current medications include: No medication for past 1.5 years .  Pt reports nausea/and some vomiting afternoon/ evening. Unsure if medication related?   She is unsure why she stopped this drug 1.5 years ago.           Constipation :  She takes bisacodyl 5mg. - as needed -about 1 x week.  Feels stomach cramps /not having a BM very  often .   Never tried miralax.     Insomnia: Current medications include: melatonin 10mg . and zolpidem 5mg. /day. Patient reports trouble falling asleep,  depression and anxiety.       I spent 50 minutes with this patient today. All changes were made via collaborative practice agreement with Ramona Ann Aaseby-Aguilera, PA-C. A copy of the visit note was provided to the patient's primary care provider.    The patient was sent via [employer] a summary of these recommendations.     Vidhya Pruitt Rph.  Medication Therapy Management Provider  [phone]      Telemedicine Visit Details  Type of service:  Telephone visit  Start Time: 4PM  End Time: 4:50pm.   Originating Location (patient location): Home  Distant Location (provider location):  [facility]     Medication Therapy Recommendations  No medication therapy recommendations to display

## 2021-09-13 ENCOUNTER — VIRTUAL VISIT (OUTPATIENT)
Dept: PHARMACY | Facility: CLINIC | Age: 61
End: 2021-09-13
Attending: PHYSICIAN ASSISTANT
Payer: COMMERCIAL

## 2021-09-13 DIAGNOSIS — E78.00 PURE HYPERCHOLESTEROLEMIA: ICD-10-CM

## 2021-09-13 DIAGNOSIS — E11.9 TYPE 2 DIABETES MELLITUS WITHOUT COMPLICATION, WITH LONG-TERM CURRENT USE OF INSULIN (H): Primary | ICD-10-CM

## 2021-09-13 DIAGNOSIS — F32.0 MILD MAJOR DEPRESSION (H): ICD-10-CM

## 2021-09-13 DIAGNOSIS — I10 HYPERTENSION GOAL BP (BLOOD PRESSURE) < 140/90: ICD-10-CM

## 2021-09-13 DIAGNOSIS — G47.00 INSOMNIA, UNSPECIFIED TYPE: ICD-10-CM

## 2021-09-13 DIAGNOSIS — F41.9 ANXIETY: ICD-10-CM

## 2021-09-13 DIAGNOSIS — R12 HEART BURN: ICD-10-CM

## 2021-09-13 DIAGNOSIS — K59.03 DRUG-INDUCED CONSTIPATION: ICD-10-CM

## 2021-09-13 DIAGNOSIS — Z79.4 TYPE 2 DIABETES MELLITUS WITHOUT COMPLICATION, WITH LONG-TERM CURRENT USE OF INSULIN (H): Primary | ICD-10-CM

## 2021-09-13 PROCEDURE — 99605 MTMS BY PHARM NP 15 MIN: CPT | Performed by: PHARMACIST

## 2021-09-13 PROCEDURE — 99607 MTMS BY PHARM ADDL 15 MIN: CPT | Performed by: PHARMACIST

## 2021-09-13 RX ORDER — PHENOL 1.4 %
10 AEROSOL, SPRAY (ML) MUCOUS MEMBRANE AT BEDTIME
COMMUNITY
End: 2021-10-06

## 2021-09-13 RX ORDER — OMEPRAZOLE 40 MG/1
40 CAPSULE, DELAYED RELEASE ORAL DAILY
Qty: 90 CAPSULE | Refills: 1 | Status: ON HOLD | OUTPATIENT
Start: 2021-09-13 | End: 2023-07-21

## 2021-09-13 NOTE — Clinical Note
Yessi--socorro for mtm referral --had a nice chat with Vanessa--I restarted her ppi drug --lets see if that helps alleviate this nausea/vomiting issue ? Also will start her on daily Miralax , will stop or hold melatonin for now see if that helps nausea issue as well.    She will see you on 9-24-21 for fasting labs --.    [place], Vidhya Pruitt [facility].  Medication Therapy Management Provider  [phone]

## 2021-09-13 NOTE — PATIENT INSTRUCTIONS
Recommendations from today's MTM visit:                                                    MTM (medication therapy management) is a service provided by a clinical pharmacist designed to help you get the most of out of your medicines.   Today we reviewed what your medicines are for, how to know if they are working, that your medicines are safe and how to make your medicine regimen as easy as possible.      1.  FYI--Lets re-try your Omeprazole 40mg. Pill-- 1 pill daily first thing in AM before any food(about 30 minutes or so). See if this calms your stomach and stops the nausea/vomiting.    2. FYI-- Please try some generic brand of Miralax - take this daily but control amount you take daily --place powder in glass of water or even your coke and drink daily --you want to take it regularly enough to have a BM at least every other day .    3. FYI--Melatonin can cause these SE's:  The most common melatonin side effects include:  Headache  Dizziness  Nausea  Drowsiness    Use your generic Ambien daily for sleep.         Follow-up: Return in about 23 days (around 10/6/2021), or 4 PM., for Medication Therapy Management Visit, GERD, constipation.  See Yessi for office visit on 9-24-21 --fast 10-12 hours on water only - for lab rechecks.     It was great to speak with you today.  I value your experience and would be very thankful for your time with providing feedback on our clinic survey. You may receive a survey via email or text message in the next few days.     To schedule another MTM appointment, please call the clinic directly or you may call the MTM scheduling line at [phone] or toll-free at [phone].     My Clinical Pharmacist's contact information:                                                      Please feel free to contact me with any questions or concerns you have.      Vidhya Pruitt [facility].  Medication Therapy Management Provider  [phone]

## 2021-09-18 DIAGNOSIS — E78.5 HYPERLIPIDEMIA LDL GOAL <100: ICD-10-CM

## 2021-09-20 NOTE — TELEPHONE ENCOUNTER
Routing refill request to provider for review/approval because:  A break in medication    Last filled in Dec for 6 month supply    pt has appt in 4 days    Fatuma Lewis RN

## 2021-09-21 RX ORDER — ATORVASTATIN CALCIUM 20 MG/1
TABLET, FILM COATED ORAL
Qty: 90 TABLET | Refills: 0 | Status: SHIPPED | OUTPATIENT
Start: 2021-09-21 | End: 2021-09-24

## 2021-09-24 ENCOUNTER — OFFICE VISIT (OUTPATIENT)
Dept: FAMILY MEDICINE | Facility: CLINIC | Age: 61
End: 2021-09-24
Payer: COMMERCIAL

## 2021-09-24 VITALS
DIASTOLIC BLOOD PRESSURE: 84 MMHG | HEIGHT: 59 IN | OXYGEN SATURATION: 97 % | HEART RATE: 74 BPM | BODY MASS INDEX: 34.47 KG/M2 | SYSTOLIC BLOOD PRESSURE: 210 MMHG | TEMPERATURE: 98.2 F | WEIGHT: 171 LBS

## 2021-09-24 DIAGNOSIS — E11.9 TYPE 2 DIABETES MELLITUS WITHOUT COMPLICATION, WITH LONG-TERM CURRENT USE OF INSULIN (H): ICD-10-CM

## 2021-09-24 DIAGNOSIS — Z00.00 ROUTINE GENERAL MEDICAL EXAMINATION AT A HEALTH CARE FACILITY: ICD-10-CM

## 2021-09-24 DIAGNOSIS — Z78.0 POSTMENOPAUSAL STATUS: ICD-10-CM

## 2021-09-24 DIAGNOSIS — Z79.4 TYPE 2 DIABETES MELLITUS WITHOUT COMPLICATION, WITH LONG-TERM CURRENT USE OF INSULIN (H): ICD-10-CM

## 2021-09-24 DIAGNOSIS — N95.0 POSTMENOPAUSAL BLEEDING: ICD-10-CM

## 2021-09-24 DIAGNOSIS — Z12.11 SCREEN FOR COLON CANCER: ICD-10-CM

## 2021-09-24 DIAGNOSIS — Z12.31 VISIT FOR SCREENING MAMMOGRAM: ICD-10-CM

## 2021-09-24 DIAGNOSIS — E78.5 HYPERLIPIDEMIA LDL GOAL <100: ICD-10-CM

## 2021-09-24 DIAGNOSIS — E66.01 MORBID OBESITY (H): ICD-10-CM

## 2021-09-24 DIAGNOSIS — I10 HYPERTENSION GOAL BP (BLOOD PRESSURE) < 140/90: ICD-10-CM

## 2021-09-24 DIAGNOSIS — G43.009 MIGRAINE WITHOUT AURA AND WITHOUT STATUS MIGRAINOSUS, NOT INTRACTABLE: ICD-10-CM

## 2021-09-24 DIAGNOSIS — Z72.0 TOBACCO ABUSE: Primary | ICD-10-CM

## 2021-09-24 DIAGNOSIS — M79.7 FIBROMYALGIA: ICD-10-CM

## 2021-09-24 DIAGNOSIS — Z87.891 PERSONAL HISTORY OF TOBACCO USE: ICD-10-CM

## 2021-09-24 DIAGNOSIS — Z11.51 SCREENING FOR HUMAN PAPILLOMAVIRUS: ICD-10-CM

## 2021-09-24 LAB
ERYTHROCYTE [DISTWIDTH] IN BLOOD BY AUTOMATED COUNT: 14.1 % (ref 10–15)
HBA1C MFR BLD: 6.9 % (ref 0–5.6)
HCT VFR BLD AUTO: 41.3 % (ref 35–47)
HGB BLD-MCNC: 14.2 G/DL (ref 11.7–15.7)
MCH RBC QN AUTO: 28.3 PG (ref 26.5–33)
MCHC RBC AUTO-ENTMCNC: 34.4 G/DL (ref 31.5–36.5)
MCV RBC AUTO: 82 FL (ref 78–100)
PLATELET # BLD AUTO: 236 10E3/UL (ref 150–450)
RBC # BLD AUTO: 5.01 10E6/UL (ref 3.8–5.2)
WBC # BLD AUTO: 10.9 10E3/UL (ref 4–11)

## 2021-09-24 PROCEDURE — 84443 ASSAY THYROID STIM HORMONE: CPT | Performed by: PHYSICIAN ASSISTANT

## 2021-09-24 PROCEDURE — 87624 HPV HI-RISK TYP POOLED RSLT: CPT | Performed by: PHYSICIAN ASSISTANT

## 2021-09-24 PROCEDURE — 36415 COLL VENOUS BLD VENIPUNCTURE: CPT | Performed by: PHYSICIAN ASSISTANT

## 2021-09-24 PROCEDURE — 99396 PREV VISIT EST AGE 40-64: CPT | Mod: 25 | Performed by: PHYSICIAN ASSISTANT

## 2021-09-24 PROCEDURE — 82043 UR ALBUMIN QUANTITATIVE: CPT | Performed by: PHYSICIAN ASSISTANT

## 2021-09-24 PROCEDURE — 90682 RIV4 VACC RECOMBINANT DNA IM: CPT | Performed by: PHYSICIAN ASSISTANT

## 2021-09-24 PROCEDURE — 85027 COMPLETE CBC AUTOMATED: CPT | Performed by: PHYSICIAN ASSISTANT

## 2021-09-24 PROCEDURE — 99214 OFFICE O/P EST MOD 30 MIN: CPT | Mod: 25 | Performed by: PHYSICIAN ASSISTANT

## 2021-09-24 PROCEDURE — 80053 COMPREHEN METABOLIC PANEL: CPT | Performed by: PHYSICIAN ASSISTANT

## 2021-09-24 PROCEDURE — 90471 IMMUNIZATION ADMIN: CPT | Performed by: PHYSICIAN ASSISTANT

## 2021-09-24 PROCEDURE — 99207 PR FOOT EXAM NO CHARGE: CPT | Mod: 25 | Performed by: PHYSICIAN ASSISTANT

## 2021-09-24 PROCEDURE — G0145 SCR C/V CYTO,THINLAYER,RESCR: HCPCS | Performed by: PHYSICIAN ASSISTANT

## 2021-09-24 PROCEDURE — 83036 HEMOGLOBIN GLYCOSYLATED A1C: CPT | Performed by: PHYSICIAN ASSISTANT

## 2021-09-24 RX ORDER — AMLODIPINE BESYLATE 5 MG/1
5 TABLET ORAL DAILY
Qty: 30 TABLET | Refills: 1 | Status: SHIPPED | OUTPATIENT
Start: 2021-09-24 | End: 2021-10-13 | Stop reason: DRUGHIGH

## 2021-09-24 RX ORDER — INSULIN GLARGINE 100 [IU]/ML
54 INJECTION, SOLUTION SUBCUTANEOUS EVERY MORNING
Qty: 3 ML | Refills: 11 | Status: SHIPPED | OUTPATIENT
Start: 2021-09-24 | End: 2021-09-24

## 2021-09-24 RX ORDER — GABAPENTIN 100 MG/1
200 CAPSULE ORAL 3 TIMES DAILY
Qty: 180 CAPSULE | Refills: 11 | Status: SHIPPED | OUTPATIENT
Start: 2021-09-24 | End: 2022-08-18

## 2021-09-24 RX ORDER — LOSARTAN POTASSIUM 100 MG/1
100 TABLET ORAL DAILY
Qty: 90 TABLET | Refills: 3 | Status: SHIPPED | OUTPATIENT
Start: 2021-09-24 | End: 2022-08-18

## 2021-09-24 RX ORDER — ATORVASTATIN CALCIUM 20 MG/1
20 TABLET, FILM COATED ORAL DAILY
Qty: 90 TABLET | Refills: 3 | Status: SHIPPED | OUTPATIENT
Start: 2021-09-24 | End: 2022-08-18

## 2021-09-24 RX ORDER — SUMATRIPTAN 100 MG/1
TABLET, FILM COATED ORAL
Qty: 10 TABLET | Refills: 0 | Status: SHIPPED | OUTPATIENT
Start: 2021-09-24 | End: 2021-11-04

## 2021-09-24 RX ORDER — GLIPIZIDE 10 MG/1
10 TABLET, FILM COATED, EXTENDED RELEASE ORAL EVERY 12 HOURS
Qty: 180 TABLET | Refills: 3 | Status: SHIPPED | OUTPATIENT
Start: 2021-09-24 | End: 2022-08-18

## 2021-09-24 RX ORDER — INSULIN GLARGINE 100 [IU]/ML
INJECTION, SOLUTION SUBCUTANEOUS
Qty: 9 ML | Refills: 11 | Status: SHIPPED | OUTPATIENT
Start: 2021-09-24 | End: 2022-05-17

## 2021-09-24 ASSESSMENT — ENCOUNTER SYMPTOMS
ARTHRALGIAS: 1
DIARRHEA: 0
FEVER: 0
SHORTNESS OF BREATH: 0
SORE THROAT: 0
MYALGIAS: 0
DIZZINESS: 0
CONSTIPATION: 1
COUGH: 0
DYSURIA: 0
PARESTHESIAS: 0
PALPITATIONS: 0
HEADACHES: 1
WEAKNESS: 0
FREQUENCY: 1
EYE PAIN: 0
HEARTBURN: 1
NAUSEA: 0
HEMATURIA: 0
CHILLS: 0
ABDOMINAL PAIN: 0
NERVOUS/ANXIOUS: 1
JOINT SWELLING: 0
HEMATOCHEZIA: 0

## 2021-09-24 ASSESSMENT — PATIENT HEALTH QUESTIONNAIRE - PHQ9
SUM OF ALL RESPONSES TO PHQ QUESTIONS 1-9: 8
10. IF YOU CHECKED OFF ANY PROBLEMS, HOW DIFFICULT HAVE THESE PROBLEMS MADE IT FOR YOU TO DO YOUR WORK, TAKE CARE OF THINGS AT HOME, OR GET ALONG WITH OTHER PEOPLE: SOMEWHAT DIFFICULT
SUM OF ALL RESPONSES TO PHQ QUESTIONS 1-9: 8

## 2021-09-24 ASSESSMENT — MIFFLIN-ST. JEOR: SCORE: 1255.24

## 2021-09-24 NOTE — TELEPHONE ENCOUNTER
Pharmacy looking for clarification:    Pharmacy comment: Please clarify the directions  for this prescription. This came over as ordering one pen with 11 refills. Did you want 3 boxes with 11 rfs?    MARY ANN HartmanN, RN  [facility]

## 2021-09-24 NOTE — PROGRESS NOTES
Prior to immunization administration, verified patients identity using patient s name and date of birth. Please see Immunization Activity for additional information.     Screening Questionnaire for Adult Immunization    Are you sick today?   No   Do you have allergies to medications, food, a vaccine component or latex?   No   Have you ever had a serious reaction after receiving a vaccination?   No   Do you have a long-term health problem with heart, lung, kidney, or metabolic disease (e.g., diabetes), asthma, a blood disorder, no spleen, complement component deficiency, a cochlear implant, or a spinal fluid leak?  Are you on long-term aspirin therapy?   No   Do you have cancer, leukemia, HIV/AIDS, or any other immune system problem?   No   Do you have a parent, brother, or sister with an immune system problem?   No   In the past 3 months, have you taken medications that affect  your immune system, such as prednisone, other steroids, or anticancer drugs; drugs for the treatment of rheumatoid arthritis, Crohn s disease, or psoriasis; or have you had radiation treatments?   No   Have you had a seizure, or a brain or other nervous system problem?   No   During the past year, have you received a transfusion of blood or blood    products, or been given immune (gamma) globulin or antiviral drug?   No   For women: Are you pregnant or is there a chance you could become       pregnant during the next month?   No   Have you received any vaccinations in the past 4 weeks?   No     Immunization questionnaire answers were all negative.        Per orders of Ramona Aasevy Aguilera PA-C, injection of Influenza given by Fatuma Phillips. Patient instructed to remain in clinic for 15 minutes afterwards, and to report any adverse reaction to me immediately.       Screening performed by Fatuma Phillips on 9/24/2021 at 4:29 PM.

## 2021-09-24 NOTE — PATIENT INSTRUCTIONS
elevated blood pressure:  Well add amlodipine 5 mg.  Will have come in for nurse only BP check x 2 and then follow-up for office visit in 1 month with BP diary.      DUB: well need to get pelvic US and follow-up with ob/gyn for endometrial biopsy       Preventive Health Recommendations  Female Ages 50 - 64    Yearly exam: See your health care provider every year in order to  o Review health changes.   o Discuss preventive care.    o Review your medicines if your doctor has prescribed any.      Get a Pap test every three years (unless you have an abnormal result and your provider advises testing more often).    If you get Pap tests with HPV test, you only need to test every 5 years, unless you have an abnormal result.     You do not need a Pap test if your uterus was removed (hysterectomy) and you have not had cancer.    You should be tested each year for STDs (sexually transmitted diseases) if you're at risk.     Have a mammogram every 1 to 2 years.    Have a colonoscopy at age 50, or have a yearly FIT test (stool test). These exams screen for colon cancer.      Have a cholesterol test every 5 years, or more often if advised.    Have a diabetes test (fasting glucose) every three years. If you are at risk for diabetes, you should have this test more often.     If you are at risk for osteoporosis (brittle bone disease), think about having a bone density scan (DEXA).    Shots: Get a flu shot each year. Get a tetanus shot every 10 years.    Nutrition:     Eat at least 5 servings of fruits and vegetables each day.    Eat whole-grain bread, whole-wheat pasta and brown rice instead of white grains and rice.    Get adequate Calcium and Vitamin D.     Lifestyle    Exercise at least 150 minutes a week (30 minutes a day, 5 days a week). This will help you control your weight and prevent disease.    Limit alcohol to one drink per day.    No smoking.     Wear sunscreen to prevent skin cancer.     See your dentist every six  months for an exam and cleaning.    See your eye doctor every 1 to 2 years.    Lung Cancer Screening   Frequently Asked Questions  If you are at high-risk for lung cancer, getting screened with low-dose computed tomography (LDCT) every year can help save your life. This handout offers answers to some of the most common questions about lung cancer screening. If you have other questions, please call [phone] ([phone]).     What is it?  Lung cancer screening uses special X-ray technology to create an image of your lung tissue. The exam is quick and easy and takes less than 10 seconds. We don t give you any medicine or use any needles. You can eat before and after the exam. You don t need to change your clothes as long as the clothing on your chest doesn t contain metal. But, you do need to be able to hold your breath for at least 6 seconds during the exam.    What is the goal of lung cancer screening?  The goal of lung cancer screening is to save lives. Many times, lung cancer is not found until a person starts having physical symptoms. Lung cancer screening can help detect lung cancer in the earliest stages when it may be easier to treat.    Who should be screened for lung cancer?  We suggest lung cancer screening for anyone who is at high-risk for lung cancer. You are in the high-risk group if you:      are between the ages of 55 and 79, and    have smoked at least 1 pack of cigarettes a day for 30 or more years, and    still smoke or have quit within the past 15 years.    However, if you have a new cough or shortness of breath, you should talk to your doctor before being screened.    Some national lung health advocacy groups also recommend screening for people ages 50 to 79 who have smoked an average of 1 pack of cigarettes a day for 20 years. They must also have at least 1 other risk factor for lung cancer, not including exposure to secondhand smoke. Other risk factors are having had cancer in the  past, emphysema, pulmonary fibrosis, COPD, a family history of lung cancer, or exposure to certain materials such as arsenic, asbestos, beryllium, cadmium, chromium, diesel fumes, nickel, radon or silica. Your care team can help you know if you have one of these risk factors.     Why does it matter if I have symptoms?  Certain symptoms can be a sign that you have a condition in your lungs that should be checked and treated by your doctor. These symptoms include fever, chest pain, a new or changing cough, shortness of breath that you have never felt before, coughing up blood or unexplained weight loss. Having any of these symptoms can greatly affect the results of lung cancer screening.       Should all smokers get an LDCT lung cancer screening exam?  It depends. Lung cancer screening is for a very specific group of men and women who have a history of heavy smoking over a long period of time (see  Who should be screened for lung cancer  above).  I am in the high-risk group, but have been diagnosed with cancer in the past. Is LDCT lung cancer screening right for me?  In some cases, you should not have LDCT lung screening, such as when your doctor is already following your cancer with CT scan studies. Your doctor will help you decide if LDCT lung screening is right for you.  Do I need to have a screening exam every year?  Yes. If you are in the high-risk group described earlier, you should get an LDCT lung cancer screening exam every year until you are 79, or are no longer willing or able to undergo screening and possible procedures to diagnose and treat lung cancer.  How effective is LDCT at preventing death from lung cancer?  Studies have shown that LDCT lung cancer screening can lower the risk of death from lung cancer by 20 percent in people who are at high-risk.  What are the risks?  There are some risks and limitations of LDCT lung cancer screening. We want to make sure you understand the risks and benefits, so  please let us know if you have any questions. Your doctor may want to talk with you more about these risks.    Radiation exposure: As with any exam that uses radiation, there is a very small increased risk of cancer. The amount of radiation in LDCT is small--about the same amount a person would get from a mammogram. Your doctor orders the exam when he or she feels the potential benefits outweigh the risks.    False negatives: No test is perfect, including LDCT. It is possible that you may have a medical condition, including lung cancer, that is not found during your exam. This is called a false negative result.    False positives and more testing: LDCT very often finds something in the lung that could be cancer, but in fact is not. This is called a false positive result. False positive tests often cause anxiety. To make sure these findings are not cancer, you may need to have more tests. These tests will be done only if you give us permission. Sometimes patients need a treatment that can have side effects, such as a biopsy. For more information on false positives, see  What can I expect from the results?     Findings not related to lung cancer: Your LDCT exam also takes pictures of areas of your body next to your lungs. In a very small number of cases, the CT scan will show an abnormal finding in one of these areas, such as your kidneys, adrenal glands, liver or thyroid. This finding may not be serious, but you may need more tests. Your doctor can help you decide what other tests you may need, if any.  What can I expect from the results?  About 1 out of 4 LDCT exams will find something that may need more tests. Most of the time, these findings are lung nodules. Lung nodules are very small collections of tissue in the lung. These nodules are very common, and the vast majority--more than 97 percent--are not cancer (benign). Most are normal lymph nodes or small areas of scarring from past infections.  But, if a small  lung nodule is found to be cancer, the cancer can be cured more than 90 percent of the time. To know if the nodule is cancer, we may need to get more images before your next yearly screening exam. If the nodule has suspicious features (for example, it is large, has an odd shape or grows over time), we will refer you to a specialist for further testing.  Will my doctor also get the results?  Yes. Your doctor will get a copy of your results.  Is it okay to keep smoking now that there s a cancer screening exam?  No. Tobacco is one of the strongest cancer-causing agents. It causes not only lung cancer, but other cancers and cardiovascular (heart) diseases as well. The damage caused by smoking builds over time. This means that the longer you smoke, the higher your risk of disease. While it is never too late to quit, the sooner you quit, the better.  Where can I find help to quit smoking?  The best way to prevent lung cancer is to stop smoking. If you have already quit smoking, congratulations and keep it up! For help on quitting smoking, please call [employer] at 4-991-656-KWIA ([phone]) or the [employer] at 1-921.526.1706 to find local resources near you.  One-on-one health coaching:  If you d prefer to work individually with a health care provider on tobacco cessation, we offer:      Medication Therapy Management:  Our specially trained pharmacists work closely with you and your doctor to help you quit smoking.  Call [phone] or [phone] (toll free).     Can Do: Health coaching offered by [facility].  [email or web address]
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
## 2021-09-25 LAB
ALBUMIN SERPL-MCNC: 4.2 G/DL (ref 3.4–5)
ALP SERPL-CCNC: 97 U/L (ref 40–150)
ALT SERPL W P-5'-P-CCNC: 74 U/L (ref 0–50)
ANION GAP SERPL CALCULATED.3IONS-SCNC: 3 MMOL/L (ref 3–14)
AST SERPL W P-5'-P-CCNC: 55 U/L (ref 0–45)
BILIRUB SERPL-MCNC: 0.5 MG/DL (ref 0.2–1.3)
BUN SERPL-MCNC: 6 MG/DL (ref 7–30)
CALCIUM SERPL-MCNC: 8.8 MG/DL (ref 8.5–10.1)
CHLORIDE BLD-SCNC: 105 MMOL/L (ref 94–109)
CO2 SERPL-SCNC: 30 MMOL/L (ref 20–32)
CREAT SERPL-MCNC: 0.81 MG/DL (ref 0.52–1.04)
CREAT UR-MCNC: 37 MG/DL
GFR SERPL CREATININE-BSD FRML MDRD: 79 ML/MIN/1.73M2
GLUCOSE BLD-MCNC: 97 MG/DL (ref 70–99)
MICROALBUMIN UR-MCNC: 16 MG/L
MICROALBUMIN/CREAT UR: 43.24 MG/G CR (ref 0–25)
POTASSIUM BLD-SCNC: 3.8 MMOL/L (ref 3.4–5.3)
PROT SERPL-MCNC: 7.6 G/DL (ref 6.8–8.8)
SODIUM SERPL-SCNC: 138 MMOL/L (ref 133–144)
TSH SERPL DL<=0.005 MIU/L-ACNC: 1.99 MU/L (ref 0.4–4)

## 2021-09-25 ASSESSMENT — PATIENT HEALTH QUESTIONNAIRE - PHQ9: SUM OF ALL RESPONSES TO PHQ QUESTIONS 1-9: 8

## 2021-09-28 ENCOUNTER — NURSE TRIAGE (OUTPATIENT)
Dept: FAMILY MEDICINE | Facility: CLINIC | Age: 61
End: 2021-09-28

## 2021-09-28 DIAGNOSIS — K62.5 RECTAL BLEEDING: Primary | ICD-10-CM

## 2021-09-28 LAB
BKR LAB AP GYN ADEQUACY: NORMAL
BKR LAB AP GYN INTERPRETATION: NORMAL
BKR LAB AP HPV REFLEX: NORMAL
BKR LAB AP PREVIOUS ABNORMAL: NORMAL
PATH REPORT.COMMENTS IMP SPEC: NORMAL
PATH REPORT.RELEVANT HX SPEC: NORMAL

## 2021-09-28 NOTE — TELEPHONE ENCOUNTER
S-(situation): Patient calling regarding episode of blood in stool a few weeks ago. Patient wondering if she should get a colonoscopy.     B-(background): Patient has OV w/ PCP 9/24/21 and forgot to mention episode of blood stool a few weeks ago.     A-(assessment): Patient is experiencing some mild lower abdominal cramping off and on. No other current symptoms. Patient experienced an episode of rectal bleeding and blood in stool a few weeks ago. Patient notes there was bright red blood mixed in with stool, prior to episode, patient had been experiencing constipation. Patient states no current diarrhea, no constipation, no fever, no blood in stool currently, no black/tarry stools, no dizziness. Patient notes they are experiencing some nausea and vomited two days ago. Patient is currently trying to wean off nicotine and attributes n/v to stopping this medication.     R-(recommendations): Routing to provider to review and advise as patient had appointment recently. Order for colonoscopy or other recommendations?       Reason for Disposition    MILD rectal bleeding (more than just a few drops or streaks)    Additional Information    Negative: Passed out (i.e., fainted, collapsed and was not responding)    Negative: Shock suspected (e.g., cold/pale/clammy skin, too weak to stand, low BP, rapid pulse)    Negative: Vomiting red blood or black (coffee ground) material    Negative: Sounds like a life-threatening emergency to the triager    Negative: Diarrhea is the main symptom    Negative: Rectal symptoms    Negative: SEVERE rectal bleeding (large blood clots; on and off, or constant bleeding)    Negative: SEVERE dizziness (e.g., unable to stand, requires support to walk, feels like passing out now)    Negative: MODERATE rectal bleeding (small blood clots, passing blood without stool, or toilet water turns red) more than once a day    Negative: Bloody, black, or tarry bowel movements (Exception: chronic-unchanged   "black-grey bowel movements and is taking iron pills or Pepto-bismol)    Negative: High-risk adult (e.g., prior surgery on aorta, abdominal aortic aneurysm)    Negative: Rectal foreign body (inserted or swallowed)    Negative: SEVERE abdominal pain (e.g., excruciating)    Negative: Constant abdominal pain lasting > 2 hours    Negative: Pale skin (pallor) of new onset or worsening    Negative: Patient sounds very sick or weak to the triager    Negative: MODERATE rectal bleeding (small blood clots, passing blood without stool, or toilet water turns red)    Negative: Taking Coumadin (warfarin) or other strong blood thinner, or known bleeding disorder (e.g., thrombocytopenia)    Negative: Colonoscopy in past 72 hours    Negative: Known cirrhosis of the liver (or history of liver failure or ascites)    Negative: Patient wants to be seen    Answer Assessment - Initial Assessment Questions  1. APPEARANCE of BLOOD: \"What color is it?\" \"Is it passed separately, on the surface of the stool, or mixed in with the stool?\"       Bright red, mixed in with stool. Occurred after being \"very constipated\"  2. AMOUNT: \"How much blood was passed?\"       Moderate amount   3. FREQUENCY: \"How many times has blood been passed with the stools?\"       1x   4. ONSET: \"When was the blood first seen in the stools?\" (Days or weeks)       \"couple weeks ago\"   5. DIARRHEA: \"Is there also some diarrhea?\" If so, ask: \"How many diarrhea stools were passed in past 24 hours?\"       No diarrhea   6. CONSTIPATION: \"Do you have constipation?\" If so, \"How bad is it?\"      Constipation before, has not had since  7. RECURRENT SYMPTOMS: \"Have you had blood in your stools before?\" If so, ask: \"When was the last time?\" and \"What happened that time?\"       Never happened before   8. BLOOD THINNERS: \"Do you take any blood thinners?\" (e.g., Coumadin/warfarin, Pradaxa/dabigatran, aspirin)      Aspirin   9. OTHER SYMPTOMS: \"Do you have any other symptoms?\"  (e.g., " "abdominal pain, vomiting, dizziness, fever)      Patient having abdominal pain, describes as cramping in lower abdomen. Patient has had nausea, vomited two days ago. No dizziness, no fever.   10. PREGNANCY: \"Is there any chance you are pregnant?\" \"When was your last menstrual period?\"        No    Protocols used: RECTAL BLEEDING-SASHA LANDON RN    "

## 2021-09-29 LAB
HUMAN PAPILLOMA VIRUS 16 DNA: NEGATIVE
HUMAN PAPILLOMA VIRUS 18 DNA: NEGATIVE
HUMAN PAPILLOMA VIRUS FINAL DIAGNOSIS: NORMAL
HUMAN PAPILLOMA VIRUS OTHER HR: NEGATIVE

## 2021-09-30 ENCOUNTER — PATIENT OUTREACH (OUTPATIENT)
Dept: FAMILY MEDICINE | Facility: CLINIC | Age: 61
End: 2021-09-30

## 2021-09-30 DIAGNOSIS — R87.610 PAPANICOLAOU SMEAR OF CERVIX WITH ATYPICAL SQUAMOUS CELLS OF UNDETERMINED SIGNIFICANCE (ASC-US): ICD-10-CM

## 2021-09-30 NOTE — PROGRESS NOTES
Pre-Visit Planning   Next 5 appointments (look out 90 days)    Oct 01, 2021  3:00 PM  (Arrive by 2:55 PM)  MA Visit with LV MA/LPN  [facility] ([facility] ) [id number] [facility] [id number] [phone]   Oct 08, 2021  3:00 PM  (Arrive by 2:55 PM)  MA Visit with YONAS RICKS/LPN  [facility] ([facility] ) [address] [facility] [id number] [phone]   Oct 29, 2021  4:00 PM  (Arrive by 3:40 PM)  Office Visit with Ramona Ann Aaseby-Aguilera, PA-C  [facility] ([facility] ) [id number] [facility] [id number] [phone]        Appointment Notes for this encounter:   BP check    Questionnaires Reviewed/Assigned  No additional questionnaires are needed       Patient preferred phone number: [phone]    Unable to reach. Left voicemail. Advised patient to call clinic back at [phone].

## 2021-10-01 ENCOUNTER — ALLIED HEALTH/NURSE VISIT (OUTPATIENT)
Dept: FAMILY MEDICINE | Facility: CLINIC | Age: 61
End: 2021-10-01
Payer: COMMERCIAL

## 2021-10-01 VITALS
RESPIRATION RATE: 18 BRPM | SYSTOLIC BLOOD PRESSURE: 152 MMHG | HEART RATE: 72 BPM | DIASTOLIC BLOOD PRESSURE: 66 MMHG | TEMPERATURE: 98.5 F

## 2021-10-01 DIAGNOSIS — Z01.30 BP CHECK: Primary | ICD-10-CM

## 2021-10-01 PROCEDURE — 99207 PR NO CHARGE NURSE ONLY: CPT

## 2021-10-01 NOTE — PROGRESS NOTES
Vanessa Mota is a 61 year old patient who comes in today for a Blood Pressure check.  Initial BP:  BP (!) 176/82 (BP Location: Right arm, Patient Position: Sitting, Cuff Size: Adult Regular)   Pulse 72   Temp 98.5  F (36.9  C) (Oral)   Resp 18      72  Disposition: BP elevated.  Triage RN notified, patient asked to wait    Vanessa Mota is a 61 year old patient who comes in today for a Blood Pressure check.  Initial BP:  BP (!) 152/66 (BP Location: Right arm, Patient Position: Sitting, Cuff Size: Adult Regular)   Pulse 72   Temp 98.5  F (36.9  C) (Oral)   Resp 18      72  Disposition: BP elevated.  Triage RN notified, patient asked to wait

## 2021-10-01 NOTE — PROGRESS NOTES
"Vanessa Mota is a 61 year old year old patient who comes in today for a Blood Pressure check because of medication change.  Patient is taking medication as prescribed  Patient is tolerating medications well.  Patient is not monitoring Blood Pressure at home.   Current complaints: none  Disposition:  patient to continue with the same medication. Will route update to provider.     First few times patient took new medication, did have a \"little bit\" of a headache. Rated as moderate headache 6/10. No current headache and patient believes these headaches are subsiding.    Will route as update to PCP.     Cliff LANDON RN    "

## 2021-10-04 NOTE — TELEPHONE ENCOUNTER
Please let her know I ordered a colonoscopy at [facility] with a LAURYN toro gastro doc.  Please give her number to yonatan to set up. Yessi Valentino PA-C

## 2021-10-04 NOTE — TELEPHONE ENCOUNTER
Attempted to reach patient by phone, LVMTCB, need to relay referral information.     Cliff LANDON RN

## 2021-10-04 NOTE — TELEPHONE ENCOUNTER
Called and spoke with patient. Patient informs she had already called back and was given the information by someone else. No further questions or concerns at this time.     Cliff LANDON RN

## 2021-10-05 NOTE — PROGRESS NOTES
Medication Therapy Management (MTM) Encounter    ASSESSMENT:                            Medication Adherence/Access: No issues identified    Type 2 Diabetes: Patient is meeting A1c goal of < 7%.(6.9%) Self monitoring of blood glucose is at goal of fasting  mg/dL. Patient is meeting average glucose goal of <150mg/dl Patient would benefit from minimum SMBG: Check blood sugars fasting, and occasionally 2 hours after starting a meal.  Continue current doses of Metformin, Basaglar, Glipizide as is .      Hyperlipidemia: Stable.  Patient is on moderate intensity statin which is indicated based on 2019 ACC/AHA guidelines for lipid management.    Recheck fasting lipids fall -2021.       Anxiety/Depression:   Stable on current meds.       DPNP:  Stable.     Hypertension: Patient is not meeting blood pressure goal of < 140/90mmHg. Patient would benefit from the following changes - continued blood pressure monitoring, watching diet, increasing exercise and weight loss and limiting/reducing sodium.  See plan for details.       Constipation: Stable now on daily dose of 3/4 capful Miralax.     GERD: Stable. Current treatment is effective. Chronic PPI use places patient at an increased risk of C. Diff, hypomagnesemia and lower bone mineral density, these risks were reviewed with the patient.  Patient would benefit from the following changes: none(but consider baseline magnesium lab in 6 months ).      Insomnia: stable on ambien , off melatonin has resolved some side effect issues.     PLAN:                               1. FYI--Please have your BP rechecked this Friday at the  [facility] outpatient pharmacy .     Goal BP is <140/90mmhg --if your top number is still too high (>140) lets discuss your amlodipine dose on 10-13-21.     2. FYI--Long term Omeprazole use can deplete your Magnesium --lets consider a lab check of that in 6 months .     3. Glad to hear all the stomach/constipation issues are now well controlled  --stay on current medications/plan as is .       Follow-up: Return in about 1 week (around 10/13/2021), or 3;30 Pm via telephone, for Medication Therapy Management Visit.    SUBJECTIVE/OBJECTIVE:                          Vanessa Mota is a 61 year old female called for a follow-up (21) visit. She was referred to me from Aaseby-Aguilera, Ramona Ann  .      Reason for visit:   Will have ct scan 10-8-21 . Colonoscopy soon due to some rectal bleeding. (she states it was bright red).   F/up on gerd/constipation.    Allergies/ADRs: Reviewed in chart  Past Medical History: Reviewed in chart; new issue is severe constipation, stomach cramps. Some N@V as well since 2 months ago(occurs afternoon/pm).   Tobacco: She reports that she quit smoking about 8 years ago. She quit after 3.00 years of use. She uses smokeless tobacco.Tobacco Cessation Action Plan:   Pharmacotherapies : other Nicotine replacement--uses lozenges - 5/day (cuts into little tiny pieces ).    Alcohol: 1-3 beverages / week  Caffeine: drinks regular coke --5 cans /day .   Social: retired  Personal Healthcare Goals: correct nausea/vomiting issue  Activity: not real active , gentle yoga , walk stairs at home.     Medication Adherence/Access: not adherent with fish oils.     Type 2 Diabetes:  Currently taking : glipizide 10mg. ER twice daily(2nd dose at bedtime), Basaglar -55 units daily in AM, Metformin --2 tabs twice daily . Patient is not experiencing side effects.  Blood sugar monitorin time(s) daily. Ranges (patient reported): Fasting- 110  Symptoms of low blood sugar? Weak, grouchy, anxious, hot flash (bs's in the 70's triggers these symptoms).   Symptoms of high blood sugar? fatigue  Eye exam: up to date  Foot exam: up to date  Diet/Exercise:     Aspirin: Taking 81mg daily at bedtime and denies side effects  Statin: Yes: Atorvastatin   ACEi/ARB: Yes: Losartan.   Urine Albumin:   Lab Results   Component Value Date    UMALCR 43.24 (H) 2021       Lab Results   Component Value Date    A1C 6.9 09/24/2021    A1C 7.1 10/30/2020    A1C 7.0 02/28/2020    A1C 7.5 05/28/2019    A1C 6.9 09/06/2018    A1C 7.0 01/09/2018         Hyperlipidemia: Current therapy includes :Atorvastatin 20mg daily, fish oils -4 pills /day but admits takes 2-3 x week. .  Patient reports no significant myalgias or other side effects.  The 10-year ASCVD risk score (Ruddylien SIEGEL Jr., et al., 2013) is: 8.8%    Values used to calculate the score:      Age: 61 years      Sex: Female      Is Non- : No      Diabetic: Yes      Tobacco smoker: No      Systolic Blood Pressure: 152 mmHg      Is BP treated: Yes      HDL Cholesterol: 52 mg/dL      Total Cholesterol: 131 mg/dL  Recent Labs   Lab Test 10/30/20  1424 06/25/19  1436 06/13/16  1141 02/10/15  1047 08/26/14  1224 08/26/14  1224   CHOL 131 125   < > 150   < > 145   HDL 52 53   < > 57   < > 49*   LDL 55 47   < > 71   < > 69   TRIG 122 125   < > 110   < > 136   CHOLHDLRATIO  --   --   --  2.6  --  3.0    < > = values in this interval not displayed.         Anxiety/Depression:     Taking buspirone 30mg. Bid -it helps , prozac 20mg./day.    She stopped this drug last October(Duloxetine 60mg.)    DPNP:  Takes [id number] Gabapentin . Pills --3 x day for general pain relief -they work well.     Hypertension: Current medications include : Losartan 100mg./day + 5mg. Amlodipine daily per pcp just started 9-24-21 .   Patient does not self-monitor blood pressure.  Patient reports no current medication side effects.  BP Readings from Last 3 Encounters:   10/01/21 (!) 152/66   09/24/21 (!) 210/84   02/28/20 138/78     Will recheck BP Friday 10-8-21.         GERD: Current medications include: Prilosec (omeprazole) 40mg.  once daily. Pt reports no current symptoms.   The patient does not have a history of GI bleed. Last HG 14.2 -ten days ago, bright red blood see in stool--maybe hemmoroids? Will have colonoscopy soon to determine underlying  cause.   The patient does notice symptoms if they miss a dose.  Patient has tried a trial off of therapy and is not interested in doing so. Patient feels that current regimen is effective. Her gi symptoms have resolved back on PPi drug.        Constipation : 3/4 capful daily of miralax works well for having a BM every other day now.    She takes bisacodyl 5mg. - as needed -about 1 x week.  Feels stomach cramps /not having a BM very often .       Insomnia: Current medications include: OFF all melatonin -just using zolpidem 5mg. /day. Patient reports trouble falling asleep,  depression and anxiety. She feels ok and doesn't want more meds for insomnia.       I spent 30 minutes with this patient today. All changes were made via collaborative practice agreement with Ramona Ann Aaseby-Aguilera, PA-C. A copy of the visit note was provided to the patient's primary care provider.    The patient was sent via [employer] a summary of these recommendations.     Vidhya Pruitt [facility].  Medication Therapy Management Provider  [phone]      Telemedicine Visit Details  Type of service:  Telephone visit  Start Time: 4PM  End Time: 4:30pm  Originating Location (patient location): Home  Distant Location (provider location):  [facility]     Medication Therapy Recommendations  Hypertension goal BP (blood pressure) < 140/90    Current Medication: amLODIPine (NORVASC) 5 MG tablet   Rationale: Medication requires monitoring - Needs additional monitoring   Recommendation: Continue to Monitor - Have [facility] out patient pharmacist recheck BP 10-8-21 ; Goal BP is <140/90mmhg.   Status: Accepted per CPA

## 2021-10-06 ENCOUNTER — VIRTUAL VISIT (OUTPATIENT)
Dept: PHARMACY | Facility: CLINIC | Age: 61
End: 2021-10-06
Payer: COMMERCIAL

## 2021-10-06 DIAGNOSIS — E78.00 PURE HYPERCHOLESTEROLEMIA: ICD-10-CM

## 2021-10-06 DIAGNOSIS — E11.9 TYPE 2 DIABETES MELLITUS WITHOUT COMPLICATION, WITH LONG-TERM CURRENT USE OF INSULIN (H): ICD-10-CM

## 2021-10-06 DIAGNOSIS — Z79.4 TYPE 2 DIABETES MELLITUS WITHOUT COMPLICATION, WITH LONG-TERM CURRENT USE OF INSULIN (H): ICD-10-CM

## 2021-10-06 DIAGNOSIS — K59.03 DRUG-INDUCED CONSTIPATION: ICD-10-CM

## 2021-10-06 DIAGNOSIS — G47.00 INSOMNIA, UNSPECIFIED TYPE: ICD-10-CM

## 2021-10-06 DIAGNOSIS — F41.9 ANXIETY: ICD-10-CM

## 2021-10-06 DIAGNOSIS — R12 HEART BURN: ICD-10-CM

## 2021-10-06 DIAGNOSIS — F32.0 MILD MAJOR DEPRESSION (H): ICD-10-CM

## 2021-10-06 DIAGNOSIS — I10 HYPERTENSION GOAL BP (BLOOD PRESSURE) < 140/90: Primary | ICD-10-CM

## 2021-10-06 PROCEDURE — 99606 MTMS BY PHARM EST 15 MIN: CPT | Performed by: PHARMACIST

## 2021-10-06 PROCEDURE — 99607 MTMS BY PHARM ADDL 15 MIN: CPT | Performed by: PHARMACIST

## 2021-10-06 NOTE — PATIENT INSTRUCTIONS
Recommendations from today's MTM visit:                                                       1. FYI--Please have your BP rechecked this Friday at the  [facility] outpatient pharmacy .     Goal BP is <140/90mmhg --if your top number is still too high (>140) lets discuss your amlodipine dose on 10-13-21.     2. FYI--Long term Omeprazole use can deplete your Magnesium --lets consider a lab check of that in 6 months .     3. Glad to hear all the stomach/constipation issues are now well controlled --stay on current medications/plan as is .       Follow-up: Return in about 1 week (around 10/13/2021), or 3;30 Pm via telephone, for Medication Therapy Management Visit.    It was great to speak with you today.  I value your experience and would be very thankful for your time with providing feedback on our clinic survey. You may receive a survey via email or text message in the next few days.     To schedule another MTM appointment, please call the clinic directly or you may call the MTM scheduling line at [phone] or toll-free at [phone].     My Clinical Pharmacist's contact information:                                                      Please feel free to contact me with any questions or concerns you have.      Vidhya Pruitt [facility].  Medication Therapy Management Provider  [phone]

## 2021-10-06 NOTE — Clinical Note
Yessi--jose antonioi-- amlodipine working --will have [facility] outpt. Phcy recheck her BP this Friday --will consider CCb drug dose increase if not at goal .    Vidhya Quintanilla formerly [facility].  Medication Therapy Management Provider  [phone]

## 2021-10-08 ENCOUNTER — HOSPITAL ENCOUNTER (OUTPATIENT)
Dept: CT IMAGING | Facility: CLINIC | Age: 61
End: 2021-10-08
Attending: PHYSICIAN ASSISTANT
Payer: COMMERCIAL

## 2021-10-08 ENCOUNTER — ALLIED HEALTH/NURSE VISIT (OUTPATIENT)
Dept: FAMILY MEDICINE | Facility: CLINIC | Age: 61
End: 2021-10-08

## 2021-10-08 ENCOUNTER — HOSPITAL ENCOUNTER (OUTPATIENT)
Dept: BONE DENSITY | Facility: CLINIC | Age: 61
End: 2021-10-08
Attending: PHYSICIAN ASSISTANT
Payer: COMMERCIAL

## 2021-10-08 ENCOUNTER — HOSPITAL ENCOUNTER (OUTPATIENT)
Dept: MAMMOGRAPHY | Facility: CLINIC | Age: 61
End: 2021-10-08
Attending: PHYSICIAN ASSISTANT
Payer: COMMERCIAL

## 2021-10-08 ENCOUNTER — HOSPITAL ENCOUNTER (OUTPATIENT)
Dept: ULTRASOUND IMAGING | Facility: CLINIC | Age: 61
End: 2021-10-08
Attending: PHYSICIAN ASSISTANT
Payer: COMMERCIAL

## 2021-10-08 VITALS — DIASTOLIC BLOOD PRESSURE: 78 MMHG | SYSTOLIC BLOOD PRESSURE: 158 MMHG

## 2021-10-08 DIAGNOSIS — Z78.0 POSTMENOPAUSAL STATUS: ICD-10-CM

## 2021-10-08 DIAGNOSIS — N95.0 POSTMENOPAUSAL BLEEDING: ICD-10-CM

## 2021-10-08 DIAGNOSIS — Z72.0 TOBACCO ABUSE: ICD-10-CM

## 2021-10-08 DIAGNOSIS — Z01.30 BP CHECK: Primary | ICD-10-CM

## 2021-10-08 DIAGNOSIS — Z12.31 VISIT FOR SCREENING MAMMOGRAM: ICD-10-CM

## 2021-10-08 PROCEDURE — 77067 SCR MAMMO BI INCL CAD: CPT

## 2021-10-08 PROCEDURE — 71271 CT THORAX LUNG CANCER SCR C-: CPT

## 2021-10-08 PROCEDURE — 77080 DXA BONE DENSITY AXIAL: CPT

## 2021-10-08 PROCEDURE — 99207 PR NO CHARGE NURSE ONLY: CPT | Performed by: PHYSICIAN ASSISTANT

## 2021-10-08 PROCEDURE — 76830 TRANSVAGINAL US NON-OB: CPT

## 2021-10-08 NOTE — PROGRESS NOTES
Vanessa Mota was evaluated at [facility] on October 8, 2021 at which time her blood pressure was:    BP Readings from Last 3 Encounters:   10/08/21 (!) 158/78   10/01/21 (!) 152/66   09/24/21 (!) 210/84     Pulse Readings from Last 3 Encounters:   10/01/21 72   09/24/21 74   02/28/20 80       Reviewed lifestyle modifications for blood pressure control and reduction: including making healthy food choices, managing weight, getting regular exercise, smoking cessation, reducing alcohol consumption, monitoring blood pressure regularly.     Symptoms: None    BP Goal:< 140/90 mmHg    BP Assessment:  BP too high    Potential Reasons for BP too high: Unknown/Other: Only on BP meds for 1 week so far    BP Follow-Up Plan: Recheck BP in 30 days at pharmacy    Recommendation to Provider: Recheck BP in 1 month. If persistently > 140/90 mmHg recommend increasing dose of antihypertensive.    Note completed by: Dwight Schafer, PharmD Candidate

## 2021-10-12 DIAGNOSIS — M79.7 FIBROMYALGIA: ICD-10-CM

## 2021-10-12 RX ORDER — CYCLOBENZAPRINE HCL 10 MG
TABLET ORAL
Qty: 30 TABLET | Refills: 0 | Status: SHIPPED | OUTPATIENT
Start: 2021-10-12 | End: 2021-11-11

## 2021-10-12 NOTE — PROGRESS NOTES
Medication Therapy Management (MTM) Encounter    ASSESSMENT:                            Medication Adherence/Access: No issues identified    Type 2 Diabetes: Patient is meeting A1c goal of < 7%.(6.9%) Self monitoring of blood glucose is at goal of fasting  mg/dL. Patient is meeting average glucose goal of <150mg/dl Patient would benefit from minimum SMBG: Check blood sugars fasting, and occasionally 2 hours after starting a meal.  Continue current doses of Metformin, Basaglar, Glipizide as is .      Hyperlipidemia: Stable.  Patient is on moderate intensity statin which is indicated based on 2019 ACC/AHA guidelines for lipid management.    Recheck fasting lipids fall -2021.       Anxiety/Depression:   Stable on current meds.       DPNP:  Stable.     Hypertension: Patient is not meeting blood pressure goal of < 140/90mmHg. Patient would benefit from the following changes - Increase dose of Amlodipine , Move losartan to AM dosing , continued blood pressure monitoring, watching diet, increasing exercise and weight loss and limiting/reducing sodium.  See plan for details.       Constipation: Stable now on daily dose of 3/4 capful Miralax.     GERD: Stable. Current treatment is effective. Chronic PPI use places patient at an increased risk of C. Diff, hypomagnesemia and lower bone mineral density, these risks were reviewed with the patient.  Patient would benefit from the following changes: none(but consider baseline magnesium lab in 6 months ).      Insomnia: stable on ambien , off melatonin has resolved some side effect issues.     PLAN:                            1. FYI--last clinic BP on 10-8-21 was 158/78mmhg --our Goal BP is <140/90mmhg.    Lets increase your Amlodipine dose to 10mg. /day --take at bedtime , but change timing of the Losartan to AM.    See Yessi on Friday -October 29th at 4pm --she will recheck your BP.     Careful of sodium use in microwave bowls , fast foods, canned soups , total daily  sodium intake should not exceed 2,300 mgs.       Follow-up: Return in about 4 weeks (around 11/10/2021), or 3 Pm via telephone, for BP Recheck, Medication Therapy Management Visit.      SUBJECTIVE/OBJECTIVE:                          Vanessa Mota is a 61 year old female called for a follow-up (10-6-21) visit. She was referred to me from Aaseby-Aguilera, Ramona Ann  .      Reason for visit:   BP recheck.     Allergies/ADRs: Reviewed in chart  Past Medical History: Reviewed in chart; new issue is severe constipation, stomach cramps. Some N@V as well since 2 months ago(occurs afternoon/pm).   Tobacco: She reports that she quit smoking about 8 years ago. She quit after 3.00 years of use. She uses smokeless tobacco.Tobacco Cessation Action Plan:   Pharmacotherapies : other Nicotine replacement--uses lozenges - 5/day (cuts into little tiny pieces ).    Alcohol: 1-3 beverages / week  Caffeine: drinks regular coke --5 cans /day .   Social: retired  Personal Healthcare Goals: correct nausea/vomiting issue  Activity: not real active , gentle yoga , walk stairs at home.     Medication Adherence/Access: not adherent with fish oils.     Type 2 Diabetes:  Currently taking : glipizide 10mg. ER twice daily(2nd dose at bedtime), Basaglar -55 units daily in AM, Metformin --2 tabs twice daily . Patient is not experiencing side effects.  Blood sugar monitorin time(s) daily. Ranges (patient reported): Fasting- 110  Symptoms of low blood sugar? Weak, grouchy, anxious, hot flash (bs's in the 70's triggers these symptoms).   Symptoms of high blood sugar? fatigue  Eye exam: up to date  Foot exam: up to date  Diet/Exercise:     Aspirin: Taking 81mg daily at bedtime and denies side effects  Statin: Yes: Atorvastatin   ACEi/ARB: Yes: Losartan.   Urine Albumin:   Lab Results   Component Value Date    UMALCR 43.24 (H) 2021      Lab Results   Component Value Date    A1C 6.9 2021    A1C 7.1 10/30/2020    A1C 7.0 2020    A1C 7.5  05/28/2019    A1C 6.9 09/06/2018    A1C 7.0 01/09/2018         Hyperlipidemia: Current therapy includes :Atorvastatin 20mg daily, fish oils -4 pills /day but admits takes 2-3 x week. .  Patient reports no significant myalgias or other side effects.  The 10-year ASCVD risk score (Ruddy SIEGEL Jr., et al., 2013) is: 9.5%    Values used to calculate the score:      Age: 61 years      Sex: Female      Is Non- : No      Diabetic: Yes      Tobacco smoker: No      Systolic Blood Pressure: 158 mmHg      Is BP treated: Yes      HDL Cholesterol: 52 mg/dL      Total Cholesterol: 131 mg/dL  Recent Labs   Lab Test 10/30/20  1424 06/25/19  1436 06/13/16  1141 02/10/15  1047 08/26/14  1224 08/26/14  1224   CHOL 131 125   < > 150   < > 145   HDL 52 53   < > 57   < > 49*   LDL 55 47   < > 71   < > 69   TRIG 122 125   < > 110   < > 136   CHOLHDLRATIO  --   --   --  2.6  --  3.0    < > = values in this interval not displayed.         Anxiety/Depression:     Taking buspirone 30mg. Bid -it helps , prozac 20mg./day.    She stopped this drug last October(Duloxetine 60mg.)    DPNP:  Takes [id number] Gabapentin . Pills --3 x day for general pain relief -they work well.     Hypertension: Current medications include : Losartan 100mg./day + 5mg. Amlodipine daily per pcp just started 9-24-21 .   Patient does not self-monitor blood pressure.  Patient reports no current medication side effects.  BP Readings from Last 3 Encounters:   10/08/21 (!) 158/78   10/01/21 (!) 152/66   09/24/21 (!) 210/84     She is not a salter of foods, some FF , microwave food bowl, some bread.         GERD: Current medications include: Prilosec (omeprazole) 40mg.  once daily. Pt reports no current symptoms.   The patient does not have a history of GI bleed. Last HG 14.2 -ten days ago, bright red blood see in stool--maybe hemmoroids? Will have colonoscopy soon to determine underlying cause.   The patient does notice symptoms if they miss a dose.  Patient  has tried a trial off of therapy and is not interested in doing so. Patient feels that current regimen is effective. Her gi symptoms have resolved back on PPi drug.        Constipation : 3/4 capful daily of miralax works well for having a BM every other day now.    She takes bisacodyl 5mg. - as needed -about 1 x week.  Feels stomach cramps /not having a BM very often .       Insomnia: Current medications include: OFF all melatonin -just using zolpidem 5mg. /day. Patient reports trouble falling asleep,  depression and anxiety. She feels ok and doesn't want more meds for insomnia.       I spent 30 minutes with this patient today. All changes were made via collaborative practice agreement with Ramona Ann Aaseby-Aguilera, PA-C. A copy of the visit note was provided to the patient's primary care provider.    The patient was sent via [employer] a summary of these recommendations.     Vidhya Pruitt [facility].  Medication Therapy Management Provider  [phone]      Telemedicine Visit Details  Type of service:  Telephone visit  Start Time: 3;30pm   End Time: 4:00 PM  Originating Location (patient location): Home  Distant Location (provider location):  [facility]     Medication Therapy Recommendations  Hypertension goal BP (blood pressure) < 140/90    Current Medication: amLODIPine (NORVASC) 5 MG tablet (Discontinued)   Rationale: Dose too low - Dosage too low - Effectiveness   Recommendation: Increase Dose - amLODIPine 10 MG tablet - 1 tab daily at bedtime.   Status: Accepted per CPA   Note: fyi--chnage timing of losartan to AM now.

## 2021-10-13 ENCOUNTER — VIRTUAL VISIT (OUTPATIENT)
Dept: PHARMACY | Facility: CLINIC | Age: 61
End: 2021-10-13
Payer: COMMERCIAL

## 2021-10-13 DIAGNOSIS — G47.00 INSOMNIA, UNSPECIFIED TYPE: ICD-10-CM

## 2021-10-13 DIAGNOSIS — E11.9 TYPE 2 DIABETES MELLITUS WITHOUT COMPLICATION, WITH LONG-TERM CURRENT USE OF INSULIN (H): ICD-10-CM

## 2021-10-13 DIAGNOSIS — F41.9 ANXIETY: ICD-10-CM

## 2021-10-13 DIAGNOSIS — Z79.4 TYPE 2 DIABETES MELLITUS WITHOUT COMPLICATION, WITH LONG-TERM CURRENT USE OF INSULIN (H): ICD-10-CM

## 2021-10-13 DIAGNOSIS — F32.0 MILD MAJOR DEPRESSION (H): ICD-10-CM

## 2021-10-13 DIAGNOSIS — I10 HYPERTENSION GOAL BP (BLOOD PRESSURE) < 140/90: Primary | ICD-10-CM

## 2021-10-13 DIAGNOSIS — E78.00 PURE HYPERCHOLESTEROLEMIA: ICD-10-CM

## 2021-10-13 PROCEDURE — 99606 MTMS BY PHARM EST 15 MIN: CPT | Performed by: PHARMACIST

## 2021-10-13 PROCEDURE — 99607 MTMS BY PHARM ADDL 15 MIN: CPT | Performed by: PHARMACIST

## 2021-10-13 RX ORDER — AMLODIPINE BESYLATE 10 MG/1
10 TABLET ORAL AT BEDTIME
Qty: 90 TABLET | Refills: 1 | Status: SHIPPED | OUTPATIENT
Start: 2021-10-13 | End: 2022-04-06

## 2021-10-13 NOTE — PATIENT INSTRUCTIONS
Recommendations from today's MTM visit:                                                       1. FYI--last clinic BP on 10-8-21 was 158/78mmhg --our Goal BP is <140/90mmhg.    Lets increase your Amlodipine dose to 10mg. /day --take at bedtime , but change timing of the Losartan to AM.    See Yessi on Friday -October 29th at 4pm --she will recheck your BP.     Careful of sodium use in microwave bowls , fast foods, canned soups , total daily sodium intake should not exceed 2,300 mgs.       Follow-up: Return in about 4 weeks (around 11/10/2021), or 3 Pm via telephone, for BP Recheck, Medication Therapy Management Visit.    It was great to speak with you today.  I value your experience and would be very thankful for your time with providing feedback on our clinic survey. You may receive a survey via email or text message in the next few days.     To schedule another MTM appointment, please call the clinic directly or you may call the MTM scheduling line at [phone] or toll-free at [phone].     My Clinical Pharmacist's contact information:                                                      Please feel free to contact me with any questions or concerns you have.      Vidhya Pruitt [facility].  Medication Therapy Management Provider  [phone]

## 2021-10-13 NOTE — Clinical Note
Yessi--kiet--spoke with angel today --I increased her amlodipine as BP not at goal , she will see you 10-29 for bp recheck etc.    I have f/up with her 11-10-21 to see if Bp at goal --I can make adjustments then if neecessary.    Vidhya Quintanilla, [facility].  Medication Therapy Management Provider  [phone]

## 2021-10-28 NOTE — PROGRESS NOTES
Pre-Visit Planning   Next 5 appointments (look out 90 days)    Oct 29, 2021  4:00 PM  (Arrive by 3:40 PM)  Office Visit with Ramona Ann Aaseby-Aguilera, PA-C  [facility] ([facility] ) [address] [address] [phone]   Nov 10, 2021  3:00 PM  Pharmacist Phone Visit with Vidhya Pruitt RPH  [facility] ([facility] ) [address] [address] [address] [phone]        Appointment Notes for this encounter:   Data Unavailable    Questionnaires Reviewed/Assigned  No additional questionnaires are needed    Patient preferred phone number: [phone]    Unable to reach. Left voicemail. Advised patient to call clinic back at [phone].

## 2021-10-29 ENCOUNTER — OFFICE VISIT (OUTPATIENT)
Dept: FAMILY MEDICINE | Facility: CLINIC | Age: 61
End: 2021-10-29

## 2021-10-29 VITALS
BODY MASS INDEX: 34.92 KG/M2 | HEIGHT: 59 IN | DIASTOLIC BLOOD PRESSURE: 88 MMHG | OXYGEN SATURATION: 97 % | SYSTOLIC BLOOD PRESSURE: 144 MMHG | RESPIRATION RATE: 18 BRPM | HEART RATE: 81 BPM | TEMPERATURE: 97.9 F | WEIGHT: 173.2 LBS

## 2021-10-29 DIAGNOSIS — Z11.59 ENCOUNTER FOR SCREENING FOR OTHER VIRAL DISEASES: ICD-10-CM

## 2021-10-29 DIAGNOSIS — R01.1 UNDIAGNOSED CARDIAC MURMURS: ICD-10-CM

## 2021-10-29 DIAGNOSIS — I10 HYPERTENSION GOAL BP (BLOOD PRESSURE) < 140/90: Primary | ICD-10-CM

## 2021-10-29 PROCEDURE — 36415 COLL VENOUS BLD VENIPUNCTURE: CPT | Performed by: PHYSICIAN ASSISTANT

## 2021-10-29 PROCEDURE — 99214 OFFICE O/P EST MOD 30 MIN: CPT | Performed by: PHYSICIAN ASSISTANT

## 2021-10-29 PROCEDURE — 80061 LIPID PANEL: CPT | Performed by: PHYSICIAN ASSISTANT

## 2021-10-29 RX ORDER — CHLORTHALIDONE 25 MG/1
25 TABLET ORAL DAILY
Qty: 90 TABLET | Refills: 1 | Status: SHIPPED | OUTPATIENT
Start: 2021-10-29 | End: 2022-04-20

## 2021-10-29 ASSESSMENT — PATIENT HEALTH QUESTIONNAIRE - PHQ9
SUM OF ALL RESPONSES TO PHQ QUESTIONS 1-9: 9
10. IF YOU CHECKED OFF ANY PROBLEMS, HOW DIFFICULT HAVE THESE PROBLEMS MADE IT FOR YOU TO DO YOUR WORK, TAKE CARE OF THINGS AT HOME, OR GET ALONG WITH OTHER PEOPLE: VERY DIFFICULT
SUM OF ALL RESPONSES TO PHQ QUESTIONS 1-9: 9

## 2021-10-29 ASSESSMENT — MIFFLIN-ST. JEOR: SCORE: 1261.26

## 2021-10-29 NOTE — PROGRESS NOTES
Assessment & Plan     Hypertension goal BP (blood pressure) < 140/90  See pt info   - chlorthalidone (HYGROTON) 25 MG tablet; Take 1 tablet (25 mg) by mouth daily  - Lipid panel reflex to direct LDL Fasting  - Adult Cardiology Eval Referral; Future    Undiagnosed cardiac murmurs  Referred to cardiology   - Adult Cardiology Eval Referral; Future    Patient Instructions   (I10) Hypertension goal BP (blood pressure) < 140/90  (primary encounter diagnosis)  Comment: bp still elevated so added chlorthalidone 25 mg to losartan 100 mg and amlodipine 10 mg.   Well get 2 BPs and well have her follow-up with cardiology due to new murmur  Plan: chlorthalidone (HYGROTON) 25 MG tablet, Lipid         panel reflex to direct LDL Fasting, Adult         Cardiology Eval Referral            (R01.1) Undiagnosed cardiac murmurs  Comment: see above  Plan: Adult Cardiology Eval Referral          **Postmenopausal bleeding had pelvic US that showed thickened endometrium.  Needs to follow-up with ob/gyn for endometrial biopsy . Has referral already           No follow-ups on file.    Ramona Ann Aaseby-Aguilera, PA-C  [facility] is a 61 year old who presents for the following health issues     History of Present Illness       Hypertension: She presents for follow up of hypertension.  She does not check blood pressure  regularly outside of the clinic. Outside blood pressures have been over 140/90. She follows a low salt diet.     She eats 2-3 servings of fruits and vegetables daily.She consumes 4 sweetened beverage(s) daily.She exercises with enough effort to increase her heart rate 10 to 19 minutes per day.  She exercises with enough effort to increase her heart rate 3 or less days per week.   She is taking medications regularly.       Htn:  Has come down significantly but still above normal.  Is on losartan 100 mg and amlodipine was increased to 10 mg.  Does have some foot swelling with this.  "          Review of Systems   Constitutional, HEENT, cardiovascular, pulmonary, gi and gu systems are negative, except as otherwise noted.      Objective    BP (!) 144/88 (BP Location: Right arm, Patient Position: Sitting, Cuff Size: Adult Regular)   Pulse 81   Temp 97.9  F (36.6  C) (Oral)   Resp 18   Ht 1.499 m (4' 11\")   Wt 78.6 kg (173 lb 3.2 oz)   SpO2 97%   BMI 34.98 kg/m    Body mass index is 34.98 kg/m .  Physical Exam   GENERAL: healthy, alert and no distress  HENT: ear canals and TM's normal, nose and mouth without ulcers or lesions  NECK: no adenopathy, no asymmetry, masses, or scars and thyroid normal to palpation  RESP: lungs clear to auscultation - no rales, rhonchi or wheezes  CV: regular rate and rhythm, normal S1 S2, no S3 or S4, no murmur, click or rub, no peripheral edema and peripheral pulses strong  MS: non pitting edema to bilat feet    {            Answers for HPI/ROS submitted by the patient on 10/29/2021  If you checked off any problems, how difficult have these problems made it for you to do your work, take care of things at home, or get along with other people?: Very difficult  PHQ9 TOTAL SCORE: 9      "

## 2021-10-29 NOTE — PATIENT INSTRUCTIONS
(I10) Hypertension goal BP (blood pressure) < 140/90  (primary encounter diagnosis)  Comment: bp still elevated so added chlorthalidone 25 mg to losartan 100 mg and amlodipine 10 mg.   Well get 2 BPs and well have her follow-up with cardiology due to new murmur  Plan: chlorthalidone (HYGROTON) 25 MG tablet, Lipid         panel reflex to direct LDL Fasting, Adult         Cardiology Eval Referral            (R01.1) Undiagnosed cardiac murmurs  Comment: see above  Plan: Adult Cardiology Eval Referral          Postmenopausal bleeding had pelvic US that showed thickened endometrium.  Needs to follow-up with ob/gyn for endometrial biopsy . Has referral already

## 2021-10-30 LAB
CHOLEST SERPL-MCNC: 141 MG/DL
FASTING STATUS PATIENT QL REPORTED: NORMAL
HDLC SERPL-MCNC: 62 MG/DL
LDLC SERPL CALC-MCNC: 52 MG/DL
NONHDLC SERPL-MCNC: 79 MG/DL
TRIGL SERPL-MCNC: 134 MG/DL

## 2021-10-30 ASSESSMENT — PATIENT HEALTH QUESTIONNAIRE - PHQ9: SUM OF ALL RESPONSES TO PHQ QUESTIONS 1-9: 9

## 2021-11-02 DIAGNOSIS — G43.009 MIGRAINE WITHOUT AURA AND WITHOUT STATUS MIGRAINOSUS, NOT INTRACTABLE: ICD-10-CM

## 2021-11-04 RX ORDER — SUMATRIPTAN 100 MG/1
TABLET, FILM COATED ORAL
Qty: 10 TABLET | Refills: 0 | Status: SHIPPED | OUTPATIENT
Start: 2021-11-04 | End: 2022-01-28

## 2021-11-04 NOTE — TELEPHONE ENCOUNTER
Routing refill request to provider for review/approval because:  Labs out of range:  BP    BP Readings from Last 3 Encounters:   10/29/21 (!) 144/88   10/08/21 (!) 158/78   10/01/21 (!) 152/66     Cliff LANDON RN

## 2021-11-11 DIAGNOSIS — G47.00 INSOMNIA, UNSPECIFIED TYPE: ICD-10-CM

## 2021-11-11 DIAGNOSIS — M79.7 FIBROMYALGIA: ICD-10-CM

## 2021-11-11 RX ORDER — ZOLPIDEM TARTRATE 5 MG/1
TABLET ORAL
Qty: 30 TABLET | Refills: 0 | Status: SHIPPED | OUTPATIENT
Start: 2021-11-11 | End: 2021-12-10

## 2021-11-11 RX ORDER — CYCLOBENZAPRINE HCL 10 MG
TABLET ORAL
Qty: 30 TABLET | Refills: 0 | Status: SHIPPED | OUTPATIENT
Start: 2021-11-11 | End: 2021-12-10

## 2021-11-11 NOTE — TELEPHONE ENCOUNTER
Routing refill request to provider for review/approval because:  Drug not on the [facility] refill protocol     Cliff LANDON RN

## 2021-11-26 ENCOUNTER — LAB (OUTPATIENT)
Dept: LAB | Facility: CLINIC | Age: 61
End: 2021-11-26
Attending: INTERNAL MEDICINE
Payer: COMMERCIAL

## 2021-11-26 DIAGNOSIS — Z11.59 ENCOUNTER FOR SCREENING FOR OTHER VIRAL DISEASES: ICD-10-CM

## 2021-11-26 PROCEDURE — U0005 INFEC AGEN DETEC AMPLI PROBE: HCPCS

## 2021-11-27 LAB — SARS-COV-2 RNA RESP QL NAA+PROBE: NEGATIVE

## 2021-11-29 RX ORDER — ALPRAZOLAM 0.25 MG
0.25 TABLET ORAL 3 TIMES DAILY PRN
Status: ON HOLD | COMMUNITY
End: 2023-07-21

## 2021-11-30 ENCOUNTER — HOSPITAL ENCOUNTER (OUTPATIENT)
Facility: CLINIC | Age: 61
Discharge: HOME | End: 2021-11-30
Attending: INTERNAL MEDICINE | Admitting: INTERNAL MEDICINE
Payer: COMMERCIAL

## 2021-11-30 VITALS
HEIGHT: 60 IN | HEART RATE: 80 BPM | OXYGEN SATURATION: 99 % | DIASTOLIC BLOOD PRESSURE: 71 MMHG | RESPIRATION RATE: 16 BRPM | SYSTOLIC BLOOD PRESSURE: 143 MMHG | BODY MASS INDEX: 33.96 KG/M2 | TEMPERATURE: 97.7 F | WEIGHT: 173 LBS

## 2021-11-30 LAB
COLONOSCOPY: NORMAL
GLUCOSE BLDC GLUCOMTR-MCNC: 279 MG/DL (ref 70–99)

## 2021-11-30 PROCEDURE — 99153 MOD SED SAME PHYS/QHP EA: CPT | Performed by: INTERNAL MEDICINE

## 2021-11-30 PROCEDURE — 45378 DIAGNOSTIC COLONOSCOPY: CPT | Performed by: INTERNAL MEDICINE

## 2021-11-30 PROCEDURE — 82962 GLUCOSE BLOOD TEST: CPT

## 2021-11-30 PROCEDURE — 250N000011 HC RX IP 250 OP 636: Performed by: INTERNAL MEDICINE

## 2021-11-30 PROCEDURE — 258N000003 HC RX IP 258 OP 636: Performed by: INTERNAL MEDICINE

## 2021-11-30 PROCEDURE — G0500 MOD SEDAT ENDO SERVICE >5YRS: HCPCS | Performed by: INTERNAL MEDICINE

## 2021-11-30 PROCEDURE — G0121 COLON CA SCRN NOT HI RSK IND: HCPCS | Performed by: INTERNAL MEDICINE

## 2021-11-30 RX ORDER — ONDANSETRON 2 MG/ML
4 INJECTION INTRAMUSCULAR; INTRAVENOUS
Status: COMPLETED | OUTPATIENT
Start: 2021-11-30 | End: 2021-11-30

## 2021-11-30 RX ORDER — NALOXONE HYDROCHLORIDE 0.4 MG/ML
0.4 INJECTION, SOLUTION INTRAMUSCULAR; INTRAVENOUS; SUBCUTANEOUS
Status: DISCONTINUED | OUTPATIENT
Start: 2021-11-30 | End: 2021-11-30 | Stop reason: HOSPADM

## 2021-11-30 RX ORDER — PROCHLORPERAZINE MALEATE 10 MG
10 TABLET ORAL EVERY 6 HOURS PRN
Status: DISCONTINUED | OUTPATIENT
Start: 2021-11-30 | End: 2021-11-30 | Stop reason: HOSPADM

## 2021-11-30 RX ORDER — ATROPINE SULFATE 0.4 MG/ML
0.4 AMPUL (ML) INJECTION
Status: DISCONTINUED | OUTPATIENT
Start: 2021-11-30 | End: 2021-11-30 | Stop reason: HOSPADM

## 2021-11-30 RX ORDER — NALOXONE HYDROCHLORIDE 0.4 MG/ML
0.2 INJECTION, SOLUTION INTRAMUSCULAR; INTRAVENOUS; SUBCUTANEOUS
Status: DISCONTINUED | OUTPATIENT
Start: 2021-11-30 | End: 2021-11-30 | Stop reason: HOSPADM

## 2021-11-30 RX ORDER — ONDANSETRON 4 MG/1
4 TABLET, ORALLY DISINTEGRATING ORAL EVERY 6 HOURS PRN
Status: DISCONTINUED | OUTPATIENT
Start: 2021-11-30 | End: 2021-11-30 | Stop reason: HOSPADM

## 2021-11-30 RX ORDER — EPINEPHRINE 1 MG/ML
0.1 INJECTION, SOLUTION INTRAMUSCULAR; SUBCUTANEOUS
Status: DISCONTINUED | OUTPATIENT
Start: 2021-11-30 | End: 2021-11-30 | Stop reason: HOSPADM

## 2021-11-30 RX ORDER — ONDANSETRON 2 MG/ML
4 INJECTION INTRAMUSCULAR; INTRAVENOUS EVERY 6 HOURS PRN
Status: DISCONTINUED | OUTPATIENT
Start: 2021-11-30 | End: 2021-11-30 | Stop reason: HOSPADM

## 2021-11-30 RX ORDER — FENTANYL CITRATE 50 UG/ML
25-50 INJECTION, SOLUTION INTRAMUSCULAR; INTRAVENOUS
Status: DISCONTINUED | OUTPATIENT
Start: 2021-11-30 | End: 2021-11-30 | Stop reason: HOSPADM

## 2021-11-30 RX ORDER — FENTANYL CITRATE 50 UG/ML
50-100 INJECTION, SOLUTION INTRAMUSCULAR; INTRAVENOUS
Status: COMPLETED | OUTPATIENT
Start: 2021-11-30 | End: 2021-11-30

## 2021-11-30 RX ORDER — SIMETHICONE 40MG/0.6ML
133 SUSPENSION, DROPS(FINAL DOSAGE FORM)(ML) ORAL
Status: DISCONTINUED | OUTPATIENT
Start: 2021-11-30 | End: 2021-11-30 | Stop reason: HOSPADM

## 2021-11-30 RX ORDER — FLUMAZENIL 0.1 MG/ML
0.2 INJECTION, SOLUTION INTRAVENOUS
Status: DISCONTINUED | OUTPATIENT
Start: 2021-11-30 | End: 2021-11-30 | Stop reason: HOSPADM

## 2021-11-30 RX ORDER — LIDOCAINE 40 MG/G
CREAM TOPICAL
Status: DISCONTINUED | OUTPATIENT
Start: 2021-11-30 | End: 2021-11-30 | Stop reason: HOSPADM

## 2021-11-30 RX ADMIN — MIDAZOLAM 1 MG: 1 INJECTION INTRAMUSCULAR; INTRAVENOUS at 12:14

## 2021-11-30 RX ADMIN — ONDANSETRON 4 MG: 2 INJECTION INTRAMUSCULAR; INTRAVENOUS at 12:03

## 2021-11-30 RX ADMIN — MIDAZOLAM 1 MG: 1 INJECTION INTRAMUSCULAR; INTRAVENOUS at 12:09

## 2021-11-30 RX ADMIN — SODIUM CHLORIDE 500 ML: 9 INJECTION, SOLUTION INTRAVENOUS at 12:11

## 2021-11-30 RX ADMIN — FENTANYL CITRATE 50 MCG: 50 INJECTION INTRAMUSCULAR; INTRAVENOUS at 12:08

## 2021-11-30 RX ADMIN — FENTANYL CITRATE 50 MCG: 50 INJECTION INTRAMUSCULAR; INTRAVENOUS at 12:14

## 2021-11-30 ASSESSMENT — MIFFLIN-ST. JEOR: SCORE: 1276.22

## 2021-11-30 NOTE — LETTER
November 11, 2021      Vanessa Mota  [address] [place] [address]        Dear Vanessa,   Please be aware that coverage of these services is subject to the terms and limitations of your health insurance plan.  Call member services at your health plan with any benefit or coverage questions.    Thank you for choosing [facility]. You are scheduled for the following service(s):    Date: 11/30/21             Procedure:  COLONOSCOPY  Doctor:       Dr Rader   Arrival Time: 1:45 pm  *Enter and check in at the Main Hospital Entrance*  Procedure Time: 2:30 pm     Location:   [facility]        Endoscopy Department, First Floor         [address] [place], [place] [address]      [phone] or [phone] ([facility]) to reschedule      MIRALAX -GATORADE  PREP  Colonoscopy is the most accurate test to detect colon polyps and colon cancer; and the only test where polyps can be removed. During this procedure, a doctor examines the lining of your large intestine and rectum through a flexible tube.   Transportation  You must arrange for a ride for the day of your procedure with a responsible adult. A taxi , Uber, etc, is not an option unless you are accompanied by a responsible adult. If you fail to arrange transportation with a responsible adult, your procedure will be cancelled and rescheduled.    Purchase the  following supplies at your local pharmacy:  - 2 (two) bisacodyl tablets: each tablet contains 5 mg.  (Dulcolax  laxative NOT Dulcolax  stool softener)   - 1 (one) 8.3 oz bottle of Polyethylene Glycol (PEG) 3350 Powder   (MiraLAX , Smooth LAX , ClearLAX  or equivalent)  - 64 oz Gatorade    Regular Gatorade, Gatorade G2 , Powerade , Powerade Zero  or Pedialyte  is acceptable. Red colored flavors are not allowed; all other colors (yellow, green, orange, purple and blue) are okay. It is also okay to buy two 2.12 oz packets of powdered Gatorade that can be  mixed with water to a total volume of 64 oz of liquid.  - 1 (one) 10 oz bottle of Magnesium Citrate (Red colored flavors are not allowed)  It is also okay for you to use a 0.5 oz package of powdered magnesium citrate (17 g) mixed with 10 oz of water.      PREPARATION FOR COLONOSCOPY    7 days before:    Discontinue fiber supplements and medications containing iron. This includes Metamucil  and Fibercon ; and multivitamins with iron.    3 days before:    Begin a low-fiber diet. A low-fiber diet helps making the cleanout more effective.     Examples of a low-fiber diet include (but are not limited to): white bread, white rice, pasta, crackers, fish, chicken, eggs, ground beef, creamy peanut butter, cooked/steamed/boiled vegetables, canned fruit, bananas, melons, milk, plain yogurt cheese, salad dressing and other condiments.     The following are not allowed on a low-fiber diet: seeds, nuts, popcorn, bran, whole wheat, corn, quinoa, raw fruits and vegetables, berries and dried fruit, beans and lentils.    For additional details on low-fiber diet, please refer to the table on the last page.    2 days before:    Continue the low-fiber diet.     Drink at least 8 glasses of water throughout the day.     Stop eating solid foods at 11:45 pm.    1 day before:    In the morning: begin a clear liquid diet (liquids you can see through).     Examples of a clear liquid diet include: water, clear broth or bouillon, Gatorade, Pedialyte or Powerade, carbonated and non-carbonated soft drinks (Sprite , 7-Up , ginger ale), strained fruit juices without pulp (apple, white grape, white cranberry), Jell-O  and popsicles.     The following are not allowed on a clear liquid diet: red liquids, alcoholic beverages, dairy products (milk, creamer, and yogurt), protein shakes, creamy broths, juice with pulp and chewing tobacco.    At noon: take 2 (two) bisacodyl tablets     At 4 (and no later than 6pm): start drinking the Miralax-Gatorade  preparation (8.3 oz of Miralax mixed with 64 oz of Gatorade in a large pitcher). Drink 1(one) 8 oz glass every 15 minutes thereafter, until the mixture is gone.    COLON CLEANSING TIPS: drink adequate amounts of fluids before and after your colon cleansing to prevent dehydration. Stay near a toilet because you will have diarrhea. Even if you are sitting on the toilet, continue to drink the cleansing solution every 15 minutes. If you feel nauseous or vomit, rinse your mouth with water, take a 15 to 30-minute-break and then continue drinking the solution. You will be uncomfortable until the stool has flushed from your colon (in about 2 to 4 hours). You may feel chilled.    Day of your procedure  You may take all of your morning medications including blood pressure medications, blood thinners (if you have not been instructed to stop these by our office), methadone, anti-seizure medications with sips of water 3 hours prior to your procedure or earlier. Do not take insulin or vitamins prior to your procedure. Continue the clear liquid diet.       4 hours prior: drink 10 oz of magnesium citrate. It may be easier to drink it with a straw.    STOP consuming all liquids after that.     Do not take anything by mouth during this time.     Allow extra time to travel to your procedure as you may need to stop and use a restroom along the way.    You are ready for the procedure, if you followed all instructions and your stool is no longer formed, but clear or yellow liquid. If you are unsure whether your colon is clean, please call our office at [phone] before you leave for your appointment.    Bring the following to your procedure:  - Insurance Card/Photo ID.   - List of current medications including over-the-counter medications and supplements.   - Your rescue inhaler if you currently use one to control asthma.    Canceling or rescheduling your appointment:   If you must cancel or reschedule your appointment, please call  [phone] as soon as possible.      COLONOSCOPY PRE-PROCEDURE CHECKLIST    If you have diabetes, ask your regular doctor for diet and medication restrictions.  If you take an anticoagulant or anti-platelet medication (such as Coumadin , Lovenox , Pradaxa , Xarelto , Eliquis , etc.), please call your primary doctor for advice on holding this medication.  If you take aspirin you may continue to do so.  If you are or may be pregnant, please discuss the risks and benefits of this procedure with your doctor.        What happens during a colonoscopy?    Plan to spend up to two hours, starting at registration time, at the endoscopy center the day of your procedure. The colonoscopy takes an average of 15 to 30 minutes. Recovery time is about 30 minutes.      Before the exam:    You will change into a gown.    Your medical history and medication list will be reviewed with you, unless that has been done over the phone prior to the procedure.     A nurse will insert an intravenous (IV) line into your hand or arm.    The doctor will meet with you and will give you a consent form to sign.  During the exam:     Medicine will be given through the IV line to help you relax.     Your heart rate and oxygen levels will be monitored. If your blood pressure is low, you may be given fluids through the IV line.     The doctor will insert a flexible hollow tube, called a colonoscope, into your rectum. The scope will be advanced slowly through the large intestine (colon).    You may have a feeling of fullness or pressure.     If an abnormal tissue or a polyp is found, the doctor may remove it through the endoscope for closer examination, or biopsy. Tissue removal is painless    After the exam:           Any tissue samples removed during the exam will be sent to a lab for evaluation. It may take 5-7 working days for you to be notified of the results.     A nurse will provide you with complete discharge instructions before you leave the  endoscopy center. Be sure to ask the nurse for specific instructions if you take blood thinners such as Aspirin, Coumadin or Plavix.     The doctor will prepare a full report for you and for the physician who referred you for the procedure.     Your doctor will talk with you about the initial results of your exam.      Medication given during the exam will prohibit you from driving for the rest of the day.     Following the exam, you may resume your normal diet. Your first meal should be light, no greasy foods. Avoid alcohol until the next day.     You may resume your regular activities the day after the procedure.         LOW-FIBER DIET    Foods RECOMMENDED Foods to AVOID   Breads, Cereal, Rice and Pasta:   White bread, rolls, biscuits, croissant and rodolfo toast.   Waffles, Bolivian toast and pancakes.   White rice, noodles, pasta, macaroni and peeled cooked potatoes.   Plain crackers and saltines.   Cooked cereals: farina, cream of rice.   Cold cereals: Puffed Rice , Rice Krispies , Corn Flakes  and Special K    Breads, Cereal, Rice and Pasta:   Breads or rolls with nuts, seeds or fruit.   Whole wheat, pumpernickel, rye breads and cornbread.   Potatoes with skin, brown or wild rice, and kasha (buckwheat).     Vegetables:   Tender cooked and canned vegetables without seeds: carrots, asparagus tips, green or wax beans, pumpkin, spinach, lima beans. Vegetables:   Raw or steamed vegetables.   Vegetables with seeds.   Sauerkraut.   Winter squash, peas, broccoli, Brussel sprouts, cabbage, onions, cauliflower, baked beans, peas and corn.   Fruits:   Strained fruit juice.   Canned fruit, except pineapple.   Ripe bananas and melon. Fruits:   Prunes and prune juice.   Raw fruits.   Dried fruits: figs, dates and raisins.   Milk/Dairy:   Milk: plain or flavored.   Yogurt, custard and ice cream.   Cheese and cottage cheese Milk/Dairy:     Meat and other proteins:   ground, well-cooked tender beef, lamb, ham, veal, pork, fish,  poultry and organ meats.   Eggs.   Peanut butter without nuts. Meat and other proteins:   Tough, fibrous meats with gristle.   Dry beans, peas and lentils.   Peanut butter with nuts.   Tofu.   Fats, Snack, Sweets, Condiments and Beverages:   Margarine, butter, oils, mayonnaise, sour cream and salad dressing, plain gravy.   Sugar, hard candy, clear jelly, honey and syrup.   Spices, cooked herbs, bouillon, broth and soups made with allowed vegetable, ketchup and mustard.   Coffee, tea and carbonated drinks.   Plain cakes, cookies and pretzels.   Gelatin, plain puddings, custard, ice cream, sherbet and popsicles. Fats, Snack, Sweets, Condiments and Beverages:   Nuts, seeds and coconut.   Jam, marmalade and preserves.   Pickles, olives, relish and horseradish.   All desserts containing nuts, seeds, dried fruit and coconut; or made from whole grains or bran.   Candy made with nuts or seeds.   Popcorn.         DIRECTIONS TO THE ENDOSCOPY DEPARTMENT    From the north ([facility])  Take [address] South, exit on [address] [address]. Get into the left hand magy, turn left (east), go one-half mile to [address] and turn left. Go north to the second stoplight, take a right on [address] and follow it to the Main Hospital entrance.    From the south ([facility])  Take 35N to the 35E split and exit on [address] [address]. On [address] [address], turn left (west) to [address]. Turn right (north) on [address]. Go north to the second stoplight, take a right on [address] and follow it to the Main Hospital entrance.    From the east via 35E ([facility])  Take [address]E south to [address] [address] exit. Turn right on [address] [address]. Go west to [address]. Turn right (north) on [address]. Go to the second stoplight, take a right on [address] to the Main Hospital entrance.    From the east via [address] ([facility])  Take [address] to [address]. Turn left  (south) on [address] to [address], turn left (east) on [address] and follow it to the Main Hospital entrance.    From the west via [address] ([place], [place])  Take [address] east to [address]. Turn right (south) on [address] to [address], turn left (east) on [address] and follow it to the Main Hospital entrance.

## 2021-11-30 NOTE — DISCHARGE INSTRUCTIONS

## 2021-11-30 NOTE — H&P
Pre-Endoscopy History and Physical     Vanessa Mota MRN# [id number]   YOB: 1960 Age: 61 year old     Date of Procedure: 2021  Primary care provider: Aaseby-Aguilera, Ramona Ann  Type of Endoscopy: Colonoscopy with possible biopsy, possible polypectomy  Reason for Procedure: screen  Type of Anesthesia Anticipated: Conscious Sedation    HPI:    Vanessa is a 61 year old female who will be undergoing the above procedure.      A history and physical has been performed. The patient's medications and allergies have been reviewed. The risks and benefits of the procedure and the sedation options and risks were discussed with the patient.  All questions were answered and informed consent was obtained.      She denies a personal or family history of anesthesia complications or bleeding disorders.     Patient Active Problem List   Diagnosis     Headache     Myalgia and myositis     ASCUS favor benign     Insomnia     Obesity     Disturbance in sleep behavior     Fatty liver     Anxiety     Mild major depression (H)     Snoring     HYPERLIPIDEMIA LDL GOAL <100     Dry mouth     Fibromyalgia     Migraine headache     obesity     Pure hypercholesterolemia     Hypertension goal BP (blood pressure) < 140/90     Elevated LFTs     BMI 38.0-38.9,adult     Elevated testosterone level in female     Type 2 diabetes mellitus without complication, with long-term current use of insulin (H)        Past Medical History:   Diagnosis Date     311     1-2 yrs, [facility] started zoloft 50 mg daily     Diabetes (H)      Headache(784.0)     4-5 yrs-migraines     Hypertension      Myalgia and myositis, unspecified     suspects fibromyalgia, took celebrex for a while     Papanicolaou smear of cervix with atypical squamous cells of undetermined significance (ASC-US) 05, 07, 08, 11, 14, 17    neg hpv        Past Surgical History:   Procedure Laterality Date     C FULL ROUT OBSTE CARE,VAGINAL DELIV       x 4     C  INDUCED ABORTN BY D&C      once 12 years ago       Social History     Tobacco Use     Smoking status: Former Smoker     Years: 3.00     Quit date: 1/15/2013     Years since quittin.8     Smokeless tobacco: Current User     Tobacco comment: nicotine lozenge   Substance Use Topics     Alcohol use: Yes     Comment: rare       Family History   Problem Relation Age of Onset     Diabetes Brother         2 brothers     Family History Negative Mother      Family History Negative Father      Diabetes Father      Hypertension Maternal Grandmother      Arthritis Maternal Grandmother      Diabetes Maternal Grandfather      Prostate Cancer Maternal Grandfather      Blood Disease Brother         hiv positive, homosextual and alcoholism and drug abuse     Respiratory Brother         sleep apnea, htn and dyslipidmia     Breast Cancer No family hx of      Cancer - colorectal No family hx of      Colon Cancer No family hx of        Prior to Admission medications    Medication Sig Start Date End Date Taking? Authorizing Provider   ALPRAZolam (XANAX) 0.25 MG tablet Take 0.25 mg by mouth 3 times daily as needed for anxiety   Yes Reported, Patient   amLODIPine (NORVASC) 10 MG tablet Take 1 tablet (10 mg) by mouth At Bedtime 10/13/21  Yes Aaseby-Aguilera, Ramona Ann, PA-C   ASPIRIN 81 MG OR TABS ONE DAILY 4/3/09  Yes Azucena Harrison MD   atorvastatin (LIPITOR) 20 MG tablet Take 1 tablet (20 mg) by mouth daily 21  Yes Aaseby-Aguilera, Ramona Ann, PA-C   BusPIRone HCl (BUSPAR) 30 MG TABS One tablet twice daily  -  60 mg daily 5/3/13  Yes Azucena Harrison MD   chlorthalidone (HYGROTON) 25 MG tablet Take 1 tablet (25 mg) by mouth daily 10/29/21  Yes Aaseby-Aguilera, Ramona Ann, PA-C   cyclobenzaprine (FLEXERIL) 10 MG tablet TAKE 1 TABLET BY MOUTH AT NIGHT AS NEEDED FOR MUSCLE SPASM 21  Yes Aaseby-Aguilera, Ramona Ann, PA-C   FLUoxetine 20 MG tablet Take 20 mg by mouth daily   Yes Reported, Patient   gabapentin (NEURONTIN) 100 MG  capsule Take 2 capsules (200 mg) by mouth 3 times daily 9/24/21  Yes Aaseby-Aguilera, Ramona Ann, PA-C   glipiZIDE (GLIPIZIDE XL) 10 MG 24 hr tablet Take 1 tablet (10 mg) by mouth every 12 hours 9/24/21  Yes Aaseby-Aguilera, Ramona Ann, PA-C   hydrOXYzine (VISTARIL) 50 MG capsule Take 100 mg by mouth Two tablets at bedtime   Yes Reported, Patient   insulin glargine (BASAGLAR KWIKPEN) 100 UNIT/ML pen INJECT 54 UNITS SUBCUTANEOUSLY IN THE MORNING 9/24/21  Yes Aaseby-Aguilera, Ramona Ann, PA-C   losartan (COZAAR) 100 MG tablet Take 1 tablet (100 mg) by mouth daily 9/24/21  Yes Aaseby-Aguilera, Ramona Ann, PA-C   metFORMIN (GLUCOPHAGE) 500 MG tablet Take 2 tablets (1,000 mg) by mouth 2 times daily (with meals) 9/24/21  Yes Aaseby-Aguilera, Ramona Ann, PA-C   omeprazole (PRILOSEC) 40 MG DR capsule Take 1 capsule (40 mg) by mouth daily 9/13/21  Yes Aaseby-Aguilera, Ramona Ann, PA-C   SUMAtriptan (IMITREX) 100 MG tablet TAKE 1 TABLET BY MOUTH AT ONSET OF HEADACHE FOR MIGRAINE. MAY REPEAT AFTER 2 HOURS AS NEEDED. MAX OF 2 TABLETS PER 24 HOURS 11/4/21  Yes Aranza Torres PA-C   zolpidem (AMBIEN) 5 MG tablet TAKE 1 TABLET BY MOUTH ONCE DAILY AT NIGHT AS NEEDED FOR SLEEP 11/11/21  Yes Aaseby-Aguilera, Ramona Ann, PA-C   blood glucose (CANDIDA CONTOUR) test strip Use to test blood sugars 2 times daily or as directed. 9/30/14   Aaseby-Aguilera, Ramona Ann, PA-C   blood glucose (NO BRAND SPECIFIED) lancets standard Use to test blood sugar 2 times daily or as directed.  Dispense as covered by insurance 8/21/17   Aaseby-Aguilera, Ramona Ann, PA-C   blood glucose monitoring (NO BRAND SPECIFIED) meter device kit Use to test blood sugar 2 times daily or as directed.  Dispense brand as covered by insurance 8/21/17   Aaseby-Aguilera, Ramona Ann, PA-C   blood glucose monitoring (NO BRAND SPECIFIED) test strip Use to test blood sugar 2 times daily or as directed.  Dispense Ultra One touch strips per insurance coverage 9/11/17  "  Aaseby-Aguilera, Ramona Ann, PA-C   FISH OIL 1000 MG OR CAPS Take 2 capsules by mouth 2 times daily Takes 2-3 x week. 8/24/09   Azucena Harrison MD   insulin pen needle (ULTICARE MINI) 31G X 6 MM 1 Units daily Use 1 daily or as directed. At bedtime 9/6/18   Aaseby-Aguilera, Ramona Ann, PA-C       Allergies   Allergen Reactions     Wellbutrin [Bupropion Hcl] Rash        REVIEW OF SYSTEMS:   5 point ROS negative except as noted above in HPI, including Gen., Resp., CV, GI &  system review.    PHYSICAL EXAM:   There were no vitals taken for this visit. Estimated body mass index is 34.98 kg/m  as calculated from the following:    Height as of 10/29/21: 1.499 m (4' 11\").    Weight as of 10/29/21: 78.6 kg (173 lb 3.2 oz).   GENERAL APPEARANCE: alert, and oriented  MENTAL STATUS: alert  AIRWAY EXAM: Mallampatti Class I (visualization of the soft palate, fauces, uvula, anterior and posterior pillars)  RESP: lungs clear to auscultation - no rales, rhonchi or wheezes  CV: regular rates and rhythm  DIAGNOSTICS:    Not indicated    IMPRESSION   ASA Class 2 - Mild systemic disease    PLAN:   Plan for Colonoscopy with possible biopsy, possible polypectomy. We discussed the risks, benefits and alternatives and the patient wished to proceed.    The above has been forwarded to the consulting provider.      Signed Electronically by: Elia Rader MD  November 30, 2021          "

## 2021-12-06 ENCOUNTER — TELEPHONE (OUTPATIENT)
Dept: OBGYN | Facility: CLINIC | Age: 61
End: 2021-12-06

## 2021-12-06 ENCOUNTER — OFFICE VISIT (OUTPATIENT)
Dept: OBGYN | Facility: CLINIC | Age: 61
End: 2021-12-06

## 2021-12-06 VITALS
BODY MASS INDEX: 33.85 KG/M2 | DIASTOLIC BLOOD PRESSURE: 78 MMHG | WEIGHT: 172.4 LBS | HEIGHT: 60 IN | SYSTOLIC BLOOD PRESSURE: 130 MMHG

## 2021-12-06 DIAGNOSIS — N95.0 POST-MENOPAUSAL BLEEDING: ICD-10-CM

## 2021-12-06 DIAGNOSIS — R93.89 THICKENED ENDOMETRIUM: Primary | ICD-10-CM

## 2021-12-06 PROCEDURE — 88305 TISSUE EXAM BY PATHOLOGIST: CPT | Performed by: FAMILY MEDICINE

## 2021-12-06 PROCEDURE — 99203 OFFICE O/P NEW LOW 30 MIN: CPT | Performed by: FAMILY MEDICINE

## 2021-12-06 ASSESSMENT — MIFFLIN-ST. JEOR: SCORE: 1273.5

## 2021-12-06 NOTE — PATIENT INSTRUCTIONS
Will notify of results     Maude will call to schedule hysteroscopy d & c       Dr. Margaret Wolf, DO    Obstetrics and Gynecology  [facility]

## 2021-12-06 NOTE — PROGRESS NOTES
SUBJECTIVE:  Vanessa Mota is an 61 year old   woman who presents for   gynecology consult for thickened endometrium, 14 mm, postmenopausal spotting, occurring a year prior.  No LMP recorded. Patient is postmenopausal.     Current contraception: none  History of abnormal Pap smear: No  Family history of uterine or ovarian cancer: No  History of abnormal mammogram: No  Family history of breast cancer: No      Past Medical History:   Diagnosis Date     311     1-2 yrs, [facility] started zoloft 50 mg daily     Diabetes (H)      Headache(784.0)     4-5 yrs-migraines     Hypertension      Myalgia and myositis, unspecified     suspects fibromyalgia, took celebrex for a while     Papanicolaou smear of cervix with atypical squamous cells of undetermined significance (ASC-US) 05, 07, 08, , , 17    neg hpv          Family History   Problem Relation Age of Onset     Diabetes Brother         2 brothers     Family History Negative Mother      Family History Negative Father      Diabetes Father      Hypertension Maternal Grandmother      Arthritis Maternal Grandmother      Diabetes Maternal Grandfather      Prostate Cancer Maternal Grandfather      Blood Disease Brother         hiv positive, homosextual and alcoholism and drug abuse     Respiratory Brother         sleep apnea, htn and dyslipidmia     Breast Cancer No family hx of      Cancer - colorectal No family hx of      Colon Cancer No family hx of        Past Surgical History:   Procedure Laterality Date     C FULL ROUT OBSTE CARE,VAGINAL DELIV       x 4     C INDUCED ABORTN BY D&C      once 12 years ago     COLONOSCOPY Left 2021    Procedure: COLONOSCOPY;  Surgeon: Elia Rader MD;  Location:  GI       Current Outpatient Medications   Medication     ALPRAZolam (XANAX) 0.25 MG tablet     amLODIPine (NORVASC) 10 MG tablet     ASPIRIN 81 MG OR TABS     atorvastatin (LIPITOR) 20 MG tablet     blood glucose (CANDIDA CONTOUR) test strip      blood glucose (NO BRAND SPECIFIED) lancets standard     blood glucose monitoring (NO BRAND SPECIFIED) meter device kit     blood glucose monitoring (NO BRAND SPECIFIED) test strip     BusPIRone HCl (BUSPAR) 30 MG TABS     chlorthalidone (HYGROTON) 25 MG tablet     cyclobenzaprine (FLEXERIL) 10 MG tablet     FISH OIL 1000 MG OR CAPS     FLUoxetine 20 MG tablet     gabapentin (NEURONTIN) 100 MG capsule     glipiZIDE (GLIPIZIDE XL) 10 MG 24 hr tablet     hydrOXYzine (VISTARIL) 50 MG capsule     insulin glargine (BASAGLAR KWIKPEN) 100 UNIT/ML pen     insulin pen needle (ULTICARE MINI) 31G X 6 MM     losartan (COZAAR) 100 MG tablet     metFORMIN (GLUCOPHAGE) 500 MG tablet     omeprazole (PRILOSEC) 40 MG DR capsule     SUMAtriptan (IMITREX) 100 MG tablet     zolpidem (AMBIEN) 5 MG tablet     No current facility-administered medications for this visit.     Allergies   Allergen Reactions     Wellbutrin [Bupropion Hcl] Rash       Social History     Tobacco Use     Smoking status: Former Smoker     Years: 3.00     Quit date: 1/15/2013     Years since quittin.8     Smokeless tobacco: Current User     Tobacco comment: nicotine lozenge   Substance Use Topics     Alcohol use: Yes     Comment: rare       Review Of Systems  Ears/Nose/Throat: negative  Respiratory: No shortness of breath, dyspnea on exertion, cough, or hemoptysis  Cardiovascular: negative  Gastrointestinal: negative  Genitourinary: See HPI   Constitutional, HEENT, cardiovascular, pulmonary, GI, , musculoskeletal, neuro, skin, endocrine and psych systems are negative, except as otherwise noted.    OBJECTIVE:  /78   Ht 1.524 m (5')   Wt 78.2 kg (172 lb 6.4 oz)   BMI 33.67 kg/m    General appearance: healthy, alert and no distress  Skin: Skin color, texture, turgor normal. No rashes or lesions.  Ears: negative  Nose/Sinuses: Nares normal. Septum midline. Mucosa normal. No drainage or sinus tenderness.  Oropharynx: Lips, mucosa, and tongue normal. Teeth  and gums normal.  Neck: Neck supple. No adenopathy. Thyroid symmetric, normal size,, Carotids without bruits.  Lungs: negative, Percussion normal. Good diaphragmatic excursion. Lungs clear  Heart: negative, PMI normal. No lifts, heaves, or thrills. RRR. No murmurs, clicks gallops or rub  Breasts: deferred  Abdomen: Abdomen soft, non-tender. BS normal. No masses, organomegaly  Pelvic: Pelvic:  Pelvic examination with no pap/no Gonorrhea and Chlamydia   including  External genitalia normal   and vagina normal rugatted not atrophic  Examination of urethra normal no masses, tenderness, scarring  bladder, no masses or tenderness  Cervix no lesions or discharge  Bimanual exam with   Uterus 7 weeks size, mid position, mobile, non-tenderness, no descent   Adnexa/parametria  Normal no masses       Procedure:  Endometrial biopsy    Indication: Menometrorrhagia with age >35                   Endometrial cells on pap and age >40    Discussed risk of bleeding, infection, uterine perforation, cramping pain.  Pt agreed to proceed with procedure after all questions answered. INFORMED CONSENT OBTAINED   TIME OUT DONE PRIOR TO PROCEDURE:   STATING PROCEDURE NAME     Speculum placed and cervix visualized.  Cervix cleansed with betadine x 3.  Tenaculum placed on anterior lip of the cervix.  Endometrial biopsy pipelle passed through cervix and uterus sounded to 7 cm.  Biopsy specimen collected with one pass with return of moderate amount of pink tissue.  Specimen placed in a labeled container and set aside to be sent to pathology.  Tenaculum removed from the cervix and sites hemostatic.  No bleeding noted from cervical os.     Patient tolerated the procedure well.  There were no apparent complications and bleeding was minimal.    She is instructed to use no tampons and have no intercourse for the next 5 days.    ASSESSMENT:  Vanessa Mota is an 61 year old   woman who presents for   gynecology consult for thickened endometrium,  14 mm, postmenopausal spotting, occurring a year prior.   PLAN:  Dx:  1)  Thickened endometrium, 14 mm, postmenopausal spotting, occurring a year prior.   For endometrial biopsy today.  Due to polypoid thickening recommend hysteroscopy d & c as well, which will be scheduled.       Labs were reviewed in [facility]  Imaging was reviewed in Epic   Tests and documents were reviewed.   Discussion of management or test interpretation       Dr. Margaret Wolf, DO    Obstetrics and Gynecology  [facility]

## 2021-12-06 NOTE — TELEPHONE ENCOUNTER
Procedure name(s) - multi select hysteroscopy dilation and curettage wtih myosure    Reason for procedure thickened endometrium    Is this a multi surgeon case? No    Laterality N/A    Request for additional equipment Other (see comments) None   Anesthesia General    Initiate Pre-op orders for above procedure: Yes, as ordered in Epic Additional orders noted there also   Location of Case: [facility]    Surgeon Procedure Time (incision to closure) in minutes (per procedure as applicable) 30    Note: Surgical Case Time Needed (in minutes)   Date of Surgery (Requested): 12/6/2021    Patient Class (for admit prior to surgery, specify number of days in comments): Same day (hospital outpatient)    Why can t this outpatient surgery be done at the [facility] or [facility]? NA    H&P To Be Completed By: PCP    Post-Op Appointment 1.5 week    Bowel Prep: No    Office visit with surgeon prior to surgery: No    Vendor Needed? No

## 2021-12-06 NOTE — NURSING NOTE
Chief Complaint   Patient presents with     Abnormal Uterine Bleeding     postmenopausal bleeding--pelvic US 10/8/21 with thickened endometrium       Initial /78   Ht 1.524 m (5')   Wt 78.2 kg (172 lb 6.4 oz)   BMI 33.67 kg/m   Estimated body mass index is 33.67 kg/m  as calculated from the following:    Height as of this encounter: 1.524 m (5').    Weight as of this encounter: 78.2 kg (172 lb 6.4 oz).  BP completed using cuff size: regular    Questioned patient about current smoking habits.  Pt. quit smoking some time ago.          The following HM Due: NONE

## 2021-12-08 ENCOUNTER — HOSPITAL ENCOUNTER (OUTPATIENT)
Facility: CLINIC | Age: 61
End: 2021-12-08
Attending: FAMILY MEDICINE | Admitting: FAMILY MEDICINE

## 2021-12-08 ENCOUNTER — PREP FOR PROCEDURE (OUTPATIENT)
Dept: OBGYN | Facility: CLINIC | Age: 61
End: 2021-12-08
Payer: COMMERCIAL

## 2021-12-08 DIAGNOSIS — R93.89 THICKENED ENDOMETRIUM: Primary | ICD-10-CM

## 2021-12-08 LAB
PATH REPORT.COMMENTS IMP SPEC: NORMAL
PATH REPORT.FINAL DX SPEC: NORMAL
PATH REPORT.GROSS SPEC: NORMAL
PATH REPORT.MICROSCOPIC SPEC OTHER STN: NORMAL
PATH REPORT.RELEVANT HX SPEC: NORMAL
PHOTO IMAGE: NORMAL

## 2021-12-08 NOTE — TELEPHONE ENCOUNTER
Type of surgery: hysteroscopy dilation and curettage with myosure  Location of surgery: [facility]  Date and time of surgery: 1/12/22 @ 9:45 am  Surgeon: Dr. Wolf  Pre-Op Appt Date: Patient advised to schedule.  Post-Op Appt Date: 1/24/22   Packet sent out: Yes  Pre-cert/Authorization completed:  No  Date: 12/8/21

## 2021-12-09 ENCOUNTER — TELEPHONE (OUTPATIENT)
Dept: OBGYN | Facility: CLINIC | Age: 61
End: 2021-12-09
Payer: COMMERCIAL

## 2021-12-10 DIAGNOSIS — G47.00 INSOMNIA, UNSPECIFIED TYPE: ICD-10-CM

## 2021-12-10 DIAGNOSIS — E78.5 HYPERLIPIDEMIA LDL GOAL <100: ICD-10-CM

## 2021-12-10 DIAGNOSIS — M79.7 FIBROMYALGIA: ICD-10-CM

## 2021-12-10 RX ORDER — CYCLOBENZAPRINE HCL 10 MG
TABLET ORAL
Qty: 30 TABLET | Refills: 0 | Status: SHIPPED | OUTPATIENT
Start: 2021-12-10 | End: 2022-01-11

## 2021-12-10 RX ORDER — ATORVASTATIN CALCIUM 20 MG/1
TABLET, FILM COATED ORAL
Qty: 90 TABLET | Refills: 0 | OUTPATIENT
Start: 2021-12-10

## 2021-12-10 RX ORDER — ZOLPIDEM TARTRATE 5 MG/1
TABLET ORAL
Qty: 30 TABLET | Refills: 0 | Status: SHIPPED | OUTPATIENT
Start: 2021-12-10 | End: 2022-01-11

## 2021-12-10 NOTE — TELEPHONE ENCOUNTER
Routing refill request to provider for review/approval because:  Drug not on the [facility] refill protocol     Fatuma Lewis RN

## 2021-12-12 DIAGNOSIS — E78.5 HYPERLIPIDEMIA LDL GOAL <100: ICD-10-CM

## 2021-12-13 ENCOUNTER — PRE VISIT (OUTPATIENT)
Dept: CARDIOLOGY | Facility: CLINIC | Age: 61
End: 2021-12-13
Payer: COMMERCIAL

## 2021-12-13 DIAGNOSIS — R01.1 UNDIAGNOSED CARDIAC MURMURS: ICD-10-CM

## 2021-12-13 DIAGNOSIS — I10 HYPERTENSION GOAL BP (BLOOD PRESSURE) < 140/90: Primary | ICD-10-CM

## 2021-12-13 NOTE — TELEPHONE ENCOUNTER
Message to Dr. Santiago to see if echo should be ordered NEW patient pre-visit for possible murmur.    12/14/2021 reply from Dr. Santiago: Sure. Thanks    Echo for order placed.  Left a message for patient with update that an echo is planned before her visit with Dr. Santiago.  Message to scheduling to contact patient for appointment.

## 2021-12-14 RX ORDER — ATORVASTATIN CALCIUM 20 MG/1
TABLET, FILM COATED ORAL
Qty: 90 TABLET | Refills: 0 | OUTPATIENT
Start: 2021-12-14

## 2021-12-14 NOTE — TELEPHONE ENCOUNTER
Should have refills on file  E-Prescribing Status: Receipt confirmed by pharmacy (9/24/2021  3:58 PM CDT)  Kelvin MORAN RN, BSN

## 2021-12-15 ENCOUNTER — TELEPHONE (OUTPATIENT)
Dept: CARDIOLOGY | Facility: CLINIC | Age: 61
End: 2021-12-15
Payer: COMMERCIAL

## 2021-12-15 DIAGNOSIS — E78.5 HYPERLIPIDEMIA LDL GOAL <100: ICD-10-CM

## 2021-12-15 NOTE — TELEPHONE ENCOUNTER
----- Message from Sera Munoz RN sent at 12/14/2021  8:56 AM CST -----  Regarding: per Dr. Santiago - needs an echo before NEW visit on 1/6/2022 - left patient a message, thanks meryl

## 2021-12-16 RX ORDER — ATORVASTATIN CALCIUM 20 MG/1
TABLET, FILM COATED ORAL
Qty: 90 TABLET | Refills: 0 | OUTPATIENT
Start: 2021-12-16

## 2021-12-17 DIAGNOSIS — Z11.59 ENCOUNTER FOR SCREENING FOR OTHER VIRAL DISEASES: ICD-10-CM

## 2021-12-31 ENCOUNTER — HOSPITAL ENCOUNTER (OUTPATIENT)
Dept: CARDIOLOGY | Facility: CLINIC | Age: 61
Discharge: HOME | End: 2021-12-31
Attending: INTERNAL MEDICINE | Admitting: INTERNAL MEDICINE
Payer: COMMERCIAL

## 2021-12-31 DIAGNOSIS — R01.1 UNDIAGNOSED CARDIAC MURMURS: ICD-10-CM

## 2021-12-31 LAB — LVEF ECHO: NORMAL

## 2021-12-31 PROCEDURE — 93306 TTE W/DOPPLER COMPLETE: CPT | Mod: 26 | Performed by: INTERNAL MEDICINE

## 2021-12-31 PROCEDURE — 93306 TTE W/DOPPLER COMPLETE: CPT

## 2022-01-04 RX ORDER — CEFAZOLIN SODIUM/WATER 2 G/20 ML
2 SYRINGE (ML) INTRAVENOUS SEE ADMIN INSTRUCTIONS
Status: CANCELLED | OUTPATIENT
Start: 2022-01-04

## 2022-01-04 RX ORDER — CEFAZOLIN SODIUM/WATER 2 G/20 ML
2 SYRINGE (ML) INTRAVENOUS
Status: CANCELLED | OUTPATIENT
Start: 2022-01-04

## 2022-01-04 RX ORDER — ACETAMINOPHEN 325 MG/1
975 TABLET ORAL ONCE
Status: CANCELLED | OUTPATIENT
Start: 2022-01-04 | End: 2022-01-04

## 2022-01-04 RX ORDER — KETOROLAC TROMETHAMINE 30 MG/ML
30 INJECTION, SOLUTION INTRAMUSCULAR; INTRAVENOUS ONCE
Status: CANCELLED | OUTPATIENT
Start: 2022-01-04 | End: 2022-01-04

## 2022-01-05 ENCOUNTER — OFFICE VISIT (OUTPATIENT)
Dept: FAMILY MEDICINE | Facility: CLINIC | Age: 62
End: 2022-01-05
Payer: OTHER GOVERNMENT

## 2022-01-05 VITALS
SYSTOLIC BLOOD PRESSURE: 146 MMHG | RESPIRATION RATE: 18 BRPM | HEART RATE: 91 BPM | WEIGHT: 167.9 LBS | HEIGHT: 60 IN | BODY MASS INDEX: 32.96 KG/M2 | OXYGEN SATURATION: 99 % | TEMPERATURE: 98.2 F | DIASTOLIC BLOOD PRESSURE: 78 MMHG

## 2022-01-05 DIAGNOSIS — I10 HYPERTENSION GOAL BP (BLOOD PRESSURE) < 140/90: ICD-10-CM

## 2022-01-05 DIAGNOSIS — R74.8 ELEVATED LIVER ENZYMES: ICD-10-CM

## 2022-01-05 DIAGNOSIS — E11.9 TYPE 2 DIABETES MELLITUS WITHOUT COMPLICATION, WITH LONG-TERM CURRENT USE OF INSULIN (H): ICD-10-CM

## 2022-01-05 DIAGNOSIS — Z79.4 TYPE 2 DIABETES MELLITUS WITHOUT COMPLICATION, WITH LONG-TERM CURRENT USE OF INSULIN (H): ICD-10-CM

## 2022-01-05 DIAGNOSIS — E87.6 LOW POTASSIUM SYNDROME: ICD-10-CM

## 2022-01-05 DIAGNOSIS — E87.1 LOW SODIUM LEVELS: ICD-10-CM

## 2022-01-05 DIAGNOSIS — Z01.818 PREOP TESTING: Primary | ICD-10-CM

## 2022-01-05 DIAGNOSIS — N95.0 POSTMENOPAUSAL BLEEDING: ICD-10-CM

## 2022-01-05 LAB
ERYTHROCYTE [DISTWIDTH] IN BLOOD BY AUTOMATED COUNT: 13.4 % (ref 10–15)
HCT VFR BLD AUTO: 40.2 % (ref 35–47)
HGB BLD-MCNC: 13.8 G/DL (ref 11.7–15.7)
MCH RBC QN AUTO: 27.9 PG (ref 26.5–33)
MCHC RBC AUTO-ENTMCNC: 34.3 G/DL (ref 31.5–36.5)
MCV RBC AUTO: 81 FL (ref 78–100)
PLATELET # BLD AUTO: 290 10E3/UL (ref 150–450)
RBC # BLD AUTO: 4.95 10E6/UL (ref 3.8–5.2)
WBC # BLD AUTO: 11.2 10E3/UL (ref 4–11)

## 2022-01-05 PROCEDURE — 80053 COMPREHEN METABOLIC PANEL: CPT | Performed by: PHYSICIAN ASSISTANT

## 2022-01-05 PROCEDURE — 0004A PR COVID VAC PFIZER DIL RECON 30 MCG/0.3 ML IM: CPT | Performed by: PHYSICIAN ASSISTANT

## 2022-01-05 PROCEDURE — 36415 COLL VENOUS BLD VENIPUNCTURE: CPT | Performed by: PHYSICIAN ASSISTANT

## 2022-01-05 PROCEDURE — 91300 PR COVID VAC PFIZER DIL RECON 30 MCG/0.3 ML IM: CPT | Performed by: PHYSICIAN ASSISTANT

## 2022-01-05 PROCEDURE — 83036 HEMOGLOBIN GLYCOSYLATED A1C: CPT | Performed by: PHYSICIAN ASSISTANT

## 2022-01-05 PROCEDURE — 99214 OFFICE O/P EST MOD 30 MIN: CPT | Performed by: PHYSICIAN ASSISTANT

## 2022-01-05 PROCEDURE — 85027 COMPLETE CBC AUTOMATED: CPT | Performed by: PHYSICIAN ASSISTANT

## 2022-01-05 SDOH — ECONOMIC STABILITY: FOOD INSECURITY: WITHIN THE PAST 12 MONTHS, THE FOOD YOU BOUGHT JUST DIDN'T LAST AND YOU DIDN'T HAVE MONEY TO GET MORE.: NEVER TRUE

## 2022-01-05 SDOH — HEALTH STABILITY: PHYSICAL HEALTH: ON AVERAGE, HOW MANY DAYS PER WEEK DO YOU ENGAGE IN MODERATE TO STRENUOUS EXERCISE (LIKE A BRISK WALK)?: 3 DAYS

## 2022-01-05 SDOH — ECONOMIC STABILITY: TRANSPORTATION INSECURITY
IN THE PAST 12 MONTHS, HAS THE LACK OF TRANSPORTATION KEPT YOU FROM MEDICAL APPOINTMENTS OR FROM GETTING MEDICATIONS?: YES

## 2022-01-05 SDOH — ECONOMIC STABILITY: TRANSPORTATION INSECURITY
IN THE PAST 12 MONTHS, HAS LACK OF TRANSPORTATION KEPT YOU FROM MEETINGS, WORK, OR FROM GETTING THINGS NEEDED FOR DAILY LIVING?: NO

## 2022-01-05 SDOH — ECONOMIC STABILITY: INCOME INSECURITY: IN THE LAST 12 MONTHS, WAS THERE A TIME WHEN YOU WERE NOT ABLE TO PAY THE MORTGAGE OR RENT ON TIME?: NO

## 2022-01-05 SDOH — ECONOMIC STABILITY: INCOME INSECURITY: HOW HARD IS IT FOR YOU TO PAY FOR THE VERY BASICS LIKE FOOD, HOUSING, MEDICAL CARE, AND HEATING?: NOT VERY HARD

## 2022-01-05 SDOH — ECONOMIC STABILITY: FOOD INSECURITY: WITHIN THE PAST 12 MONTHS, YOU WORRIED THAT YOUR FOOD WOULD RUN OUT BEFORE YOU GOT MONEY TO BUY MORE.: NEVER TRUE

## 2022-01-05 ASSESSMENT — SOCIAL DETERMINANTS OF HEALTH (SDOH)
DO YOU BELONG TO ANY CLUBS OR ORGANIZATIONS SUCH AS CHURCH GROUPS UNIONS, FRATERNAL OR ATHLETIC GROUPS, OR SCHOOL GROUPS?: NO
HOW OFTEN DO YOU ATTEND CHURCH OR RELIGIOUS SERVICES?: NEVER
IN A TYPICAL WEEK, HOW MANY TIMES DO YOU TALK ON THE PHONE WITH FAMILY, FRIENDS, OR NEIGHBORS?: THREE TIMES A WEEK
HOW OFTEN DO YOU GET TOGETHER WITH FRIENDS OR RELATIVES?: PATIENT DECLINED

## 2022-01-05 ASSESSMENT — LIFESTYLE VARIABLES
HOW OFTEN DO YOU HAVE A DRINK CONTAINING ALCOHOL: MONTHLY OR LESS
HOW MANY STANDARD DRINKS CONTAINING ALCOHOL DO YOU HAVE ON A TYPICAL DAY: 1 OR 2
HOW OFTEN DO YOU HAVE SIX OR MORE DRINKS ON ONE OCCASION: NEVER

## 2022-01-05 ASSESSMENT — MIFFLIN-ST. JEOR: SCORE: 1253.09

## 2022-01-05 NOTE — H&P (VIEW-ONLY)
[facility]  [address] [facility] [address]  Phone: [phone]  Primary Provider: Aaseby-Aguilera, Ramona Ann  Pre-op Performing Provider: AASEBY-AGUILERA, RAMONA ANN      PREOPERATIVE EVALUATION:  Today's date: 1/5/2022    Vanessa Mota is a 61 year old female who presents for a preoperative evaluation.    Surgical Information:  Surgery/Procedure: Removing mass from Uterus   Surgery Location: [facility]   Surgeon: Dr. Wolf  Surgery Date: 01/12/2022  Time of Surgery: 09:45  Where patient plans to recover: At home with family  Fax number for surgical facility: Note does not need to be faxed, will be available electronically in Epic.    Type of Anesthesia Anticipated: General    Assessment & Plan     The proposed surgical procedure is considered LOW risk.    Preop testing    - Comprehensive metabolic panel  - CBC with platelets    DUB (dysfunctional uterine bleeding)               Risks and Recommendations:  The patient has the following additional risks and recommendations for perioperative complications:   - No identified additional risk factors other than previously addressed    Medication Instructions:   - Long acting insulin (e.g. glargine, detemir): Take 50% of the usual evening or morning dose before surgery.    RECOMMENDATION:  APPROVAL GIVEN to proceed with proposed procedure, without further diagnostic evaluation.                      Subjective     HPI related to upcoming procedure: had DUB and found to have enlarged uterus.     Preop Questions 1/5/2022   1. Have you ever had a heart attack or stroke? No   2. Have you ever had surgery on your heart or blood vessels, such as a stent placement, a coronary artery bypass, or surgery on an artery in your head, neck, heart, or legs? No   3. Do you have chest pain with activity? No   4. Do you have a history of  heart failure? No   5. Do you currently have a cold, bronchitis or symptoms of other infection? No   6. Do you  have a cough, shortness of breath, or wheezing? No   7. Do you or anyone in your family have previous history of blood clots? No   8. Do you or does anyone in your family have a serious bleeding problem such as prolonged bleeding following surgeries or cuts? No   9. Have you ever had problems with anemia or been told to take iron pills? YES - years ago    10. Have you had any abnormal blood loss such as black, tarry or bloody stools, or abnormal vaginal bleeding? No   11. Have you ever had a blood transfusion? No   12. Are you willing to have a blood transfusion if it is medically needed before, during, or after your surgery? Yes   13. Have you or any of your relatives ever had problems with anesthesia? No   14. Do you have sleep apnea, excessive snoring or daytime drowsiness? YES - snoring    14a. Do you have a CPAP machine? No   15. Do you have any artifical heart valves or other implanted medical devices like a pacemaker, defibrillator, or continuous glucose monitor? No   16. Do you have artificial joints? No   17. Are you allergic to latex? No   18. Is there any chance that you may be pregnant? No       Health Care Directive:  Patient does not have a Health Care Directive or Living Will: Discussed advance care planning with patient; however, patient declined at this time.    Preoperative Review of :   reviewed - no record of controlled substances prescribed.      Status of Chronic Conditions:  DIABETES - Patient has a longstanding history of DiabetesType Type II . Patient is being treated with oral agents and denies significant side effects. Control has been good. Complicating factors include but are not limited to: hypertension.     HYPERLIPIDEMIA - Patient has a long history of significant Hyperlipidemia requiring medication for treatment with recent good control. Patient reports no problems or side effects with the medication.     HYPERTENSION - Patient has longstanding history of HTN , currently denies  any symptoms referable to elevated blood pressure. Specifically denies chest pain, palpitations, dyspnea, orthopnea, PND or peripheral edema. Blood pressure readings have been in normal range. Current medication regimen is as listed below. Patient denies any side effects of medication.       Review of Systems  Constitutional, neuro, ENT, endocrine, pulmonary, cardiac, gastrointestinal, genitourinary, musculoskeletal, integument and psychiatric systems are negative, except as otherwise noted.    Patient Active Problem List    Diagnosis Date Noted     Type 2 diabetes mellitus without complication, with long-term current use of insulin (H) 11/08/2016     Priority: Medium     Elevated testosterone level in female 05/09/2014     Priority: Medium     BMI 38.0-38.9,adult 04/25/2014     Priority: Medium     Elevated LFTs 09/30/2013     Priority: Medium     Hypertension goal BP (blood pressure) < 140/90 08/30/2013     Priority: Medium     Pure hypercholesterolemia 10/01/2012     Priority: Medium     Fibromyalgia 10/07/2011     Priority: Medium     Migraine headache 10/07/2011     Priority: Medium     HYPERLIPIDEMIA LDL GOAL <100 10/31/2010     Priority: Medium     Snoring 04/29/2010     Priority: Medium     Mild major depression (H) 03/24/2010     Priority: Medium     Anxiety 01/16/2010     Priority: Medium     Fatty liver 12/30/2008     Priority: Medium     Obesity 01/18/2007     Priority: Medium     Problem list name updated by automated process. Provider to review       Disturbance in sleep behavior 01/18/2007     Priority: Medium     Problem list name updated by automated process. Provider to review       Insomnia 03/30/2006     Priority: Medium     Problem list name updated by automated process. Provider to review       Myalgia and myositis 09/12/2005     Priority: Medium     suspects fibromyalgia, took celebrex for a while  Problem list name updated by automated process. Provider to review       ASCUS favor benign  2005     Priority: Medium     ASCUS Negative HPV ', , '08, ',  Plan: cotest three years  17: ASCUS pap with negative HPV result. Plan cotest in 3 years.   21 NIL Pap, Neg HR HPV. Plan cotest in 1 year due 21.         Past Medical History:   Diagnosis Date     311     1-2 yrs, [facility] started zoloft 50 mg daily     Diabetes (H)      Gastroesophageal reflux disease      Headache(784.0)     4-5 yrs-migraines     Heart murmur      Hypertension      Myalgia and myositis, unspecified     suspects fibromyalgia, took celebrex for a while     Other chronic pain      Pancreatic disease      Papanicolaou smear of cervix with atypical squamous cells of undetermined significance (ASC-US) , , , , , 17    neg hpv     Pure hypercholesterolemia 10/1/2012     Past Surgical History:   Procedure Laterality Date     COLONOSCOPY Left 2021    Procedure: COLONOSCOPY;  Surgeon: Elia Rader MD;  Location:  GI     ZZC FULL ROUT OBSTE CARE,VAGINAL DELIV       x 4     ZZC INDUCED ABORTN BY D&C      once 12 years ago     Current Outpatient Medications   Medication Sig Dispense Refill     ALPRAZolam (XANAX) 0.25 MG tablet Take 0.25 mg by mouth 3 times daily as needed for anxiety       amLODIPine (NORVASC) 10 MG tablet Take 1 tablet (10 mg) by mouth At Bedtime 90 tablet 1     ASPIRIN 81 MG OR TABS ONE DAILY 100 3     atenolol (TENORMIN) 25 MG tablet Take 1 tablet (25 mg) by mouth every morning 30 tablet 4     atorvastatin (LIPITOR) 20 MG tablet Take 1 tablet (20 mg) by mouth daily 90 tablet 3     blood glucose (CANDIDA CONTOUR) test strip Use to test blood sugars 2 times daily or as directed. 100 strip 2     blood glucose (NO BRAND SPECIFIED) lancets standard Use to test blood sugar 2 times daily or as directed.  Dispense as covered by insurance 100 each 3     blood glucose monitoring (NO BRAND SPECIFIED) meter device kit Use to test blood sugar 2 times daily or as directed.   Dispense brand as covered by insurance 1 kit 0     blood glucose monitoring (NO BRAND SPECIFIED) test strip Use to test blood sugar 2 times daily or as directed.  Dispense Ultra One touch strips per insurance coverage 200 each 3     BusPIRone HCl (BUSPAR) 30 MG TABS 45 mg 2 times daily  90 tablet 0     chlorthalidone (HYGROTON) 25 MG tablet Take 1 tablet (25 mg) by mouth daily 90 tablet 1     cyclobenzaprine (FLEXERIL) 10 MG tablet TAKE 1 TABLET BY MOUTH AT NIGHT AS NEEDED FOR MUSCLE SPASM 30 tablet 0     FISH OIL 1000 MG OR CAPS Take 2 capsules by mouth 2 times daily Takes 2-3 x week. 100 0     FLUoxetine 20 MG tablet Take 20 mg by mouth daily       gabapentin (NEURONTIN) 100 MG capsule Take 2 capsules (200 mg) by mouth 3 times daily 180 capsule 11     glipiZIDE (GLIPIZIDE XL) 10 MG 24 hr tablet Take 1 tablet (10 mg) by mouth every 12 hours 180 tablet 3     hydrOXYzine (VISTARIL) 50 MG capsule Take 100 mg by mouth Two tablets at bedtime       insulin glargine (BASAGLAR KWIKPEN) 100 UNIT/ML pen INJECT 54 UNITS SUBCUTANEOUSLY IN THE MORNING 9 mL 11     insulin pen needle (ULTICARE MINI) 31G X 6 MM 1 Units daily Use 1 daily or as directed. At bedtime 100 each 6     losartan (COZAAR) 100 MG tablet Take 1 tablet (100 mg) by mouth daily 90 tablet 3     metFORMIN (GLUCOPHAGE) 500 MG tablet Take 2 tablets (1,000 mg) by mouth 2 times daily (with meals) 360 tablet 3     omeprazole (PRILOSEC) 40 MG DR capsule Take 1 capsule (40 mg) by mouth daily 90 capsule 1     SUMAtriptan (IMITREX) 100 MG tablet TAKE 1 TABLET BY MOUTH AT ONSET OF HEADACHE FOR MIGRAINE. MAY REPEAT AFTER 2 HOURS AS NEEDED. MAX OF 2 TABLETS PER 24 HOURS 10 tablet 0     zolpidem (AMBIEN) 5 MG tablet TAKE 1 TABLET BY MOUTH ONCE DAILY AT NIGHT AS NEEDED FOR SLEEP 30 tablet 0       Allergies   Allergen Reactions     Wellbutrin [Bupropion Hcl] Rash        Social History     Tobacco Use     Smoking status: Former Smoker     Years: 3.00     Quit date: 1/15/2013      Years since quittin.9     Smokeless tobacco: Current User     Tobacco comment: nicotine lozenge   Substance Use Topics     Alcohol use: Yes     Comment: 1-2 drinks per month     Family History   Problem Relation Age of Onset     Diabetes Brother         2 brothers     Family History Negative Mother      Family History Negative Father      Diabetes Father      Hypertension Maternal Grandmother      Arthritis Maternal Grandmother      Diabetes Maternal Grandfather      Prostate Cancer Maternal Grandfather      Blood Disease Brother         hiv positive, homosextual and alcoholism and drug abuse     Respiratory Brother         sleep apnea, htn and dyslipidmia     Breast Cancer No family hx of      Cancer - colorectal No family hx of      Colon Cancer No family hx of      History   Drug Use     Types: Marijuana     Comment: once in a while          Objective     BP (!) 146/78 (BP Location: Right arm, Patient Position: Sitting, Cuff Size: Adult Regular)   Pulse 91   Temp 98.2  F (36.8  C) (Oral)   Resp 18   Ht 1.524 m (5')   Wt 76.2 kg (167 lb 14.4 oz)   SpO2 99%   BMI 32.79 kg/m      Physical Exam    GENERAL APPEARANCE: healthy, alert and no distress     HENT: ear canals and TM's normal and nose and mouth without ulcers or lesions     NECK: no adenopathy, no asymmetry, masses, or scars and thyroid normal to palpation     RESP: lungs clear to auscultation - no rales, rhonchi or wheezes     CV: regular rates and rhythm, normal S1 S2, no S3 or S4 and no murmur, click or rub     ABDOMEN:  soft, nontender, no HSM or masses and bowel sounds normal     MS: extremities normal- no gross deformities noted, no evidence of inflammation in joints, FROM in all extremities.     SKIN: no suspicious lesions or rashes     NEURO: Normal strength and tone, sensory exam grossly normal, mentation intact and speech normal     PSYCH: mentation appears normal. and affect normal/bright     LYMPHATICS: No cervical adenopathy    Recent  Labs   Lab Test 09/24/21  1620 10/30/20  1424   HGB 14.2 13.5    213    139   POTASSIUM 3.8 3.5   CR 0.81 0.77   A1C 6.9* 7.1*        Diagnostics:  Results for orders placed or performed in visit on 01/05/22 (from the past 48 hour(s))   Comprehensive metabolic panel   Result Value Ref Range    Sodium 129 (L) 133 - 144 mmol/L    Potassium 3.0 (L) 3.4 - 5.3 mmol/L    Chloride 93 (L) 94 - 109 mmol/L    Carbon Dioxide (CO2) 27 20 - 32 mmol/L    Anion Gap 9 3 - 14 mmol/L    Urea Nitrogen 10 7 - 30 mg/dL    Creatinine 1.00 0.52 - 1.04 mg/dL    Calcium 10.0 8.5 - 10.1 mg/dL    Glucose 170 (H) 70 - 99 mg/dL    Alkaline Phosphatase 99 40 - 150 U/L    AST 60 (H) 0 - 45 U/L     (H) 0 - 50 U/L    Protein Total 8.4 6.8 - 8.8 g/dL    Albumin 4.5 3.4 - 5.0 g/dL    Bilirubin Total 0.7 0.2 - 1.3 mg/dL    GFR Estimate 64 >60 mL/min/1.73m2   CBC with platelets   Result Value Ref Range    WBC Count 11.2 (H) 4.0 - 11.0 10e3/uL    RBC Count 4.95 3.80 - 5.20 10e6/uL    Hemoglobin 13.8 11.7 - 15.7 g/dL    Hematocrit 40.2 35.0 - 47.0 %    MCV 81 78 - 100 fL    MCH 27.9 26.5 - 33.0 pg    MCHC 34.3 31.5 - 36.5 g/dL    RDW 13.4 10.0 - 15.0 %    Platelet Count 290 150 - 450 10e3/uL     Echo done on 12/31/22 and normal     Revised Cardiac Risk Index (RCRI):  The patient has the following serious cardiovascular risks for perioperative complications:   - No serious cardiac risks = 0 points     RCRI Interpretation: 1 point: Class II (low risk - 0.9% complication rate)           Signed Electronically by: Ramona Ann Aaseby-Aguilera, PA-C  Copy of this evaluation report is provided to requesting physician.

## 2022-01-05 NOTE — PROGRESS NOTES
[facility]  [address] [address]  Phone: [phone]  Primary Provider: Aaseby-Aguilera, Ramona Ann  Pre-op Performing Provider: AASEBY-AGUILERA, RAMONA ANN      PREOPERATIVE EVALUATION:  Today's date: 1/5/2022    Vanessa Mota is a 61 year old female who presents for a preoperative evaluation.    Surgical Information:  Surgery/Procedure: Removing mass from Uterus   Surgery Location: [facility]   Surgeon: Dr. Wolf  Surgery Date: 01/12/2022  Time of Surgery: 09:45  Where patient plans to recover: At home with family  Fax number for surgical facility: Note does not need to be faxed, will be available electronically in Epic.    Type of Anesthesia Anticipated: General    Assessment & Plan     The proposed surgical procedure is considered LOW risk.    Preop testing    - Comprehensive metabolic panel  - CBC with platelets    DUB (dysfunctional uterine bleeding)               Risks and Recommendations:  The patient has the following additional risks and recommendations for perioperative complications:   - No identified additional risk factors other than previously addressed    Medication Instructions:   - Long acting insulin (e.g. glargine, detemir): Take 50% of the usual evening or morning dose before surgery.    RECOMMENDATION:  APPROVAL GIVEN to proceed with proposed procedure, without further diagnostic evaluation.                      Subjective     HPI related to upcoming procedure: had DUB and found to have enlarged uterus.     Preop Questions 1/5/2022   1. Have you ever had a heart attack or stroke? No   2. Have you ever had surgery on your heart or blood vessels, such as a stent placement, a coronary artery bypass, or surgery on an artery in your head, neck, heart, or legs? No   3. Do you have chest pain with activity? No   4. Do you have a history of  heart failure? No   5. Do you currently have a cold, bronchitis or symptoms of other infection? No   6. Do you  have a cough, shortness of breath, or wheezing? No   7. Do you or anyone in your family have previous history of blood clots? No   8. Do you or does anyone in your family have a serious bleeding problem such as prolonged bleeding following surgeries or cuts? No   9. Have you ever had problems with anemia or been told to take iron pills? YES - years ago    10. Have you had any abnormal blood loss such as black, tarry or bloody stools, or abnormal vaginal bleeding? No   11. Have you ever had a blood transfusion? No   12. Are you willing to have a blood transfusion if it is medically needed before, during, or after your surgery? Yes   13. Have you or any of your relatives ever had problems with anesthesia? No   14. Do you have sleep apnea, excessive snoring or daytime drowsiness? YES - snoring    14a. Do you have a CPAP machine? No   15. Do you have any artifical heart valves or other implanted medical devices like a pacemaker, defibrillator, or continuous glucose monitor? No   16. Do you have artificial joints? No   17. Are you allergic to latex? No   18. Is there any chance that you may be pregnant? No       Health Care Directive:  Patient does not have a Health Care Directive or Living Will: Discussed advance care planning with patient; however, patient declined at this time.    Preoperative Review of :   reviewed - no record of controlled substances prescribed.      Status of Chronic Conditions:  DIABETES - Patient has a longstanding history of DiabetesType Type II . Patient is being treated with oral agents and denies significant side effects. Control has been good. Complicating factors include but are not limited to: hypertension.     HYPERLIPIDEMIA - Patient has a long history of significant Hyperlipidemia requiring medication for treatment with recent good control. Patient reports no problems or side effects with the medication.     HYPERTENSION - Patient has longstanding history of HTN , currently denies  any symptoms referable to elevated blood pressure. Specifically denies chest pain, palpitations, dyspnea, orthopnea, PND or peripheral edema. Blood pressure readings have been in normal range. Current medication regimen is as listed below. Patient denies any side effects of medication.       Review of Systems  Constitutional, neuro, ENT, endocrine, pulmonary, cardiac, gastrointestinal, genitourinary, musculoskeletal, integument and psychiatric systems are negative, except as otherwise noted.    Patient Active Problem List    Diagnosis Date Noted     Type 2 diabetes mellitus without complication, with long-term current use of insulin (H) 11/08/2016     Priority: Medium     Elevated testosterone level in female 05/09/2014     Priority: Medium     BMI 38.0-38.9,adult 04/25/2014     Priority: Medium     Elevated LFTs 09/30/2013     Priority: Medium     Hypertension goal BP (blood pressure) < 140/90 08/30/2013     Priority: Medium     Pure hypercholesterolemia 10/01/2012     Priority: Medium     Fibromyalgia 10/07/2011     Priority: Medium     Migraine headache 10/07/2011     Priority: Medium     HYPERLIPIDEMIA LDL GOAL <100 10/31/2010     Priority: Medium     Snoring 04/29/2010     Priority: Medium     Mild major depression (H) 03/24/2010     Priority: Medium     Anxiety 01/16/2010     Priority: Medium     Fatty liver 12/30/2008     Priority: Medium     Obesity 01/18/2007     Priority: Medium     Problem list name updated by automated process. Provider to review       Disturbance in sleep behavior 01/18/2007     Priority: Medium     Problem list name updated by automated process. Provider to review       Insomnia 03/30/2006     Priority: Medium     Problem list name updated by automated process. Provider to review       Myalgia and myositis 09/12/2005     Priority: Medium     suspects fibromyalgia, took celebrex for a while  Problem list name updated by automated process. Provider to review       ASCUS favor benign  2005     Priority: Medium     ASCUS Negative HPV ', , '08, ',  Plan: cotest three years  17: ASCUS pap with negative HPV result. Plan cotest in 3 years.   21 NIL Pap, Neg HR HPV. Plan cotest in 1 year due 21.         Past Medical History:   Diagnosis Date     311     1-2 yrs, [facility] started zoloft 50 mg daily     Diabetes (H)      Gastroesophageal reflux disease      Headache(784.0)     4-5 yrs-migraines     Heart murmur      Hypertension      Myalgia and myositis, unspecified     suspects fibromyalgia, took celebrex for a while     Other chronic pain      Pancreatic disease      Papanicolaou smear of cervix with atypical squamous cells of undetermined significance (ASC-US) , , , , , 17    neg hpv     Pure hypercholesterolemia 10/1/2012     Past Surgical History:   Procedure Laterality Date     COLONOSCOPY Left 2021    Procedure: COLONOSCOPY;  Surgeon: Elia Rader MD;  Location:  GI     ZZC FULL ROUT OBSTE CARE,VAGINAL DELIV       x 4     ZZC INDUCED ABORTN BY [facility]      once 12 years ago     Current Outpatient Medications   Medication Sig Dispense Refill     ALPRAZolam (XANAX) 0.25 MG tablet Take 0.25 mg by mouth 3 times daily as needed for anxiety       amLODIPine (NORVASC) 10 MG tablet Take 1 tablet (10 mg) by mouth At Bedtime 90 tablet 1     ASPIRIN 81 MG OR TABS ONE DAILY 100 3     atenolol (TENORMIN) 25 MG tablet Take 1 tablet (25 mg) by mouth every morning 30 tablet 4     atorvastatin (LIPITOR) 20 MG tablet Take 1 tablet (20 mg) by mouth daily 90 tablet 3     blood glucose (CANDIDA CONTOUR) test strip Use to test blood sugars 2 times daily or as directed. 100 strip 2     blood glucose (NO BRAND SPECIFIED) lancets standard Use to test blood sugar 2 times daily or as directed.  Dispense as covered by insurance 100 each 3     blood glucose monitoring (NO BRAND SPECIFIED) meter device kit Use to test blood sugar 2 times daily or as directed.   Dispense brand as covered by insurance 1 kit 0     blood glucose monitoring (NO BRAND SPECIFIED) test strip Use to test blood sugar 2 times daily or as directed.  Dispense Ultra One touch strips per insurance coverage 200 each 3     BusPIRone HCl (BUSPAR) 30 MG TABS 45 mg 2 times daily  90 tablet 0     chlorthalidone (HYGROTON) 25 MG tablet Take 1 tablet (25 mg) by mouth daily 90 tablet 1     cyclobenzaprine (FLEXERIL) 10 MG tablet TAKE 1 TABLET BY MOUTH AT NIGHT AS NEEDED FOR MUSCLE SPASM 30 tablet 0     FISH OIL 1000 MG OR CAPS Take 2 capsules by mouth 2 times daily Takes 2-3 x week. 100 0     FLUoxetine 20 MG tablet Take 20 mg by mouth daily       gabapentin (NEURONTIN) 100 MG capsule Take 2 capsules (200 mg) by mouth 3 times daily 180 capsule 11     glipiZIDE (GLIPIZIDE XL) 10 MG 24 hr tablet Take 1 tablet (10 mg) by mouth every 12 hours 180 tablet 3     hydrOXYzine (VISTARIL) 50 MG capsule Take 100 mg by mouth Two tablets at bedtime       insulin glargine (BASAGLAR KWIKPEN) 100 UNIT/ML pen INJECT 54 UNITS SUBCUTANEOUSLY IN THE MORNING 9 mL 11     insulin pen needle (ULTICARE MINI) 31G X 6 MM 1 Units daily Use 1 daily or as directed. At bedtime 100 each 6     losartan (COZAAR) 100 MG tablet Take 1 tablet (100 mg) by mouth daily 90 tablet 3     metFORMIN (GLUCOPHAGE) 500 MG tablet Take 2 tablets (1,000 mg) by mouth 2 times daily (with meals) 360 tablet 3     omeprazole (PRILOSEC) 40 MG DR capsule Take 1 capsule (40 mg) by mouth daily 90 capsule 1     SUMAtriptan (IMITREX) 100 MG tablet TAKE 1 TABLET BY MOUTH AT ONSET OF HEADACHE FOR MIGRAINE. MAY REPEAT AFTER 2 HOURS AS NEEDED. MAX OF 2 TABLETS PER 24 HOURS 10 tablet 0     zolpidem (AMBIEN) 5 MG tablet TAKE 1 TABLET BY MOUTH ONCE DAILY AT NIGHT AS NEEDED FOR SLEEP 30 tablet 0       Allergies   Allergen Reactions     Wellbutrin [Bupropion Hcl] Rash        Social History     Tobacco Use     Smoking status: Former Smoker     Years: 3.00     Quit date: 1/15/2013      Years since quittin.9     Smokeless tobacco: Current User     Tobacco comment: nicotine lozenge   Substance Use Topics     Alcohol use: Yes     Comment: 1-2 drinks per month     Family History   Problem Relation Age of Onset     Diabetes Brother         2 brothers     Family History Negative Mother      Family History Negative Father      Diabetes Father      Hypertension Maternal Grandmother      Arthritis Maternal Grandmother      Diabetes Maternal Grandfather      Prostate Cancer Maternal Grandfather      Blood Disease Brother         hiv positive, homosextual and alcoholism and drug abuse     Respiratory Brother         sleep apnea, htn and dyslipidmia     Breast Cancer No family hx of      Cancer - colorectal No family hx of      Colon Cancer No family hx of      History   Drug Use     Types: Marijuana     Comment: once in a while          Objective     BP (!) 146/78 (BP Location: Right arm, Patient Position: Sitting, Cuff Size: Adult Regular)   Pulse 91   Temp 98.2  F (36.8  C) (Oral)   Resp 18   Ht 1.524 m (5')   Wt 76.2 kg (167 lb 14.4 oz)   SpO2 99%   BMI 32.79 kg/m      Physical Exam    GENERAL APPEARANCE: healthy, alert and no distress     HENT: ear canals and TM's normal and nose and mouth without ulcers or lesions     NECK: no adenopathy, no asymmetry, masses, or scars and thyroid normal to palpation     RESP: lungs clear to auscultation - no rales, rhonchi or wheezes     CV: regular rates and rhythm, normal S1 S2, no S3 or S4 and no murmur, click or rub     ABDOMEN:  soft, nontender, no HSM or masses and bowel sounds normal     MS: extremities normal- no gross deformities noted, no evidence of inflammation in joints, FROM in all extremities.     SKIN: no suspicious lesions or rashes     NEURO: Normal strength and tone, sensory exam grossly normal, mentation intact and speech normal     PSYCH: mentation appears normal. and affect normal/bright     LYMPHATICS: No cervical adenopathy    Recent  Labs   Lab Test 09/24/21  1620 10/30/20  1424   HGB 14.2 13.5    213    139   POTASSIUM 3.8 3.5   CR 0.81 0.77   A1C 6.9* 7.1*        Diagnostics:  Results for orders placed or performed in visit on 01/05/22 (from the past 48 hour(s))   Comprehensive metabolic panel   Result Value Ref Range    Sodium 129 (L) 133 - 144 mmol/L    Potassium 3.0 (L) 3.4 - 5.3 mmol/L    Chloride 93 (L) 94 - 109 mmol/L    Carbon Dioxide (CO2) 27 20 - 32 mmol/L    Anion Gap 9 3 - 14 mmol/L    Urea Nitrogen 10 7 - 30 mg/dL    Creatinine 1.00 0.52 - 1.04 mg/dL    Calcium 10.0 8.5 - 10.1 mg/dL    Glucose 170 (H) 70 - 99 mg/dL    Alkaline Phosphatase 99 40 - 150 U/L    AST 60 (H) 0 - 45 U/L     (H) 0 - 50 U/L    Protein Total 8.4 6.8 - 8.8 g/dL    Albumin 4.5 3.4 - 5.0 g/dL    Bilirubin Total 0.7 0.2 - 1.3 mg/dL    GFR Estimate 64 >60 mL/min/1.73m2   CBC with platelets   Result Value Ref Range    WBC Count 11.2 (H) 4.0 - 11.0 10e3/uL    RBC Count 4.95 3.80 - 5.20 10e6/uL    Hemoglobin 13.8 11.7 - 15.7 g/dL    Hematocrit 40.2 35.0 - 47.0 %    MCV 81 78 - 100 fL    MCH 27.9 26.5 - 33.0 pg    MCHC 34.3 31.5 - 36.5 g/dL    RDW 13.4 10.0 - 15.0 %    Platelet Count 290 150 - 450 10e3/uL     Echo done on 12/31/22 and normal     Revised Cardiac Risk Index (RCRI):  The patient has the following serious cardiovascular risks for perioperative complications:   - No serious cardiac risks = 0 points     RCRI Interpretation: 1 point: Class II (low risk - 0.9% complication rate)           Signed Electronically by: Ramona Ann Aaseby-Aguilera, PA-C  Copy of this evaluation report is provided to requesting physician.

## 2022-01-06 ENCOUNTER — OFFICE VISIT (OUTPATIENT)
Dept: CARDIOLOGY | Facility: CLINIC | Age: 62
End: 2022-01-06
Attending: PHYSICIAN ASSISTANT

## 2022-01-06 VITALS
HEART RATE: 92 BPM | DIASTOLIC BLOOD PRESSURE: 48 MMHG | SYSTOLIC BLOOD PRESSURE: 142 MMHG | HEIGHT: 60 IN | OXYGEN SATURATION: 97 % | BODY MASS INDEX: 32.79 KG/M2 | WEIGHT: 167 LBS

## 2022-01-06 DIAGNOSIS — R06.83 SNORING: ICD-10-CM

## 2022-01-06 DIAGNOSIS — I10 UNCONTROLLED HYPERTENSION: Primary | ICD-10-CM

## 2022-01-06 DIAGNOSIS — R01.1 UNDIAGNOSED CARDIAC MURMURS: ICD-10-CM

## 2022-01-06 DIAGNOSIS — R00.2 PALPITATIONS: ICD-10-CM

## 2022-01-06 LAB
ALBUMIN SERPL-MCNC: 4.5 G/DL (ref 3.4–5)
ALP SERPL-CCNC: 99 U/L (ref 40–150)
ALT SERPL W P-5'-P-CCNC: 100 U/L (ref 0–50)
ANION GAP SERPL CALCULATED.3IONS-SCNC: 9 MMOL/L (ref 3–14)
AST SERPL W P-5'-P-CCNC: 60 U/L (ref 0–45)
BILIRUB SERPL-MCNC: 0.7 MG/DL (ref 0.2–1.3)
BUN SERPL-MCNC: 10 MG/DL (ref 7–30)
CALCIUM SERPL-MCNC: 10 MG/DL (ref 8.5–10.1)
CHLORIDE BLD-SCNC: 93 MMOL/L (ref 94–109)
CO2 SERPL-SCNC: 27 MMOL/L (ref 20–32)
CREAT SERPL-MCNC: 1 MG/DL (ref 0.52–1.04)
GFR SERPL CREATININE-BSD FRML MDRD: 64 ML/MIN/1.73M2
GLUCOSE BLD-MCNC: 170 MG/DL (ref 70–99)
POTASSIUM BLD-SCNC: 3 MMOL/L (ref 3.4–5.3)
PROT SERPL-MCNC: 8.4 G/DL (ref 6.8–8.8)
SODIUM SERPL-SCNC: 129 MMOL/L (ref 133–144)

## 2022-01-06 PROCEDURE — 93000 ELECTROCARDIOGRAM COMPLETE: CPT | Performed by: INTERNAL MEDICINE

## 2022-01-06 PROCEDURE — 99204 OFFICE O/P NEW MOD 45 MIN: CPT | Performed by: INTERNAL MEDICINE

## 2022-01-06 RX ORDER — FLUOXETINE 40 MG/1
CAPSULE ORAL
COMMUNITY
Start: 2021-12-13 | End: 2022-01-06

## 2022-01-06 RX ORDER — ATENOLOL 25 MG/1
25 TABLET ORAL EVERY MORNING
Qty: 30 TABLET | Refills: 4 | Status: SHIPPED | OUTPATIENT
Start: 2022-01-06 | End: 2022-06-08

## 2022-01-06 ASSESSMENT — MIFFLIN-ST. JEOR: SCORE: 1249.01

## 2022-01-06 NOTE — PATIENT INSTRUCTIONS
MEDICATIONS:  1.  Start a new medication called atenolol 25 mg.  Take 1 tablet in the morning.  Prescription sent.    TESTING AND FOLLOW-UP:  1.  Sleep clinic referral.  I think you have severe sleep apnea.  2.  In 2 months you will get a 3-day heart monitor and follow-up with Dr. Santiago.    If you have any questions or concerns, please contact my nurses at [phone].

## 2022-01-06 NOTE — PROGRESS NOTES
Clinic visit note dictated. Dictation reference number - [id number]        Today's clinic visit entailed:  The following tests were independently interpreted by me as noted in my documentation: ECG, labs, echocardiogram.  Ordering of each unique test  Prescription drug management  45 minutes spent on the date of the encounter doing chart review, history and exam, documentation and further activities per the note  Provider  Link to MDM Help Grid     The level of medical decision making during this visit was of moderate complexity.          CURRENT MEDICATIONS:  Current Outpatient Medications   Medication Sig Dispense Refill     ALPRAZolam (XANAX) 0.25 MG tablet Take 0.25 mg by mouth 3 times daily as needed for anxiety       amLODIPine (NORVASC) 10 MG tablet Take 1 tablet (10 mg) by mouth At Bedtime 90 tablet 1     ASPIRIN 81 MG OR TABS ONE DAILY 100 3     atenolol (TENORMIN) 25 MG tablet Take 1 tablet (25 mg) by mouth every morning 30 tablet 4     atorvastatin (LIPITOR) 20 MG tablet Take 1 tablet (20 mg) by mouth daily 90 tablet 3     blood glucose (CANDIDA CONTOUR) test strip Use to test blood sugars 2 times daily or as directed. 100 strip 2     blood glucose (NO BRAND SPECIFIED) lancets standard Use to test blood sugar 2 times daily or as directed.  Dispense as covered by insurance 100 each 3     blood glucose monitoring (NO BRAND SPECIFIED) meter device kit Use to test blood sugar 2 times daily or as directed.  Dispense brand as covered by insurance 1 kit 0     blood glucose monitoring (NO BRAND SPECIFIED) test strip Use to test blood sugar 2 times daily or as directed.  Dispense Ultra One touch strips per insurance coverage 200 each 3     BusPIRone HCl (BUSPAR) 30 MG TABS 45 mg 2 times daily  90 tablet 0     chlorthalidone (HYGROTON) 25 MG tablet Take 1 tablet (25 mg) by mouth daily 90 tablet 1     cyclobenzaprine (FLEXERIL) 10 MG tablet TAKE 1 TABLET BY MOUTH AT NIGHT AS NEEDED FOR MUSCLE SPASM 30 tablet 0     FISH  OIL 1000 MG OR CAPS Take 2 capsules by mouth 2 times daily Takes 2-3 x week. 100 0     FLUoxetine 20 MG tablet Take 20 mg by mouth daily       gabapentin (NEURONTIN) 100 MG capsule Take 2 capsules (200 mg) by mouth 3 times daily 180 capsule 11     glipiZIDE (GLIPIZIDE XL) 10 MG 24 hr tablet Take 1 tablet (10 mg) by mouth every 12 hours 180 tablet 3     hydrOXYzine (VISTARIL) 50 MG capsule Take 100 mg by mouth Two tablets at bedtime       insulin glargine (BASAGLAR KWIKPEN) 100 UNIT/ML pen INJECT 54 UNITS SUBCUTANEOUSLY IN THE MORNING 9 mL 11     insulin pen needle (ULTICARE MINI) 31G X 6 MM 1 Units daily Use 1 daily or as directed. At bedtime 100 each 6     losartan (COZAAR) 100 MG tablet Take 1 tablet (100 mg) by mouth daily 90 tablet 3     metFORMIN (GLUCOPHAGE) 500 MG tablet Take 2 tablets (1,000 mg) by mouth 2 times daily (with meals) 360 tablet 3     omeprazole (PRILOSEC) 40 MG DR capsule Take 1 capsule (40 mg) by mouth daily 90 capsule 1     SUMAtriptan (IMITREX) 100 MG tablet TAKE 1 TABLET BY MOUTH AT ONSET OF HEADACHE FOR MIGRAINE. MAY REPEAT AFTER 2 HOURS AS NEEDED. MAX OF 2 TABLETS PER 24 HOURS 10 tablet 0     zolpidem (AMBIEN) 5 MG tablet TAKE 1 TABLET BY MOUTH ONCE DAILY AT NIGHT AS NEEDED FOR SLEEP 30 tablet 0         ALLERGIES:  Allergies   Allergen Reactions     Wellbutrin [Bupropion Hcl] Rash       PAST MEDICAL HISTORY:    Past Medical History:   Diagnosis Date     311     1-2 yrs, [facility] started zoloft 50 mg daily     Diabetes (H)      Gastroesophageal reflux disease      Headache(784.0)     4-5 yrs-migraines     Heart murmur      Hypertension      Myalgia and myositis, unspecified     suspects fibromyalgia, took celebrex for a while     Other chronic pain      Pancreatic disease      Papanicolaou smear of cervix with atypical squamous cells of undetermined significance (ASC-US) 05, 07, 08, 11, 14, 06/22/17    neg hpv     Pure hypercholesterolemia 10/1/2012       PAST SURGICAL  HISTORY:    Past Surgical History:   Procedure Laterality Date     COLONOSCOPY Left 2021    Procedure: COLONOSCOPY;  Surgeon: Elia Rader MD;  Location: [facility]     ZZC FULL ROUT OBSTE CARE,VAGINAL DELIV       x 4     ZZC INDUCED ABORTN BY [facility]      once 12 years ago       FAMILY HISTORY:    Family History   Problem Relation Age of Onset     Diabetes Brother         2 brothers     Family History Negative Mother      Family History Negative Father      Diabetes Father      Hypertension Maternal Grandmother      Arthritis Maternal Grandmother      Diabetes Maternal Grandfather      Prostate Cancer Maternal Grandfather      Blood Disease Brother         hiv positive, homosextual and alcoholism and drug abuse     Respiratory Brother         sleep apnea, htn and dyslipidmia     Breast Cancer No family hx of      Cancer - colorectal No family hx of      Colon Cancer No family hx of        SOCIAL HISTORY:    Social History     Socioeconomic History     Marital status:      Spouse name: Willem     Number of children: 4     Years of education: None     Highest education level: None   Occupational History     Employer: NONE    Tobacco Use     Smoking status: Former Smoker     Years: 3.00     Quit date: 1/15/2013     Years since quittin.9     Smokeless tobacco: Current User     Tobacco comment: nicotine lozenge   Vaping Use     Vaping Use: Never used   Substance and Sexual Activity     Alcohol use: Yes     Comment: 1-2 drinks per month     Drug use: Yes     Types: Marijuana     Comment: once in a while      Sexual activity: Yes     Partners: Male     Birth control/protection: Surgical     Comment:  with vasectomy   Other Topics Concern      Service No     Blood Transfusions No     Caffeine Concern Yes     Comment: 2 cups per day, 2 pop per day     Occupational Exposure No     Hobby Hazards No     Sleep Concern No     Comment: snores, not getting good night sleep     Stress Concern Yes      Weight Concern Yes     Comment: gained weight 30 lbs in a year- stress better, meds     Special Diet No     Back Care No     Exercise Yes     Comment: 3-4 times per week, 20-30 minutes per day, yoga, meditation     Bike Helmet No     Seat Belt Yes     Self-Exams Yes     Comment: tries     Parent/sibling w/ CABG, MI or angioplasty before 65F 55M? No   Social History Narrative     since 1988 , 2nd marriage, 4 children,     3 dogs and 2 cats     Social Determinants of Health     Financial Resource Strain: Low Risk      Difficulty of Paying Living Expenses: Not very hard   Food Insecurity: No Food Insecurity     Worried About Running Out of Food in the Last Year: Never true     Ran Out of Food in the Last Year: Never true   Transportation Needs: Unmet Transportation Needs     Lack of Transportation (Medical): Yes     Lack of Transportation (Non-Medical): No   Physical Activity: Unknown     Days of Exercise per Week: 3 days     Minutes of Exercise per Session: Not on file   Stress: Stress Concern Present     Feeling of Stress : To some extent   Social Connections: Moderately Isolated     Frequency of Communication with Friends and Family: Three times a week     Frequency of Social Gatherings with Friends and Family: Patient refused     Attends Sikh Services: Never     Active Member of Clubs or Organizations: No     Attends Club or Organization Meetings: Not on file     Marital Status:    Intimate Partner Violence: Not on file   Housing Stability: Unknown     Unable to Pay for Housing in the Last Year: No     Number of Places Lived in the Last Year: Not on file     Unstable Housing in the Last Year: No       PHYSICAL EXAM:    Vitals: BP (!) 142/48 (BP Location: Left arm, Patient Position: Sitting, Cuff Size: Adult Regular)   Pulse 92   Ht 1.524 m (5')   Wt 75.8 kg (167 lb)   SpO2 97%   BMI 32.61 kg/m    Wt Readings from Last 5 Encounters:   01/06/22 75.8 kg (167 lb)   01/05/22 76.2 kg  (167 lb 14.4 oz)   12/06/21 78.2 kg (172 lb 6.4 oz)   11/30/21 78.5 kg (173 lb)   10/29/21 78.6 kg (173 lb 3.2 oz)             Encounter Diagnoses   Name Primary?     Uncontrolled hypertension Yes     Undiagnosed cardiac murmurs      Palpitations      Snoring        Orders Placed This Encounter   Procedures     Follow-Up with Cardiologist     SLEEP EVALUATION & MANAGEMENT REFERRAL - ADULT -     Leadless EKG Monitor 3 to 7 Days       CC  REFERRING PROVIDER:  Ramona Ann Aaseby-Aguilera, PA-C  [id number] [place] [address]

## 2022-01-06 NOTE — LETTER
1/6/2022    Ramona Ann Aaseby-Aguilera, PA-C  [address]  [facility] [address]    RE: Vanessa Mota       Dear Colleague,     I had the pleasure of seeing Vanessa Mota in the [facility].  Clinic visit note dictated. Dictation reference number - [id number]        Today's clinic visit entailed:  The following tests were independently interpreted by me as noted in my documentation: ECG, labs, echocardiogram.  Ordering of each unique test  Prescription drug management  45 minutes spent on the date of the encounter doing chart review, history and exam, documentation and further activities per the note  Provider  Link to MDM Help Grid     The level of medical decision making during this visit was of moderate complexity.          CURRENT MEDICATIONS:  Current Outpatient Medications   Medication Sig Dispense Refill     ALPRAZolam (XANAX) 0.25 MG tablet Take 0.25 mg by mouth 3 times daily as needed for anxiety       amLODIPine (NORVASC) 10 MG tablet Take 1 tablet (10 mg) by mouth At Bedtime 90 tablet 1     ASPIRIN 81 MG OR TABS ONE DAILY 100 3     atenolol (TENORMIN) 25 MG tablet Take 1 tablet (25 mg) by mouth every morning 30 tablet 4     atorvastatin (LIPITOR) 20 MG tablet Take 1 tablet (20 mg) by mouth daily 90 tablet 3     blood glucose (CANDIDA CONTOUR) test strip Use to test blood sugars 2 times daily or as directed. 100 strip 2     blood glucose (NO BRAND SPECIFIED) lancets standard Use to test blood sugar 2 times daily or as directed.  Dispense as covered by insurance 100 each 3     blood glucose monitoring (NO BRAND SPECIFIED) meter device kit Use to test blood sugar 2 times daily or as directed.  Dispense brand as covered by insurance 1 kit 0     blood glucose monitoring (NO BRAND SPECIFIED) test strip Use to test blood sugar 2 times daily or as directed.  Dispense Ultra One touch strips per insurance coverage 200 each 3     BusPIRone HCl (BUSPAR) 30 MG TABS 45 mg 2 times daily  90 tablet 0      chlorthalidone (HYGROTON) 25 MG tablet Take 1 tablet (25 mg) by mouth daily 90 tablet 1     cyclobenzaprine (FLEXERIL) 10 MG tablet TAKE 1 TABLET BY MOUTH AT NIGHT AS NEEDED FOR MUSCLE SPASM 30 tablet 0     FISH OIL 1000 MG OR CAPS Take 2 capsules by mouth 2 times daily Takes 2-3 x week. 100 0     FLUoxetine 20 MG tablet Take 20 mg by mouth daily       gabapentin (NEURONTIN) 100 MG capsule Take 2 capsules (200 mg) by mouth 3 times daily 180 capsule 11     glipiZIDE (GLIPIZIDE XL) 10 MG 24 hr tablet Take 1 tablet (10 mg) by mouth every 12 hours 180 tablet 3     hydrOXYzine (VISTARIL) 50 MG capsule Take 100 mg by mouth Two tablets at bedtime       insulin glargine (BASAGLAR KWIKPEN) 100 UNIT/ML pen INJECT 54 UNITS SUBCUTANEOUSLY IN THE MORNING 9 mL 11     insulin pen needle (ULTICARE MINI) 31G X 6 MM 1 Units daily Use 1 daily or as directed. At bedtime 100 each 6     losartan (COZAAR) 100 MG tablet Take 1 tablet (100 mg) by mouth daily 90 tablet 3     metFORMIN (GLUCOPHAGE) 500 MG tablet Take 2 tablets (1,000 mg) by mouth 2 times daily (with meals) 360 tablet 3     omeprazole (PRILOSEC) 40 MG DR capsule Take 1 capsule (40 mg) by mouth daily 90 capsule 1     SUMAtriptan (IMITREX) 100 MG tablet TAKE 1 TABLET BY MOUTH AT ONSET OF HEADACHE FOR MIGRAINE. MAY REPEAT AFTER 2 HOURS AS NEEDED. MAX OF 2 TABLETS PER 24 HOURS 10 tablet 0     zolpidem (AMBIEN) 5 MG tablet TAKE 1 TABLET BY MOUTH ONCE DAILY AT NIGHT AS NEEDED FOR SLEEP 30 tablet 0         ALLERGIES:  Allergies   Allergen Reactions     Wellbutrin [Bupropion Hcl] Rash       PAST MEDICAL HISTORY:    Past Medical History:   Diagnosis Date     311     1-2 yrs, [facility] started zoloft 50 mg daily     Diabetes (H)      Gastroesophageal reflux disease      Headache(784.0)     4-5 yrs-migraines     Heart murmur      Hypertension      Myalgia and myositis, unspecified     suspects fibromyalgia, took celebrex for a while     Other chronic pain      Pancreatic disease       Papanicolaou smear of cervix with atypical squamous cells of undetermined significance (ASC-US) 05, 07, 08, 11, 14, 17    neg hpv     Pure hypercholesterolemia 10/1/2012       PAST SURGICAL HISTORY:    Past Surgical History:   Procedure Laterality Date     COLONOSCOPY Left 2021    Procedure: COLONOSCOPY;  Surgeon: Elia Rader MD;  Location:  GI     ZZC FULL ROUT OBSTE CARE,VAGINAL DELIV       x 4     ZZC INDUCED ABORTN BY D&C      once 12 years ago       FAMILY HISTORY:    Family History   Problem Relation Age of Onset     Diabetes Brother         2 brothers     Family History Negative Mother      Family History Negative Father      Diabetes Father      Hypertension Maternal Grandmother      Arthritis Maternal Grandmother      Diabetes Maternal Grandfather      Prostate Cancer Maternal Grandfather      Blood Disease Brother         hiv positive, homosextual and alcoholism and drug abuse     Respiratory Brother         sleep apnea, htn and dyslipidmia     Breast Cancer No family hx of      Cancer - colorectal No family hx of      Colon Cancer No family hx of        SOCIAL HISTORY:    Social History     Socioeconomic History     Marital status:      Spouse name: Willem     Number of children: 4     Years of education: None     Highest education level: None   Occupational History     Employer: NONE    Tobacco Use     Smoking status: Former Smoker     Years: 3.00     Quit date: 1/15/2013     Years since quittin.9     Smokeless tobacco: Current User     Tobacco comment: nicotine lozenge   Vaping Use     Vaping Use: Never used   Substance and Sexual Activity     Alcohol use: Yes     Comment: 1-2 drinks per month     Drug use: Yes     Types: Marijuana     Comment: once in a while      Sexual activity: Yes     Partners: Male     Birth control/protection: Surgical     Comment:  with vasectomy   Other Topics Concern      Service No     Blood Transfusions No     Caffeine  Concern Yes     Comment: 2 cups per day, 2 pop per day     Occupational Exposure No     Hobby Hazards No     Sleep Concern No     Comment: snores, not getting good night sleep     Stress Concern Yes     Weight Concern Yes     Comment: gained weight 30 lbs in a year- stress better, meds     Special Diet No     Back Care No     Exercise Yes     Comment: 3-4 times per week, 20-30 minutes per day, yoga, meditation     Bike Helmet No     Seat Belt Yes     Self-Exams Yes     Comment: tries     Parent/sibling w/ CABG, MI or angioplasty before 65F 55M? No   Social History Narrative     since 1988 , 2nd marriage, 4 children,     3 dogs and 2 cats     Social Determinants of Health     Financial Resource Strain: Low Risk      Difficulty of Paying Living Expenses: Not very hard   Food Insecurity: No Food Insecurity     Worried About Running Out of Food in the Last Year: Never true     Ran Out of Food in the Last Year: Never true   Transportation Needs: Unmet Transportation Needs     Lack of Transportation (Medical): Yes     Lack of Transportation (Non-Medical): No   Physical Activity: Unknown     Days of Exercise per Week: 3 days     Minutes of Exercise per Session: Not on file   Stress: Stress Concern Present     Feeling of Stress : To some extent   Social Connections: Moderately Isolated     Frequency of Communication with Friends and Family: Three times a week     Frequency of Social Gatherings with Friends and Family: Patient refused     Attends Cheondoism Services: Never     Active Member of Clubs or Organizations: No     Attends Club or Organization Meetings: Not on file     Marital Status:    Intimate Partner Violence: Not on file   Housing Stability: Unknown     Unable to Pay for Housing in the Last Year: No     Number of Places Lived in the Last Year: Not on file     Unstable Housing in the Last Year: No       PHYSICAL EXAM:    Vitals: BP (!) 142/48 (BP Location: Left arm, Patient Position:  Sitting, Cuff Size: Adult Regular)   Pulse 92   Ht 1.524 m (5')   Wt 75.8 kg (167 lb)   SpO2 97%   BMI 32.61 kg/m    Wt Readings from Last 5 Encounters:   01/06/22 75.8 kg (167 lb)   01/05/22 76.2 kg (167 lb 14.4 oz)   12/06/21 78.2 kg (172 lb 6.4 oz)   11/30/21 78.5 kg (173 lb)   10/29/21 78.6 kg (173 lb 3.2 oz)             Encounter Diagnoses   Name Primary?     Uncontrolled hypertension Yes     Undiagnosed cardiac murmurs      Palpitations      Snoring        Orders Placed This Encounter   Procedures     Follow-Up with Cardiologist     SLEEP EVALUATION & MANAGEMENT REFERRAL - ADULT -     Leadless EKG Monitor 3 to 7 Days       CC  REFERRING PROVIDER:  Ramona Ann Aaseby-Aguilera, PA-C  [address] [place] [address]                Thank you for allowing me to participate in the care of your patient.      Sincerely,     Raz Santiago MD     [facility]  cc:   Ramona Ann Aaseby-Aguilera, PA-C  [address] [place] [address]

## 2022-01-07 LAB — HBA1C MFR BLD: 7.8 % (ref 0–5.6)

## 2022-01-07 NOTE — PROGRESS NOTES
Service Date: 01/06/2022    PRIMARY CARE AND REFERRING PROVIDER:  Ramona Aaseby-Aguilera, PA-C, [place], [place].    REASON FOR VISIT:    1.  Uncontrolled hypertension.  2.  Cardiac murmur.    HISTORY OF PRESENT ILLNESS:  It was my pleasure to see Vanessa, who is new to Cardiology.  She is a 61-year-old  lady, lives with her  and 25-year-old son, mother of 4 children, homemaker.    She has multiple comorbidities including hypertension, type 2 diabetes mellitus (on insulin), BMI 33 kg/m2, sedentary lifestyle, tobacco dependency, treated hyperlipidemia, generalized anxiety disorder.    She has been having problems with nausea and vomiting and GI upset and it is thought that this may be related to her uterine polyps.  She is scheduled for hysteroscopy with dilatation and curettage next week on 01/12/2022.  She herself states that this is causing her to have considerable anxiety and palpitations.    She has had hypertension for a few years.  More recently, blood pressure control has been increased to the 140s-150s/80s-90s.  Her primary, Ms. Valentino, put her on amlodipine 10 mg daily in addition to losartan 100 mg, and today her BP is 142/48 with a pulse of 92 BPM.    She also has been feeling palpitations intermittently.  These typically happen when she is feeling nervous.  No associated shortness of breath or dyspnea.    She drinks alcohol socially.  Chews Nicorette and is trying to quit.  She has significant insomnia, heavy snoring and daytime somnolence and has not had a sleep study in the past.    On recent visit with her primary provider, a cardiac murmur was auscultated and echocardiogram was obtained.  I personally reviewed this and it shows preserved biventricular systolic function, trileaflet aortic valve with sclerosis, no stenosis, mild to moderate regurgitation.  Other cardiac valves are normal.  Normal atrial size.  Normal LVEF of 60%-65% without any wall motion abnormalities, normal right  ventricular systolic function.    ECG done today shows sinus rhythm of 92 BPM, left ventricular hypertrophy with LV strain pattern, and normal cardiac intervals.    Labs are also reassuring with normal CBC, normal renal panel, LDL 52, high HDL of 62, triglycerides 134, she is hyponatremic at 129 and hypokalemic at 3.0 (has been vomiting yesterday and today).  Creatinine is 1.0, BUN is normal.    PHYSICAL EXAMINATION:  The pertinent positives are BP of 142/48, pulse 92 per minute.  Appears older than stated age, had significant nausea during the visit.  Heart sounds are regular.  Apical impulse undisplaced.  Normal JVP.  No lower extremity edema.  She does have a very soft early diastolic murmur.    DIAGNOSES:    1.  Preoperative cardiac clearance.  2.  Cardiac murmur secondary to aortic valve sclerosis and mild to moderate aortic valve regurgitation.  3.  Uncontrolled hypertension.  4.  Other comorbidities listed above.  5.  Hyponatremia and hypokalemia secondary to recurrent vomiting.    ASSESSMENT AND PLAN:    1.  Successful preoperative cardiac clearance.  She has no concerning cardiac symptoms and has preserved biventricular systolic function and no significant valve disease.  2.  She had significant nausea and states she has been vomiting the last couple of days and this has been the symptom that is prompting her hysteroscopy next week.  I advised her to follow up with her primary provider.  3.  Her palpitations sound like they may be related to anxiety.  She has also high risk of atrial fibrillation given her comorbidities and high clinical likelihood of sleep apnea.  Therefore:  -- Start atenolol 25 mg daily.  This will help both her palpitations and hypertension control.  -- After her surgery, I have referred her to Sleep Clinic.  -- In 2 months, she will have a 3-day Zio Patch heart monitor and follow up with me.  4.  Hopefully, when she is feeling better after the surgery, she can take up a regular  exercise program.      [facility] Tara Santiago MD        D: 2022   T: 2022   MT: roberta    Name:     ROBERT MAYA  MRN:      [id number]        Account:      [id number]   :      1960           Service Date: 2022       Document: [id number]

## 2022-01-08 DIAGNOSIS — M79.7 FIBROMYALGIA: ICD-10-CM

## 2022-01-08 DIAGNOSIS — G47.00 INSOMNIA, UNSPECIFIED TYPE: ICD-10-CM

## 2022-01-11 ENCOUNTER — PREP FOR PROCEDURE (OUTPATIENT)
Dept: OBGYN | Facility: CLINIC | Age: 62
End: 2022-01-11

## 2022-01-11 DIAGNOSIS — R93.89 THICKENED ENDOMETRIUM: Primary | ICD-10-CM

## 2022-01-11 RX ORDER — CYCLOBENZAPRINE HCL 10 MG
TABLET ORAL
Qty: 30 TABLET | Refills: 0 | Status: SHIPPED | OUTPATIENT
Start: 2022-01-11 | End: 2022-02-11

## 2022-01-11 RX ORDER — ZOLPIDEM TARTRATE 5 MG/1
TABLET ORAL
Qty: 30 TABLET | Refills: 0 | Status: SHIPPED | OUTPATIENT
Start: 2022-01-11 | End: 2022-02-11

## 2022-01-11 NOTE — TELEPHONE ENCOUNTER
Patient called to cancel surgery scheduled on 1/12/22 due to a lapse in her insurance.  She would like to reschedule in February 2022.    Type of surgery: hysteroscopy dilation and curettage with myosure  Location of surgery: [facility]  Date and time of surgery: 2/7/22 @ 10:30 am  Surgeon: Dr. Wolf  Pre-Op Appt Date: Patient advised to schedule.  Post-Op Appt Date: 2/16/22   Packet sent out: Yes  Pre-cert/Authorization completed:  No  Date: 1/11/22

## 2022-01-11 NOTE — OR NURSING
Called to check if pt had covid test, she stated she was suppose to have it yesterday but will have to cancel surgery as she has no health insurance until feb 1, she stated she called Dr. Angeles's office. I also called and morteza carrington for Maude-coordinator.

## 2022-01-25 DIAGNOSIS — Z11.59 ENCOUNTER FOR SCREENING FOR OTHER VIRAL DISEASES: Primary | ICD-10-CM

## 2022-01-25 NOTE — TELEPHONE ENCOUNTER
Surgery rescheduled per Dr. Wolf' request.  Patient notified.    Type of surgery: hysteroscopy dilation and curettage with myosure  Location of surgery: [facility]  Date and time of surgery: 2/1/22 @ 10:25 am  Surgeon: Dr. Wolf  Pre-Op Appt Date: 1/6/22  Post-Op Appt Date: 2/16/22   Packet sent out: Yes  Pre-cert/Authorization completed:  No  Date: 1/25/22

## 2022-01-28 ENCOUNTER — LAB (OUTPATIENT)
Dept: LAB | Facility: CLINIC | Age: 62
End: 2022-01-28
Payer: OTHER GOVERNMENT

## 2022-01-28 DIAGNOSIS — G43.009 MIGRAINE WITHOUT AURA AND WITHOUT STATUS MIGRAINOSUS, NOT INTRACTABLE: ICD-10-CM

## 2022-01-28 DIAGNOSIS — Z11.59 ENCOUNTER FOR SCREENING FOR OTHER VIRAL DISEASES: ICD-10-CM

## 2022-01-28 PROCEDURE — U0005 INFEC AGEN DETEC AMPLI PROBE: HCPCS

## 2022-01-28 RX ORDER — SUMATRIPTAN 100 MG/1
TABLET, FILM COATED ORAL
Qty: 10 TABLET | Refills: 0 | Status: SHIPPED | OUTPATIENT
Start: 2022-01-28 | End: 2022-05-23

## 2022-01-28 NOTE — TELEPHONE ENCOUNTER
Routing refill request to provider for review/approval because:  Labs out of range:  BP    BP Readings from Last 3 Encounters:   01/06/22 (!) 142/48   01/05/22 (!) 146/78   12/06/21 130/78     Cliff LANDON RN

## 2022-01-29 LAB — SARS-COV-2 RNA RESP QL NAA+PROBE: NEGATIVE

## 2022-02-01 ENCOUNTER — HOSPITAL ENCOUNTER (OUTPATIENT)
Facility: CLINIC | Age: 62
Discharge: HOME | End: 2022-02-01
Attending: FAMILY MEDICINE | Admitting: FAMILY MEDICINE
Payer: COMMERCIAL

## 2022-02-01 ENCOUNTER — ANESTHESIA EVENT (OUTPATIENT)
Dept: SURGERY | Facility: CLINIC | Age: 62
End: 2022-02-01
Payer: COMMERCIAL

## 2022-02-01 ENCOUNTER — ANESTHESIA (OUTPATIENT)
Dept: SURGERY | Facility: CLINIC | Age: 62
End: 2022-02-01
Payer: COMMERCIAL

## 2022-02-01 VITALS
RESPIRATION RATE: 16 BRPM | DIASTOLIC BLOOD PRESSURE: 80 MMHG | HEIGHT: 60 IN | SYSTOLIC BLOOD PRESSURE: 129 MMHG | HEART RATE: 66 BPM | BODY MASS INDEX: 33.77 KG/M2 | WEIGHT: 172 LBS | TEMPERATURE: 98 F | OXYGEN SATURATION: 96 %

## 2022-02-01 DIAGNOSIS — Z98.890 STATUS POST HYSTEROSCOPIC POLYPECTOMY: Primary | ICD-10-CM

## 2022-02-01 LAB
GLUCOSE BLDC GLUCOMTR-MCNC: 156 MG/DL (ref 70–99)
GLUCOSE BLDC GLUCOMTR-MCNC: 85 MG/DL (ref 70–99)

## 2022-02-01 PROCEDURE — 710N000012 HC RECOVERY PHASE 2, PER MINUTE: Performed by: FAMILY MEDICINE

## 2022-02-01 PROCEDURE — 360N000076 HC SURGERY LEVEL 3, PER MIN: Performed by: FAMILY MEDICINE

## 2022-02-01 PROCEDURE — 370N000017 HC ANESTHESIA TECHNICAL FEE, PER MIN: Performed by: FAMILY MEDICINE

## 2022-02-01 PROCEDURE — 88305 TISSUE EXAM BY PATHOLOGIST: CPT | Mod: TC | Performed by: FAMILY MEDICINE

## 2022-02-01 PROCEDURE — 250N000009 HC RX 250: Performed by: NURSE ANESTHETIST, CERTIFIED REGISTERED

## 2022-02-01 PROCEDURE — 250N000011 HC RX IP 250 OP 636: Performed by: FAMILY MEDICINE

## 2022-02-01 PROCEDURE — 250N000011 HC RX IP 250 OP 636: Performed by: NURSE ANESTHETIST, CERTIFIED REGISTERED

## 2022-02-01 PROCEDURE — 258N000001 HC RX 258: Performed by: FAMILY MEDICINE

## 2022-02-01 PROCEDURE — 258N000003 HC RX IP 258 OP 636: Performed by: NURSE ANESTHETIST, CERTIFIED REGISTERED

## 2022-02-01 PROCEDURE — 58558 HYSTEROSCOPY BIOPSY: CPT | Performed by: FAMILY MEDICINE

## 2022-02-01 PROCEDURE — 710N000009 HC RECOVERY PHASE 1, LEVEL 1, PER MIN: Performed by: FAMILY MEDICINE

## 2022-02-01 PROCEDURE — 250N000013 HC RX MED GY IP 250 OP 250 PS 637: Performed by: FAMILY MEDICINE

## 2022-02-01 PROCEDURE — 272N000001 HC OR GENERAL SUPPLY STERILE: Performed by: FAMILY MEDICINE

## 2022-02-01 PROCEDURE — 82962 GLUCOSE BLOOD TEST: CPT

## 2022-02-01 PROCEDURE — 999N000141 HC STATISTIC PRE-PROCEDURE NURSING ASSESSMENT: Performed by: FAMILY MEDICINE

## 2022-02-01 RX ORDER — KETOROLAC TROMETHAMINE 15 MG/ML
15 INJECTION, SOLUTION INTRAMUSCULAR; INTRAVENOUS EVERY 6 HOURS PRN
Status: DISCONTINUED | OUTPATIENT
Start: 2022-02-01 | End: 2022-02-01 | Stop reason: HOSPADM

## 2022-02-01 RX ORDER — ACETAMINOPHEN 325 MG/1
975 TABLET ORAL ONCE
Status: COMPLETED | OUTPATIENT
Start: 2022-02-01 | End: 2022-02-01

## 2022-02-01 RX ORDER — LABETALOL HYDROCHLORIDE 5 MG/ML
10 INJECTION, SOLUTION INTRAVENOUS
Status: DISCONTINUED | OUTPATIENT
Start: 2022-02-01 | End: 2022-02-01 | Stop reason: HOSPADM

## 2022-02-01 RX ORDER — HYDROMORPHONE HCL IN WATER/PF 6 MG/30 ML
0.2 PATIENT CONTROLLED ANALGESIA SYRINGE INTRAVENOUS EVERY 5 MIN PRN
Status: DISCONTINUED | OUTPATIENT
Start: 2022-02-01 | End: 2022-02-01 | Stop reason: HOSPADM

## 2022-02-01 RX ORDER — DEXAMETHASONE SODIUM PHOSPHATE 4 MG/ML
INJECTION, SOLUTION INTRA-ARTICULAR; INTRALESIONAL; INTRAMUSCULAR; INTRAVENOUS; SOFT TISSUE PRN
Status: DISCONTINUED | OUTPATIENT
Start: 2022-02-01 | End: 2022-02-01

## 2022-02-01 RX ORDER — SODIUM CHLORIDE, SODIUM LACTATE, POTASSIUM CHLORIDE, CALCIUM CHLORIDE 600; 310; 30; 20 MG/100ML; MG/100ML; MG/100ML; MG/100ML
INJECTION, SOLUTION INTRAVENOUS CONTINUOUS PRN
Status: DISCONTINUED | OUTPATIENT
Start: 2022-02-01 | End: 2022-02-01

## 2022-02-01 RX ORDER — HYDRALAZINE HYDROCHLORIDE 20 MG/ML
2.5-5 INJECTION INTRAMUSCULAR; INTRAVENOUS EVERY 10 MIN PRN
Status: DISCONTINUED | OUTPATIENT
Start: 2022-02-01 | End: 2022-02-01 | Stop reason: HOSPADM

## 2022-02-01 RX ORDER — CEFAZOLIN SODIUM/WATER 2 G/20 ML
2 SYRINGE (ML) INTRAVENOUS
Status: COMPLETED | OUTPATIENT
Start: 2022-02-01 | End: 2022-02-01

## 2022-02-01 RX ORDER — ACETAMINOPHEN 325 MG/1
975 TABLET ORAL ONCE
Status: DISCONTINUED | OUTPATIENT
Start: 2022-02-01 | End: 2022-02-01 | Stop reason: HOSPADM

## 2022-02-01 RX ORDER — FENTANYL CITRATE 50 UG/ML
25 INJECTION, SOLUTION INTRAMUSCULAR; INTRAVENOUS
Status: DISCONTINUED | OUTPATIENT
Start: 2022-02-01 | End: 2022-02-01 | Stop reason: HOSPADM

## 2022-02-01 RX ORDER — SODIUM CHLORIDE, SODIUM LACTATE, POTASSIUM CHLORIDE, CALCIUM CHLORIDE 600; 310; 30; 20 MG/100ML; MG/100ML; MG/100ML; MG/100ML
INJECTION, SOLUTION INTRAVENOUS CONTINUOUS
Status: DISCONTINUED | OUTPATIENT
Start: 2022-02-01 | End: 2022-02-01 | Stop reason: HOSPADM

## 2022-02-01 RX ORDER — PROPOFOL 10 MG/ML
INJECTION, EMULSION INTRAVENOUS CONTINUOUS PRN
Status: DISCONTINUED | OUTPATIENT
Start: 2022-02-01 | End: 2022-02-01

## 2022-02-01 RX ORDER — ONDANSETRON 2 MG/ML
4 INJECTION INTRAMUSCULAR; INTRAVENOUS EVERY 30 MIN PRN
Status: DISCONTINUED | OUTPATIENT
Start: 2022-02-01 | End: 2022-02-01 | Stop reason: HOSPADM

## 2022-02-01 RX ORDER — FENTANYL CITRATE 50 UG/ML
INJECTION, SOLUTION INTRAMUSCULAR; INTRAVENOUS PRN
Status: DISCONTINUED | OUTPATIENT
Start: 2022-02-01 | End: 2022-02-01

## 2022-02-01 RX ORDER — CEFAZOLIN SODIUM/WATER 2 G/20 ML
2 SYRINGE (ML) INTRAVENOUS SEE ADMIN INSTRUCTIONS
Status: DISCONTINUED | OUTPATIENT
Start: 2022-02-01 | End: 2022-02-01 | Stop reason: HOSPADM

## 2022-02-01 RX ORDER — HYDROCODONE BITARTRATE AND ACETAMINOPHEN 5; 325 MG/1; MG/1
1-2 TABLET ORAL EVERY 4 HOURS PRN
Qty: 10 TABLET | Refills: 0 | Status: SHIPPED | OUTPATIENT
Start: 2022-02-01 | End: 2022-08-18

## 2022-02-01 RX ORDER — LIDOCAINE HYDROCHLORIDE 10 MG/ML
INJECTION, SOLUTION INFILTRATION; PERINEURAL PRN
Status: DISCONTINUED | OUTPATIENT
Start: 2022-02-01 | End: 2022-02-01

## 2022-02-01 RX ORDER — ONDANSETRON 4 MG/1
4 TABLET, ORALLY DISINTEGRATING ORAL EVERY 30 MIN PRN
Status: DISCONTINUED | OUTPATIENT
Start: 2022-02-01 | End: 2022-02-01 | Stop reason: HOSPADM

## 2022-02-01 RX ORDER — IBUPROFEN 800 MG/1
800 TABLET, FILM COATED ORAL EVERY 6 HOURS PRN
Qty: 30 TABLET | Refills: 0 | Status: SHIPPED | OUTPATIENT
Start: 2022-02-01 | End: 2022-08-31

## 2022-02-01 RX ORDER — LIDOCAINE 40 MG/G
CREAM TOPICAL
Status: DISCONTINUED | OUTPATIENT
Start: 2022-02-01 | End: 2022-02-01 | Stop reason: HOSPADM

## 2022-02-01 RX ORDER — ACETAMINOPHEN 325 MG/1
975 TABLET ORAL EVERY 6 HOURS PRN
Qty: 50 TABLET | Refills: 0 | Status: ON HOLD | OUTPATIENT
Start: 2022-02-01 | End: 2023-07-21

## 2022-02-01 RX ORDER — ONDANSETRON 4 MG/1
4-8 TABLET, ORALLY DISINTEGRATING ORAL EVERY 8 HOURS PRN
Qty: 8 TABLET | Refills: 0 | Status: SHIPPED | OUTPATIENT
Start: 2022-02-01 | End: 2022-08-31

## 2022-02-01 RX ORDER — ONDANSETRON 2 MG/ML
INJECTION INTRAMUSCULAR; INTRAVENOUS PRN
Status: DISCONTINUED | OUTPATIENT
Start: 2022-02-01 | End: 2022-02-01

## 2022-02-01 RX ORDER — GLYCOPYRROLATE 0.2 MG/ML
INJECTION, SOLUTION INTRAMUSCULAR; INTRAVENOUS PRN
Status: DISCONTINUED | OUTPATIENT
Start: 2022-02-01 | End: 2022-02-01

## 2022-02-01 RX ORDER — KETOROLAC TROMETHAMINE 30 MG/ML
30 INJECTION, SOLUTION INTRAMUSCULAR; INTRAVENOUS ONCE
Status: COMPLETED | OUTPATIENT
Start: 2022-02-01 | End: 2022-02-01

## 2022-02-01 RX ORDER — OXYCODONE HYDROCHLORIDE 5 MG/1
5 TABLET ORAL EVERY 4 HOURS PRN
Status: DISCONTINUED | OUTPATIENT
Start: 2022-02-01 | End: 2022-02-01 | Stop reason: HOSPADM

## 2022-02-01 RX ORDER — FENTANYL CITRATE 50 UG/ML
25 INJECTION, SOLUTION INTRAMUSCULAR; INTRAVENOUS EVERY 5 MIN PRN
Status: DISCONTINUED | OUTPATIENT
Start: 2022-02-01 | End: 2022-02-01 | Stop reason: HOSPADM

## 2022-02-01 RX ORDER — AMOXICILLIN 250 MG
1-2 CAPSULE ORAL 2 TIMES DAILY
Qty: 30 TABLET | Refills: 0 | Status: SHIPPED | OUTPATIENT
Start: 2022-02-01 | End: 2022-08-31

## 2022-02-01 RX ORDER — IBUPROFEN 800 MG/1
800 TABLET, FILM COATED ORAL ONCE
Status: DISCONTINUED | OUTPATIENT
Start: 2022-02-01 | End: 2022-02-01 | Stop reason: HOSPADM

## 2022-02-01 RX ORDER — MEPERIDINE HYDROCHLORIDE 25 MG/ML
12.5 INJECTION INTRAMUSCULAR; INTRAVENOUS; SUBCUTANEOUS
Status: DISCONTINUED | OUTPATIENT
Start: 2022-02-01 | End: 2022-02-01 | Stop reason: HOSPADM

## 2022-02-01 RX ORDER — OXYCODONE HYDROCHLORIDE 5 MG/1
5 TABLET ORAL
Status: DISCONTINUED | OUTPATIENT
Start: 2022-02-01 | End: 2022-02-01 | Stop reason: HOSPADM

## 2022-02-01 RX ADMIN — Medication 2 G: at 10:23

## 2022-02-01 RX ADMIN — DEXAMETHASONE SODIUM PHOSPHATE 4 MG: 4 INJECTION, SOLUTION INTRA-ARTICULAR; INTRALESIONAL; INTRAMUSCULAR; INTRAVENOUS; SOFT TISSUE at 10:30

## 2022-02-01 RX ADMIN — LIDOCAINE HYDROCHLORIDE 50 MG: 10 INJECTION, SOLUTION INFILTRATION; PERINEURAL at 10:28

## 2022-02-01 RX ADMIN — ONDANSETRON HYDROCHLORIDE 4 MG: 2 INJECTION, SOLUTION INTRAVENOUS at 10:32

## 2022-02-01 RX ADMIN — GLYCOPYRROLATE 0.2 MG: 0.2 INJECTION, SOLUTION INTRAMUSCULAR; INTRAVENOUS at 10:27

## 2022-02-01 RX ADMIN — ACETAMINOPHEN 975 MG: 325 TABLET, FILM COATED ORAL at 09:08

## 2022-02-01 RX ADMIN — FENTANYL CITRATE 100 MCG: 50 INJECTION, SOLUTION INTRAMUSCULAR; INTRAVENOUS at 10:28

## 2022-02-01 RX ADMIN — KETOROLAC TROMETHAMINE 30 MG: 30 INJECTION, SOLUTION INTRAMUSCULAR at 09:53

## 2022-02-01 RX ADMIN — MIDAZOLAM 2 MG: 1 INJECTION INTRAMUSCULAR; INTRAVENOUS at 10:23

## 2022-02-01 RX ADMIN — SODIUM CHLORIDE, POTASSIUM CHLORIDE, SODIUM LACTATE AND CALCIUM CHLORIDE: 600; 310; 30; 20 INJECTION, SOLUTION INTRAVENOUS at 10:23

## 2022-02-01 RX ADMIN — PROPOFOL 50 MCG/KG/MIN: 10 INJECTION, EMULSION INTRAVENOUS at 10:39

## 2022-02-01 ASSESSMENT — MIFFLIN-ST. JEOR: SCORE: 1271.69

## 2022-02-01 NOTE — BRIEF OP NOTE
[facility]    Brief Operative Note    Pre-operative diagnosis: Thickened endometrium [R93.89]  Post-operative diagnosis 62 y/o female with postmenopausal bleeding, polyp on ultrasound s/p hysteroscopy dilation and curettage/endometrial biopsy, endometrial polypectomy     Procedure: Procedure(s):  HYSTEROSCOPY DILATION AND CURETTAGE WITH MYOSURE  Surgeon: Surgeon(s) and Role:     * Margaret Wolf DO - Primary  Anesthesia: General   Estimated Blood Loss: 5 ml    Drains: None  Specimens:   ID Type Source Tests Collected by Time Destination   1 : endometrial curettings and polyp  Tissue Endometrium SURGICAL PATHOLOGY EXAM Margaret Wolf DO 2/1/2022 10:44 AM      Findings:   EUA 8 cm uterus, small endometrial polyp, normal endometrial cavity, normal appearing endometrium, bilateral normal tubal ostia.  Complications: None.  Implants: * No implants in log *

## 2022-02-01 NOTE — DISCHARGE INSTRUCTIONS
You received Toradol, an IV form of Ibuprofen (Motrin) at 10:00AM.  Do not take any Ibuprofen products until 4:00PM.    Maximum acetaminophen (Tylenol) dose from all sources should not exceed 4 grams (4000 mg) per day.  You received 975 mg at 9:00 AM    GENERAL ANESTHESIA OR SEDATION ADULT DISCHARGE INSTRUCTIONS   SPECIAL PRECAUTIONS FOR 24 HOURS AFTER SURGERY    IT IS NOT UNUSUAL TO FEEL LIGHT-HEADED OR FAINT, UP TO 24 HOURS AFTER SURGERY OR WHILE TAKING PAIN MEDICATION.  IF YOU HAVE THESE SYMPTOMS; SIT FOR A FEW MINUTES BEFORE STANDING AND HAVE SOMEONE ASSIST YOU WHEN YOU GET UP TO WALK OR USE THE BATHROOM.    YOU SHOULD REST AND RELAX FOR THE NEXT 24 HOURS AND YOU MUST MAKE ARRANGEMENTS TO HAVE SOMEONE STAY WITH YOU FOR AT LEAST 24 HOURS AFTER YOUR DISCHARGE.  AVOID HAZARDOUS AND STRENUOUS ACTIVITIES.  DO NOT MAKE IMPORTANT DECISIONS FOR 24 HOURS.    DO NOT DRIVE ANY VEHICLE OR OPERATE MECHANICAL EQUIPMENT FOR 24 HOURS FOLLOWING THE END OF YOUR SURGERY.  EVEN THOUGH YOU MAY FEEL NORMAL, YOUR REACTIONS MAY BE AFFECTED BY THE MEDICATION YOU HAVE RECEIVED.    DO NOT DRINK ALCOHOLIC BEVERAGES FOR 24 HOURS FOLLOWING YOUR SURGERY.    DRINK CLEAR LIQUIDS (APPLE JUICE, GINGER ALE, 7-UP, BROTH, ETC.).  PROGRESS TO YOUR REGULAR DIET AS YOU FEEL ABLE.    YOU MAY HAVE A DRY MOUTH, A SORE THROAT, MUSCLES ACHES OR TROUBLE SLEEPING.  THESE SHOULD GO AWAY AFTER 24 HOURS.    CALL YOUR DOCTOR FOR ANY OF THE FOLLOWING:  SIGNS OF INFECTION (FEVER, GROWING TENDERNESS AT THE SURGERY SITE, A LARGE AMOUNT OF DRAINAGE OR BLEEDING, SEVERE PAIN, FOUL-SMELLING DRAINAGE, REDNESS OR SWELLING.    IT HAS BEEN OVER 8 TO 10 HOURS SINCE SURGERY AND YOU ARE STILL NOT ABLE TO URINATE (PASS WATER).       Follow up in 1-2 weeks   Call [phone] for post op concerns, this will get you to the answering service for the on-call doctor.     Things to be concerned with is: uncontrolled vomiting, bleeding that is more than spotting, pain that doesn't  respond to oxycodone and ibuprofen and tylenol   Also fever or general unwell feeling or increased pain.     Also it is ok to please also call for any reason or concern!     Dr. Margaret Wolf, DO    OB/GYN   [facility]

## 2022-02-01 NOTE — ANESTHESIA CARE TRANSFER NOTE
Patient: Vanessa Mota    Procedure: Procedure(s):  HYSTEROSCOPY DILATION AND CURETTAGE WITH MYOSURE and endometrial polypectomy       Diagnosis: Thickened endometrium [R93.89]  Diagnosis Additional Information: No value filed.    Anesthesia Type:   General     Note:    Oropharynx: spontaneously breathing  Level of Consciousness: awake  Oxygen Supplementation: face mask    Independent Airway: airway patency satisfactory and stable  Dentition: dentition unchanged  Vital Signs Stable: post-procedure vital signs reviewed and stable  Report to RN Given: handoff report given  Patient transferred to: PACU  Comments: To PACU, report to RN, oxygen per face mask.        Vitals:  Vitals Value Taken Time   BP     Temp     Pulse 71 02/01/22 1111   Resp 9 02/01/22 1111   SpO2 100 % 02/01/22 1111   Vitals shown include unvalidated device data.    Electronically Signed By: SIA Littlejohn CRNA  February 1, 2022  11:12 AM

## 2022-02-01 NOTE — ANESTHESIA PREPROCEDURE EVALUATION
Anesthesia Pre-Procedure Evaluation    Patient: Vanessa Mota   MRN: [id number] : 1960        Preoperative Diagnosis: Thickened endometrium [R93.89]    Procedure : Procedure(s):  HYSTEROSCOPY DILATION AND CURETTAGE WITH MYOSURE          Past Medical History:   Diagnosis Date     311     1-2 yrs, [facility] started zoloft 50 mg daily     Diabetes (H)      Gastroesophageal reflux disease      Headache(784.0)     4-5 yrs-migraines     Heart murmur      Hypertension      Myalgia and myositis, unspecified     suspects fibromyalgia, took celebrex for a while     Other chronic pain      Pancreatic disease      Papanicolaou smear of cervix with atypical squamous cells of undetermined significance (ASC-US) 05, 07, 08, 11, 14, 17    neg hpv     Pure hypercholesterolemia 10/1/2012      Past Surgical History:   Procedure Laterality Date     COLONOSCOPY Left 2021    Procedure: COLONOSCOPY;  Surgeon: Elia Rader MD;  Location:  GI     ZZC FULL ROUT OBSTE CARE,VAGINAL DELIV       x 4     ZZC INDUCED ABORTN BY D&C      once 12 years ago      Allergies   Allergen Reactions     Wellbutrin [Bupropion Hcl] Rash      Social History     Tobacco Use     Smoking status: Former Smoker     Years: 3.00     Quit date: 1/15/2013     Years since quittin.0     Smokeless tobacco: Current User     Tobacco comment: nicotine lozenge   Substance Use Topics     Alcohol use: Yes     Comment: 1-2 drinks per month      Wt Readings from Last 1 Encounters:   22 78 kg (172 lb)        Anesthesia Evaluation   Pt has had prior anesthetic. Type: General.        ROS/MED HX  ENT/Pulmonary:  - neg pulmonary ROS     Neurologic:     (+) migraines,     Cardiovascular:     (+) Dyslipidemia hypertension-----Irregular Heartbeat/Palpitations, valvular problems/murmurs     METS/Exercise Tolerance:     Hematologic: Comments: Lab Test        01/05/22     09/24/21     10/30/20                       1640          3272           1424          WBC          11.2*        10.9         10.7          HGB          13.8         14.2         13.5          MCV          81           82           85            PLT          290          236          213            Lab Test        02/01/22     01/05/22     11/30/21     09/24/21     10/30/20                       0839          1640          1200          1620          1424          NA            --          129*          --          138          139           POTASSIUM     --          3.0*          --          3.8          3.5           CHLORIDE      --          93*           --          105          104           CO2           --          27            --          30           30            BUN           --          10            --          6*           6*            CR            --          1.00          --          0.81         0.77          ANIONGAP      --          9             --          3            5             LIZBETH           --          10.0          --          8.8          9.6           GLC          156*         170*         279*         97           81             - neg hematologic  ROS     Musculoskeletal: Comment: Myalgia and myositis      GI/Hepatic:     (+) GERD, Asymptomatic on medication, liver disease,     Renal/Genitourinary:       Endo: Comment: Thickened endometrium    (+) type II DM, Not using insulin, - not using insulin pump. not previously admitted for DM/DKA. Obesity,     Psychiatric/Substance Use:  - neg psychiatric ROS     Infectious Disease:  - neg infectious disease ROS     Malignancy:       Other:  - neg other ROS    (+) , H/O Chronic Pain, (-) Any chance pregnant       Physical Exam    Airway        Mallampati: II   TM distance: > 3 FB   Neck ROM: full   Mouth opening: > 3 cm    Respiratory Devices and Support         Dental  no notable dental history         Cardiovascular   cardiovascular exam normal          Pulmonary   pulmonary exam normal                OUTSIDE  LABS:  CBC:   Lab Results   Component Value Date    WBC 11.2 (H) 01/05/2022    WBC 10.9 09/24/2021    HGB 13.8 01/05/2022    HGB 14.2 09/24/2021    HCT 40.2 01/05/2022    HCT 41.3 09/24/2021     01/05/2022     09/24/2021     BMP:   Lab Results   Component Value Date     (L) 01/05/2022     09/24/2021    POTASSIUM 3.0 (L) 01/05/2022    POTASSIUM 3.8 09/24/2021    CHLORIDE 93 (L) 01/05/2022    CHLORIDE 105 09/24/2021    CO2 27 01/05/2022    CO2 30 09/24/2021    BUN 10 01/05/2022    BUN 6 (L) 09/24/2021    CR 1.00 01/05/2022    CR 0.81 09/24/2021     (H) 02/01/2022     (H) 01/05/2022     COAGS: No results found for: PTT, INR, FIBR  POC: No results found for: BGM, HCG, HCGS  HEPATIC:   Lab Results   Component Value Date    ALBUMIN 4.5 01/05/2022    PROTTOTAL 8.4 01/05/2022     (H) 01/05/2022    AST 60 (H) 01/05/2022    ALKPHOS 99 01/05/2022    BILITOTAL 0.7 01/05/2022     OTHER:   Lab Results   Component Value Date    A1C 7.8 (H) 01/05/2022    LIZBETH 10.0 01/05/2022    LIPASE 122 03/07/2008    TSH 1.99 09/24/2021    CRP 5.4 02/23/2011       Anesthesia Plan    ASA Status:  3      Anesthesia Type: General.     - Airway: LMA   Induction: Intravenous, Propofol.   Maintenance: Balanced.        Consents    Anesthesia Plan(s) and associated risks, benefits, and realistic alternatives discussed. Questions answered and patient/representative(s) expressed understanding.    - Discussed:     - Discussed with:  Patient      - Extended Intubation/Ventilatory Support Discussed: No.      - Patient is DNR/DNI Status: No    Use of blood products discussed: Yes.     - Discussed with: Patient.     - Consented: consented to blood products            Reason for refusal: other.     Postoperative Care    Pain management: IV analgesics.   PONV prophylaxis: Ondansetron (or other 5HT-3), Dexamethasone or Solumedrol     Comments:                Darian Abdi MD

## 2022-02-01 NOTE — OP NOTE
PREOPERATIVE DIAGNOSIS: A 61 year old-year-old  female with postmenopausal bleeding, thickened endometrium on ultrasound with possible endometrial polyp  POSTOPERATIVE DIAGNOSIS: A 61 year old-year-old  female with postmenopausal bleeding, thickened endometrium on ultrasound, endometrial polyp  SURGEON:  Dr. Margaret Wolf  PROCEDURE:   1. Hysteroscopy.   2. Endometrial polypectomy with morcellator  3. Dilation and curettage, endometrial biospy  INDICATIONS: A 61 year old-year-old  female with postmenopausal bleeding, thickened endometrium on ultrasound with possible endometrial polyp  Risks, benefits and alternatives discussed with patient, recommend hysteroscopy with removal of polyp for tissue diagnosis.   COMPLICATIONS: None apparent at time of procedure.   FLUID DEFICIT: <130 ml  ESTIMATED BLOOD LOSS: Less than 5 ml  FINDINGS: Exam under anesthesia reveals a small anteverted uterus about 8 weeks' size. Intrauterine findings include polyps from anterior right uterus, bilateral normal tubal ostia, normal appearing endometrium.   DESCRIPTION OF PROCEDURE: After informed consent was obtained, the patient was taken to the operating room where she was placed in dorsal supine position and placed under general anesthesia without difficulty. Exam under anesthesia reveals the findings above. The patient was prepped and draped in normal sterile fashion.The bivalve speculum was placed in the patient's vaginal vault. Anterior lip of the cervix was grasped with a single-tooth tenaculum. An endometrial biopsy is performed and the uterus sounded to 8 cm. The cervix was dilated up to 8 mm and the hysteroscope was placed into the intrauterine cavity and there was noted to be the findings above. Endometrial polypectomy was performed. Directed sharp endometrial curettage was performed. Hemostasis was assured. The procedure was terminated  Patient tolerated the procedure well. Sponge, lap, needle counts were  correct x2. The patient was awakened from anesthesia and returned to the recovery room in stable condition.   MIKE RODAS DO

## 2022-02-01 NOTE — ANESTHESIA POSTPROCEDURE EVALUATION
Patient: Vanessa Mota    Procedure: Procedure(s):  HYSTEROSCOPY DILATION AND CURETTAGE WITH MYOSURE and endometrial polypectomy       Diagnosis:Thickened endometrium [R93.89]  Diagnosis Additional Information: No value filed.    Anesthesia Type:  General    Note:     Postop Pain Control: Uneventful            Sign Out: Well controlled pain   PONV: No   Neuro/Psych: Uneventful            Sign Out: Acceptable/Baseline neuro status   Airway/Respiratory: Uneventful            Sign Out: Acceptable/Baseline resp. status   CV/Hemodynamics: Uneventful            Sign Out: Acceptable CV status; No obvious hypovolemia; No obvious fluid overload   Other NRE: NONE   DID A NON-ROUTINE EVENT OCCUR? No           Last vitals:  Vitals Value Taken Time   /59 02/01/22 1125   Temp 97.2  F (36.2  C) 02/01/22 1130   Pulse 70 02/01/22 1130   Resp 9 02/01/22 1130   SpO2 93 % 02/01/22 1130   Vitals shown include unvalidated device data.    Electronically Signed By: Darian Abdi MD  February 1, 2022  5:11 PM

## 2022-02-02 LAB
PATH REPORT.COMMENTS IMP SPEC: NORMAL
PATH REPORT.COMMENTS IMP SPEC: NORMAL
PATH REPORT.FINAL DX SPEC: NORMAL
PATH REPORT.GROSS SPEC: NORMAL
PATH REPORT.MICROSCOPIC SPEC OTHER STN: NORMAL
PATH REPORT.RELEVANT HX SPEC: NORMAL
PHOTO IMAGE: NORMAL

## 2022-02-02 PROCEDURE — 88305 TISSUE EXAM BY PATHOLOGIST: CPT | Mod: 26 | Performed by: PATHOLOGY

## 2022-02-11 DIAGNOSIS — M79.7 FIBROMYALGIA: ICD-10-CM

## 2022-02-11 DIAGNOSIS — G47.00 INSOMNIA, UNSPECIFIED TYPE: ICD-10-CM

## 2022-02-11 RX ORDER — ZOLPIDEM TARTRATE 5 MG/1
TABLET ORAL
Qty: 30 TABLET | Refills: 0 | Status: SHIPPED | OUTPATIENT
Start: 2022-02-11 | End: 2022-03-10

## 2022-02-11 RX ORDER — CYCLOBENZAPRINE HCL 10 MG
TABLET ORAL
Qty: 30 TABLET | Refills: 0 | Status: SHIPPED | OUTPATIENT
Start: 2022-02-11 | End: 2022-03-10

## 2022-02-11 NOTE — TELEPHONE ENCOUNTER
Routing refill request to provider for review/approval because:  Drug not on the [facility] refill protocol     Zulma Shaikh RN on 2/11/2022 at 7:24 AM

## 2022-03-08 ENCOUNTER — HOSPITAL ENCOUNTER (OUTPATIENT)
Dept: CARDIOLOGY | Facility: CLINIC | Age: 62
Discharge: HOME | End: 2022-03-08
Attending: INTERNAL MEDICINE | Admitting: INTERNAL MEDICINE
Payer: COMMERCIAL

## 2022-03-08 DIAGNOSIS — R00.2 PALPITATIONS: ICD-10-CM

## 2022-03-08 PROCEDURE — 93242 EXT ECG>48HR<7D RECORDING: CPT

## 2022-03-08 PROCEDURE — 93244 EXT ECG>48HR<7D REV&INTERPJ: CPT | Performed by: INTERNAL MEDICINE

## 2022-03-10 ENCOUNTER — VIRTUAL VISIT (OUTPATIENT)
Dept: SLEEP MEDICINE | Facility: CLINIC | Age: 62
End: 2022-03-10
Attending: INTERNAL MEDICINE
Payer: COMMERCIAL

## 2022-03-10 VITALS — HEIGHT: 60 IN | BODY MASS INDEX: 33.77 KG/M2 | WEIGHT: 172 LBS

## 2022-03-10 DIAGNOSIS — R00.2 PALPITATIONS: ICD-10-CM

## 2022-03-10 DIAGNOSIS — M79.7 FIBROMYALGIA: ICD-10-CM

## 2022-03-10 DIAGNOSIS — R06.83 SNORING: ICD-10-CM

## 2022-03-10 DIAGNOSIS — Z53.9 ERRONEOUS ENCOUNTER--DISREGARD: Primary | ICD-10-CM

## 2022-03-10 DIAGNOSIS — G47.00 INSOMNIA, UNSPECIFIED TYPE: ICD-10-CM

## 2022-03-10 RX ORDER — CYCLOBENZAPRINE HCL 10 MG
TABLET ORAL
Qty: 30 TABLET | Refills: 0 | Status: SHIPPED | OUTPATIENT
Start: 2022-03-10 | End: 2022-04-06

## 2022-03-10 RX ORDER — ZOLPIDEM TARTRATE 5 MG/1
TABLET ORAL
Qty: 30 TABLET | Refills: 0 | Status: SHIPPED | OUTPATIENT
Start: 2022-03-10 | End: 2022-04-06

## 2022-03-10 ASSESSMENT — SLEEP AND FATIGUE QUESTIONNAIRES
HOW LIKELY ARE YOU TO NOD OFF OR FALL ASLEEP WHEN YOU ARE A PASSENGER IN A CAR FOR AN HOUR WITHOUT A BREAK: SLIGHT CHANCE OF DOZING
HOW LIKELY ARE YOU TO NOD OFF OR FALL ASLEEP WHILE SITTING AND READING: WOULD NEVER DOZE
HOW LIKELY ARE YOU TO NOD OFF OR FALL ASLEEP WHILE LYING DOWN TO REST IN THE AFTERNOON WHEN CIRCUMSTANCES PERMIT: HIGH CHANCE OF DOZING
HOW LIKELY ARE YOU TO NOD OFF OR FALL ASLEEP WHILE SITTING AND TALKING TO SOMEONE: SLIGHT CHANCE OF DOZING
HOW LIKELY ARE YOU TO NOD OFF OR FALL ASLEEP WHILE SITTING QUIETLY AFTER LUNCH WITHOUT ALCOHOL: HIGH CHANCE OF DOZING
HOW LIKELY ARE YOU TO NOD OFF OR FALL ASLEEP WHILE SITTING INACTIVE IN A PUBLIC PLACE: WOULD NEVER DOZE
HOW LIKELY ARE YOU TO NOD OFF OR FALL ASLEEP WHILE WATCHING TV: SLIGHT CHANCE OF DOZING
HOW LIKELY ARE YOU TO NOD OFF OR FALL ASLEEP IN A CAR, WHILE STOPPED FOR A FEW MINUTES IN TRAFFIC: WOULD NEVER DOZE

## 2022-03-10 ASSESSMENT — PAIN SCALES - GENERAL: PAINLEVEL: NO PAIN (0)

## 2022-03-10 NOTE — PROGRESS NOTES
I called at 2:20 PM and she was no longer there. The man who answered said she took off. They would call back to reschedule. I recommend an in person visit due to tech issues and what sounds like some other confusion.  I called that back to see if Vanessa's  (presumably who answered the phone) thought she was acting normally given she had made some odd comments to Rosanne during the rooming conversation. She had said something about trying to make eggs for breakfast (at 2 PM) and something about having 20 but that she didn't shove them down. I got her voicemail. I did not leave a message.  Bennett Goltz, PA-C       Vanessa is a 61 year old who is being evaluated via a billable video visit.      How would you like to obtain your AVS? Mail a copy  If the video visit is dropped, the invitation should be resent by: Text to cell phone: [phone]  Will anyone else be joining your video visit? Emeli Bridges

## 2022-03-10 NOTE — PROGRESS NOTES
Patient was no longer there when I called. The man who answered said they would call back to [facility].  Bennett Goltz, PA-C       Outpatient Sleep Medicine Consultation:  March 9, 2022    Name: Vanessa Mota MRN# [id number]   Age: 61 year old YOB: 1960     Date of Consultation: March 9, 2022  Consultation is requested by: Raz Santiago MD  [address] [place] [id number] Raz Santiago  Primary care provider: Aaseby-Aguilera, Ramona Ann       Reason for Sleep Consult:     Vanessa Mota is sent by Raz Santiago for a sleep consultation regarding palpitations, snoring.    Patient s Reason for visit  Vanessa Mota main reason for visit:    Patient states problem(s) started:    Vanessa D Mota's goals for this visit:             Assessment and Plan:     Summary Sleep Diagnoses:      Comorbid Diagnoses:  hypertension, type 2 diabetes mellitus (on insulin), BMI 33 kg/m2, sedentary lifestyle, tobacco dependency, treated hyperlipidemia, generalized anxiety disorder      Summary Recommendations:    No orders of the defined types were placed in this encounter.        Summary Counseling:    Sleep Testing Reviewed  Obstructive Sleep Apnea Reviewed  Complications of Untreated Sleep Apnea Reviewed

## 2022-03-15 ENCOUNTER — PRE VISIT (OUTPATIENT)
Dept: CARDIOLOGY | Facility: CLINIC | Age: 62
End: 2022-03-15
Payer: COMMERCIAL

## 2022-03-15 NOTE — TELEPHONE ENCOUNTER
Checked [employer] site, monitor has not been logged in for tracking yet. Attempted to contact patient, left a voice mail reminding her to send the package through [place] post office for processing.

## 2022-03-22 ENCOUNTER — TELEPHONE (OUTPATIENT)
Dept: SLEEP MEDICINE | Facility: CLINIC | Age: 62
End: 2022-03-22
Payer: COMMERCIAL

## 2022-03-22 NOTE — NURSING NOTE
GLADYSTCB     Writer created a telephone encounter regarding scheduling patient for an in-person appointment with providers from [facility].

## 2022-03-22 NOTE — TELEPHONE ENCOUNTER
"LMTCB    Upon call back please schedule a \"in-person\" new patient sleep appointment with someone from the [facility]. Patient was scheduled for telephone previously but appointment was never completed due to confusion. Please see appointment notes from 3/10/2022.       "

## 2022-03-30 ENCOUNTER — TELEPHONE (OUTPATIENT)
Dept: CARDIOLOGY | Facility: CLINIC | Age: 62
End: 2022-03-30
Payer: COMMERCIAL

## 2022-03-30 NOTE — TELEPHONE ENCOUNTER
Voicemail received from patient calling to confirm her follow up with Dr Santiago. Left detailed message reviewing details of upcoming visit 4/7/22. Pt to call back with any questions.

## 2022-04-06 DIAGNOSIS — G47.00 INSOMNIA, UNSPECIFIED TYPE: ICD-10-CM

## 2022-04-06 DIAGNOSIS — M79.7 FIBROMYALGIA: ICD-10-CM

## 2022-04-06 DIAGNOSIS — I10 HYPERTENSION GOAL BP (BLOOD PRESSURE) < 140/90: ICD-10-CM

## 2022-04-06 RX ORDER — CYCLOBENZAPRINE HCL 10 MG
TABLET ORAL
Qty: 30 TABLET | Refills: 0 | Status: SHIPPED | OUTPATIENT
Start: 2022-04-06 | End: 2022-05-05

## 2022-04-06 RX ORDER — ZOLPIDEM TARTRATE 5 MG/1
TABLET ORAL
Qty: 30 TABLET | Refills: 0 | Status: SHIPPED | OUTPATIENT
Start: 2022-04-06 | End: 2022-05-05

## 2022-04-06 RX ORDER — AMLODIPINE BESYLATE 10 MG/1
TABLET ORAL
Qty: 90 TABLET | Refills: 1 | Status: SHIPPED | OUTPATIENT
Start: 2022-04-06 | End: 2022-08-18

## 2022-04-19 DIAGNOSIS — I10 HYPERTENSION GOAL BP (BLOOD PRESSURE) < 140/90: ICD-10-CM

## 2022-04-20 RX ORDER — CHLORTHALIDONE 25 MG/1
TABLET ORAL
Qty: 30 TABLET | Refills: 0 | Status: SHIPPED | OUTPATIENT
Start: 2022-04-20 | End: 2022-05-17

## 2022-04-20 NOTE — TELEPHONE ENCOUNTER
Routing refill request to provider for review/approval because:  Labs out of range:  K, Na  New medication.     Potassium   Date Value Ref Range Status   01/05/2022 3.0 (L) 3.4 - 5.3 mmol/L Final   10/30/2020 3.5 3.4 - 5.3 mmol/L Final     Sodium   Date Value Ref Range Status   01/05/2022 129 (L) 133 - 144 mmol/L Final   10/30/2020 139 133 - 144 mmol/L Final       Helen BERNARD RN

## 2022-05-04 DIAGNOSIS — M79.7 FIBROMYALGIA: ICD-10-CM

## 2022-05-04 DIAGNOSIS — G47.00 INSOMNIA, UNSPECIFIED TYPE: ICD-10-CM

## 2022-05-04 NOTE — TELEPHONE ENCOUNTER
Routing refill request to provider for review/approval because:  Drug not on the [facility] refill protocol     Helen BERNARD RN

## 2022-05-05 ENCOUNTER — DOCUMENTATION ONLY (OUTPATIENT)
Dept: CARDIOLOGY | Facility: CLINIC | Age: 62
End: 2022-05-05
Payer: COMMERCIAL

## 2022-05-05 RX ORDER — CYCLOBENZAPRINE HCL 10 MG
TABLET ORAL
Qty: 30 TABLET | Refills: 0 | Status: SHIPPED | OUTPATIENT
Start: 2022-05-05 | End: 2022-06-02

## 2022-05-05 RX ORDER — ZOLPIDEM TARTRATE 5 MG/1
TABLET ORAL
Qty: 30 TABLET | Refills: 0 | Status: SHIPPED | OUTPATIENT
Start: 2022-05-05 | End: 2022-06-02

## 2022-05-05 NOTE — PROGRESS NOTES
Mukesh routed to Dr Santiago to review, OV 4/7/22 cancelled by patient due to lack of insurance.     Ziopatch showed SR/SB HR 52-90bpm, avg 64bpm. <1% PVC/PACs.       Last OV 1/6/22 by Dr Santiago:   1.  Successful preoperative cardiac clearance.  She has no concerning cardiac symptoms and has preserved biventricular systolic function and no significant valve disease.  2.  She had significant nausea and states she has been vomiting the last couple of days and this has been the symptom that is prompting her hysteroscopy next week.  I advised her to follow up with her primary provider.  3.  Her palpitations sound like they may be related to anxiety.  She has also high risk of atrial fibrillation given her comorbidities and high clinical likelihood of sleep apnea.  Therefore:  -- Start atenolol 25 mg daily.  This will help both her palpitations and hypertension control.  -- After her surgery, I have referred her to Sleep Clinic.  -- In 2 months, she will have a 3-day Zio Patch heart monitor and follow up with me.  4.  Hopefully, when she is feeling better after the surgery, she can take up a regular exercise program.

## 2022-05-05 NOTE — LETTER
May 5, 2022       TO: Vanessa Mota   [address] [place] [address]       Dear Ms. Mota,    We have included the results of your heart monitor in the document attached. Dr Santiago reviewed the report and said it was normal. You were in a normal sinus rhythm with occasional early beats, which are a benign finding.     Should you get your insurance back and wish to follow up with cardiology, please call scheduling at [phone].    Sincerely,  Team 2 Nurses  [phone]  [facility]

## 2022-05-15 DIAGNOSIS — Z79.4 TYPE 2 DIABETES MELLITUS WITHOUT COMPLICATION, WITH LONG-TERM CURRENT USE OF INSULIN (H): ICD-10-CM

## 2022-05-15 DIAGNOSIS — E11.9 TYPE 2 DIABETES MELLITUS WITHOUT COMPLICATION, WITH LONG-TERM CURRENT USE OF INSULIN (H): ICD-10-CM

## 2022-05-15 DIAGNOSIS — I10 HYPERTENSION GOAL BP (BLOOD PRESSURE) < 140/90: ICD-10-CM

## 2022-05-16 NOTE — TELEPHONE ENCOUNTER
Routing refill request to provider for review/approval because:  Grazyna given x1 and patient did not follow up, please advise  Labs out of range:  K, Na    Potassium   Date Value Ref Range Status   01/05/2022 3.0 (L) 3.4 - 5.3 mmol/L Final   10/30/2020 3.5 3.4 - 5.3 mmol/L Final     Sodium   Date Value Ref Range Status   01/05/2022 129 (L) 133 - 144 mmol/L Final   10/30/2020 139 133 - 144 mmol/L Final       Helen BERNARD RN

## 2022-05-17 RX ORDER — INSULIN GLARGINE 100 [IU]/ML
INJECTION, SOLUTION SUBCUTANEOUS
Qty: 60 ML | Refills: 0 | Status: SHIPPED | OUTPATIENT
Start: 2022-05-17 | End: 2022-08-18

## 2022-05-17 RX ORDER — CHLORTHALIDONE 25 MG/1
TABLET ORAL
Qty: 30 TABLET | Refills: 0 | Status: SHIPPED | OUTPATIENT
Start: 2022-05-17 | End: 2022-07-06

## 2022-05-23 DIAGNOSIS — G43.009 MIGRAINE WITHOUT AURA AND WITHOUT STATUS MIGRAINOSUS, NOT INTRACTABLE: ICD-10-CM

## 2022-05-23 RX ORDER — SUMATRIPTAN 100 MG/1
TABLET, FILM COATED ORAL
Qty: 10 TABLET | Refills: 0 | Status: SHIPPED | OUTPATIENT
Start: 2022-05-23 | End: 2022-11-11

## 2022-05-23 NOTE — TELEPHONE ENCOUNTER
Prescription approved per [facility] Refill Protocol.  Kelvin MORAN RN, BSN

## 2022-06-01 DIAGNOSIS — G47.00 INSOMNIA, UNSPECIFIED TYPE: ICD-10-CM

## 2022-06-01 DIAGNOSIS — M79.7 FIBROMYALGIA: ICD-10-CM

## 2022-06-02 RX ORDER — CYCLOBENZAPRINE HCL 10 MG
TABLET ORAL
Qty: 30 TABLET | Refills: 0 | Status: SHIPPED | OUTPATIENT
Start: 2022-06-02 | End: 2022-07-22

## 2022-06-02 RX ORDER — ZOLPIDEM TARTRATE 5 MG/1
5 TABLET ORAL
Qty: 30 TABLET | Refills: 3 | Status: SHIPPED | OUTPATIENT
Start: 2022-06-02 | End: 2022-09-20

## 2022-06-07 DIAGNOSIS — I10 UNCONTROLLED HYPERTENSION: ICD-10-CM

## 2022-06-07 DIAGNOSIS — R00.2 PALPITATIONS: ICD-10-CM

## 2022-06-08 RX ORDER — ATENOLOL 25 MG/1
25 TABLET ORAL EVERY MORNING
Qty: 30 TABLET | Refills: 2 | Status: SHIPPED | OUTPATIENT
Start: 2022-06-08 | End: 2022-08-18

## 2022-06-08 NOTE — TELEPHONE ENCOUNTER
Prescription approved per [facility] Refill Protocol.    Helen BERNARD RN

## 2022-07-03 DIAGNOSIS — I10 HYPERTENSION GOAL BP (BLOOD PRESSURE) < 140/90: ICD-10-CM

## 2022-07-06 RX ORDER — CHLORTHALIDONE 25 MG/1
TABLET ORAL
Qty: 30 TABLET | Refills: 0 | Status: SHIPPED | OUTPATIENT
Start: 2022-07-06 | End: 2022-07-22

## 2022-07-06 NOTE — TELEPHONE ENCOUNTER
Routing refill request to provider for review/approval because:  Labs not current:    Diuretics (Including Combos) Protocol Failed 07/03/2022 01:24 PM   Protocol Details  Normal serum potassium on file in past 12 months    Normal serum sodium on file in past 12 months

## 2022-07-22 DIAGNOSIS — I10 HYPERTENSION GOAL BP (BLOOD PRESSURE) < 140/90: ICD-10-CM

## 2022-07-22 DIAGNOSIS — M79.7 FIBROMYALGIA: ICD-10-CM

## 2022-07-22 RX ORDER — CYCLOBENZAPRINE HCL 10 MG
TABLET ORAL
Qty: 30 TABLET | Refills: 0 | Status: SHIPPED | OUTPATIENT
Start: 2022-07-22 | End: 2022-08-29

## 2022-07-22 RX ORDER — CHLORTHALIDONE 25 MG/1
TABLET ORAL
Qty: 30 TABLET | Refills: 0 | Status: SHIPPED | OUTPATIENT
Start: 2022-07-22 | End: 2022-08-18

## 2022-07-22 NOTE — TELEPHONE ENCOUNTER
Routing refill request to provider for review/approval because:  Drug not on the [facility] refill protocol   Labs out of range    Potassium   Date Value Ref Range Status   01/05/2022 3.0 (L) 3.4 - 5.3 mmol/L Final   10/30/2020 3.5 3.4 - 5.3 mmol/L Final     Sodium   Date Value Ref Range Status   01/05/2022 129 (L) 133 - 144 mmol/L Final   10/30/2020 139 133 - 144 mmol/L Final     Kirsty Garcia RN/Fatuma Lewis RN

## 2022-08-04 DIAGNOSIS — E11.9 TYPE 2 DIABETES MELLITUS WITHOUT COMPLICATION, WITH LONG-TERM CURRENT USE OF INSULIN (H): ICD-10-CM

## 2022-08-04 DIAGNOSIS — Z79.4 TYPE 2 DIABETES MELLITUS WITHOUT COMPLICATION, WITH LONG-TERM CURRENT USE OF INSULIN (H): ICD-10-CM

## 2022-08-04 NOTE — TELEPHONE ENCOUNTER
"[employer] message sent to patient to complete questionnaire. Will contact once submitted back     Penelope Nunez/

## 2022-08-17 NOTE — PROGRESS NOTES
"Pre-Visit Planning   Next 5 appointments (look out 90 days)    Aug 18, 2022  3:00 PM  (Arrive by 2:40 PM)  Provider Visit with Ramona Ann Aaseby-Aguilera, PA-C  [facility] ([facility] ) [address] [facility] [address] [phone]        Appointment Notes for this encounter:   DM follow up     Questionnaires Reviewed/Assigned  No additional questionnaires are needed    Patient preferred phone number: [phone]      Spoke to patient via phone. Patient does not have additional questions or concerns.        Visit is not preventive.    Patient is established.    Chief complaint confirmed.    Health Maintenance Due   Topic Date Due     Pneumococcal Vaccine: Pediatrics (0 to 5 Years) and At-Risk Patients (6 to 64 Years) (2 - PCV) 08/07/2009     ZOSTER IMMUNIZATION (1 of 2) Never done     EYE EXAM  02/01/2017     PHQ-9  04/29/2022     COVID-19 Vaccine (4 - Booster for Pfizer series) 05/05/2022     A1C  07/05/2022     PAP FOLLOW-UP  09/24/2022     HPV FOLLOW-UP  09/24/2022     LUNG CANCER SCREENING  10/08/2022     Patient is due for:  none  No appointment needed.    [employer]  Patient is active on [employer].    Questionnaire Review   Offered information on completing questionnaires via [employer].    Call Summary  \"Thank you for your time today.  If anything comes up before your appointment, please feel free to contact us at [phone].\"    "

## 2022-08-18 ENCOUNTER — OFFICE VISIT (OUTPATIENT)
Dept: FAMILY MEDICINE | Facility: CLINIC | Age: 62
End: 2022-08-18
Payer: COMMERCIAL

## 2022-08-18 VITALS
SYSTOLIC BLOOD PRESSURE: 144 MMHG | WEIGHT: 171.8 LBS | HEIGHT: 60 IN | BODY MASS INDEX: 33.73 KG/M2 | RESPIRATION RATE: 18 BRPM | HEART RATE: 68 BPM | TEMPERATURE: 98.2 F | OXYGEN SATURATION: 98 % | DIASTOLIC BLOOD PRESSURE: 68 MMHG

## 2022-08-18 DIAGNOSIS — R12 HEART BURN: ICD-10-CM

## 2022-08-18 DIAGNOSIS — R00.2 PALPITATIONS: ICD-10-CM

## 2022-08-18 DIAGNOSIS — Z79.4 TYPE 2 DIABETES MELLITUS WITHOUT COMPLICATION, WITH LONG-TERM CURRENT USE OF INSULIN (H): Primary | ICD-10-CM

## 2022-08-18 DIAGNOSIS — F32.0 MILD MAJOR DEPRESSION (H): ICD-10-CM

## 2022-08-18 DIAGNOSIS — I10 HYPERTENSION GOAL BP (BLOOD PRESSURE) < 140/90: ICD-10-CM

## 2022-08-18 DIAGNOSIS — E78.5 HYPERLIPIDEMIA LDL GOAL <100: ICD-10-CM

## 2022-08-18 DIAGNOSIS — E11.9 TYPE 2 DIABETES MELLITUS WITHOUT COMPLICATION, WITH LONG-TERM CURRENT USE OF INSULIN (H): Primary | ICD-10-CM

## 2022-08-18 DIAGNOSIS — C43.62: ICD-10-CM

## 2022-08-18 DIAGNOSIS — I10 UNCONTROLLED HYPERTENSION: ICD-10-CM

## 2022-08-18 DIAGNOSIS — M79.7 FIBROMYALGIA: ICD-10-CM

## 2022-08-18 LAB — HBA1C MFR BLD: 7.2 % (ref 0–5.6)

## 2022-08-18 PROCEDURE — 91305 COVID-19,PF,PFIZER (12+ YRS): CPT | Performed by: PHYSICIAN ASSISTANT

## 2022-08-18 PROCEDURE — 80061 LIPID PANEL: CPT | Performed by: PHYSICIAN ASSISTANT

## 2022-08-18 PROCEDURE — 36415 COLL VENOUS BLD VENIPUNCTURE: CPT | Performed by: PHYSICIAN ASSISTANT

## 2022-08-18 PROCEDURE — 0054A COVID-19,PF,PFIZER (12+ YRS): CPT | Performed by: PHYSICIAN ASSISTANT

## 2022-08-18 PROCEDURE — 99214 OFFICE O/P EST MOD 30 MIN: CPT | Mod: 25 | Performed by: PHYSICIAN ASSISTANT

## 2022-08-18 PROCEDURE — 83036 HEMOGLOBIN GLYCOSYLATED A1C: CPT | Performed by: PHYSICIAN ASSISTANT

## 2022-08-18 PROCEDURE — 80053 COMPREHEN METABOLIC PANEL: CPT | Performed by: PHYSICIAN ASSISTANT

## 2022-08-18 RX ORDER — ATENOLOL 25 MG/1
25 TABLET ORAL EVERY MORNING
Qty: 30 TABLET | Refills: 2 | Status: SHIPPED | OUTPATIENT
Start: 2022-08-18 | End: 2022-12-01

## 2022-08-18 RX ORDER — AMLODIPINE BESYLATE 10 MG/1
10 TABLET ORAL AT BEDTIME
Qty: 90 TABLET | Refills: 0 | Status: SHIPPED | OUTPATIENT
Start: 2022-08-18 | End: 2023-01-09

## 2022-08-18 RX ORDER — ATORVASTATIN CALCIUM 20 MG/1
20 TABLET, FILM COATED ORAL DAILY
Qty: 90 TABLET | Refills: 3 | Status: SHIPPED | OUTPATIENT
Start: 2022-08-18 | End: 2023-04-28

## 2022-08-18 RX ORDER — INSULIN GLARGINE 100 [IU]/ML
INJECTION, SOLUTION SUBCUTANEOUS
Qty: 60 ML | Refills: 1 | Status: SHIPPED | OUTPATIENT
Start: 2022-08-18 | End: 2023-01-31

## 2022-08-18 RX ORDER — SPIRONOLACTONE 25 MG/1
25 TABLET ORAL DAILY
Qty: 30 TABLET | Refills: 1 | Status: SHIPPED | OUTPATIENT
Start: 2022-08-18 | End: 2022-10-10

## 2022-08-18 RX ORDER — CHLORTHALIDONE 25 MG/1
25 TABLET ORAL DAILY
Qty: 90 TABLET | Refills: 1 | Status: SHIPPED | OUTPATIENT
Start: 2022-08-18 | End: 2023-01-31

## 2022-08-18 RX ORDER — GLIPIZIDE 10 MG/1
10 TABLET, FILM COATED, EXTENDED RELEASE ORAL EVERY 12 HOURS
Qty: 180 TABLET | Refills: 3 | Status: SHIPPED | OUTPATIENT
Start: 2022-08-18 | End: 2023-01-31

## 2022-08-18 RX ORDER — LOSARTAN POTASSIUM 100 MG/1
100 TABLET ORAL DAILY
Qty: 90 TABLET | Refills: 3 | Status: SHIPPED | OUTPATIENT
Start: 2022-08-18 | End: 2023-01-31

## 2022-08-18 RX ORDER — GABAPENTIN 100 MG/1
200 CAPSULE ORAL 3 TIMES DAILY
Qty: 180 CAPSULE | Refills: 11 | Status: SHIPPED | OUTPATIENT
Start: 2022-08-18 | End: 2023-01-31

## 2022-08-18 NOTE — PROGRESS NOTES
Assessment & Plan     Type 2 diabetes mellitus without complication, with long-term current use of insulin (H)  Stable and doing well   - Lipid Profile (Chol, Trig, HDL, LDL calc)  - Comprehensive metabolic panel  - Hemoglobin A1c  - glipiZIDE (GLIPIZIDE XL) 10 MG 24 hr tablet; Take 1 tablet (10 mg) by mouth every 12 hours  - insulin glargine (BASAGLAR KWIKPEN) 100 UNIT/ML pen; INJECT 78 UNITS SUBCUTANEOUSLY IN THE MORNING  - metFORMIN (GLUCOPHAGE) 500 MG tablet; Take 2 tablets (1,000 mg) by mouth 2 times daily (with meals)  - Med Therapy Management Referral    Hypertension goal BP (blood pressure) < 140/90  Elevated today   - amLODIPine (NORVASC) 10 MG tablet; Take 1 tablet (10 mg) by mouth At Bedtime  - chlorthalidone (HYGROTON) 25 MG tablet; Take 1 tablet (25 mg) by mouth daily  - losartan (COZAAR) 100 MG tablet; Take 1 tablet (100 mg) by mouth daily    Uncontrolled hypertension    - spironolactone (ALDACTONE) 25 MG tablet; Take 1 tablet (25 mg) by mouth daily  - atenolol (TENORMIN) 25 MG tablet; Take 1 tablet (25 mg) by mouth every morning  - Med Therapy Management Referral    Palpitations    - atenolol (TENORMIN) 25 MG tablet; Take 1 tablet (25 mg) by mouth every morning    Hyperlipidemia LDL goal <100    - atorvastatin (LIPITOR) 20 MG tablet; Take 1 tablet (20 mg) by mouth daily    Fibromyalgia    - gabapentin (NEURONTIN) 100 MG capsule; Take 2 capsules (200 mg) by mouth 3 times daily    Malignant melanoma of left wrist (H)  resolved    Mild major depression (H)  Stable     Heart burn  Stable     Patient Instructions   Elevated bp: will add spironolactone.   Will have come in for nurse only BP check x 2 and then follow-up for office visit in 6 weeks  with BP diary.        0956}     BMI:   Estimated body mass index is 33.55 kg/m  as calculated from the following:    Height as of this encounter: 1.524 m (5').    Weight as of this encounter: 77.9 kg (171 lb 12.8 oz).         Return in about 6 weeks (around  9/29/2022) for med check, BP Recheck.    Ramona Ann Aaseby-Aguilera, PA-C [facility] SHAMEKA Mendez is a 62 year old, presenting for the following health issues:  Diabetes      History of Present Illness       Diabetes:   She presents for follow up of diabetes.  She is checking home blood glucose one time daily. She checks blood glucose before meals.  Blood glucose is never over 200 and never under 70. She is aware of hypoglycemia symptoms including shakiness and weakness. She has no concerns regarding her diabetes at this time.  She is not experiencing numbness or burning in feet, excessive thirst, blurry vision, weight changes or redness, sores or blisters on feet. The patient has not had a diabetic eye exam in the last 12 months.         Hypertension: She presents for follow up of hypertension.  She does not check blood pressure  regularly outside of the clinic. Outpatient blood pressures have not been over 140/90. She follows a low salt diet.      Today's PHQ-9         PHQ-9 Total Score: 8    PHQ-9 Q9 Thoughts of better off dead/self-harm past 2 weeks :   Not at all    How difficult have these problems made it for you to do your work, take care of things at home, or get along with other people: Somewhat difficult                     Review of Systems         Objective    BP (!) 144/68 (BP Location: Right arm, Patient Position: Sitting, Cuff Size: Adult Regular)   Pulse 68   Temp 98.2  F (36.8  C) (Oral)   Resp 18   Ht 1.524 m (5')   Wt 77.9 kg (171 lb 12.8 oz)   SpO2 98%   BMI 33.55 kg/m    Body mass index is 33.55 kg/m .  Physical Exam     GENERAL APPEARANCE: healthy, alert and no distress,  EYES: Eyes grossly normal to inspection,  PERRL  HENT: ear canals and TM's normal and nose and mouth without ulcers or lesions  NECK: no adenopathy, no asymmetry, masses, or scars and thyroid normal to palpation  RESP: lungs clear to auscultation - no rales, rhonchi or wheezes  CV:  regular rate and rhythm, and no murmur, click, rub , or gallop  ABDOMEN: soft, nontender, without hepatosplenomegaly or masses   MS: extremities normal- no gross deformities noted  SKIN: no suspicious lesions or rashes  NEURO: Normal strength and tone, sensory exam grossly normal, mentation appears intact and speech normal  PSYCH: mood and affect normal/bright                  .  ..

## 2022-08-18 NOTE — PATIENT INSTRUCTIONS
Elevated bp: will add spironolactone.   Will have come in for nurse only BP check x 2 and then follow-up for office visit in 6 weeks  with BP diary.

## 2022-08-19 LAB
ALBUMIN SERPL-MCNC: 4.5 G/DL (ref 3.4–5)
ALP SERPL-CCNC: 103 U/L (ref 40–150)
ALT SERPL W P-5'-P-CCNC: 47 U/L (ref 0–50)
ANION GAP SERPL CALCULATED.3IONS-SCNC: 10 MMOL/L (ref 3–14)
AST SERPL W P-5'-P-CCNC: 33 U/L (ref 0–45)
BILIRUB SERPL-MCNC: 0.5 MG/DL (ref 0.2–1.3)
BUN SERPL-MCNC: 11 MG/DL (ref 7–30)
CALCIUM SERPL-MCNC: 10.5 MG/DL (ref 8.5–10.1)
CHLORIDE BLD-SCNC: 95 MMOL/L (ref 94–109)
CHOLEST SERPL-MCNC: 143 MG/DL
CO2 SERPL-SCNC: 27 MMOL/L (ref 20–32)
CREAT SERPL-MCNC: 1.1 MG/DL (ref 0.52–1.04)
FASTING STATUS PATIENT QL REPORTED: NO
GFR SERPL CREATININE-BSD FRML MDRD: 57 ML/MIN/1.73M2
GLUCOSE BLD-MCNC: 148 MG/DL (ref 70–99)
HDLC SERPL-MCNC: 50 MG/DL
LDLC SERPL CALC-MCNC: 62 MG/DL
NONHDLC SERPL-MCNC: 93 MG/DL
POTASSIUM BLD-SCNC: 3.2 MMOL/L (ref 3.4–5.3)
PROT SERPL-MCNC: 8 G/DL (ref 6.8–8.8)
SODIUM SERPL-SCNC: 132 MMOL/L (ref 133–144)
TRIGL SERPL-MCNC: 157 MG/DL

## 2022-08-23 NOTE — PROGRESS NOTES
Medication Therapy Management (MTM) Encounter    ASSESSMENT:                            Medication Adherence/Access: No issues identified    Type 2 Diabetes:  Patient is not at goal A1c of <7% per the 2022 ADA Guidelines. Can benefit from starting Ozempic for glycemic control, appetite suppression, and weight loss. Recommend decreasing Levemir to 40 units daily if starting on Ozempic to avoid hypoglycemia. She would also benefit from discontinuing aspirin d/t low ASCVD risk and preference for decreasing # of medications.    Hypertension: Patient is not at goal BP of <140/90 mmHg per the 2017 ACC/AHA Guidelines. Unable to assess efficacy of spironolactone due to lack of recent BP readings. Recommend continuing current therapy for now and checking BP at next visit. Consider decrease amlodipine dose if edema is persistent and if BMP stable since addition of spironolactone, could consider increasing dose if needed. Weight loss recommended to assist with blood pressure control.     Hyperlipidemia: Stable and at goal LDL of <100 mg/dL per the 2018 AHA/ACC Guidelines.     Insomnia: Patient can benefit from pursuing CBT-I. Recommend continuing current therapy for now and continuing to work with psychiatry on decreasing zolpidem use, educated on risks and side effects.     Migraines: Stable.     Heartburn: Stable.    Pain/Muscle Spasms: Stable    Depression/Anxiety: Followed by psychiatry. Would recommend taper off hydroxyzine if ineffective especially considering risk of anticholinergic side effects and she will discuss with psychiatry.    Immunizations: Due for PCV20, Shingrix, and annual flu shot. Did not have enough time to discuss today; check if patient would like to receive above vaccines during her next visit.    PLAN:                            1. Start Ozempic 0.25 mg weekly for four weeks, then increase to 0.5 mg weekly.  2. Once you start on Ozempic, decrease Levemir to 40 units daily. If you start to see more  low blood sugar readings, call me to discuss decreasing your insulin dose even further.   3. Stop aspirin.     Follow-up: Return in about 15 days (around 9/15/2022) for Medication Therapy Management Pharmacist, Lab Work.    SUBJECTIVE/OBJECTIVE:                          Vanessa Mota is a 62 year old female called for an initial visit. She was referred to me from Ramona Aaseby-Aguilera.      Reason for visit: Referred for blood pressure follow-up    Allergies/ADRs: Reviewed in chart  Past Medical History: Reviewed in chart  Tobacco: She reports that she quit smoking about 9 years ago. She quit after 3.00 years of use. She uses smokeless tobacco.  Alcohol: Less than 1 beverage / month  Caffeine: 3-4 cans of Coke per day    Medication Adherence/Access: Patient uses pill box(es).  Patient takes medications 2 time(s) per day.       Type 2 Diabetes:  Currently taking Basaglar 46-48 units daily, glipizide XL 10 mg twice daily, and metformin 1000 mg twice daily. Patient is not experiencing side effects.  Blood sugar monitoring: once time(s) daily. Readings(patient reported): 175 mg/dL in the past couple of days in the morning before breakfast.  Reports feeling shaky a few times after taking a nap. Happens around 1-2x per month and her blood sugar was around 65-68 mg/dL the last few times she checked.  Symptoms of low blood sugar? Shaky 1-2x monthly  Symptoms of high blood sugar? none  Eye exam: due  Foot exam: due  Diet/Exercise: Drinks 3-4 cans of coke, doesn't like diet soda. Also likes gatorade and Sprite. Had a candy bar last night so caused higher blood glucose. Likes her sugar.   Aspirin: Taking 81mg daily  Statin: Yes: atorvastatin   ACEi/ARB: Yes: losartan.   Urine Albumin:    Lab Results   Component Value Date    UMALCR 43.24 (H) 09/24/2021      Lab Results   Component Value Date    A1C 7.2 08/18/2022    A1C 7.8 01/05/2022    A1C 6.9 09/24/2021    A1C 7.1 10/30/2020    A1C 7.0 02/28/2020    A1C 7.5 05/28/2019    A1C  "6.9 09/06/2018    A1C 7.0 01/09/2018       Hypertension: Current medications include amlodipine 10 mg at bedtime (causing edema), atenolol 25 mg daily, losartan 100 mg daily, spironolactone 25 mg daily, and chlorthalidone 25 mg daily.  Patient does not self-monitor blood pressure.  Patient reports the following medication side effects: swelling in ankles and nausea. Patient continues to experience edema but describes it as \"a little better than it used to.\" Tries to keep her legs elevated as much as possible and tries to go on walks. Started experiencing nausea when she first started taking spironolactone until now; uses Mylanta twice daily to help with nausea and finds it effective.   BP Readings from Last 3 Encounters:   08/18/22 (!) 144/68   02/01/22 129/80   01/06/22 (!) 142/48       Hyperlipidemia: Current therapy includes atorvastatin 20mg daily.  Patient reports no significant myalgias or other side effects.  The 10-year ASCVD risk score (Ruddylien SIEGEL Jr., et al., 2013) is: 9.7%    Values used to calculate the score:      Age: 62 years      Sex: Female      Is Non- : No      Diabetic: Yes      Tobacco smoker: No      Systolic Blood Pressure: 144 mmHg      Is BP treated: Yes      HDL Cholesterol: 50 mg/dL      Total Cholesterol: 143 mg/dL  Recent Labs   Lab Test 08/18/22  1624 10/29/21  1644 06/13/16  1141 02/10/15  1047 08/26/14  1224   CHOL 143 141   < > 150 145   HDL 50 62   < > 57 49*   LDL 62 52   < > 71 69   TRIG 157* 134   < > 110 136   CHOLHDLRATIO  --   --   --  2.6 3.0    < > = values in this interval not displayed.       Insomnia: Current medications include: zolpidem 5 mg bedtime as needed.   Reports using zolpidem every night and finds it helpful for staying asleep. Patient goes to bed around 1:30-2 am and wakes up at around 6-7 am. Has trouble going back to sleep once she's already awake in the morning and usually takes a nap for an hour in the afternoon.      Migraines: " "Patient taking sumatriptan 100 mg as needed  Reports using around one to two times per month; finds medication effective when she needs it. Used to have migraines daily and reports that her symptoms have improved significantly.     Heartburn: Current medications include: Prilosec (omeprazole) 40 mg once daily. Patient reports no current symptoms.  Patient feels that current regimen is effective. Patient sometimes forgets to take omeprazole because she tries to separate gabapentin and omeprazole to prevent them from interacting.     Pain/Muscle Spasms: Patient taking gabapentin 200 mg three times daily, cyclobenzaprine 10 mg as needed (4-5x a month), and acetaminophen 650 mg as needed (1-2 x a month)  Patient is not able to tell if her gabapentin is working well; continues to take it daily and denies adverse effects from the medication. Finds both cyclobenzaprine and acetaminophen effective at managing her symptoms if she needs to use them.        Depression/Anxiety: Patient taking fluoxetine 40 mg daily, alprazolam 0.25 mg as needed (uses around twice a week), hydroxyzine 100 mg at bedtime, and buspirone 45 mg twice daily.  Reports buspirone works \"really good\" and finds that the higher dose is more effective. Still finds fluoxetine effective for managing her depression symptoms. Unsure as to how helpful hydroxyzine is for her; doesn't skip a dose so she cannot tell how effective it is for her. Has not been seeing a therapist.    Psychiatry: Esther Teresa CNP  PHQ 9/24/2021 10/29/2021 8/18/2022   PHQ-9 Total Score 8 9 8   Q9: Thoughts of better off dead/self-harm past 2 weeks Not at all Not at all Not at all     NATALIIA-7 SCORE 6/13/2016 9/6/2018 12/30/2020   Total Score - - -   Total Score 7 8 3       Immunizations: Patient is eligible for PCV20, Shingles, and annual flu shot. Did not have enough time to address this with the patient during this visit.   Most Recent Immunizations   Administered Date(s) Administered "     COVID-19,PF,Pfizer (12+ Yrs) 01/05/2022     COVID-19,PF,Pfizer 12+ Yrs (2022 and After) 08/18/2022     Influenza (H1N1) 03/24/2010     Influenza (IIV3) PF 09/21/2012     Influenza Quad, Recombinant, pf(RIV4) (Flublok) 09/24/2021     Influenza Vaccine IM > 6 months Valent IIV4 (Alfuria,Fluzone) 10/04/2018     Pneumococcal 23 valent 08/07/2008     TD (ADULT, 7+) 03/28/2003     TDAP Vaccine (Boostrix) 05/03/2013       ----------------      I spent 60 minutes with this patient today. All changes were made via collaborative practice agreement with Ramona Ann Aaseby-Aguilera, PA-C. A copy of the visit note was provided to the patient's provider(s).    The patient was sent via [employer] a summary of these recommendations.     Raman Murphy  PD4 Pharmacy Student    Elizabeth Cardona, PharmD, BCACP  Medication Therapy Management Provider, [facility]  Pager: [phone]    Telemedicine Visit Details  Type of service:  Telephone visit  Start Time: 1:30 PM  End Time: 2:28 PM  Originating Location (patient location): Home  Distant Location (provider location):  [facility]     Medication Therapy Recommendations  Hypertension goal BP (blood pressure) < 140/90    Current Medication: spironolactone (ALDACTONE) 25 MG tablet   Rationale: Medication requires monitoring - Needs additional monitoring - Safety   Recommendation: Order Lab - BMP   Status: Accepted per CPA         Type 2 diabetes mellitus without complication, with long-term current use of insulin (H)    Current Medication: ASPIRIN 81 MG OR TABS (Discontinued)   Rationale: Nonmedication therapy more appropriate - Unnecessary medication therapy - Indication   Recommendation: Discontinue Medication   Status: Accepted per CPA          Rationale: Synergistic therapy - Needs additional medication therapy - Indication   Recommendation: Start Medication - Ozempic (0.25 or 0.5 MG/DOSE) 2 MG/1.5ML Sopn   Status: Accepted per CPA

## 2022-08-29 DIAGNOSIS — M79.7 FIBROMYALGIA: ICD-10-CM

## 2022-08-29 RX ORDER — CYCLOBENZAPRINE HCL 10 MG
TABLET ORAL
Qty: 30 TABLET | Refills: 0 | Status: SHIPPED | OUTPATIENT
Start: 2022-08-29 | End: 2022-09-20

## 2022-08-31 ENCOUNTER — TELEPHONE (OUTPATIENT)
Dept: FAMILY MEDICINE | Facility: CLINIC | Age: 62
End: 2022-08-31

## 2022-08-31 ENCOUNTER — VIRTUAL VISIT (OUTPATIENT)
Dept: PHARMACY | Facility: CLINIC | Age: 62
End: 2022-08-31
Attending: PHYSICIAN ASSISTANT
Payer: COMMERCIAL

## 2022-08-31 DIAGNOSIS — G47.00 INSOMNIA, UNSPECIFIED TYPE: ICD-10-CM

## 2022-08-31 DIAGNOSIS — Z23 ENCOUNTER FOR IMMUNIZATION: ICD-10-CM

## 2022-08-31 DIAGNOSIS — G43.909 MIGRAINE WITHOUT STATUS MIGRAINOSUS, NOT INTRACTABLE, UNSPECIFIED MIGRAINE TYPE: ICD-10-CM

## 2022-08-31 DIAGNOSIS — Z79.4 TYPE 2 DIABETES MELLITUS WITHOUT COMPLICATION, WITH LONG-TERM CURRENT USE OF INSULIN (H): Primary | ICD-10-CM

## 2022-08-31 DIAGNOSIS — E11.9 TYPE 2 DIABETES MELLITUS WITHOUT COMPLICATION, WITH LONG-TERM CURRENT USE OF INSULIN (H): Primary | ICD-10-CM

## 2022-08-31 DIAGNOSIS — M79.7 FIBROMYALGIA: ICD-10-CM

## 2022-08-31 DIAGNOSIS — R12 HEART BURN: ICD-10-CM

## 2022-08-31 DIAGNOSIS — F32.0 MILD MAJOR DEPRESSION (H): ICD-10-CM

## 2022-08-31 DIAGNOSIS — E78.5 HYPERLIPIDEMIA LDL GOAL <100: ICD-10-CM

## 2022-08-31 DIAGNOSIS — I10 HYPERTENSION GOAL BP (BLOOD PRESSURE) < 140/90: ICD-10-CM

## 2022-08-31 DIAGNOSIS — F41.9 ANXIETY: ICD-10-CM

## 2022-08-31 PROCEDURE — 99605 MTMS BY PHARM NP 15 MIN: CPT | Performed by: PHARMACIST

## 2022-08-31 PROCEDURE — 99607 MTMS BY PHARM ADDL 15 MIN: CPT | Performed by: PHARMACIST

## 2022-08-31 RX ORDER — SEMAGLUTIDE 1.34 MG/ML
INJECTION, SOLUTION SUBCUTANEOUS
Qty: 1.5 ML | Refills: 1 | Status: SHIPPED | OUTPATIENT
Start: 2022-08-31 | End: 2022-10-19

## 2022-08-31 RX ORDER — FLUOXETINE 40 MG/1
40 CAPSULE ORAL DAILY
COMMUNITY
Start: 2022-08-29

## 2022-08-31 NOTE — PATIENT INSTRUCTIONS
"Recommendations from today's MTM visit:                                                    MTM (medication therapy management) is a service provided by a clinical pharmacist designed to help you get the most of out of your medicines.   Today we reviewed what your medicines are for, how to know if they are working, that your medicines are safe and how to make your medicine regimen as easy as possible.      Start Ozempic 0.25 mg weekly for four weeks, then increase to 0.5 mg weekly.  Once you start on Ozempic, decrease Levemir to 40 units daily. If you start to see more low blood sugar readings, call me to discuss decreasing your insulin dose even further.     It was great speaking with you today.  I value your experience and would be very thankful for your time in providing feedback in our clinic survey. In the next few days, you may receive an email or text message from [employer] with a link to a survey related to your  clinical pharmacist.\"     To schedule another MTM appointment, please call the clinic directly or you may call the MTM scheduling line at [phone] or toll-free at [phone].     My Clinical Pharmacist's contact information:                                                      Please feel free to contact me with any questions or concerns you have.      Elizabeth Cardona, PharmD, BCACP  Medication Therapy Management Provider, [facility]  "

## 2022-08-31 NOTE — TELEPHONE ENCOUNTER
TANNER PA REQUEST    Prior Authorization Retail Medication Request    Medication/Dose: Ozempic  ICD code (if different than what is on RX):    Previously Tried and Failed:  Basaglar, glipizide, metformin  Rationale:  She is currently on insulin, glipizide and metformin max doses and would benefit from addition of GLP-1 agonist.     Insurance Name:  [facility] [employer]  Insurance ID:  [id number]       Pharmacy Information (if different than what is on RX)  Name:    Phone:

## 2022-09-01 NOTE — TELEPHONE ENCOUNTER
Central Prior Authorization Team   Phone: [phone]      PA Initiation    Medication: Ozempic  Insurance Company: [employer] - Phone [phone] Fax [phone]  Pharmacy Filling the Rx: [facility] [address] [place] [address] [id number]  Filling Pharmacy Phone: [phone]  Filling Pharmacy Fax:    Start Date: 9/1/2022    Waiting for questions to generate.

## 2022-09-01 NOTE — TELEPHONE ENCOUNTER
Prior Authorization Approval    Authorization Effective Date: 8/2/2022  Authorization Expiration Date: 8/31/2025  Medication: Ozempic-APPROVED  Approved Dose/Quantity:   Reference #:     Insurance Company: [employer] - Phone [phone] Fax [phone]  Expected CoPay:       CoPay Card Available:      Foundation Assistance Needed:    Which Pharmacy is filling the prescription (Not needed for infusion/clinic administered): [facility] [address] [id number] [facility]  Pharmacy Notified: Yes  Patient Notified: No    **Instructed pharmacy to notify patient when script is ready to /ship.**

## 2022-09-08 ENCOUNTER — TELEPHONE (OUTPATIENT)
Dept: PHARMACY | Facility: CLINIC | Age: 62
End: 2022-09-08

## 2022-09-08 NOTE — TELEPHONE ENCOUNTER
Elizabeth,    Patient called in regards to having severe side effects after starting her Ozempic Saturday, she stated she had vomitting and was not feeling well. She was hoping if you could reach out to her on whether she should continue taking the medication. Patient did state she feels a bit better today.    Saad Mayfield [facility]

## 2022-09-08 NOTE — TELEPHONE ENCOUNTER
Called patient and LM about mychart message with instructions.    Elizabeth Cardona, PharmD, BCACP  Medication Therapy Management Provider, [facility]  Pager: [phone]

## 2022-09-12 ENCOUNTER — TELEPHONE (OUTPATIENT)
Dept: PHARMACY | Facility: CLINIC | Age: 62
End: 2022-09-12

## 2022-09-12 NOTE — TELEPHONE ENCOUNTER
Elizabeth,    Patient called and left message that she wanted to inform you she has stopped taking the Ozempic as it made her too nausea and sick, she hoped you understand. She wonder if there was potentially another medication she can use instead.    Thanks,    Saad Mayfield [facility]

## 2022-09-12 NOTE — TELEPHONE ENCOUNTER
Sent [employer] message.    Elizabeth Cardona, PharmD, BCACP  Medication Therapy Management Provider, [facility]  Pager: [phone]

## 2022-09-18 DIAGNOSIS — G47.00 INSOMNIA, UNSPECIFIED TYPE: ICD-10-CM

## 2022-09-18 DIAGNOSIS — M79.7 FIBROMYALGIA: ICD-10-CM

## 2022-09-20 RX ORDER — ZOLPIDEM TARTRATE 5 MG/1
TABLET ORAL
Qty: 30 TABLET | Refills: 0 | Status: SHIPPED | OUTPATIENT
Start: 2022-09-20 | End: 2022-10-25

## 2022-09-20 RX ORDER — CYCLOBENZAPRINE HCL 10 MG
TABLET ORAL
Qty: 30 TABLET | Refills: 0 | Status: SHIPPED | OUTPATIENT
Start: 2022-09-20 | End: 2022-10-18

## 2022-10-10 DIAGNOSIS — I10 UNCONTROLLED HYPERTENSION: ICD-10-CM

## 2022-10-10 RX ORDER — SPIRONOLACTONE 25 MG/1
TABLET ORAL
Qty: 30 TABLET | Refills: 0 | Status: SHIPPED | OUTPATIENT
Start: 2022-10-10 | End: 2022-10-19

## 2022-10-10 NOTE — TELEPHONE ENCOUNTER
Routing refill request to provider for review/approval because:  BP Readings from Last 3 Encounters:   08/18/22 (!) 144/68   02/01/22 129/80   01/06/22 (!) 142/48      Creatinine   Date Value Ref Range Status   08/18/2022 1.10 (H) 0.52 - 1.04 mg/dL Final   10/30/2020 0.77 0.52 - 1.04 mg/dL Final      Potassium   Date Value Ref Range Status   08/18/2022 3.2 (L) 3.4 - 5.3 mmol/L Final   10/30/2020 3.5 3.4 - 5.3 mmol/L Final        Fatuma Lewis RN

## 2022-10-10 NOTE — TELEPHONE ENCOUNTER
Routing refill request to provider for review/approval because:  Labs out of range:    Needs a follow up BP visit with PCP - team please call to schedule    BP Readings from Last 3 Encounters:   08/18/22 (!) 144/68   02/01/22 129/80   01/06/22 (!) 142/48     Creatinine   Date Value Ref Range Status   08/18/2022 1.10 (H) 0.52 - 1.04 mg/dL Final   10/30/2020 0.77 0.52 - 1.04 mg/dL Final     Potassium   Date Value Ref Range Status   08/18/2022 3.2 (L) 3.4 - 5.3 mmol/L Final   10/30/2020 3.5 3.4 - 5.3 mmol/L Final     Sodium   Date Value Ref Range Status   08/18/2022 132 (L) 133 - 144 mmol/L Final   10/30/2020 139 133 - 144 mmol/L Final   Kirsty OLMSTEAD RN

## 2022-10-13 NOTE — TELEPHONE ENCOUNTER
Spoke with patient, she states this medication is hard for her to take as it causes extreme nausea around 1:00 everyday. I spoke with RN who states to have patient continue doing what she has been and get her approval spot for next week. Patient is scheduled for 10/19/22, arrival 2:10.  Monika Campa,

## 2022-10-18 DIAGNOSIS — M79.7 FIBROMYALGIA: ICD-10-CM

## 2022-10-18 RX ORDER — CYCLOBENZAPRINE HCL 10 MG
TABLET ORAL
Qty: 30 TABLET | Refills: 0 | Status: SHIPPED | OUTPATIENT
Start: 2022-10-18 | End: 2022-11-28

## 2022-10-18 NOTE — PROGRESS NOTES
Pre-Visit Planning   Next 5 appointments (look out 90 days)    Oct 19, 2022  2:30 PM  (Arrive by 2:10 PM)  Provider Visit with Ramona Ann Aaseby-Aguilera, PA-C  [facility] [address] [facility] [address] [phone]        Appointment Notes for this encounter:   BP follow up, spironolactone is causing her extreme nausea, hard for her to continue to take. OK per RN    Questionnaires Reviewed/Assigned  No additional questionnaires are needed    Patient preferred phone number: [phone]    Unable to reach. Left voicemail. Advised patient to call clinic back at [phone].

## 2022-10-18 NOTE — TELEPHONE ENCOUNTER
Routing refill request to provider for review/approval because:  Drug not on the [facility] refill protocol     Fautma Lewis RN

## 2022-10-19 ENCOUNTER — OFFICE VISIT (OUTPATIENT)
Dept: FAMILY MEDICINE | Facility: CLINIC | Age: 62
End: 2022-10-19
Payer: COMMERCIAL

## 2022-10-19 VITALS
DIASTOLIC BLOOD PRESSURE: 60 MMHG | HEIGHT: 60 IN | RESPIRATION RATE: 14 BRPM | HEART RATE: 66 BPM | SYSTOLIC BLOOD PRESSURE: 118 MMHG | BODY MASS INDEX: 32.98 KG/M2 | TEMPERATURE: 99 F | WEIGHT: 168 LBS

## 2022-10-19 DIAGNOSIS — E11.9 TYPE 2 DIABETES MELLITUS WITHOUT COMPLICATION, WITH LONG-TERM CURRENT USE OF INSULIN (H): ICD-10-CM

## 2022-10-19 DIAGNOSIS — R11.0 NAUSEA: ICD-10-CM

## 2022-10-19 DIAGNOSIS — I10 ESSENTIAL HYPERTENSION: Primary | ICD-10-CM

## 2022-10-19 DIAGNOSIS — Z12.31 VISIT FOR SCREENING MAMMOGRAM: ICD-10-CM

## 2022-10-19 DIAGNOSIS — Z79.4 TYPE 2 DIABETES MELLITUS WITHOUT COMPLICATION, WITH LONG-TERM CURRENT USE OF INSULIN (H): ICD-10-CM

## 2022-10-19 PROCEDURE — 90471 IMMUNIZATION ADMIN: CPT | Performed by: PHYSICIAN ASSISTANT

## 2022-10-19 PROCEDURE — 90682 RIV4 VACC RECOMBINANT DNA IM: CPT | Performed by: PHYSICIAN ASSISTANT

## 2022-10-19 PROCEDURE — 99214 OFFICE O/P EST MOD 30 MIN: CPT | Mod: 25 | Performed by: PHYSICIAN ASSISTANT

## 2022-10-19 RX ORDER — ONDANSETRON 4 MG/1
4 TABLET, ORALLY DISINTEGRATING ORAL EVERY 8 HOURS PRN
Qty: 30 TABLET | Refills: 0 | Status: SHIPPED | OUTPATIENT
Start: 2022-10-19 | End: 2023-01-31

## 2022-10-19 RX ORDER — SPIRONOLACTONE 25 MG/1
25 TABLET ORAL DAILY
Qty: 90 TABLET | Refills: 3 | Status: SHIPPED | OUTPATIENT
Start: 2022-10-19 | End: 2023-07-19

## 2022-10-19 ASSESSMENT — ANXIETY QUESTIONNAIRES
GAD7 TOTAL SCORE: 7
7. FEELING AFRAID AS IF SOMETHING AWFUL MIGHT HAPPEN: SEVERAL DAYS
GAD7 TOTAL SCORE: 7
IF YOU CHECKED OFF ANY PROBLEMS ON THIS QUESTIONNAIRE, HOW DIFFICULT HAVE THESE PROBLEMS MADE IT FOR YOU TO DO YOUR WORK, TAKE CARE OF THINGS AT HOME, OR GET ALONG WITH OTHER PEOPLE: SOMEWHAT DIFFICULT
8. IF YOU CHECKED OFF ANY PROBLEMS, HOW DIFFICULT HAVE THESE MADE IT FOR YOU TO DO YOUR WORK, TAKE CARE OF THINGS AT HOME, OR GET ALONG WITH OTHER PEOPLE?: SOMEWHAT DIFFICULT
4. TROUBLE RELAXING: SEVERAL DAYS
3. WORRYING TOO MUCH ABOUT DIFFERENT THINGS: SEVERAL DAYS
7. FEELING AFRAID AS IF SOMETHING AWFUL MIGHT HAPPEN: SEVERAL DAYS
2. NOT BEING ABLE TO STOP OR CONTROL WORRYING: SEVERAL DAYS
GAD7 TOTAL SCORE: 7
5. BEING SO RESTLESS THAT IT IS HARD TO SIT STILL: NOT AT ALL
6. BECOMING EASILY ANNOYED OR IRRITABLE: SEVERAL DAYS
1. FEELING NERVOUS, ANXIOUS, OR ON EDGE: MORE THAN HALF THE DAYS

## 2022-10-19 ASSESSMENT — PATIENT HEALTH QUESTIONNAIRE - PHQ9
10. IF YOU CHECKED OFF ANY PROBLEMS, HOW DIFFICULT HAVE THESE PROBLEMS MADE IT FOR YOU TO DO YOUR WORK, TAKE CARE OF THINGS AT HOME, OR GET ALONG WITH OTHER PEOPLE: SOMEWHAT DIFFICULT
SUM OF ALL RESPONSES TO PHQ QUESTIONS 1-9: 8
SUM OF ALL RESPONSES TO PHQ QUESTIONS 1-9: 8

## 2022-10-19 NOTE — PROGRESS NOTES
Assessment & Plan     Type 2 diabetes mellitus without complication, with long-term current use of insulin (H)  Stable   - Adult Eye  Referral; Future  - FOOT EXAM    Visit for screening mammogram    - MA SCREENING DIGITAL BILAT - Future  (s+30); Future        Nausea  spironolactone is causing some nausea.  Advised to take before bedtime   - ondansetron (ZOFRAN ODT) 4 MG ODT tab; Take 1 tablet (4 mg) by mouth every 8 hours as needed for nausea    Essential hypertension  Well controlled today!  - spironolactone (ALDACTONE) 25 MG tablet; Take 1 tablet (25 mg) by mouth daily    [id number]}     BMI:   Estimated body mass index is 32.81 kg/m  as calculated from the following:    Height as of this encounter: 1.524 m (5').    Weight as of this encounter: 76.2 kg (168 lb).   Weight management plan: Discussed healthy diet and exercise guidelines        Return in about 3 months (around 1/19/2023) for Physical Exam.    Ramona Ann Aaseby-Aguilera, PA-C  [facility] 62 year [facility], presenting for the following health issues:  Hypertension    Here for follow-up for BP.  Has had hard to treat BP.  At last visit spironolacton was added to LOSARTAN, AMLIDIPINE AND HYGROTON .   bp is well controlled today.   HPI     Hypertension Follow-up      Do you check your blood pressure regularly outside of the clinic? No     Are you following a low salt diet? Yes    Are your blood pressures ever more than 140 on the top number (systolic) OR more   than 90 on the bottom number (diastolic), for example 140/90? unknown      How many servings of fruits and vegetables do you eat daily?  2-3    On average, how many sweetened beverages do you drink each day (Examples: soda, juice, sweet tea, etc.  Do NOT count diet or artificially sweetened beverages)?   2    How many days per week do you exercise enough to make your heart beat faster? 3 or less    How many minutes a day do you exercise enough to  make your heart beat faster? 9 or less    How many days per week do you miss taking your medication? 0        Review of Systems   Constitutional, HEENT, cardiovascular, pulmonary, gi and gu systems are negative, except as otherwise noted.      Objective    /60 (BP Location: Right arm, Patient Position: Chair, Cuff Size: Adult Large)   Pulse 66   Temp 99  F (37.2  C) (Oral)   Resp 14   Ht 1.524 m (5')   Wt 76.2 kg (168 lb)   BMI 32.81 kg/m    Body mass index is 32.81 kg/m .  Physical Exam   GENERAL: healthy, alert and no distress  EYES: Eyes grossly normal to inspection, PERRL and conjunctivae and sclerae normal  HENT: ear canals and TM's normal, nose and mouth without ulcers or lesions  RESP: lungs clear to auscultation - no rales, rhonchi or wheezes  CV: regular rate and rhythm, normal S1 S2, no S3 or S4, no murmur, click or rub, no peripheral edema and peripheral pulses strong  MS: no gross musculoskeletal defects noted, no edema                    Answers for HPI/ROS submitted by the patient on 10/19/2022  If you checked off any problems, how difficult have these problems made it for you to do your work, take care of things at home, or get along with other people?: Somewhat difficult  PHQ9 TOTAL SCORE: 8  NATALIIA 7 TOTAL SCORE: 7

## 2022-10-25 DIAGNOSIS — G47.00 INSOMNIA, UNSPECIFIED TYPE: ICD-10-CM

## 2022-10-25 RX ORDER — ZOLPIDEM TARTRATE 5 MG/1
TABLET ORAL
Qty: 30 TABLET | Refills: 0 | Status: SHIPPED | OUTPATIENT
Start: 2022-10-25 | End: 2022-11-11

## 2022-11-03 DIAGNOSIS — Z79.4 TYPE 2 DIABETES MELLITUS WITHOUT COMPLICATION, WITH LONG-TERM CURRENT USE OF INSULIN (H): ICD-10-CM

## 2022-11-03 DIAGNOSIS — E11.9 TYPE 2 DIABETES MELLITUS WITHOUT COMPLICATION, WITH LONG-TERM CURRENT USE OF INSULIN (H): ICD-10-CM

## 2022-11-04 RX ORDER — PEN NEEDLE, DIABETIC 32GX 5/32"
NEEDLE, DISPOSABLE MISCELLANEOUS
Qty: 90 EACH | Refills: 0 | Status: SHIPPED | OUTPATIENT
Start: 2022-11-04 | End: 2024-05-21

## 2022-11-04 NOTE — TELEPHONE ENCOUNTER
Relion Pen Needles 31Gx6 mm  Routing refill request to provider for review/approval because:  A break in medication    Needles last prescribed in 20018  LOV 10/19/2022

## 2022-11-23 ENCOUNTER — ALLIED HEALTH/NURSE VISIT (OUTPATIENT)
Dept: FAMILY MEDICINE | Facility: CLINIC | Age: 62
End: 2022-11-23
Payer: COMMERCIAL

## 2022-11-23 VITALS — DIASTOLIC BLOOD PRESSURE: 60 MMHG | SYSTOLIC BLOOD PRESSURE: 118 MMHG

## 2022-11-23 DIAGNOSIS — I10 BENIGN ESSENTIAL HYPERTENSION: Primary | ICD-10-CM

## 2022-11-23 PROCEDURE — 99207 PR NO CHARGE NURSE ONLY: CPT | Performed by: PHYSICIAN ASSISTANT

## 2022-11-23 NOTE — PROGRESS NOTES
Vanessa's recent blood pressure readings were reviewed by pharmacist at [facility] on 11/23/2022. Recent blood pressure history listed below:    BP Readings from Last 3 Encounters:   10/19/22 118/60   10/19/22 118/60   08/18/22 (!) 144/68     Pulse Readings from Last 3 Encounters:   10/19/22 66   08/18/22 68   02/01/22 66     Due to recent blood pressure readings at goal, blood pressure check at pharmacy not needed today. Pharmacy will continue to follow in BPGAP program. Next blood pressure check due 04/19/2023.  Note completed by: DEEPAK TORRES [facility]

## 2022-11-28 DIAGNOSIS — M79.7 FIBROMYALGIA: ICD-10-CM

## 2022-11-28 RX ORDER — CYCLOBENZAPRINE HCL 10 MG
TABLET ORAL
Qty: 30 TABLET | Refills: 0 | Status: SHIPPED | OUTPATIENT
Start: 2022-11-28 | End: 2023-01-09

## 2022-12-22 ENCOUNTER — TELEPHONE (OUTPATIENT)
Dept: FAMILY MEDICINE | Facility: CLINIC | Age: 62
End: 2022-12-22

## 2022-12-22 NOTE — TELEPHONE ENCOUNTER
Summary:    Patient is due/failing the following:   Eye exam    Reviewed:    [] CARE EVERYWHERE  [] LAST OV NOTE   [] FYI TAB  [] MYCHART ACTIVE?  [] LAST PANEL ENCOUNTER  [] FUTURE APPTS  [] IMMUNIZATIONS  [] Media Tab            Action needed:   Patient needs office visit for eye exam.    Type of outreach:    patient has used [facility] in the past, contacted them and they state hasn't had since 2014, will contact patient                                                                               Alisha Wang/TRICIA  [place]---[facility]

## 2022-12-26 ENCOUNTER — HEALTH MAINTENANCE LETTER (OUTPATIENT)
Age: 62
End: 2022-12-26

## 2023-01-08 DIAGNOSIS — I10 HYPERTENSION GOAL BP (BLOOD PRESSURE) < 140/90: ICD-10-CM

## 2023-01-08 DIAGNOSIS — G47.00 INSOMNIA, UNSPECIFIED TYPE: ICD-10-CM

## 2023-01-08 DIAGNOSIS — M79.7 FIBROMYALGIA: ICD-10-CM

## 2023-01-09 RX ORDER — CYCLOBENZAPRINE HCL 10 MG
TABLET ORAL
Qty: 30 TABLET | Refills: 0 | Status: SHIPPED | OUTPATIENT
Start: 2023-01-09 | End: 2023-02-06

## 2023-01-09 RX ORDER — ZOLPIDEM TARTRATE 5 MG/1
TABLET ORAL
Qty: 30 TABLET | Refills: 0 | Status: SHIPPED | OUTPATIENT
Start: 2023-01-09 | End: 2023-02-06

## 2023-01-09 RX ORDER — AMLODIPINE BESYLATE 10 MG/1
TABLET ORAL
Qty: 90 TABLET | Refills: 0 | Status: SHIPPED | OUTPATIENT
Start: 2023-01-09 | End: 2023-01-31

## 2023-01-09 NOTE — TELEPHONE ENCOUNTER
Routing refill request to provider for review/approval because:  Drug not on the [facility] refill protocol   A break in medication  Cr    Has appt with provider 1/31/23    Helen BERNARD RN

## 2023-01-31 ENCOUNTER — OFFICE VISIT (OUTPATIENT)
Dept: FAMILY MEDICINE | Facility: CLINIC | Age: 63
End: 2023-01-31
Payer: COMMERCIAL

## 2023-01-31 VITALS
SYSTOLIC BLOOD PRESSURE: 124 MMHG | BODY MASS INDEX: 33.41 KG/M2 | OXYGEN SATURATION: 97 % | HEIGHT: 60 IN | HEART RATE: 65 BPM | RESPIRATION RATE: 18 BRPM | WEIGHT: 170.2 LBS | TEMPERATURE: 98.2 F | DIASTOLIC BLOOD PRESSURE: 62 MMHG

## 2023-01-31 DIAGNOSIS — I10 BENIGN ESSENTIAL HYPERTENSION: ICD-10-CM

## 2023-01-31 DIAGNOSIS — M79.7 FIBROMYALGIA: ICD-10-CM

## 2023-01-31 DIAGNOSIS — R11.0 NAUSEA: ICD-10-CM

## 2023-01-31 DIAGNOSIS — E11.9 TYPE 2 DIABETES MELLITUS WITHOUT COMPLICATION, WITH LONG-TERM CURRENT USE OF INSULIN (H): ICD-10-CM

## 2023-01-31 DIAGNOSIS — Z79.4 TYPE 2 DIABETES MELLITUS WITHOUT COMPLICATION, WITH LONG-TERM CURRENT USE OF INSULIN (H): ICD-10-CM

## 2023-01-31 DIAGNOSIS — Z12.4 CERVICAL CANCER SCREENING: ICD-10-CM

## 2023-01-31 DIAGNOSIS — R10.12 LUQ ABDOMINAL PAIN: ICD-10-CM

## 2023-01-31 DIAGNOSIS — Z00.00 ROUTINE GENERAL MEDICAL EXAMINATION AT A HEALTH CARE FACILITY: Primary | ICD-10-CM

## 2023-01-31 LAB
ERYTHROCYTE [DISTWIDTH] IN BLOOD BY AUTOMATED COUNT: 12.9 % (ref 10–15)
HBA1C MFR BLD: 7.4 % (ref 0–5.6)
HCT VFR BLD AUTO: 36.7 % (ref 35–47)
HGB BLD-MCNC: 12.4 G/DL (ref 11.7–15.7)
MCH RBC QN AUTO: 28.6 PG (ref 26.5–33)
MCHC RBC AUTO-ENTMCNC: 33.8 G/DL (ref 31.5–36.5)
MCV RBC AUTO: 85 FL (ref 78–100)
PLATELET # BLD AUTO: 307 10E3/UL (ref 150–450)
RBC # BLD AUTO: 4.33 10E6/UL (ref 3.8–5.2)
WBC # BLD AUTO: 9.5 10E3/UL (ref 4–11)

## 2023-01-31 PROCEDURE — 82570 ASSAY OF URINE CREATININE: CPT | Performed by: PHYSICIAN ASSISTANT

## 2023-01-31 PROCEDURE — 80053 COMPREHEN METABOLIC PANEL: CPT | Performed by: PHYSICIAN ASSISTANT

## 2023-01-31 PROCEDURE — 36415 COLL VENOUS BLD VENIPUNCTURE: CPT | Performed by: PHYSICIAN ASSISTANT

## 2023-01-31 PROCEDURE — 82043 UR ALBUMIN QUANTITATIVE: CPT | Performed by: PHYSICIAN ASSISTANT

## 2023-01-31 PROCEDURE — 99396 PREV VISIT EST AGE 40-64: CPT | Mod: 25 | Performed by: PHYSICIAN ASSISTANT

## 2023-01-31 PROCEDURE — 83036 HEMOGLOBIN GLYCOSYLATED A1C: CPT | Performed by: PHYSICIAN ASSISTANT

## 2023-01-31 PROCEDURE — 80061 LIPID PANEL: CPT | Performed by: PHYSICIAN ASSISTANT

## 2023-01-31 PROCEDURE — 0124A COVID-19 VACCINE BIVALENT BOOSTER 12+ (PFIZER): CPT | Performed by: PHYSICIAN ASSISTANT

## 2023-01-31 PROCEDURE — 91312 COVID-19 VACCINE BIVALENT BOOSTER 12+ (PFIZER): CPT | Performed by: PHYSICIAN ASSISTANT

## 2023-01-31 PROCEDURE — 85027 COMPLETE CBC AUTOMATED: CPT | Performed by: PHYSICIAN ASSISTANT

## 2023-01-31 PROCEDURE — G0145 SCR C/V CYTO,THINLAYER,RESCR: HCPCS | Performed by: PHYSICIAN ASSISTANT

## 2023-01-31 PROCEDURE — 87624 HPV HI-RISK TYP POOLED RSLT: CPT | Performed by: PHYSICIAN ASSISTANT

## 2023-01-31 RX ORDER — AMLODIPINE BESYLATE 10 MG/1
10 TABLET ORAL AT BEDTIME
Qty: 90 TABLET | Refills: 1 | Status: SHIPPED | OUTPATIENT
Start: 2023-01-31 | End: 2023-04-28

## 2023-01-31 RX ORDER — INSULIN GLARGINE 100 [IU]/ML
INJECTION, SOLUTION SUBCUTANEOUS
Qty: 3 ML | Refills: 11 | Status: SHIPPED | OUTPATIENT
Start: 2023-01-31 | End: 2023-02-01

## 2023-01-31 RX ORDER — GLIPIZIDE 10 MG/1
10 TABLET, FILM COATED, EXTENDED RELEASE ORAL EVERY 12 HOURS
Qty: 180 TABLET | Refills: 3 | Status: SHIPPED | OUTPATIENT
Start: 2023-01-31 | End: 2023-04-28

## 2023-01-31 RX ORDER — LOSARTAN POTASSIUM 100 MG/1
100 TABLET ORAL DAILY
Qty: 90 TABLET | Refills: 3 | Status: SHIPPED | OUTPATIENT
Start: 2023-01-31 | End: 2023-04-28

## 2023-01-31 RX ORDER — ONDANSETRON 4 MG/1
4 TABLET, ORALLY DISINTEGRATING ORAL EVERY 8 HOURS PRN
Qty: 30 TABLET | Refills: 0 | Status: SHIPPED | OUTPATIENT
Start: 2023-01-31 | End: 2023-06-27

## 2023-01-31 RX ORDER — GABAPENTIN 300 MG/1
300 CAPSULE ORAL 3 TIMES DAILY
Qty: 90 CAPSULE | Refills: 11 | Status: SHIPPED | OUTPATIENT
Start: 2023-01-31 | End: 2023-12-04

## 2023-01-31 RX ORDER — METFORMIN HCL 500 MG
2000 TABLET, EXTENDED RELEASE 24 HR ORAL
Qty: 180 TABLET | Refills: 1 | Status: SHIPPED | OUTPATIENT
Start: 2023-01-31 | End: 2023-04-28

## 2023-01-31 RX ORDER — CHLORTHALIDONE 25 MG/1
25 TABLET ORAL DAILY
Qty: 90 TABLET | Refills: 1 | Status: SHIPPED | OUTPATIENT
Start: 2023-01-31 | End: 2023-04-28

## 2023-01-31 SDOH — HEALTH STABILITY: PHYSICAL HEALTH: ON AVERAGE, HOW MANY MINUTES DO YOU ENGAGE IN EXERCISE AT THIS LEVEL?: 30 MIN

## 2023-01-31 SDOH — ECONOMIC STABILITY: INCOME INSECURITY: HOW HARD IS IT FOR YOU TO PAY FOR THE VERY BASICS LIKE FOOD, HOUSING, MEDICAL CARE, AND HEATING?: NOT HARD AT ALL

## 2023-01-31 SDOH — ECONOMIC STABILITY: INCOME INSECURITY: IN THE LAST 12 MONTHS, WAS THERE A TIME WHEN YOU WERE NOT ABLE TO PAY THE MORTGAGE OR RENT ON TIME?: NO

## 2023-01-31 SDOH — HEALTH STABILITY: PHYSICAL HEALTH: ON AVERAGE, HOW MANY DAYS PER WEEK DO YOU ENGAGE IN MODERATE TO STRENUOUS EXERCISE (LIKE A BRISK WALK)?: 0 DAYS

## 2023-01-31 SDOH — ECONOMIC STABILITY: FOOD INSECURITY: WITHIN THE PAST 12 MONTHS, YOU WORRIED THAT YOUR FOOD WOULD RUN OUT BEFORE YOU GOT MONEY TO BUY MORE.: NEVER TRUE

## 2023-01-31 SDOH — ECONOMIC STABILITY: TRANSPORTATION INSECURITY
IN THE PAST 12 MONTHS, HAS THE LACK OF TRANSPORTATION KEPT YOU FROM MEDICAL APPOINTMENTS OR FROM GETTING MEDICATIONS?: NO

## 2023-01-31 SDOH — ECONOMIC STABILITY: FOOD INSECURITY: WITHIN THE PAST 12 MONTHS, THE FOOD YOU BOUGHT JUST DIDN'T LAST AND YOU DIDN'T HAVE MONEY TO GET MORE.: NEVER TRUE

## 2023-01-31 ASSESSMENT — ENCOUNTER SYMPTOMS
HEADACHES: 1
SORE THROAT: 0
PARESTHESIAS: 0
BREAST MASS: 0
EYE PAIN: 0
FEVER: 0
DIARRHEA: 1
MYALGIAS: 0
WEAKNESS: 1
JOINT SWELLING: 0
CHILLS: 1
FREQUENCY: 1
NERVOUS/ANXIOUS: 1
ABDOMINAL PAIN: 1
SHORTNESS OF BREATH: 0
HEARTBURN: 1
HEMATURIA: 0
CONSTIPATION: 1
DIZZINESS: 0
PALPITATIONS: 0
NAUSEA: 1
ARTHRALGIAS: 1
HEMATOCHEZIA: 0
DYSURIA: 0
COUGH: 1

## 2023-01-31 ASSESSMENT — LIFESTYLE VARIABLES
HOW OFTEN DO YOU HAVE A DRINK CONTAINING ALCOHOL: NEVER
AUDIT-C TOTAL SCORE: 0
HOW MANY STANDARD DRINKS CONTAINING ALCOHOL DO YOU HAVE ON A TYPICAL DAY: PATIENT DOES NOT DRINK
SKIP TO QUESTIONS 9-10: 1
HOW OFTEN DO YOU HAVE SIX OR MORE DRINKS ON ONE OCCASION: NEVER

## 2023-01-31 ASSESSMENT — PATIENT HEALTH QUESTIONNAIRE - PHQ9
SUM OF ALL RESPONSES TO PHQ QUESTIONS 1-9: 16
SUM OF ALL RESPONSES TO PHQ QUESTIONS 1-9: 16
10. IF YOU CHECKED OFF ANY PROBLEMS, HOW DIFFICULT HAVE THESE PROBLEMS MADE IT FOR YOU TO DO YOUR WORK, TAKE CARE OF THINGS AT HOME, OR GET ALONG WITH OTHER PEOPLE: VERY DIFFICULT

## 2023-01-31 ASSESSMENT — SOCIAL DETERMINANTS OF HEALTH (SDOH)
DO YOU BELONG TO ANY CLUBS OR ORGANIZATIONS SUCH AS CHURCH GROUPS UNIONS, FRATERNAL OR ATHLETIC GROUPS, OR SCHOOL GROUPS?: NO
HOW OFTEN DO YOU GET TOGETHER WITH FRIENDS OR RELATIVES?: ONCE A WEEK
IN A TYPICAL WEEK, HOW MANY TIMES DO YOU TALK ON THE PHONE WITH FAMILY, FRIENDS, OR NEIGHBORS?: NEVER
HOW OFTEN DO YOU ATTEND CHURCH OR RELIGIOUS SERVICES?: NEVER

## 2023-01-31 NOTE — PATIENT INSTRUCTIONS
Has LUQ pain and burning and lots of nausea along with bloating.  Well get ct of abdomin and determine plan     Switched to metformin to XR to take all tablets at night         Preventive Health Recommendations  Female Ages 50 - 64    Yearly exam: See your health care provider every year in order to  Review health changes.   Discuss preventive care.    Review your medicines if your doctor has prescribed any.    Get a Pap test every three years (unless you have an abnormal result and your provider advises testing more often).  If you get Pap tests with HPV test, you only need to test every 5 years, unless you have an abnormal result.   You do not need a Pap test if your uterus was removed (hysterectomy) and you have not had cancer.  You should be tested each year for STDs (sexually transmitted diseases) if you're at risk.   Have a mammogram every 1 to 2 years.  Have a colonoscopy at age 50, or have a yearly FIT test (stool test). These exams screen for colon cancer.    Have a cholesterol test every 5 years, or more often if advised.  Have a diabetes test (fasting glucose) every three years. If you are at risk for diabetes, you should have this test more often.   If you are at risk for osteoporosis (brittle bone disease), think about having a bone density scan (DEXA).    Shots: Get a flu shot each year. Get a tetanus shot every 10 years.    Nutrition:   Eat at least 5 servings of fruits and vegetables each day.  Eat whole-grain bread, whole-wheat pasta and brown rice instead of white grains and rice.  Get adequate Calcium and Vitamin D.     Lifestyle  Exercise at least 150 minutes a week (30 minutes a day, 5 days a week). This will help you control your weight and prevent disease.  Limit alcohol to one drink per day.  No smoking.   Wear sunscreen to prevent skin cancer.   See your dentist every six months for an exam and cleaning.  See your eye doctor every 1 to 2 years.

## 2023-01-31 NOTE — PROGRESS NOTES
SUBJECTIVE:   CC: Vanessa is an 62 year old who presents for preventive health visit.   Patient has been advised of split billing requirements and indicates understanding: Yes  Healthy Habits:     Getting at least 3 servings of Calcium per day:  NO    Bi-annual eye exam:  NO    Dental care twice a year:  NO    Sleep apnea or symptoms of sleep apnea:  Excessive snoring    Diet:  Diabetic    Frequency of exercise:  2-3 days/week    Duration of exercise:  15-30 minutes    Taking medications regularly:  Yes    PHQ-2 Total Score: 4    Additional concerns today:  Yes     Patient has been having problems with nausea, vomiting, diarrhea and abdominal pain for the last year. It is off and on. She does not know what makes it better or worse. Exercise helps a little.              Today's PHQ-2 Score:   PHQ-2 ( 1999 [employer]) 1/31/2023   Q1: Little interest or pleasure in doing things 2   Q2: Feeling down, depressed or hopeless 2   PHQ-2 Score 4   PHQ-2 Total Score (12-17 Years)- Positive if 3 or more points; Administer PHQ-A if positive -   Q1: Little interest or pleasure in doing things More than half the days   Q2: Feeling down, depressed or hopeless More than half the days   PHQ-2 Score 4           Social History     Tobacco Use     Smoking status: Former     Years: 3.00     Types: Cigarettes     Quit date: 1/15/2013     Years since quitting: 10.0     Smokeless tobacco: Current     Tobacco comments:     nicotine lozenge   Substance Use Topics     Alcohol use: Not Currently         Alcohol Use 1/31/2023   Prescreen: >3 drinks/day or >7 drinks/week? No   Prescreen: >3 drinks/day or >7 drinks/week? -     Reviewed orders with patient.  Reviewed health maintenance and updated orders accordingly - Yes  BP Readings from Last 3 Encounters:   01/31/23 124/62   10/19/22 118/60   10/19/22 118/60    Wt Readings from Last 3 Encounters:   01/31/23 77.2 kg (170 lb 3.2 oz)   10/19/22 76.2 kg (168 lb)   08/18/22 77.9 kg (171 lb 12.8 oz)                     Breast Cancer Screening:    Breast CA Risk Assessment (FHS-7) 1/5/2022   Do you have a family history of breast, colon, or ovarian cancer? No / Unknown       click delete button to remove this line now    Pertinent mammograms are reviewed under the imaging tab.    History of abnormal Pap smear: NO - age 30- 65 PAP every 3 years recommended  PAP / HPV Latest Ref Rng & Units 9/24/2021 6/22/2017 8/26/2014   PAP   Negative for Intraepithelial Lesion or Malignancy (NILM) - -   PAP (Historical) - - ASC-US(A) ASC-US(A)   HPV16 Negative Negative Negative -   HPV18 Negative Negative Negative -   HRHPV Negative Negative Negative -     Reviewed and updated as needed this visit by clinical staff   Tobacco  Allergies  Meds              Reviewed and updated as needed this visit by Provider                     Review of Systems   Constitutional: Positive for chills. Negative for fever.   HENT: Positive for congestion. Negative for ear pain, hearing loss and sore throat.    Eyes: Positive for visual disturbance. Negative for pain.   Respiratory: Positive for cough. Negative for shortness of breath.    Cardiovascular: Positive for peripheral edema. Negative for chest pain and palpitations.   Gastrointestinal: Positive for abdominal pain, constipation, diarrhea, heartburn and nausea. Negative for hematochezia.   Breasts:  Negative for tenderness, breast mass and discharge.   Genitourinary: Positive for frequency and urgency. Negative for dysuria, genital sores, hematuria, pelvic pain, vaginal bleeding and vaginal discharge.   Musculoskeletal: Positive for arthralgias. Negative for joint swelling and myalgias.   Skin: Negative for rash.   Neurological: Positive for weakness and headaches. Negative for dizziness and paresthesias.   Psychiatric/Behavioral: Negative for mood changes. The patient is nervous/anxious.           OBJECTIVE:   /62 (BP Location: Right arm, Patient Position: Sitting, Cuff Size: Adult  Regular)   Pulse 65   Temp 98.2  F (36.8  C) (Oral)   Resp 18   Ht 1.524 m (5')   Wt 77.2 kg (170 lb 3.2 oz)   SpO2 97%   BMI 33.24 kg/m    Physical Exam  GENERAL APPEARANCE: healthy, alert and no distress  EYES: Eyes grossly normal to inspection, PERRL and conjunctivae and sclerae normal  HENT: ear canals and TM's normal, nose and mouth without ulcers or lesions, oropharynx clear and oral mucous membranes moist  NECK: no adenopathy, no asymmetry, masses, or scars and thyroid normal to palpation  RESP: lungs clear to auscultation - no rales, rhonchi or wheezes  BREAST: normal without masses, tenderness or nipple discharge and no palpable axillary masses or adenopathy  CV: regular rate and rhythm, normal S1 S2, no S3 or S4, no murmur, click or rub, no peripheral edema and peripheral pulses strong  ABDOMEN: soft, obese belly ,TTP in LUQ, no hepatosplenomegaly, no masses and bowel sounds normal   (female): normal female external genitalia, normal urethral meatus, vaginal mucosal atrophy noted, normal cervix, adnexae, and uterus without masses or abnormal discharge  MS: no musculoskeletal defects are noted and gait is age appropriate without ataxia  SKIN: no suspicious lesions or rashes  NEURO: Normal strength and tone, sensory exam grossly normal, mentation intact and speech normal  PSYCH: mentation appears normal and affect normal/bright    Diagnostic Test Results:  Labs reviewed in Epic    ASSESSMENT/PLAN:       .(Z00.00) Routine general medical examination at a health care facility  (primary encounter diagnosis)  Comment:   Plan:     (E11.9,  Z79.4) Type 2 diabetes mellitus without complication, with long-term current use of insulin (H)  Comment: a1c is 7.4.  Would like it under 7.  Switched metformin to  XR in hopes its helps with diarrhea and nausea.   Plan: Adult Eye  Referral, Albumin Random         Urine Quantitative with Creat Ratio, glipiZIDE         (GLIPIZIDE XL) 10 MG 24 hr tablet,  insulin         glargine (BASAGLAR KWIKPEN) 100 UNIT/ML pen,         metFORMIN (GLUCOPHAGE XR) 500 MG 24 hr tablet,         Hemoglobin A1c            (I10) Benign essential hypertension  Comment: stable and well controlled  Plan: Comprehensive metabolic panel, Lipid Profile         (Chol, Trig, HDL, LDL calc), CBC with         platelets, amLODIPine (NORVASC) 10 MG tablet,         chlorthalidone (HYGROTON) 25 MG tablet,         losartan (COZAAR) 100 MG tablet            (Z12.4) Cervical cancer screening  Comment:   Plan: Pap Screen with HPV - recommended age 30 - 65         years            (M79.7) Fibromyalgia  Comment: stable   Plan: gabapentin (NEURONTIN) 300 MG capsule            (R11.0) Nausea  Comment: pt request  Plan: ondansetron (ZOFRAN ODT) 4 MG ODT tab              (R10.12) LUQ abdominal pain  Comment: Ongoing nausea thought to be due to metformin but now with LUQ pain and TTP, advised a CT of abdomin. Will contact patient with results when known and determine plan    Plan: Pneumococcal 20 Valent Conjugate (Prevnar 20),         CT Abdomen w/o Contrast                      COUNSELING:  Reviewed preventive health counseling, as reflected in patient instructions       Regular exercise       Healthy diet/nutrition       Vision screening       Hearing screening        She reports that she quit smoking about 10 years ago. Her smoking use included cigarettes. She uses smokeless tobacco.      Ramona Ann Aaseby-Aguilera, PA-C  [facility]  Answers for HPI/ROS submitted by the patient on 1/31/2023  If you checked off any problems, how difficult have these problems made it for you to do your work, take care of things at home, or get along with other people?: Very difficult  PHQ9 TOTAL SCORE: 16

## 2023-02-01 ENCOUNTER — TELEPHONE (OUTPATIENT)
Dept: FAMILY MEDICINE | Facility: CLINIC | Age: 63
End: 2023-02-01
Payer: COMMERCIAL

## 2023-02-01 DIAGNOSIS — Z79.4 TYPE 2 DIABETES MELLITUS WITHOUT COMPLICATION, WITH LONG-TERM CURRENT USE OF INSULIN (H): ICD-10-CM

## 2023-02-01 DIAGNOSIS — Z98.890 STATUS POST HYSTEROSCOPIC POLYPECTOMY: ICD-10-CM

## 2023-02-01 DIAGNOSIS — E11.9 TYPE 2 DIABETES MELLITUS WITHOUT COMPLICATION, WITH LONG-TERM CURRENT USE OF INSULIN (H): ICD-10-CM

## 2023-02-01 LAB
ALBUMIN SERPL BCG-MCNC: 4.7 G/DL (ref 3.5–5.2)
ALP SERPL-CCNC: 93 U/L (ref 35–104)
ALT SERPL W P-5'-P-CCNC: 30 U/L (ref 10–35)
ANION GAP SERPL CALCULATED.3IONS-SCNC: 14 MMOL/L (ref 7–15)
AST SERPL W P-5'-P-CCNC: 33 U/L (ref 10–35)
BILIRUB SERPL-MCNC: 0.2 MG/DL
BUN SERPL-MCNC: 10 MG/DL (ref 8–23)
CALCIUM SERPL-MCNC: 10.2 MG/DL (ref 8.8–10.2)
CHLORIDE SERPL-SCNC: 97 MMOL/L (ref 98–107)
CHOLEST SERPL-MCNC: 155 MG/DL
CREAT SERPL-MCNC: 1.2 MG/DL (ref 0.51–0.95)
CREAT UR-MCNC: 40 MG/DL
DEPRECATED HCO3 PLAS-SCNC: 23 MMOL/L (ref 22–29)
GFR SERPL CREATININE-BSD FRML MDRD: 51 ML/MIN/1.73M2
GLUCOSE SERPL-MCNC: 118 MG/DL (ref 70–99)
HDLC SERPL-MCNC: 54 MG/DL
LDLC SERPL CALC-MCNC: 74 MG/DL
MICROALBUMIN UR-MCNC: <12 MG/L
MICROALBUMIN/CREAT UR: NORMAL MG/G{CREAT}
NONHDLC SERPL-MCNC: 101 MG/DL
POTASSIUM SERPL-SCNC: 4.7 MMOL/L (ref 3.4–5.3)
PROT SERPL-MCNC: 7.6 G/DL (ref 6.4–8.3)
SODIUM SERPL-SCNC: 134 MMOL/L (ref 136–145)
TRIGL SERPL-MCNC: 133 MG/DL

## 2023-02-01 RX ORDER — INSULIN GLARGINE 100 [IU]/ML
INJECTION, SOLUTION SUBCUTANEOUS
Qty: 15 ML | Refills: 11
Start: 2023-02-01 | End: 2023-12-19 | Stop reason: ALTCHOICE

## 2023-02-01 NOTE — TELEPHONE ENCOUNTER
Was this issue addressed and taken care of, looks like RN spoke with pharmacy earlier and clarification was given     Penelope Nunez/

## 2023-02-01 NOTE — TELEPHONE ENCOUNTER
Box is fine and patient states she is taking 48 units sq in am which is considerable lower than last time ordred

## 2023-02-01 NOTE — TELEPHONE ENCOUNTER
I spoke to Shai in pharmacy earlier on Basaglar. New call to pharmacy I was on hold for almost 10 min.     I spoke to Shai again--when I updated order in Epic (no print out) it cancelled on their end. Per Ramona Aaseby-Aguilera, PA-C order correct; [facility] pharmacist reactivated order on their end so everything should be cleared up.     Suzette Bradford R.N.

## 2023-02-01 NOTE — TELEPHONE ENCOUNTER
Reason for Call:  Other prescription    Detailed comments: [facility] called and needs a callback regarding medication for patient Tamara.    Cannot get throught to the  nurses.    Please contact them today.  Thank you.    Phone Number Patient can be reached at: Other phone number:  [phone]*    Best Time: any    Can we leave a detailed message on this number? YES    Call taken on 2/1/2023 at 2:53 PM by Patrica Peng

## 2023-02-01 NOTE — TELEPHONE ENCOUNTER
I spoke with [facility] pharmacist below information given, he will update on their end. Order corrected in Epic box is 15 ml.  Suzette Bradford R.N.

## 2023-02-01 NOTE — TELEPHONE ENCOUNTER
[facility] pharmacy calling re: basaglar. Rx is different from past, and pharmacy would rather dispense a box vs pen as it will last patient longer.  Monika Campa,

## 2023-02-03 LAB
BKR LAB AP GYN ADEQUACY: NORMAL
BKR LAB AP GYN INTERPRETATION: NORMAL
BKR LAB AP HPV REFLEX: NORMAL
BKR LAB AP PREVIOUS ABNORMAL: NORMAL
PATH REPORT.COMMENTS IMP SPEC: NORMAL
PATH REPORT.COMMENTS IMP SPEC: NORMAL
PATH REPORT.RELEVANT HX SPEC: NORMAL

## 2023-02-08 ENCOUNTER — TELEPHONE (OUTPATIENT)
Dept: FAMILY MEDICINE | Facility: CLINIC | Age: 63
End: 2023-02-08
Payer: COMMERCIAL

## 2023-02-08 NOTE — TELEPHONE ENCOUNTER
Patient Quality Outreach    Patient is due for the following:   Diabetes -  Eye Exam    Next Steps:   Patient has upcoming appointment, these items will be addressed at that time.  Pt has an appointment on 04/01/2023 at [facility] in [place]  Type of outreach:    Chart review performed, no outreach needed.    Next Steps:  Reach out within 90 days via Phone.    Max number of attempts reached: Yes. Will try again in 90 days if patient still on fail list.    Questions for provider review:    None     Penny Pastor  Chart routed to Care Team.

## 2023-02-15 ENCOUNTER — HOSPITAL ENCOUNTER (OUTPATIENT)
Dept: CT IMAGING | Facility: CLINIC | Age: 63
Discharge: HOME | End: 2023-02-15
Attending: PHYSICIAN ASSISTANT | Admitting: PHYSICIAN ASSISTANT
Payer: COMMERCIAL

## 2023-02-15 ENCOUNTER — ANCILLARY ORDERS (OUTPATIENT)
Dept: FAMILY MEDICINE | Facility: CLINIC | Age: 63
End: 2023-02-15

## 2023-02-15 DIAGNOSIS — R10.12 LUQ ABDOMINAL PAIN: ICD-10-CM

## 2023-02-15 PROCEDURE — 74176 CT ABD & PELVIS W/O CONTRAST: CPT

## 2023-02-27 ENCOUNTER — TRANSFERRED RECORDS (OUTPATIENT)
Dept: FAMILY MEDICINE | Facility: CLINIC | Age: 63
End: 2023-02-27

## 2023-02-27 LAB — RETINOPATHY: NORMAL

## 2023-03-20 NOTE — TELEPHONE ENCOUNTER
Addended by: AALIYAH OH on: 3/20/2023 06:48 PM     Modules accepted: Orders     Controlled Substance Refill Request for cyclobenzaprine (FLEXERIL) 10 MG tablet  Problem List Complete:  No     PROVIDER TO CONSIDER COMPLETION OF PROBLEM LIST AND OVERVIEW/CONTROLLED SUBSTANCE AGREEMENT    Last Written Prescription Date:  02/08/2018  Last Fill Quantity: 30 tablet,   # refills: 0    Last Office Visit with [facility] primary care provider: 01/09/2018    Future Office visit:     Controlled substance agreement on file: No.     Processing:  Fax Rx to [facility] pharmacy   checked in past 6 months?  No, route to CONNER DAVIS

## 2023-03-27 DIAGNOSIS — M79.7 FIBROMYALGIA: ICD-10-CM

## 2023-03-28 RX ORDER — CYCLOBENZAPRINE HCL 10 MG
TABLET ORAL
Qty: 30 TABLET | Refills: 0 | Status: SHIPPED | OUTPATIENT
Start: 2023-03-28 | End: 2023-04-26

## 2023-04-26 DIAGNOSIS — M79.7 FIBROMYALGIA: ICD-10-CM

## 2023-04-26 RX ORDER — CYCLOBENZAPRINE HCL 10 MG
TABLET ORAL
Qty: 30 TABLET | Refills: 0 | Status: SHIPPED | OUTPATIENT
Start: 2023-04-26 | End: 2023-04-28

## 2023-04-27 DIAGNOSIS — Z79.4 TYPE 2 DIABETES MELLITUS WITHOUT COMPLICATION, WITH LONG-TERM CURRENT USE OF INSULIN (H): ICD-10-CM

## 2023-04-27 DIAGNOSIS — E11.9 TYPE 2 DIABETES MELLITUS WITHOUT COMPLICATION, WITH LONG-TERM CURRENT USE OF INSULIN (H): ICD-10-CM

## 2023-04-27 DIAGNOSIS — M79.7 FIBROMYALGIA: ICD-10-CM

## 2023-04-27 DIAGNOSIS — E78.5 HYPERLIPIDEMIA LDL GOAL <100: ICD-10-CM

## 2023-04-27 DIAGNOSIS — I10 BENIGN ESSENTIAL HYPERTENSION: ICD-10-CM

## 2023-04-27 NOTE — LETTER
May 1, 2023      Vanessa Mota  [address] [place] [address]        Vanessa Mota      We recently received a refill request for your metFORMIN (GLUCOPHAGE XR) 500 MG 24 hr tablet. We have sent in a refill for you to your pharmacy.    Our records show you are due for a follow up visit with your provider.     Please call the clinic at [phone] to schedule as the providers are booking out.      Sincerely,        Penelope Nunez/

## 2023-04-28 RX ORDER — METFORMIN HCL 500 MG
TABLET, EXTENDED RELEASE 24 HR ORAL
Qty: 180 TABLET | Refills: 0 | Status: SHIPPED | OUTPATIENT
Start: 2023-04-28 | End: 2023-06-09

## 2023-04-28 RX ORDER — GLIPIZIDE 10 MG/1
TABLET, FILM COATED, EXTENDED RELEASE ORAL
Qty: 180 TABLET | Refills: 0 | Status: SHIPPED | OUTPATIENT
Start: 2023-04-28 | End: 2023-11-21

## 2023-04-28 RX ORDER — LOSARTAN POTASSIUM 100 MG/1
TABLET ORAL
Qty: 90 TABLET | Refills: 0 | Status: SHIPPED | OUTPATIENT
Start: 2023-04-28 | End: 2023-07-18

## 2023-04-28 RX ORDER — AMLODIPINE BESYLATE 10 MG/1
TABLET ORAL
Qty: 90 TABLET | Refills: 0 | Status: SHIPPED | OUTPATIENT
Start: 2023-04-28 | End: 2023-07-11

## 2023-04-28 RX ORDER — CHLORTHALIDONE 25 MG/1
TABLET ORAL
Qty: 90 TABLET | Refills: 0 | Status: SHIPPED | OUTPATIENT
Start: 2023-04-28 | End: 2023-07-17

## 2023-04-28 RX ORDER — CYCLOBENZAPRINE HCL 10 MG
TABLET ORAL
Qty: 30 TABLET | Refills: 0 | Status: SHIPPED | OUTPATIENT
Start: 2023-04-28 | End: 2023-06-02

## 2023-04-28 RX ORDER — ATORVASTATIN CALCIUM 20 MG/1
TABLET, FILM COATED ORAL
Qty: 90 TABLET | Refills: 0 | Status: SHIPPED | OUTPATIENT
Start: 2023-04-28 | End: 2023-12-08

## 2023-04-28 NOTE — TELEPHONE ENCOUNTER
Medication is being filled for 1 time refill only due to:  Patient needs to be seen.      Routing to MA/TC pool. The Pt is due for a visit with PCP for DM. Pt was due this month (April)    Kelvin MORAN RN, BSN

## 2023-06-02 DIAGNOSIS — F41.9 ANXIETY: ICD-10-CM

## 2023-06-02 DIAGNOSIS — F32.0 MILD MAJOR DEPRESSION (H): ICD-10-CM

## 2023-06-02 DIAGNOSIS — E11.9 TYPE 2 DIABETES MELLITUS WITHOUT COMPLICATION, WITH LONG-TERM CURRENT USE OF INSULIN (H): ICD-10-CM

## 2023-06-02 DIAGNOSIS — G43.009 MIGRAINE WITHOUT AURA AND WITHOUT STATUS MIGRAINOSUS, NOT INTRACTABLE: ICD-10-CM

## 2023-06-02 DIAGNOSIS — M79.7 FIBROMYALGIA: ICD-10-CM

## 2023-06-02 DIAGNOSIS — Z79.4 TYPE 2 DIABETES MELLITUS WITHOUT COMPLICATION, WITH LONG-TERM CURRENT USE OF INSULIN (H): ICD-10-CM

## 2023-06-02 RX ORDER — CYCLOBENZAPRINE HCL 10 MG
TABLET ORAL
Qty: 30 TABLET | Refills: 0 | Status: SHIPPED | OUTPATIENT
Start: 2023-06-02 | End: 2023-07-11

## 2023-06-02 RX ORDER — METFORMIN HCL 500 MG
2000 TABLET, EXTENDED RELEASE 24 HR ORAL
Qty: 180 TABLET | Refills: 0 | Status: CANCELLED | OUTPATIENT
Start: 2023-06-02

## 2023-06-02 RX ORDER — BUSPIRONE HYDROCHLORIDE 30 MG/1
45 TABLET ORAL 2 TIMES DAILY
Qty: 90 TABLET | Refills: 0 | Status: CANCELLED | OUTPATIENT
Start: 2023-06-02

## 2023-06-02 RX ORDER — SUMATRIPTAN 100 MG/1
TABLET, FILM COATED ORAL
Qty: 10 TABLET | Refills: 1 | Status: CANCELLED | OUTPATIENT
Start: 2023-06-02

## 2023-06-02 NOTE — TELEPHONE ENCOUNTER
Routing refill request to provider for review/approval because:  Medication is reported/historical    Kirsty OLMSTEAD RN

## 2023-06-05 DIAGNOSIS — G43.009 MIGRAINE WITHOUT AURA AND WITHOUT STATUS MIGRAINOSUS, NOT INTRACTABLE: ICD-10-CM

## 2023-06-05 DIAGNOSIS — Z79.4 TYPE 2 DIABETES MELLITUS WITHOUT COMPLICATION, WITH LONG-TERM CURRENT USE OF INSULIN (H): ICD-10-CM

## 2023-06-05 DIAGNOSIS — E11.9 TYPE 2 DIABETES MELLITUS WITHOUT COMPLICATION, WITH LONG-TERM CURRENT USE OF INSULIN (H): ICD-10-CM

## 2023-06-05 RX ORDER — METFORMIN HCL 500 MG
2000 TABLET, EXTENDED RELEASE 24 HR ORAL
Qty: 180 TABLET | Refills: 0 | OUTPATIENT
Start: 2023-06-05

## 2023-06-05 NOTE — TELEPHONE ENCOUNTER
Routing refill request to provider for review/approval because:  New provider    Kirsty OLMSTEAD RN

## 2023-06-06 RX ORDER — SUMATRIPTAN 100 MG/1
TABLET, FILM COATED ORAL
Qty: 10 TABLET | Refills: 1 | Status: SHIPPED | OUTPATIENT
Start: 2023-06-06

## 2023-06-09 DIAGNOSIS — E11.9 TYPE 2 DIABETES MELLITUS WITHOUT COMPLICATION, WITH LONG-TERM CURRENT USE OF INSULIN (H): ICD-10-CM

## 2023-06-09 DIAGNOSIS — Z79.4 TYPE 2 DIABETES MELLITUS WITHOUT COMPLICATION, WITH LONG-TERM CURRENT USE OF INSULIN (H): ICD-10-CM

## 2023-06-09 RX ORDER — METFORMIN HCL 500 MG
TABLET, EXTENDED RELEASE 24 HR ORAL
Qty: 180 TABLET | Refills: 0 | Status: SHIPPED | OUTPATIENT
Start: 2023-06-09 | End: 2023-07-12

## 2023-06-27 DIAGNOSIS — R11.0 NAUSEA: ICD-10-CM

## 2023-06-27 RX ORDER — ONDANSETRON 4 MG/1
TABLET, ORALLY DISINTEGRATING ORAL
Qty: 30 TABLET | Refills: 0 | Status: SHIPPED | OUTPATIENT
Start: 2023-06-27 | End: 2023-09-22

## 2023-06-27 NOTE — TELEPHONE ENCOUNTER
Routing refill request to provider for review/approval because:  Ongoing Rx?    Kirsty OLMSTEAD RN

## 2023-07-06 DIAGNOSIS — Z79.4 TYPE 2 DIABETES MELLITUS WITHOUT COMPLICATION, WITH LONG-TERM CURRENT USE OF INSULIN (H): ICD-10-CM

## 2023-07-06 DIAGNOSIS — E11.9 TYPE 2 DIABETES MELLITUS WITHOUT COMPLICATION, WITH LONG-TERM CURRENT USE OF INSULIN (H): ICD-10-CM

## 2023-07-06 RX ORDER — METFORMIN HCL 500 MG
TABLET, EXTENDED RELEASE 24 HR ORAL
Qty: 180 TABLET | Refills: 0 | OUTPATIENT
Start: 2023-07-06

## 2023-07-11 DIAGNOSIS — Z79.4 TYPE 2 DIABETES MELLITUS WITHOUT COMPLICATION, WITH LONG-TERM CURRENT USE OF INSULIN (H): ICD-10-CM

## 2023-07-11 DIAGNOSIS — M79.7 FIBROMYALGIA: ICD-10-CM

## 2023-07-11 DIAGNOSIS — I10 BENIGN ESSENTIAL HYPERTENSION: ICD-10-CM

## 2023-07-11 DIAGNOSIS — E11.9 TYPE 2 DIABETES MELLITUS WITHOUT COMPLICATION, WITH LONG-TERM CURRENT USE OF INSULIN (H): ICD-10-CM

## 2023-07-11 RX ORDER — AMLODIPINE BESYLATE 10 MG/1
10 TABLET ORAL AT BEDTIME
Qty: 90 TABLET | Refills: 0 | Status: SHIPPED | OUTPATIENT
Start: 2023-07-11 | End: 2023-11-20

## 2023-07-11 RX ORDER — CYCLOBENZAPRINE HCL 10 MG
10 TABLET ORAL 3 TIMES DAILY PRN
Qty: 30 TABLET | Refills: 0 | Status: SHIPPED | OUTPATIENT
Start: 2023-07-11 | End: 2023-10-17

## 2023-07-11 NOTE — TELEPHONE ENCOUNTER
Routing refill request to provider for review/approval because:  Drug not on the [facility] refill protocol   Labs out of range:    Creatinine   Date Value Ref Range Status   01/31/2023 1.20 (H) 0.51 - 0.95 mg/dL Final   10/30/2020 0.77 0.52 - 1.04 mg/dL Final     Kirsty OLMSTEAD RN

## 2023-07-11 NOTE — TELEPHONE ENCOUNTER
Routing refill request to provider for review/approval because:  Grazyna given x2 and patient did not follow up, please advise  Patient needs to be seen because it has been more than 1 year since last office visit.    Has appt 7/19/23.    Kirsty OLMSTEAD RN

## 2023-07-12 RX ORDER — METFORMIN HCL 500 MG
TABLET, EXTENDED RELEASE 24 HR ORAL
Qty: 180 TABLET | Refills: 0 | Status: SHIPPED | OUTPATIENT
Start: 2023-07-12 | End: 2023-07-19

## 2023-07-17 DIAGNOSIS — I10 BENIGN ESSENTIAL HYPERTENSION: ICD-10-CM

## 2023-07-17 NOTE — TELEPHONE ENCOUNTER
Routing refill request to provider for review/approval because:  Labs out of range:    Creatinine   Date Value Ref Range Status   01/31/2023 1.20 (H) 0.51 - 0.95 mg/dL Final   10/30/2020 0.77 0.52 - 1.04 mg/dL Final

## 2023-07-18 RX ORDER — LOSARTAN POTASSIUM 100 MG/1
100 TABLET ORAL DAILY
Qty: 30 TABLET | Refills: 1 | Status: SHIPPED | OUTPATIENT
Start: 2023-07-18 | End: 2023-07-19

## 2023-07-18 RX ORDER — CHLORTHALIDONE 25 MG/1
25 TABLET ORAL DAILY
Qty: 30 TABLET | Refills: 1 | Status: ON HOLD | OUTPATIENT
Start: 2023-07-18 | End: 2023-07-22

## 2023-07-19 ENCOUNTER — OFFICE VISIT (OUTPATIENT)
Dept: FAMILY MEDICINE | Facility: CLINIC | Age: 63
End: 2023-07-19
Payer: COMMERCIAL

## 2023-07-19 VITALS
HEART RATE: 84 BPM | HEIGHT: 60 IN | OXYGEN SATURATION: 96 % | WEIGHT: 173 LBS | TEMPERATURE: 97.8 F | RESPIRATION RATE: 14 BRPM | DIASTOLIC BLOOD PRESSURE: 56 MMHG | SYSTOLIC BLOOD PRESSURE: 112 MMHG | BODY MASS INDEX: 33.96 KG/M2

## 2023-07-19 DIAGNOSIS — E11.9 TYPE 2 DIABETES MELLITUS WITHOUT COMPLICATION, WITH LONG-TERM CURRENT USE OF INSULIN (H): ICD-10-CM

## 2023-07-19 DIAGNOSIS — K58.2 IRRITABLE BOWEL SYNDROME WITH BOTH CONSTIPATION AND DIARRHEA: ICD-10-CM

## 2023-07-19 DIAGNOSIS — Z79.4 TYPE 2 DIABETES MELLITUS WITHOUT COMPLICATION, WITH LONG-TERM CURRENT USE OF INSULIN (H): ICD-10-CM

## 2023-07-19 DIAGNOSIS — I10 BENIGN ESSENTIAL HYPERTENSION: ICD-10-CM

## 2023-07-19 DIAGNOSIS — R30.0 DYSURIA: ICD-10-CM

## 2023-07-19 DIAGNOSIS — E87.1 HYPONATREMIA: Primary | ICD-10-CM

## 2023-07-19 PROCEDURE — 36415 COLL VENOUS BLD VENIPUNCTURE: CPT | Performed by: PHYSICIAN ASSISTANT

## 2023-07-19 PROCEDURE — 80048 BASIC METABOLIC PNL TOTAL CA: CPT | Performed by: PHYSICIAN ASSISTANT

## 2023-07-19 PROCEDURE — 99214 OFFICE O/P EST MOD 30 MIN: CPT | Performed by: PHYSICIAN ASSISTANT

## 2023-07-19 PROCEDURE — 83036 HEMOGLOBIN GLYCOSYLATED A1C: CPT | Performed by: PHYSICIAN ASSISTANT

## 2023-07-19 PROCEDURE — 81001 URINALYSIS AUTO W/SCOPE: CPT | Performed by: PHYSICIAN ASSISTANT

## 2023-07-19 RX ORDER — METFORMIN HCL 500 MG
TABLET, EXTENDED RELEASE 24 HR ORAL
Qty: 180 TABLET | Refills: 0 | Status: SHIPPED | OUTPATIENT
Start: 2023-07-19 | End: 2023-08-30

## 2023-07-19 RX ORDER — SPIRONOLACTONE 25 MG/1
25 TABLET ORAL DAILY
Qty: 90 TABLET | Refills: 3 | Status: SHIPPED | OUTPATIENT
Start: 2023-07-19 | End: 2023-09-22

## 2023-07-19 RX ORDER — LOSARTAN POTASSIUM 100 MG/1
100 TABLET ORAL DAILY
Qty: 90 TABLET | Refills: 3 | Status: SHIPPED | OUTPATIENT
Start: 2023-07-19 | End: 2023-10-26

## 2023-07-19 NOTE — PROGRESS NOTES
"  Assessment & Plan     Irritable bowel syndrome with both constipation and diarrhea  Recommend adding a fiber supplement daily.      Type 2 diabetes mellitus without complication, with long-term current use of insulin (H)  A1c is up and spilling glucose in urine.  Advised to increase basaglar up 4 units and check blood sugar TID. Follow-up in 1 month   - Hemoglobin A1c  - Basic metabolic panel  - metFORMIN (GLUCOPHAGE XR) 500 MG 24 hr tablet; TAKE 4 TABLETS BY MOUTH ONCE DAILY WITH SUPPER    Benign essential hypertension  stable  - losartan (COZAAR) 100 MG tablet; Take 1 tablet (100 mg) by mouth daily  - spironolactone (ALDACTONE) 25 MG tablet; Take 1 tablet (25 mg) by mouth daily    Dysuria  Elevated glucose.   - UA Macroscopic with reflex to Microscopic and Culture - Lab Collect    (E87.1) Hyponatremia  (primary encounter diagnosis)  Comment:   Plan Will referr to ED for treatment      [id number]}     BMI:   Estimated body mass index is 34.36 kg/m  as calculated from the following:    Height as of this encounter: 1.511 m (4' 11.5\").    Weight as of this encounter: 78.5 kg (173 lb).           Ramona Ann Aaseby-Aguilera, PA-C [facility] is a 63 year old, presenting for the following health issues:  Abdominal Pain            Vanessa has had generlized low grade abdominal pain for the past few weeks with bout sof  constipation and diarrhea..No blood in stools.  she states frequent urination but not painful. She has diabetes T2  and blood sugar has been around 200 in am when she checks.last  A1c was 7.4.  Generally not feeling well but denies fever, cough, chest pain or dsyuria.. Had a CT of abdomin /pelvic that was essential normal 6 months ago. Normal colonoscopy in 2021.         7/19/2023     1:23 PM   Additional Questions   Roomed by Roque     Also, she has additional complaints of is having bouts of constipation and then loose stools. Not taking a fiber supplement. .    Had " "colonoscopy in 11/21 and within normal limits      HPI     Abdominal pain and diarrhea off and on for last few months.  No blood in stool. otc medications help some           Review of Systems   Constitutional, HEENT, cardiovascular, pulmonary, gi and gu systems are negative, except as otherwise noted.      Objective    /56 (BP Location: Right arm, Patient Position: Chair, Cuff Size: Adult Regular)   Pulse 84   Temp 97.8  F (36.6  C) (Oral)   Resp 14   Ht 1.511 m (4' 11.5\")   Wt 78.5 kg (173 lb)   SpO2 96%   BMI 34.36 kg/m    Body mass index is 34.36 kg/m .  Physical Exam   GENERAL: healthy, alert and no distress  RESP: lungs clear to auscultation - no rales, rhonchi or wheezes  CV: regular rate and rhythm, normal S1 S2, no S3 or S4, no murmur, click or rub, no peripheral edema and peripheral pulses strong  ABDOMEN: soft, nontender, no hepatosplenomegaly, no masses and bowel sounds normal                    "

## 2023-07-20 ENCOUNTER — HOSPITAL ENCOUNTER (OUTPATIENT)
Facility: CLINIC | Age: 63
Setting detail: OBSERVATION
Discharge: HOME | End: 2023-07-22
Attending: EMERGENCY MEDICINE | Admitting: INTERNAL MEDICINE
Payer: COMMERCIAL

## 2023-07-20 ENCOUNTER — TELEPHONE (OUTPATIENT)
Dept: FAMILY MEDICINE | Facility: CLINIC | Age: 63
End: 2023-07-20

## 2023-07-20 ENCOUNTER — APPOINTMENT (OUTPATIENT)
Dept: CT IMAGING | Facility: CLINIC | Age: 63
End: 2023-07-20
Attending: EMERGENCY MEDICINE
Payer: COMMERCIAL

## 2023-07-20 DIAGNOSIS — S09.90XA CLOSED HEAD INJURY, INITIAL ENCOUNTER: ICD-10-CM

## 2023-07-20 DIAGNOSIS — N28.9 RENAL INSUFFICIENCY: ICD-10-CM

## 2023-07-20 DIAGNOSIS — R10.9 ABDOMINAL PAIN, UNSPECIFIED ABDOMINAL LOCATION: ICD-10-CM

## 2023-07-20 DIAGNOSIS — E87.1 HYPONATREMIA: ICD-10-CM

## 2023-07-20 DIAGNOSIS — R73.9 HYPERGLYCEMIA: ICD-10-CM

## 2023-07-20 DIAGNOSIS — R41.0 CONFUSION: ICD-10-CM

## 2023-07-20 DIAGNOSIS — I10 BENIGN ESSENTIAL HYPERTENSION: ICD-10-CM

## 2023-07-20 LAB
ALBUMIN SERPL BCG-MCNC: 4.2 G/DL (ref 3.5–5.2)
ALBUMIN UR-MCNC: NEGATIVE MG/DL
ALP SERPL-CCNC: 115 U/L (ref 35–104)
ALT SERPL W P-5'-P-CCNC: 22 U/L (ref 0–50)
ANION GAP BLD CALCULATED.3IONS-SCNC: 17 MMOL/L (ref 3–14)
ANION GAP SERPL CALCULATED.3IONS-SCNC: 12 MMOL/L (ref 7–15)
ANION GAP SERPL CALCULATED.3IONS-SCNC: 17 MMOL/L (ref 7–15)
APPEARANCE UR: CLEAR
AST SERPL W P-5'-P-CCNC: 27 U/L (ref 0–45)
BACTERIA #/AREA URNS HPF: NORMAL /HPF
BASOPHILS # BLD AUTO: 0 10E3/UL (ref 0–0.2)
BASOPHILS NFR BLD AUTO: 0 %
BILIRUB DIRECT SERPL-MCNC: <0.2 MG/DL (ref 0–0.3)
BILIRUB SERPL-MCNC: 0.2 MG/DL
BILIRUB UR QL STRIP: NEGATIVE
BUN SERPL-MCNC: 14.5 MG/DL (ref 8–23)
BUN SERPL-MCNC: 22 MG/DL (ref 7–30)
BUN SERPL-MCNC: 22.1 MG/DL (ref 8–23)
CA-I BLD-MCNC: 4.6 MG/DL (ref 4.4–5.2)
CALCIUM SERPL-MCNC: 9.4 MG/DL (ref 8.8–10.2)
CALCIUM SERPL-MCNC: 9.8 MG/DL (ref 8.8–10.2)
CHLORIDE BLD-SCNC: 88 MMOL/L (ref 94–109)
CHLORIDE SERPL-SCNC: 84 MMOL/L (ref 98–107)
CHLORIDE SERPL-SCNC: 85 MMOL/L (ref 98–107)
CO2 BLD-SCNC: 21 MMOL/L (ref 20–32)
COLOR UR AUTO: YELLOW
CREAT BLD-MCNC: 1.6 MG/DL (ref 0.5–1)
CREAT SERPL-MCNC: 1.28 MG/DL (ref 0.51–0.95)
CREAT SERPL-MCNC: 1.4 MG/DL (ref 0.51–0.95)
DEPRECATED HCO3 PLAS-SCNC: 19 MMOL/L (ref 22–29)
DEPRECATED HCO3 PLAS-SCNC: 20 MMOL/L (ref 22–29)
EOSINOPHIL # BLD AUTO: 0.1 10E3/UL (ref 0–0.7)
EOSINOPHIL NFR BLD AUTO: 1 %
ERYTHROCYTE [DISTWIDTH] IN BLOOD BY AUTOMATED COUNT: 13 % (ref 10–15)
GFR SERPL CREATININE-BSD FRML MDRD: 42 ML/MIN/1.73M2
GFR SERPL CREATININE-BSD FRML MDRD: 47 ML/MIN/1.73M2
GLUCOSE BLD-MCNC: 196 MG/DL (ref 70–99)
GLUCOSE SERPL-MCNC: 193 MG/DL (ref 70–99)
GLUCOSE SERPL-MCNC: 298 MG/DL (ref 70–99)
GLUCOSE UR STRIP-MCNC: 500 MG/DL
HBA1C MFR BLD: 8 % (ref 0–5.6)
HCT VFR BLD AUTO: 32.4 % (ref 35–47)
HCT VFR BLD CALC: 35 % (ref 35–47)
HGB BLD-MCNC: 11.6 G/DL (ref 11.7–15.7)
HGB BLD-MCNC: 11.9 G/DL (ref 11.7–15.7)
HGB UR QL STRIP: ABNORMAL
HOLD SPECIMEN: NORMAL
HOLD SPECIMEN: NORMAL
IMM GRANULOCYTES # BLD: 0.1 10E3/UL
IMM GRANULOCYTES NFR BLD: 1 %
KETONES UR STRIP-MCNC: NEGATIVE MG/DL
LEUKOCYTE ESTERASE UR QL STRIP: NEGATIVE
LIPASE SERPL-CCNC: 22 U/L (ref 13–60)
LYMPHOCYTES # BLD AUTO: 1.8 10E3/UL (ref 0.8–5.3)
LYMPHOCYTES NFR BLD AUTO: 14 %
MCH RBC QN AUTO: 29.9 PG (ref 26.5–33)
MCHC RBC AUTO-ENTMCNC: 35.8 G/DL (ref 31.5–36.5)
MCV RBC AUTO: 84 FL (ref 78–100)
MONOCYTES # BLD AUTO: 0.8 10E3/UL (ref 0–1.3)
MONOCYTES NFR BLD AUTO: 7 %
NEUTROPHILS # BLD AUTO: 9.7 10E3/UL (ref 1.6–8.3)
NEUTROPHILS NFR BLD AUTO: 77 %
NITRATE UR QL: NEGATIVE
NRBC # BLD AUTO: 0 10E3/UL
NRBC BLD AUTO-RTO: 0 /100
PH UR STRIP: 6 [PH] (ref 5–7)
PLATELET # BLD AUTO: 264 10E3/UL (ref 150–450)
POTASSIUM BLD-SCNC: 4.2 MMOL/L (ref 3.4–5.3)
POTASSIUM SERPL-SCNC: 4.4 MMOL/L (ref 3.4–5.3)
POTASSIUM SERPL-SCNC: 4.9 MMOL/L (ref 3.4–5.3)
PROT SERPL-MCNC: 7.1 G/DL (ref 6.4–8.3)
RBC # BLD AUTO: 3.88 10E6/UL (ref 3.8–5.2)
RBC #/AREA URNS AUTO: NORMAL /HPF
SODIUM BLD-SCNC: 120 MMOL/L (ref 133–144)
SODIUM SERPL-SCNC: 116 MMOL/L (ref 136–145)
SODIUM SERPL-SCNC: 121 MMOL/L (ref 136–145)
SP GR UR STRIP: 1.01 (ref 1–1.03)
UROBILINOGEN UR STRIP-ACNC: 0.2 E.U./DL
WBC # BLD AUTO: 12.5 10E3/UL (ref 4–11)
WBC #/AREA URNS AUTO: NORMAL /HPF

## 2023-07-20 PROCEDURE — 96361 HYDRATE IV INFUSION ADD-ON: CPT

## 2023-07-20 PROCEDURE — 80047 BASIC METABLC PNL IONIZED CA: CPT | Mod: XU

## 2023-07-20 PROCEDURE — 83690 ASSAY OF LIPASE: CPT | Performed by: EMERGENCY MEDICINE

## 2023-07-20 PROCEDURE — 99285 EMERGENCY DEPT VISIT HI MDM: CPT | Mod: 25

## 2023-07-20 PROCEDURE — 93005 ELECTROCARDIOGRAM TRACING: CPT

## 2023-07-20 PROCEDURE — 74176 CT ABD & PELVIS W/O CONTRAST: CPT

## 2023-07-20 PROCEDURE — G0378 HOSPITAL OBSERVATION PER HR: HCPCS

## 2023-07-20 PROCEDURE — 70450 CT HEAD/BRAIN W/O DYE: CPT

## 2023-07-20 PROCEDURE — 96360 HYDRATION IV INFUSION INIT: CPT

## 2023-07-20 PROCEDURE — 82435 ASSAY OF BLOOD CHLORIDE: CPT | Performed by: EMERGENCY MEDICINE

## 2023-07-20 PROCEDURE — 258N000003 HC RX IP 258 OP 636: Performed by: EMERGENCY MEDICINE

## 2023-07-20 PROCEDURE — 99223 1ST HOSP IP/OBS HIGH 75: CPT | Performed by: INTERNAL MEDICINE

## 2023-07-20 PROCEDURE — 36415 COLL VENOUS BLD VENIPUNCTURE: CPT | Performed by: EMERGENCY MEDICINE

## 2023-07-20 PROCEDURE — 85025 COMPLETE CBC W/AUTO DIFF WBC: CPT | Performed by: EMERGENCY MEDICINE

## 2023-07-20 PROCEDURE — 82248 BILIRUBIN DIRECT: CPT | Performed by: EMERGENCY MEDICINE

## 2023-07-20 RX ORDER — ZOLPIDEM TARTRATE 5 MG/1
5 TABLET ORAL
Status: DISCONTINUED | OUTPATIENT
Start: 2023-07-20 | End: 2023-07-21

## 2023-07-20 RX ORDER — ATENOLOL 25 MG/1
25 TABLET ORAL EVERY MORNING
Status: DISCONTINUED | OUTPATIENT
Start: 2023-07-21 | End: 2023-07-21

## 2023-07-20 RX ORDER — ALPRAZOLAM 0.5 MG
TABLET ORAL
Status: ON HOLD | COMMUNITY
Start: 2023-07-11 | End: 2023-07-21

## 2023-07-20 RX ORDER — BUSPIRONE HYDROCHLORIDE 15 MG/1
45 TABLET ORAL 2 TIMES DAILY
Status: DISCONTINUED | OUTPATIENT
Start: 2023-07-20 | End: 2023-07-22 | Stop reason: HOSPADM

## 2023-07-20 RX ORDER — ONDANSETRON 4 MG/1
4 TABLET, ORALLY DISINTEGRATING ORAL EVERY 6 HOURS PRN
Status: DISCONTINUED | OUTPATIENT
Start: 2023-07-20 | End: 2023-07-22 | Stop reason: HOSPADM

## 2023-07-20 RX ORDER — HYDROXYZINE HYDROCHLORIDE 25 MG/1
100 TABLET, FILM COATED ORAL AT BEDTIME
Status: DISCONTINUED | OUTPATIENT
Start: 2023-07-20 | End: 2023-07-21

## 2023-07-20 RX ORDER — GABAPENTIN 100 MG/1
200 CAPSULE ORAL 3 TIMES DAILY
Status: ON HOLD | COMMUNITY
Start: 2023-01-31 | End: 2023-07-21

## 2023-07-20 RX ORDER — ATORVASTATIN CALCIUM 10 MG/1
20 TABLET, FILM COATED ORAL DAILY
Status: DISCONTINUED | OUTPATIENT
Start: 2023-07-21 | End: 2023-07-22 | Stop reason: HOSPADM

## 2023-07-20 RX ORDER — LOSARTAN POTASSIUM 100 MG/1
100 TABLET ORAL DAILY
Status: DISCONTINUED | OUTPATIENT
Start: 2023-07-21 | End: 2023-07-22 | Stop reason: HOSPADM

## 2023-07-20 RX ORDER — ALPRAZOLAM 0.25 MG
0.25 TABLET ORAL 3 TIMES DAILY PRN
Status: DISCONTINUED | OUTPATIENT
Start: 2023-07-20 | End: 2023-07-22 | Stop reason: HOSPADM

## 2023-07-20 RX ORDER — ACETAMINOPHEN 325 MG/1
650 TABLET ORAL DAILY PRN
Status: DISCONTINUED | OUTPATIENT
Start: 2023-07-20 | End: 2023-07-22 | Stop reason: HOSPADM

## 2023-07-20 RX ORDER — SODIUM CHLORIDE 9 MG/ML
INJECTION, SOLUTION INTRAVENOUS CONTINUOUS
Status: DISCONTINUED | OUTPATIENT
Start: 2023-07-20 | End: 2023-07-21

## 2023-07-20 RX ORDER — AMLODIPINE BESYLATE 10 MG/1
10 TABLET ORAL AT BEDTIME
Status: DISCONTINUED | OUTPATIENT
Start: 2023-07-20 | End: 2023-07-22 | Stop reason: HOSPADM

## 2023-07-20 RX ORDER — GABAPENTIN 100 MG/1
300 CAPSULE ORAL 3 TIMES DAILY
Status: DISCONTINUED | OUTPATIENT
Start: 2023-07-21 | End: 2023-07-22 | Stop reason: HOSPADM

## 2023-07-20 RX ORDER — ONDANSETRON 4 MG/1
4 TABLET, ORALLY DISINTEGRATING ORAL EVERY 8 HOURS PRN
Status: DISCONTINUED | OUTPATIENT
Start: 2023-07-20 | End: 2023-07-21

## 2023-07-20 RX ORDER — PANTOPRAZOLE SODIUM 40 MG/1
40 TABLET, DELAYED RELEASE ORAL
Status: DISCONTINUED | OUTPATIENT
Start: 2023-07-21 | End: 2023-07-21

## 2023-07-20 RX ORDER — ONDANSETRON 2 MG/ML
4 INJECTION INTRAMUSCULAR; INTRAVENOUS EVERY 6 HOURS PRN
Status: DISCONTINUED | OUTPATIENT
Start: 2023-07-20 | End: 2023-07-22 | Stop reason: HOSPADM

## 2023-07-20 RX ORDER — CYCLOBENZAPRINE HCL 10 MG
10 TABLET ORAL 3 TIMES DAILY PRN
Status: DISCONTINUED | OUTPATIENT
Start: 2023-07-20 | End: 2023-07-22 | Stop reason: HOSPADM

## 2023-07-20 RX ADMIN — SODIUM CHLORIDE 1000 ML: 9 INJECTION, SOLUTION INTRAVENOUS at 20:35

## 2023-07-20 ASSESSMENT — ACTIVITIES OF DAILY LIVING (ADL)
ADLS_ACUITY_SCORE: 35
ADLS_ACUITY_SCORE: 35

## 2023-07-20 NOTE — TELEPHONE ENCOUNTER
Received a critical lab result via phone call from lab, [phone].   Will forward to PCP and team for review.  Lab states they had to call then will release results.

## 2023-07-20 NOTE — TELEPHONE ENCOUNTER
Spoke with Dr. Santiago, she states patient should go to the Emergency room for critically low sodium.  Writer called and left VM on both patient and husbands VM.

## 2023-07-20 NOTE — PROGRESS NOTES
Recommend to attend ER for hyponatremia .   Spoke to RN , will try to reach patient with critical lab .

## 2023-07-21 LAB
ANION GAP SERPL CALCULATED.3IONS-SCNC: 12 MMOL/L (ref 7–15)
ATRIAL RATE - MUSE: 75 BPM
BUN SERPL-MCNC: 17.9 MG/DL (ref 8–23)
CALCIUM SERPL-MCNC: 9.4 MG/DL (ref 8.8–10.2)
CHLORIDE SERPL-SCNC: 97 MMOL/L (ref 98–107)
CREAT SERPL-MCNC: 1.28 MG/DL (ref 0.51–0.95)
CREAT UR-MCNC: 13.3 MG/DL
DEPRECATED HCO3 PLAS-SCNC: 22 MMOL/L (ref 22–29)
DIASTOLIC BLOOD PRESSURE - MUSE: NORMAL MMHG
GFR SERPL CREATININE-BSD FRML MDRD: 47 ML/MIN/1.73M2
GLUCOSE BLDC GLUCOMTR-MCNC: 153 MG/DL (ref 70–99)
GLUCOSE BLDC GLUCOMTR-MCNC: 182 MG/DL (ref 70–99)
GLUCOSE BLDC GLUCOMTR-MCNC: 188 MG/DL (ref 70–99)
GLUCOSE BLDC GLUCOMTR-MCNC: 236 MG/DL (ref 70–99)
GLUCOSE BLDC GLUCOMTR-MCNC: 80 MG/DL (ref 70–99)
GLUCOSE SERPL-MCNC: 70 MG/DL (ref 70–99)
INTERPRETATION ECG - MUSE: NORMAL
LACTATE SERPL-SCNC: 1.1 MMOL/L (ref 0.7–2)
OSMOLALITY UR: 117 MMOL/KG (ref 100–1200)
P AXIS - MUSE: 39 DEGREES
POTASSIUM SERPL-SCNC: 3.7 MMOL/L (ref 3.4–5.3)
PR INTERVAL - MUSE: 190 MS
QRS DURATION - MUSE: 100 MS
QT - MUSE: 406 MS
QTC - MUSE: 453 MS
R AXIS - MUSE: -8 DEGREES
SODIUM SERPL-SCNC: 126 MMOL/L (ref 136–145)
SODIUM SERPL-SCNC: 127 MMOL/L (ref 136–145)
SODIUM SERPL-SCNC: 130 MMOL/L (ref 136–145)
SODIUM SERPL-SCNC: 131 MMOL/L (ref 136–145)
SODIUM SERPL-SCNC: 131 MMOL/L (ref 136–145)
SODIUM UR-SCNC: 33 MMOL/L
SYSTOLIC BLOOD PRESSURE - MUSE: NORMAL MMHG
T AXIS - MUSE: 20 DEGREES
UUN UR-MCNC: 120 MG/DL (ref 801–1666)
VENTRICULAR RATE- MUSE: 75 BPM

## 2023-07-21 PROCEDURE — 96361 HYDRATE IV INFUSION ADD-ON: CPT

## 2023-07-21 PROCEDURE — 84295 ASSAY OF SERUM SODIUM: CPT | Mod: 91 | Performed by: STUDENT IN AN ORGANIZED HEALTH CARE EDUCATION/TRAINING PROGRAM

## 2023-07-21 PROCEDURE — 84300 ASSAY OF URINE SODIUM: CPT | Performed by: INTERNAL MEDICINE

## 2023-07-21 PROCEDURE — 83935 ASSAY OF URINE OSMOLALITY: CPT | Performed by: INTERNAL MEDICINE

## 2023-07-21 PROCEDURE — G0378 HOSPITAL OBSERVATION PER HR: HCPCS

## 2023-07-21 PROCEDURE — 84540 ASSAY OF URINE/UREA-N: CPT | Performed by: INTERNAL MEDICINE

## 2023-07-21 PROCEDURE — 250N000013 HC RX MED GY IP 250 OP 250 PS 637: Performed by: STUDENT IN AN ORGANIZED HEALTH CARE EDUCATION/TRAINING PROGRAM

## 2023-07-21 PROCEDURE — 82962 GLUCOSE BLOOD TEST: CPT

## 2023-07-21 PROCEDURE — 82570 ASSAY OF URINE CREATININE: CPT | Performed by: INTERNAL MEDICINE

## 2023-07-21 PROCEDURE — 36415 COLL VENOUS BLD VENIPUNCTURE: CPT | Performed by: STUDENT IN AN ORGANIZED HEALTH CARE EDUCATION/TRAINING PROGRAM

## 2023-07-21 PROCEDURE — 250N000013 HC RX MED GY IP 250 OP 250 PS 637: Performed by: INTERNAL MEDICINE

## 2023-07-21 PROCEDURE — 83605 ASSAY OF LACTIC ACID: CPT | Performed by: INTERNAL MEDICINE

## 2023-07-21 PROCEDURE — 250N000012 HC RX MED GY IP 250 OP 636 PS 637: Performed by: INTERNAL MEDICINE

## 2023-07-21 PROCEDURE — 84295 ASSAY OF SERUM SODIUM: CPT | Mod: 91 | Performed by: INTERNAL MEDICINE

## 2023-07-21 PROCEDURE — 36415 COLL VENOUS BLD VENIPUNCTURE: CPT | Performed by: INTERNAL MEDICINE

## 2023-07-21 PROCEDURE — 258N000003 HC RX IP 258 OP 636: Performed by: INTERNAL MEDICINE

## 2023-07-21 PROCEDURE — 99232 SBSQ HOSP IP/OBS MODERATE 35: CPT | Performed by: STUDENT IN AN ORGANIZED HEALTH CARE EDUCATION/TRAINING PROGRAM

## 2023-07-21 PROCEDURE — 84295 ASSAY OF SERUM SODIUM: CPT | Performed by: INTERNAL MEDICINE

## 2023-07-21 RX ORDER — ATENOLOL 25 MG/1
25 TABLET ORAL 2 TIMES DAILY
Status: DISCONTINUED | OUTPATIENT
Start: 2023-07-21 | End: 2023-07-21

## 2023-07-21 RX ORDER — ALPRAZOLAM 0.5 MG
0.25 TABLET ORAL DAILY PRN
COMMUNITY

## 2023-07-21 RX ORDER — DEXTROSE MONOHYDRATE 25 G/50ML
25-50 INJECTION, SOLUTION INTRAVENOUS
Status: DISCONTINUED | OUTPATIENT
Start: 2023-07-21 | End: 2023-07-22 | Stop reason: HOSPADM

## 2023-07-21 RX ORDER — ACETAMINOPHEN 325 MG/1
650 TABLET ORAL DAILY PRN
COMMUNITY

## 2023-07-21 RX ORDER — CARVEDILOL 6.25 MG/1
6.25 TABLET ORAL 2 TIMES DAILY WITH MEALS
Status: DISCONTINUED | OUTPATIENT
Start: 2023-07-21 | End: 2023-07-22 | Stop reason: HOSPADM

## 2023-07-21 RX ORDER — NICOTINE POLACRILEX 4 MG
15-30 LOZENGE BUCCAL
Status: DISCONTINUED | OUTPATIENT
Start: 2023-07-21 | End: 2023-07-22 | Stop reason: HOSPADM

## 2023-07-21 RX ORDER — POLYETHYLENE GLYCOL 3350 17 G/17G
17 POWDER, FOR SOLUTION ORAL 2 TIMES DAILY
Status: DISCONTINUED | OUTPATIENT
Start: 2023-07-21 | End: 2023-07-22 | Stop reason: HOSPADM

## 2023-07-21 RX ORDER — AMOXICILLIN 250 MG
1 CAPSULE ORAL 2 TIMES DAILY
Status: DISCONTINUED | OUTPATIENT
Start: 2023-07-21 | End: 2023-07-22 | Stop reason: HOSPADM

## 2023-07-21 RX ADMIN — INSULIN GLARGINE 48 UNITS: 100 INJECTION, SOLUTION SUBCUTANEOUS at 08:17

## 2023-07-21 RX ADMIN — Medication 1 MG: at 23:34

## 2023-07-21 RX ADMIN — ATENOLOL 25 MG: 25 TABLET ORAL at 00:27

## 2023-07-21 RX ADMIN — BUSPIRONE HYDROCHLORIDE 45 MG: 15 TABLET ORAL at 20:19

## 2023-07-21 RX ADMIN — AMLODIPINE BESYLATE 10 MG: 10 TABLET ORAL at 00:11

## 2023-07-21 RX ADMIN — GABAPENTIN 300 MG: 100 CAPSULE ORAL at 20:19

## 2023-07-21 RX ADMIN — ACETAMINOPHEN 650 MG: 325 TABLET, FILM COATED ORAL at 00:11

## 2023-07-21 RX ADMIN — ALPRAZOLAM 0.25 MG: 0.25 TABLET ORAL at 04:01

## 2023-07-21 RX ADMIN — SODIUM CHLORIDE: 9 INJECTION, SOLUTION INTRAVENOUS at 00:22

## 2023-07-21 RX ADMIN — FLUOXETINE 40 MG: 20 CAPSULE ORAL at 10:44

## 2023-07-21 RX ADMIN — GABAPENTIN 300 MG: 100 CAPSULE ORAL at 10:45

## 2023-07-21 RX ADMIN — INSULIN ASPART 2 UNITS: 100 INJECTION, SOLUTION INTRAVENOUS; SUBCUTANEOUS at 21:47

## 2023-07-21 RX ADMIN — SENNOSIDES AND DOCUSATE SODIUM 1 TABLET: 50; 8.6 TABLET ORAL at 17:42

## 2023-07-21 RX ADMIN — GABAPENTIN 300 MG: 100 CAPSULE ORAL at 13:52

## 2023-07-21 RX ADMIN — CARVEDILOL 6.25 MG: 6.25 TABLET, FILM COATED ORAL at 17:42

## 2023-07-21 RX ADMIN — ATORVASTATIN CALCIUM 20 MG: 10 TABLET, FILM COATED ORAL at 10:44

## 2023-07-21 RX ADMIN — AMLODIPINE BESYLATE 10 MG: 10 TABLET ORAL at 21:47

## 2023-07-21 RX ADMIN — BUSPIRONE HYDROCHLORIDE 45 MG: 15 TABLET ORAL at 00:23

## 2023-07-21 RX ADMIN — ATENOLOL 25 MG: 25 TABLET ORAL at 08:01

## 2023-07-21 RX ADMIN — BUSPIRONE HYDROCHLORIDE 45 MG: 15 TABLET ORAL at 08:01

## 2023-07-21 ASSESSMENT — ACTIVITIES OF DAILY LIVING (ADL)
ADLS_ACUITY_SCORE: 31
ADLS_ACUITY_SCORE: 35
ADLS_ACUITY_SCORE: 31
ADLS_ACUITY_SCORE: 31
ADLS_ACUITY_SCORE: 35
ADLS_ACUITY_SCORE: 31
ADLS_ACUITY_SCORE: 31

## 2023-07-21 NOTE — H&P
[facility]    History and Physical - Hospitalist Service       Date of Admission:  7/20/2023    Assessment & Plan      Vanessa Mota is a 63 year old female admitted on 7/20/2023. She Zentz with mild confusion and associated hyponatremia.  Her chart indicates initiation of chlorthalidone back in January.  She has a also noted to have a mild anion gap metabolic acidosis and mild stage I acute kidney injury.   1) hyponatremia      -Does appear to have mild symptoms from this her CT is otherwise negative of the head.      -Does have associated acute kidney injury increased BUN and anion gap metabolic acidosis      -Sodium was mildly diminished spironolactone therapy alone back in January chlorthalidone was initiated that point likely is the etiology of the patient's hyponatremia.      -would hold/d/c diuretics      -receive NS bolus in ED x 1000 ml      -initial IV rate 125 ml 0.9% NS      -q 4 hour sodium      -avoid over rapid correction. 6-8 mmol increase in 24 hours (but likely can discharge at that point as predicted sodium 127-129 at that time)     -check urine osmolality, UUN, urine sodium to assess potential etiology  2) acute kidney injury:     -Stage I      -Renal function has been slowly declining over the last year or so possible that some of this is secondary to diabetes and hypertension also seems to have significant prerenal component as discussed above.    -We will check urine electrolytes as discussed above    -IV fluids with sodium chloride IV as discussed above.    -We will hold her ARB currently.,  Likely can reestablish once her creatinine returns to a reasonable baseline for her.  3) hypertension:     -Blood pressure is mildly elevated on presentation     -Need to stop her diuretics as discussed above we will hold her ARB currently     -At this time can increase her beta-blocker therapy given her pulse is 78 to twice daily treatment.     -May have good control of blood  "pressure with ARB therapy plus increase beta-blocker      Dose  4) obstipation      -Etiology of patient's abdominal symptoms      -MiraLAX daily  5) anion gap metabolic acidosis     -No history of similar alcohol intake     -Presenting with a sepsis picture.      -We will check a serum lactate      -This may be from prerenal state or prerenal state plus metformin therapy context of decreased renal perfusion and medication of metformin     -We will hold metformin currently  6) type 2 diabetes    -We will hold her sulfonylurea and metformin    -Moderate carb diet    -We will continue the long-acting insulin therapy but initiate sliding scale coverage presently.  7) History of hyperlipidemia fibromyalgia irritable bowel syndrome     -Stable continue present regimen.                Diet:  Moderate Carbohydrate  DVT Prophylaxis: Enoxaparin (Lovenox) SQ  Benites Catheter: Not present  Lines: None     Cardiac Monitoring: None  Code Status:  Full Code    Clinically Significant Risk Factors Present on Admission         # Hyponatremia: Lowest Na = 116 mmol/L in last 2 days, will monitor as appropriate          # Hypertension: Noted on problem list     # DMII: A1C = 8.0 % (Ref range: 0.0 - 5.6 %) within past 6 months    # Obesity: Estimated body mass index is 34.36 kg/m  as calculated from the following:    Height as of 7/19/23: 1.511 m (4' 11.5\").    Weight as of 7/19/23: 78.5 kg (173 lb).            Disposition Plan home     Expected Discharge Date: 07/21/2023                  Stan Huitron MD  Hospitalist Service  [facility]  Securely message with EUDOW[employer] (more info)  Text page via [facility] Paging/Directory     ______________________________________________________________________    Chief Complaint   Abnormal sodium levels    History is obtained from the patient    History of Present Illness   Vanessa Mota is a 63 year old female with a PMHx of HTN,HLP, GERD, irritable bowel syndrome, fibromyalgia who " presents to the ED tonight secondary to referral from her outpatient family medicine clinic secondary to hyponatremia.  Review of her record indicates that she had been on spironolactone along with other blood pressure medications. She  had some difficulties with nausea but it sounds like she is ultimately able to stay on it.  It looks like she was then started on chlorthalidone back in January.  Again review indicates that she did develop a little bit of hyponatremia with the onset of the spironolactone therapy down to below 129 but it was 134 back in January when the chlorthalidone therapy was initiated.  He came in the clinic with some complaints of generalized abdominal pain seem to have some obstipation she denies any vomiting or diarrhea.  Dates that she has been feeling like that she has been a little bit more confused for the last number of weeks not being fully attentive to her usual activities.  Denies any fever or chills.  They did check a UA on her yesterday which was benign.  Her sodium yesterday was 116 down from 134 1/31/2023 and for reference it was 132 last August.  She has also had a rise in creatinine from January 2022 and was 1.00 to the current level 1.28 today.  Odium obtained in the ED today is improved over yesterday at 121.  Chlorides 85 her bicarb was 20 yesterday is 19 today.  Creatinine today is 1.40.  Anion gap is mildly elevated at 17.  Denies any fever chills dysuria frequency denies any cough chest pain.  Is having just a little bit abdominal pain today but nothing severe.  CT in the ED revealed significant stool burden and the colon without any other abnormalities.  To the head was negative.  Admitted for further evaluation and treatment of the above.    Past Medical History    Past Medical History:   Diagnosis Date     311     1-2 yrs, [facility] started zoloft 50 mg daily     Diabetes (H)      Gastroesophageal reflux disease      Headache(784.0)     4-5 yrs-migraines     Heart  murmur      Hypertension      Myalgia and myositis, unspecified     suspects fibromyalgia, took celebrex for a while     Other chronic pain      Pancreatic disease      Papanicolaou smear of cervix with atypical squamous cells of undetermined significance (ASC-US) 05, 07, 08, 11, 14, 17    neg hpv     Pure hypercholesterolemia 10/1/2012       Past Surgical History   Past Surgical History:   Procedure Laterality Date     COLONOSCOPY Left 2021    Procedure: COLONOSCOPY;  Surgeon: Elia Rader MD;  Location:  GI     DILATION AND CURETTAGE, OPERATIVE HYSTEROSCOPY WITH MORCELLATOR, COMBINED N/A 2022    Procedure: HYSTEROSCOPY DILATION AND CURETTAGE WITH MYOSURE and endometrial polypectomy;  Surgeon: Margaret Wolf DO;  Location:  OR     Z FULL ROUT OBSTE CARE,VAGINAL DELIV       x 4     ZZC INDUCED ABORTN BY D&C      once 12 years ago       Prior to Admission Medications   Prior to Admission Medications   Prescriptions Last Dose Informant Patient Reported? Taking?   ALPRAZolam (XANAX) 0.25 MG tablet   Yes No   Sig: Take 0.25 mg by mouth 3 times daily as needed for anxiety   ALPRAZolam (XANAX) 0.5 MG tablet   Yes No   FLUoxetine (PROZAC) 40 MG capsule   Yes No   Sig: Take 40 mg by mouth daily   RELION PEN NEEDLES 31G X 6 MM miscellaneous   No No   Sig: USE 1 DAILY AT BEDTIME OR AS DIRECTED.   SUMAtriptan (IMITREX) 100 MG tablet   No No   Sig: TAKE 1 TABLET BY MOUTH AT ONSET OF HEADACHE FOR MIGRAINE. MAY REPEAT DOSE AFTER 2 HOURS AS NEEDED. MAX OF 2 TABLETS PER 24 HOURS   acetaminophen (TYLENOL) 325 MG tablet   No No   Sig: Take 3 tablets (975 mg) by mouth every 6 hours as needed for mild pain   Patient taking differently: Take 650 mg by mouth daily as needed for mild pain   amLODIPine (NORVASC) 10 MG tablet   No No   Sig: Take 1 tablet (10 mg) by mouth At Bedtime   atenolol (TENORMIN) 25 MG tablet   No No   Sig: TAKE 1 TABLET BY MOUTH IN THE MORNING   atorvastatin (LIPITOR) 20 MG  tablet   No No   Sig: TAKE 1 TABLET DAILY   blood glucose (CANDIDA CONTOUR) test strip   No No   Sig: Use to test blood sugars 2 times daily or as directed.   blood glucose (NO BRAND SPECIFIED) lancets standard   No No   Sig: Use to test blood sugar 2 times daily or as directed.  Dispense as covered by insurance   blood glucose monitoring (NO BRAND SPECIFIED) meter device kit   No No   Sig: Use to test blood sugar 2 times daily or as directed.  Dispense brand as covered by insurance   blood glucose monitoring (NO BRAND SPECIFIED) test strip   No No   Sig: Use to test blood sugar 2 times daily or as directed.  Dispense Ultra One touch strips per insurance coverage   busPIRone HCl (BUSPAR) 30 MG tablet   Yes No   Si mg 2 times daily    chlorthalidone (HYGROTON) 25 MG tablet   No No   Sig: Take 1 tablet (25 mg) by mouth daily   cyclobenzaprine (FLEXERIL) 10 MG tablet   No No   Sig: Take 1 tablet (10 mg) by mouth 3 times daily as needed for muscle spasms   gabapentin (NEURONTIN) 100 MG capsule   Yes No   Sig: Take 200 mg by mouth 3 times daily   gabapentin (NEURONTIN) 300 MG capsule   No No   Sig: Take 1 capsule (300 mg) by mouth 3 times daily   glipiZIDE (GLUCOTROL XL) 10 MG 24 hr tablet   No No   Sig: TAKE 1 TABLET EVERY 12     HOURS.   hydrOXYzine (VISTARIL) 50 MG capsule   Yes No   Sig: Take 100 mg by mouth At Bedtime   insulin glargine (BASAGLAR KWIKPEN) 100 UNIT/ML pen   No No   Sig: INJECT 48 UNITS SUBCUTANEOUSLY IN THE MORNING   losartan (COZAAR) 100 MG tablet   No No   Sig: Take 1 tablet (100 mg) by mouth daily   metFORMIN (GLUCOPHAGE XR) 500 MG 24 hr tablet   No No   Sig: TAKE 4 TABLETS BY MOUTH ONCE DAILY WITH SUPPER   omeprazole (PRILOSEC) 40 MG DR capsule   No No   Sig: Take 1 capsule (40 mg) by mouth daily   ondansetron (ZOFRAN ODT) 4 MG ODT tab   No No   Sig: DISSOLVE 1 TABLET IN MOUTH EVERY 8 HOURS AS NEEDED FOR NAUSEA   spironolactone (ALDACTONE) 25 MG tablet   No No   Sig: Take 1 tablet (25 mg) by  mouth daily   zolpidem (AMBIEN) 5 MG tablet   No No   Sig: TAKE 1 TABLET BY MOUTH NIGHTLY AS NEEDED FOR SLEEP      Facility-Administered Medications: None        Review of Systems    The 10 point Review of Systems is negative other than noted in the HPI or here.    Social History   I have reviewed this patient's social history and updated it with pertinent information if needed.  Social History     Tobacco Use     Smoking status: Former     Years: 3.00     Types: Cigarettes     Quit date: 1/15/2013     Years since quitting: 10.5     Smokeless tobacco: Current     Tobacco comments:     nicotine lozenge   Vaping Use     Vaping Use: Never used   Substance Use Topics     Alcohol use: Not Currently     Drug use: Yes     Types: Marijuana     Comment: once in a while        Family History   I have reviewed this patient's family history and updated it with pertinent information if needed.  Family History   Problem Relation Age of Onset     Diabetes Brother         2 brothers     Family History Negative Mother      Family History Negative Father      Diabetes Father      Hypertension Maternal Grandmother      Arthritis Maternal Grandmother      Diabetes Maternal Grandfather      Prostate Cancer Maternal Grandfather      Blood Disease Brother         hiv positive, homosextual and alcoholism and drug abuse     Respiratory Brother         sleep apnea, htn and dyslipidmia     Breast Cancer No family hx of      Cancer - colorectal No family hx of      Colon Cancer No family hx of        Allergies   Allergies   Allergen Reactions     Wellbutrin [Bupropion Hcl] Rash        Physical Exam   Vital Signs: Temp: (!) 96.7  F (35.9  C) Temp src: Temporal BP: (!) 140/65 Pulse: 78   Resp: 16 SpO2: 96 %      Weight: 0 lbs 0 oz    Constitutional: He is awake in no acute distress.  Perhaps very mildly confused to recent events but oriented x3.  Eyes: Lids and lashes normal, pupils equal, round and reactive to light, extra ocular muscles  intact, sclera clear, conjunctiva normal and sclera clear  ENT: Traumatic normocephalic, oral mucosa is dry.  Nasal mucosa is benign.  Respiratory: No increased work of breathing, good air exchange, clear to auscultation bilaterally, no crackles or wheezing  Cardiovascular: Regular rate and rhythm normal S1-S2 grade 1/6 systolic ejection murmur at the right upper sternal border without radiation.  GI: No scars, normal bowel sounds, soft, non-distended, non-tender, no masses palpated, no hepatosplenomegally  Skin: no bruising or bleeding, no redness, warmth, or swelling and no rashes  Musculoskeletal: There is no redness, warmth, or swelling of the joints.  Full range of motion noted.  Motor strength is 5 out of 5 all extremities bilaterally.  Tone is normal.  1+ edema bilaterally.  Neurologic: Awake, alert, oriented to name, place and time.  Cranial nerves II-XII are grossly intact.  Motor is 5 out of 5 bilaterally.  Cerebellar finger to nose, heel to shin intact.  Sensory is intact.  Babinski down going, Romberg negative, and gait is normal.  Motor Exam:  Motor exam is symmetrical 5 out of 5 all extremities bilaterally  Sensory:  Sensory intact    Medical Decision Making       55 MINUTES SPENT BY ME on the date of service doing chart review, history, exam, documentation & further activities per the note.  MANAGEMENT DISCUSSED with the following over the past 24 hours: ED Provider   Tests ORDERED & REVIEWED in the past 24 hours:  - See lab/imaging results included in the data section of the note      Data     I have personally reviewed the following data over the past 24 hrs:    12.5 (H)  \   11.9   / 264     120 (L) 88 (L) 22 /  196 (H)   4.2 19 (L) 1.6 (H) \       ALT: 22 AST: 27 AP: 115 (H) TBILI: 0.2   ALB: 4.2 TOT PROTEIN: 7.1 LIPASE: 22       TSH: N/A T4: N/A A1C: N/A        Results for orders placed or performed during the hospital encounter of 07/20/23 (from the past 24 hour(s))   CBC + differential     Narrative    The following orders were created for panel order CBC + differential.  Procedure                               Abnormality         Status                     ---------                               -----------         ------                     CBC with platelets and d...[[id number]]  Abnormal            Final result                 Please view results for these tests on the individual orders.   Basic metabolic panel (BMP)   Result Value Ref Range    Sodium 121 (L) 136 - 145 mmol/L    Potassium 4.4 3.4 - 5.3 mmol/L    Chloride 85 (L) 98 - 107 mmol/L    Carbon Dioxide (CO2) 19 (L) 22 - 29 mmol/L    Anion Gap 17 (H) 7 - 15 mmol/L    Urea Nitrogen 22.1 8.0 - 23.0 mg/dL    Creatinine 1.40 (H) 0.51 - 0.95 mg/dL    Calcium 9.4 8.8 - 10.2 mg/dL    Glucose 193 (H) 70 - 99 mg/dL    GFR Estimate 42 (L) >60 mL/min/1.73m2   Extra Tube ([place] Draw)    Narrative    The following orders were created for panel order Extra Tube ([place] Draw).  Procedure                               Abnormality         Status                     ---------                               -----------         ------                     Extra Blue Top Tube[[id number]]                              Final result               Extra Red Top Tube[[id number]]                               Final result                 Please view results for these tests on the individual orders.   CBC with platelets and differential   Result Value Ref Range    WBC Count 12.5 (H) 4.0 - 11.0 10e3/uL    RBC Count 3.88 3.80 - 5.20 10e6/uL    Hemoglobin 11.6 (L) 11.7 - 15.7 g/dL    Hematocrit 32.4 (L) 35.0 - 47.0 %    MCV 84 78 - 100 fL    MCH 29.9 26.5 - 33.0 pg    MCHC 35.8 31.5 - 36.5 g/dL    RDW 13.0 10.0 - 15.0 %    Platelet Count 264 150 - 450 10e3/uL    % Neutrophils 77 %    % Lymphocytes 14 %    % Monocytes 7 %    % Eosinophils 1 %    % Basophils 0 %    % Immature Granulocytes 1 %    NRBCs per 100 WBC 0 <1 /100    Absolute Neutrophils 9.7 (H) 1.6 - 8.3 10e3/uL     Absolute Lymphocytes 1.8 0.8 - 5.3 10e3/uL    Absolute Monocytes 0.8 0.0 - 1.3 10e3/uL    Absolute Eosinophils 0.1 0.0 - 0.7 10e3/uL    Absolute Basophils 0.0 0.0 - 0.2 10e3/uL    Absolute Immature Granulocytes 0.1 <=0.4 10e3/uL    Absolute NRBCs 0.0 10e3/uL   Extra Blue Top Tube   Result Value Ref Range    Hold Specimen JIC    Extra Red Top Tube   Result Value Ref Range    Hold Specimen JIC    Hepatic panel   Result Value Ref Range    Protein Total 7.1 6.4 - 8.3 g/dL    Albumin 4.2 3.5 - 5.2 g/dL    Bilirubin Total 0.2 <=1.2 mg/dL    Alkaline Phosphatase 115 (H) 35 - 104 U/L    AST 27 0 - 45 U/L    ALT 22 0 - 50 U/L    Bilirubin Direct <0.20 0.00 - 0.30 mg/dL   Lipase   Result Value Ref Range    Lipase 22 13 - 60 U/L   iStat Basic Chem ICA Hematocrit, POCT   Result Value Ref Range    Chloride POCT 88 (L) 94 - 109 mmol/L    Potassium POCT 4.2 3.4 - 5.3 mmol/L    Sodium POCT 120 (L) 133 - 144 mmol/L    UREA NITROGEN POCT 22 7 - 30 mg/dL    Calcium, Ionized Whole Blood POCT 4.6 4.4 - 5.2 mg/dL    Glucose Whole Blood POCT 196 (H) 70 - 99 mg/dL    Anion Gap POCT 17.0 (H) 3.0 - 14.0 mmol/L    Hemoglobin POCT 11.9 11.7 - 15.7 g/dL    Hematocrit POCT 35 35 - 47 %    Creatinine POCT 1.6 (H) 0.5 - 1.0 mg/dL    TOTAL CO2 POCT 21 20 - 32 mmol/L   EKG 12 lead   Result Value Ref Range    Systolic Blood Pressure  mmHg    Diastolic Blood Pressure  mmHg    Ventricular Rate 75 BPM    Atrial Rate 75 BPM    VT Interval 190 ms    QRS Duration 100 ms     ms    QTc 453 ms    P Axis 39 degrees    R AXIS -8 degrees    T Axis 20 degrees    Interpretation ECG       Sinus rhythm  Normal ECG  No previous ECGs available     CT Abdomen Pelvis w/o Contrast    Narrative    EXAM: CT ABDOMEN PELVIS W/O CONTRAST  LOCATION: [facility]  DATE: 7/20/2023    INDICATION: Multiple weeks abd pain bloating, Cr too high for contrast  COMPARISON: CT abdomen and pelvis 02/15/2023.  TECHNIQUE: CT scan of the abdomen and pelvis was  performed without IV contrast. Multiplanar reformats were obtained. Dose reduction techniques were used.  CONTRAST: None.    FINDINGS:   LOWER CHEST: Normal.    HEPATOBILIARY: Normal.    PANCREAS: Normal.    SPLEEN: No acute abnormality. Stable small peripherally calcified splenic artery aneurysm measuring 10 mm.    ADRENAL GLANDS: Normal.    KIDNEYS/BLADDER: No obstructing stone. No hydronephrosis. No acute renal abnormality. Stable bladder wall calcification inferiorly at the midline.    BOWEL: Normal appendix. No acute bowel abnormality. Moderate stool throughout the colon. Colonic diverticula.    LYMPH NODES: Normal.    VASCULATURE: Unremarkable.    PELVIC ORGANS: Normal.    MUSCULOSKELETAL: Spine degenerative changes.      Impression    IMPRESSION:   1.  No acute abnormality identified.  2.  Moderate stool seen throughout the colon.     CT Head w/o Contrast    Narrative    EXAM: CT HEAD W/O CONTRAST  LOCATION: [facility]  DATE: 7/20/2023    INDICATION: posterior head trauma 3 days ago, nonfocal neuro exam  COMPARISON: 03/27/2008  TECHNIQUE: Routine CT Head without IV contrast. Multiplanar reformats. Dose reduction techniques were used.    FINDINGS:  INTRACRANIAL CONTENTS: No intracranial hemorrhage, extraaxial collection, or mass effect.  Tiny chronic infarction anterior aspect right basal ganglia. Mild presumed chronic small vessel ischemic changes. Normal ventricles and sulci.     VISUALIZED ORBITS/SINUSES/MASTOIDS: No intraorbital abnormality. No paranasal sinus mucosal disease. No middle ear or mastoid effusion.    BONES/SOFT TISSUES: No acute abnormality.      Impression    IMPRESSION:  1.  No acute intracranial process.     EKG reveals normal sinus rhythm normal EKG.

## 2023-07-21 NOTE — PROGRESS NOTES
Pt A/Ox4 pleasant lady. Pt is Independent in room.  Reg diet tolerated. Na+ low.  BG checks done and insulin given.  VSS on RA denies pain.  Diuretics held. Had 2 BM's this shift, voiding fine.  Pt is ready to go home, waiting on Na+ levels to improve.

## 2023-07-21 NOTE — PLAN OF CARE
PRIMARY DIAGNOSIS: Abnormal Labs  OUTPATIENT/OBSERVATION GOALS TO BE MET BEFORE DISCHARGE:  1. ADLs back to baseline: No    2. Activity and level of assistance: Ambulating independently.    3. Pain status: Pain free.    4. Return to near baseline physical activity: No     Discharge Planner Nurse   Safe discharge environment identified: Yes  Barriers to discharge: Yes       Entered by: Alicja Tucker RN 07/21/2023 5:59 PM  Patient is A & O x 4, lung sounds CTA. Bowel sounds active, LBM 8/21 x 2. Pt is independent in room, continent of B & B. Pt denies pain/dizzness/SOB, tolerating po meds and Mod carb 60 gram diet. PIV to VALDO SL, current Na 127, holding diuretics. Plan is to have Na levels to WDL. Will continue to monitor.  /72 (BP Location: Left arm)   Pulse 72   Temp 98.4  F (36.9  C) (Oral)   Resp 18   Ht 1.524 m (5')   Wt 77.1 kg (170 lb)   SpO2 98%   BMI 33.20 kg/m    Please review provider order for any additional goals.   Nurse to notify provider when observation goals have been met and patient is ready for discharge.

## 2023-07-21 NOTE — PHARMACY-ADMISSION MEDICATION HISTORY
Pharmacist Admission Medication History    Admission medication history is complete. The information provided in this note is only as accurate as the sources available at the time of the update.    Medication reconciliation/reorder completed by provider prior to medication history? Yes    Information Source(s): Patient via in-person    Pertinent Information: dispense history    Changes made to PTA medication list:    Added: None    Deleted: hydroxyzine, omeprazole, zolpidem    Changed: None    Medication Affordability:  Not including over the counter (OTC) medications, was there a time in the past 3 months when you did not take your medications as prescribed because of cost?: Yes    Allergies reviewed with patient and updates made in EHR: yes    Medication History Completed By: Braeden Cabral [facility] 7/21/2023 9:58 AM    Prior to Admission medications    Medication Sig Last Dose Taking? Auth Provider Long Term End Date   acetaminophen (TYLENOL) 325 MG tablet Take 650 mg by mouth daily as needed for mild pain  Yes Unknown, Entered By History     ALPRAZolam (XANAX) 0.5 MG tablet Take 0.25 mg by mouth daily as needed for anxiety  Yes Unknown, Entered By History     amLODIPine (NORVASC) 10 MG tablet Take 1 tablet (10 mg) by mouth At Bedtime 7/19/2023 Yes Aaseby-Aguilera, Ramona Ann, PA-C Yes    atenolol (TENORMIN) 25 MG tablet TAKE 1 TABLET BY MOUTH IN THE MORNING 7/20/2023 Yes Aaseby-Aguilera, Ramona Ann, PA-C Yes    atorvastatin (LIPITOR) 20 MG tablet TAKE 1 TABLET DAILY 7/20/2023 Yes Aaseby-Aguilera, Ramona Ann, PA-C Yes    busPIRone HCl (BUSPAR) 30 MG tablet Take 45 mg by mouth 2 times daily 7/20/2023 at am Yes Azucena Harrison MD Yes    chlorthalidone (HYGROTON) 25 MG tablet Take 1 tablet (25 mg) by mouth daily 7/20/2023 Yes Aaseby-Aguilera, Ramona Ann, PA-C Yes    cyclobenzaprine (FLEXERIL) 10 MG tablet Take 1 tablet (10 mg) by mouth 3 times daily as needed for muscle spasms  Yes Aaseby-Aguilera, Ramona Ann, PA-C      FLUoxetine (PROZAC) 40 MG capsule Take 40 mg by mouth daily 7/20/2023 Yes Reported, Patient     gabapentin (NEURONTIN) 300 MG capsule Take 1 capsule (300 mg) by mouth 3 times daily 7/20/2023 at am Yes Aaseby-Aguilera, Ramona Ann, PA-C Yes    glipiZIDE (GLUCOTROL XL) 10 MG 24 hr tablet TAKE 1 TABLET EVERY 12     HOURS. 7/20/2023 Yes Aaseby-Aguilera, Ramona Ann, PA-C Yes    insulin glargine (BASAGLAR KWIKPEN) 100 UNIT/ML pen INJECT 48 UNITS SUBCUTANEOUSLY IN THE MORNING 7/20/2023 Yes Aaseby-Aguilera, Ramona Ann, PA-C Yes    losartan (COZAAR) 100 MG tablet Take 1 tablet (100 mg) by mouth daily 7/20/2023 Yes Aaseby-Aguilera, Ramona Ann, PA-C Yes    metFORMIN (GLUCOPHAGE XR) 500 MG 24 hr tablet TAKE 4 TABLETS BY MOUTH ONCE DAILY WITH SUPPER 7/19/2023 Yes Aaseby-Aguilera, Ramona Ann, PA-C Yes    ondansetron (ZOFRAN ODT) 4 MG ODT tab DISSOLVE 1 TABLET IN MOUTH EVERY 8 HOURS AS NEEDED FOR NAUSEA  Yes Aaseby-Aguilera, Ramona Ann, PA-C     spironolactone (ALDACTONE) 25 MG tablet Take 1 tablet (25 mg) by mouth daily 7/20/2023 Yes Aaseby-Aguilera, Ramona Ann, PA-C Yes    SUMAtriptan (IMITREX) 100 MG tablet TAKE 1 TABLET BY MOUTH AT ONSET OF HEADACHE FOR MIGRAINE. MAY REPEAT DOSE AFTER 2 HOURS AS NEEDED. MAX OF 2 TABLETS PER 24 HOURS  Yes Aaseby-Aguilera, Ramona Ann, PA-C     blood glucose (CANDIDA CONTOUR) test strip Use to test blood sugars 2 times daily or as directed.   Aaseby-Aguilera, Ramona Ann, PA-C     blood glucose (NO BRAND SPECIFIED) lancets standard Use to test blood sugar 2 times daily or as directed.  Dispense as covered by insurance   Aaseby-Aguilera, Ramona Ann, PA-C     blood glucose monitoring (NO BRAND SPECIFIED) meter device kit Use to test blood sugar 2 times daily or as directed.  Dispense brand as covered by insurance   Aaseby-Aguilera, Ramona Ann, PA-C     blood glucose monitoring (NO BRAND SPECIFIED) test strip Use to test blood sugar 2 times daily or as directed.  Dispense Ultra One touch strips per  insurance coverage   Aaseby-Aguilera, Ramona Ann, PA-C     RELION PEN NEEDLES 31G X 6 MM miscellaneous USE 1 DAILY AT BEDTIME OR AS DIRECTED.   Codie Santiago MD

## 2023-07-21 NOTE — ED NOTES
[facility]  ED Nurse Handoff Report    ED Chief complaint: Abnormal Labs  . ED Diagnosis:   Final diagnoses:   Hyponatremia   Abdominal pain, unspecified abdominal location   Closed head injury, initial encounter   Hyperglycemia       Allergies:   Allergies   Allergen Reactions     Wellbutrin [Bupropion Hcl] Rash       Code Status: Full Code    Activity level - Baseline/Home:  independent.  Activity Level - Current:   independent.   Lift room needed: No.   Bariatric: No   Needed: No   Isolation: No.   Infection: Not Applicable.     Respiratory status: Room air    Vital Signs (within 30 minutes):   Vitals:    07/20/23 1949 07/20/23 2027 07/20/23 2028   BP: 139/63 (!) 140/65    Pulse: 87 78    Resp: 16     Temp: (!) 96.7  F (35.9  C)     TempSrc: Temporal     SpO2: 97%  96%       Cardiac Rhythm:  ,      Pain level:    Patient confused: No.   Patient Falls Risk: nonskid shoes/slippers when out of bed, patient and family education, assistive device/personal items within reach, activity supervised and mobility aid in reach.   Elimination Status: Has voided     Patient Report - Initial Complaint: Abnormal Labs.   Focused Assessment: Vanessa Mota is a 63 year old female who presents with sodium level concerns. The patient reports that she has been feeling confused for the past couple of weeks and it has gotten worse over time. She reports difficulty going to the bathroom and has to go frequently. Here in ED, the patient feels scared. She is on insulinThe patient denies vomiting and diarrhea. The paThe patient denies smoking tobacco. The patient denies any family history of cancer. The patient lives with her  who has not had any recent illness. was seen at the clinic yesterday and had some tests done including drawing blood and a urine analysis.     Abnormal Results:   Labs Ordered and Resulted from Time of ED Arrival to Time of ED Departure   CBC WITH PLATELETS AND DIFFERENTIAL -  Abnormal       Result Value    WBC Count 12.5 (*)     RBC Count 3.88      Hemoglobin 11.6 (*)     Hematocrit 32.4 (*)     MCV 84      MCH 29.9      MCHC 35.8      RDW 13.0      Platelet Count 264      % Neutrophils 77      % Lymphocytes 14      % Monocytes 7      % Eosinophils 1      % Basophils 0      % Immature Granulocytes 1      NRBCs per 100 WBC 0      Absolute Neutrophils 9.7 (*)     Absolute Lymphocytes 1.8      Absolute Monocytes 0.8      Absolute Eosinophils 0.1      Absolute Basophils 0.0      Absolute Immature Granulocytes 0.1      Absolute NRBCs 0.0     ISTAT BASIC CHEM ICA HEMATOCRIT POCT - Abnormal    Chloride POCT 88 (*)     Potassium POCT 4.2      Sodium POCT 120 (*)     UREA NITROGEN POCT 22      Calcium, Ionized Whole Blood POCT 4.6      Glucose Whole Blood POCT 196 (*)     Anion Gap POCT 17.0 (*)     Hemoglobin POCT 11.9      Hematocrit POCT 35      Creatinine POCT 1.6 (*)     TOTAL CO2 POCT 21     BASIC METABOLIC PANEL   HEPATIC FUNCTION PANEL   LIPASE        CT Head w/o Contrast    (Results Pending)   CT Abdomen Pelvis w/o Contrast    (Results Pending)       Treatments provided: IV, Labs, CT  Family Comments: At bedside  OBS brochure/video discussed/provided to patient:  N/A  ED Medications:   Medications   0.9% sodium chloride BOLUS (1,000 mLs Intravenous $New Bag 7/20/23 2035)       Drips infusing:  No  For the majority of the shift this patient was Green.   Interventions performed were none.    Sepsis treatment initiated: No    Cares/treatment/interventions/medications to be completed following ED care: See chart    ED Nurse Name: Hafsa Munoz RN  8:46 PM    RECEIVING UNIT ED HANDOFF REVIEW    Above ED Nurse Handoff Report was reviewed: Yes  Reviewed by: Shy Del Valle RN on July 21, 2023 at 2:45 AM

## 2023-07-21 NOTE — PLAN OF CARE
ROOM # 226    Living Situation (if not independent, order SW consult): Home w/ family   Facility name:  : [facility]     Activity level at baseline: IND  Activity level on admit: IND    Who will be transporting you at discharge: [facility]     Patient registered to observation; given Patient Bill of Rights; given the opportunity to ask questions about observation status and their plan of care.  Patient has been oriented to the observation room, bathroom and call light is in place.    Discussed discharge goals and expectations with patient/family.

## 2023-07-21 NOTE — ED TRIAGE NOTES
routine clinic appointment yesterday. Labs drawn, results show a sodium of 116. Patient called today by MD and instructed to come to ER. Patient reports ongoing issues with constipation and abdominal distension as well as fatigue and confusion. She states that the fatigue and confusion have been ongoing for several months. No acute change in symptoms. Patient is alert and oriented in triage, VSS.

## 2023-07-21 NOTE — PROGRESS NOTES
[facility]    Medicine Progress Note - Hospitalist Service  Date of Admission: 7/20/2023     Assessment & Plan         Vanessa Mota is a 63 year old female with past medical history significant for hypertension, hyperlipidemia, and type 2 diabetes mellitus who presented to [facility] on 7/20/2023 with hyponatremia.    Hyponatremia  Sodium initially 116 -->121-->131-->127 after fluids stopped due to rapid correction. Rechecking now. Was on chlortalidone outpatient, which is likely etiology. Continue fluids for correction to normal now if recheck is low, as patient outside window or rapid correction.    Acute Kidney Injury  Creatinine initially 1.4, increased from prior 1.2-1.28. Suspect possibly chronic kidney disease stage I, but possibly LAURA from ARB. Off ARB and monitoring.    Hypertension: Hold PTA ARB w/w LAURA. Hold atenolol. Carvedilol 6.25mg BID + amlodipine.  Type 2 Diabetes Mellitus: Basal insulin + MDSSI. Holding sulfonylurea + metformin.  Hyperlipidemia: Continue PTA statin     Diet: Orders Placed This Encounter      Moderate Consistent Carb (60 g CHO per Meal) Diet     DVT Prophylaxis: Enoxaparin (Lovenox) SQ  Benites: none  Code Status: Full Code    Expected discharge: Likely tomorrow if sodium correction normal in morning    The patient's care was discussed with the Bedside Nurse and Patient.    Keron Lemon MD, S  Hospitalist Service  [facility]  ______________________________________________________________________    Interval History   Nursing notes reviewed; no acute events overnight. Patient endorses continued constipation. No longer confused.    A full 10+ point review of systems was performed and found to be negative with the exception of those items noted here.    Physical Exam   Temp: 98.4  F (36.9  C) Temp src: Oral BP: 133/72 Pulse: 72   Resp: 18 SpO2: 98 % O2 Device: None (Room air)   Height: 152.4 cm (5') Weight: 77.1 kg (170  lb)  Estimated body mass index is 33.2 kg/m  as calculated from the following:    Height as of this encounter: 1.524 m (5').    Weight as of this encounter: 77.1 kg (170 lb).    General: Very pleasant female resting comfortably in hospital bed.  Awake, alert, interactive.  HEENT: Normocephalic, atraumatic.  PERRL, EOMI.  Conjunctiva clear, sclerae anicteric.  Mucous membranes moist.  Cardiac: Regular rate and rhythm without murmur, gallop, or rub.  No peripheral edema.  Respiratory: Normal work of breathing.  Clear to auscultation bilaterally without wheezing, rales, or rhonchi.  GI: Normal, active bowel sounds.  Abdomen soft, nontender, nondistended.  : Deferred.  Musculoskeletal: Moving all extremities appropriately.  Skin: No rashes or abrasions on exposed skin.  Neurologic: Alert and oriented x4.  Cranial nerves II through XII grossly intact.  Psychologic: Appropriate mood and affect.    Data   All laboratory results and other diagnostic data from the past 24 hours is available in Epic and has been personally reviewed.    Recent Labs   Lab 07/21/23  1701 07/21/23  1220 07/21/23  0939 07/21/23  0803 07/21/23  0605 07/21/23  0345 07/21/23  0030 07/20/23 1959 07/20/23 1958 07/19/23  1414   WBC  --   --   --   --   --   --   --   --  12.5*  --    HGB  --   --   --   --   --   --   --  11.9 11.6*  --    MCV  --   --   --   --   --   --   --   --  84  --    PLT  --   --   --   --   --   --   --   --  264  --    NA  --   --  127*  --  131*  131* 130*  --  120* 121* 116*   POTASSIUM  --   --   --   --  3.7  --   --  4.2 4.4 4.9   CHLORIDE  --   --   --   --  97*  --   --  88* 85* 84*   CO2  --   --   --   --  22  --   --   --  19* 20*   BUN  --   --   --   --  17.9  --   --  22 22.1 14.5   CR  --   --   --   --  1.28*  --   --  1.6* 1.40* 1.28*   ANIONGAP  --   --   --   --  12  --   --   --  17* 12   LIZBETH  --   --   --   --  9.4  --   --   --  9.4 9.8   * 188*  --  80 70  --    < > 196* 193* 298*   ALBUMIN   --   --   --   --   --   --   --   --  4.2  --    PROTTOTAL  --   --   --   --   --   --   --   --  7.1  --    BILITOTAL  --   --   --   --   --   --   --   --  0.2  --    ALKPHOS  --   --   --   --   --   --   --   --  115*  --    ALT  --   --   --   --   --   --   --   --  22  --    AST  --   --   --   --   --   --   --   --  27  --    LIPASE  --   --   --   --   --   --   --   --  22  --     < > = values in this interval not displayed.     Imaging results reviewed over the past 24 hrs:   Recent Results (from the past 24 hour(s))   CT Abdomen Pelvis w/o Contrast    Narrative    EXAM: CT ABDOMEN PELVIS W/O CONTRAST  LOCATION: [facility]  DATE: 7/20/2023    INDICATION: Multiple weeks abd pain bloating, Cr too high for contrast  COMPARISON: CT abdomen and pelvis 02/15/2023.  TECHNIQUE: CT scan of the abdomen and pelvis was performed without IV contrast. Multiplanar reformats were obtained. Dose reduction techniques were used.  CONTRAST: None.    FINDINGS:   LOWER CHEST: Normal.    HEPATOBILIARY: Normal.    PANCREAS: Normal.    SPLEEN: No acute abnormality. Stable small peripherally calcified splenic artery aneurysm measuring 10 mm.    ADRENAL GLANDS: Normal.    KIDNEYS/BLADDER: No obstructing stone. No hydronephrosis. No acute renal abnormality. Stable bladder wall calcification inferiorly at the midline.    BOWEL: Normal appendix. No acute bowel abnormality. Moderate stool throughout the colon. Colonic diverticula.    LYMPH NODES: Normal.    VASCULATURE: Unremarkable.    PELVIC ORGANS: Normal.    MUSCULOSKELETAL: Spine degenerative changes.      Impression    IMPRESSION:   1.  No acute abnormality identified.  2.  Moderate stool seen throughout the colon.     CT Head w/o Contrast    Narrative    EXAM: CT HEAD W/O CONTRAST  LOCATION: [facility]  DATE: 7/20/2023    INDICATION: posterior head trauma 3 days ago, nonfocal neuro exam  COMPARISON: 03/27/2008  TECHNIQUE: Routine  CT Head without IV contrast. Multiplanar reformats. Dose reduction techniques were used.    FINDINGS:  INTRACRANIAL CONTENTS: No intracranial hemorrhage, extraaxial collection, or mass effect.  Tiny chronic infarction anterior aspect right basal ganglia. Mild presumed chronic small vessel ischemic changes. Normal ventricles and sulci.     VISUALIZED ORBITS/SINUSES/MASTOIDS: No intraorbital abnormality. No paranasal sinus mucosal disease. No middle ear or mastoid effusion.    BONES/SOFT TISSUES: No acute abnormality.      Impression    IMPRESSION:  1.  No acute intracranial process.       I personally reviewed: no images or EKG's today.

## 2023-07-21 NOTE — ED PROVIDER NOTES
History   Chief Complaint:  Abnormal Labs     The history is provided by the patient.      Vanessa Mota is a 63 year old female with history of HTN who presents to the ED for evaluation of a sodium level of 116. The patient reports that she has been feeling fatigued and confused for the past couple of weeks and it has gotten worse over time. She also reports slight difficulty urinating, longstanding urinary frequency, abdominal distention, and constipation. These have all been ongoing issues for her. Vanessa adds that she had a fall on Monday in which she struck the back of her head. She currently has some mild pain in her head. She did not sustain any other injuries in the fall. The patient denies recent vomiting, diarrhea, and shortness of breath. The patient does not smoke tobacco or drink alcohol regularly. No personal history of cancer or IBS. The patient lives with her  who has not had any recent illness. Of note, patient was seen in clinic yesterday for a recheck of her blood work, as she has reportedly had low sodium levels in the past as well.  She was called by her MD today and told to present to the ED due to her abnormal sodium level.    Independent Historian:   , who states patient has been feeling weak overall    Review of External Notes:   I personally performed electronic chart review, I see that she was in clinic yesterday for several weeks of abdominal symptoms.  Blood tests were drawn at that time, sodium resulted 116.  Her previous sodium had been 134 in January.      Medications:    Amlodipine  Atenolol  Atorvastatin  Chlorthalidone  Cyclobenzaprine  Fluoxetine  Glipizide  Hydroxyzine  Insulin  Losartan  Metformin  Spironolactone  Sumatriptan  Zolpidem  Alprazolam  Buspirone  Gabapentin  Hydroxyzine  Omeprazole     Past Medical History:    GERD  Hypertension  Chronic pain  Pancreatic disease  Hypercholesterolemia   Hyperglycemia  Mild major depression  Insomnia   Obesity   Fatty  "liver  Anxiety  Depression   Fibromyalgia   Malignant melanoma of left wrist  Migraines   Type 2 diabetes mellitus   LATOYA    Past Surgical History:    Colonoscopy  Induced  by [facility]     Physical Exam     Patient Vitals for the past 24 hrs:   BP Temp Temp src Pulse Resp SpO2   23 0000 127/65 98.3  F (36.8  C) Temporal 73 16 96 %   23 -- -- -- -- -- 96 %   23 (!) 140/65 -- -- 78 -- --   23 139/63 (!) 96.7  F (35.9  C) Temporal 87 16 97 %      Physical Exam  General: Woman semirecumbent in room 33 after ambulating into the room,  later at bedside  HENT: mucous membranes slightly dry, OP clear  CV: rate as above  Resp: normal effort, speaks in full phrases, no stridor, no cough observed  GI: Abdomen soft, somewhat protuberant, moderate diffuse tenderness but no discrete masses, no peritonitis, negative Diana sign  MSK: no bony tenderness, no CVAT, no midline spine tenderness  Skin: appropriately warm and dry, slight yellowish ecchymosis to posterior scalp but no open wound  Neuro: alert, clear speech, oriented, follows basic commands and answers questions without difficulty though reports feeling \"confused\", no nuchal rigidity, ambulatory with independent gait  Psych: cooperative, pleasant, no apparent hallucinations    Emergency Department Course   ECG  ECG taken at  ECG read at   Normal sinus rhythm   Rate 75 bpm. TX interval 190 ms. QRS duration 100 ms. QT/QTc 406/453 ms. P-R-T axes 39 -8 20.     Imaging:  CT Head w/o Contrast   Final Result   IMPRESSION:   1.  No acute intracranial process.         CT Abdomen Pelvis w/o Contrast   Final Result   IMPRESSION:    1.  No acute abnormality identified.   2.  Moderate stool seen throughout the colon.            Report per radiology    Laboratory:  Labs Ordered and Resulted from Time of ED Arrival to Time of ED Departure   BASIC METABOLIC PANEL - Abnormal       Result Value    Sodium 121 (*)     Potassium 4.4   "    Chloride 85 (*)     Carbon Dioxide (CO2) 19 (*)     Anion Gap 17 (*)     Urea Nitrogen 22.1      Creatinine 1.40 (*)     Calcium 9.4      Glucose 193 (*)     GFR Estimate 42 (*)    CBC WITH PLATELETS AND DIFFERENTIAL - Abnormal    WBC Count 12.5 (*)     RBC Count 3.88      Hemoglobin 11.6 (*)     Hematocrit 32.4 (*)     MCV 84      MCH 29.9      MCHC 35.8      RDW 13.0      Platelet Count 264      % Neutrophils 77      % Lymphocytes 14      % Monocytes 7      % Eosinophils 1      % Basophils 0      % Immature Granulocytes 1      NRBCs per 100 WBC 0      Absolute Neutrophils 9.7 (*)     Absolute Lymphocytes 1.8      Absolute Monocytes 0.8      Absolute Eosinophils 0.1      Absolute Basophils 0.0      Absolute Immature Granulocytes 0.1      Absolute NRBCs 0.0     ISTAT BASIC CHEM ICA HEMATOCRIT POCT - Abnormal    Chloride POCT 88 (*)     Potassium POCT 4.2      Sodium POCT 120 (*)     UREA NITROGEN POCT 22      Calcium, Ionized Whole Blood POCT 4.6      Glucose Whole Blood POCT 196 (*)     Anion Gap POCT 17.0 (*)     Hemoglobin POCT 11.9      Hematocrit POCT 35      Creatinine POCT 1.6 (*)     TOTAL CO2 POCT 21     HEPATIC FUNCTION PANEL - Abnormal    Protein Total 7.1      Albumin 4.2      Bilirubin Total 0.2      Alkaline Phosphatase 115 (*)     AST 27      ALT 22      Bilirubin Direct <0.20     GLUCOSE BY METER - Abnormal    GLUCOSE BY METER POCT 182 (*)    LIPASE - Normal    Lipase 22     LACTIC ACID WHOLE BLOOD - Normal    Lactic Acid 1.1     SODIUM RANDOM URINE    Sodium Urine mmol/L 33     CREATININE RANDOM URINE    Creatinine Urine mg/dL 13.3     OSMOLALITY, RANDOM URINE   UREA NITROGEN RANDOM URINE   GLUCOSE MONITOR NURSING POCT   GLUCOSE MONITOR NURSING POCT   GLUCOSE MONITOR NURSING POCT   GLUCOSE MONITOR NURSING POCT   GLUCOSE MONITOR NURSING POCT      Emergency Department Course & Assessments:     Interventions:  Medications   acetaminophen (TYLENOL) tablet 650 mg (650 mg Oral $Given 7/21/23 0011)    ALPRAZolam (XANAX) tablet 0.25 mg (has no administration in time range)   amLODIPine (NORVASC) tablet 10 mg (10 mg Oral $Given 7/21/23 0011)   atorvastatin (LIPITOR) tablet 20 mg (has no administration in time range)   busPIRone (BUSPAR) tablet 45 mg (45 mg Oral $Given 7/21/23 0023)   cyclobenzaprine (FLEXERIL) tablet 10 mg (has no administration in time range)   FLUoxetine (PROzac) capsule 40 mg (has no administration in time range)   gabapentin (NEURONTIN) capsule 300 mg (has no administration in time range)   hydrOXYzine (ATARAX) tablet 100 mg (100 mg Oral Not Given 7/21/23 0010)   insulin glargine (LANTUS PEN) injection 48 Units (has no administration in time range)   losartan (COZAAR) tablet 100 mg ( Oral Automatically Held 7/24/23 0800)   omeprazole (priLOSEC) CR capsule 40 mg (has no administration in time range)   ondansetron (ZOFRAN ODT) ODT tab 4 mg (has no administration in time range)   zolpidem (AMBIEN) tablet 5 mg (has no administration in time range)   melatonin tablet 1 mg (has no administration in time range)   ondansetron (ZOFRAN ODT) ODT tab 4 mg (has no administration in time range)     Or   ondansetron (ZOFRAN) injection 4 mg (has no administration in time range)   sodium chloride 0.9% infusion ( Intravenous $New Bag 7/21/23 0022)   atenolol (TENORMIN) tablet 25 mg (25 mg Oral $Given 7/21/23 0027)   glucose gel 15-30 g (has no administration in time range)     Or   dextrose 50 % injection 25-50 mL (has no administration in time range)     Or   glucagon injection 1 mg (has no administration in time range)   insulin aspart (NovoLOG) injection (RAPID ACTING) (has no administration in time range)   insulin aspart (NovoLOG) injection (RAPID ACTING) ( Subcutaneous Not Given 7/21/23 0031)   0.9% sodium chloride BOLUS (0 mLs Intravenous Stopped 7/20/23 2314)     Independent Interpretation (X-rays, CTs, rhythm strip):  I personally reviewed her head CT images, no large intracranial hemorrhage is  seen    Assessments/Consultations/Discussion of Management or Tests:  ED Course as of 07/21/23 0055   Thu Jul 20, 2023 2006 I obtained history and examined the patient as noted above.   2239 I rechecked the patient and explained findings.   2259 I spoke with Dr. Huitron, hospitalist, who accepts the patient.      Social Determinants of Health affecting care:   None    Disposition:  The patient was admitted to the hospital under the care of Dr. Huitron.    Impression & Plan    Medical Decision Making:  She presents with multiple weeks of symptoms that I think are best explained by fairly profound hyponatremia, the cause of which is unclear at this time.  She had some abdominal symptoms and so CT of the abdomen was pursued given her tenderness though there is no signs of any immediately surgical process, no apparent bowel obstruction or free air.  She does have a stool burden, constipation may be playing a role in her symptoms.  She fell and hit her head, she has modest ecchymosis and head CT was indicated but fortunately does not reveal any structural intracranial process of immediate concern.  She is hyperglycemic but without evidence of DKA.  Vital signs are satisfactory.  I think she requires hospitalization for close monitoring and further care with particular attention to her sodium, she agrees with this plan and I have arranged for admission to a monitored bed under the care of the hospitalist service after discussion of the above.    Diagnosis:    ICD-10-CM    1. Hyponatremia  E87.1       2. Abdominal pain, unspecified abdominal location  R10.9       3. Closed head injury, initial encounter  S09.90XA       4. Hyperglycemia  R73.9       5. Renal insufficiency  N28.9       6. Confusion  R41.0     suspect due to hyponatremia           Scribe Disclosure:  I, Yuki Olivia, am serving as a scribe at 8:31 PM on 7/20/2023 to document services personally performed by Mo Boss MD based on my observations and the  provider's statements to me.     Scribe Disclosure:  I, Trista Grant, am serving as a scribe at 12:40 AM on 7/21/2023 to document services personally performed by Mo Boss MD based on my observations and the provider's statements to me.     7/20/2023   Mo Boss MD Reitsema, Jeffrey Alan, MD  07/21/23 0150

## 2023-07-21 NOTE — UTILIZATION REVIEW
Concurrent stay review; Secondary Review Determination     [facility]          Under the authority of the Utilization Management Committee, the utilization review process indicated a secondary review on the above patient.  The review outcome is based on review of the medical records, discussions with staff, and applying clinical experience noted on the date of the review.          (x) Observation Status Appropriate - Concurrent stay review    RATIONALE FOR DETERMINATION    63-year-old female was admitted on 7/20/2023 with mild confusion and hyponatremia. The patient's CT scan of the head was negative, but she had associated mild anion gap metabolic acidosis and stage I acute kidney injury. The hyponatremia was likely caused by chlorthalidone, which was initiated in January, along with spironolactone therapy. Diuretics were held/discontinued, and she received a 1000 ml bolus of 0.9% normal saline in the ED, followed by continuous IV infusion at 125 ml per hour to avoid over-rapid correction. Further monitoring of sodium levels, urine osmolality, UUN, and urine sodium was planned to assess the etiology. Acute kidney injury seemed to have both prerenal and intrinsic components, possibly related to diabetes and hypertension.  Sodium significantly improved this morning, fluid had to be stopped to avoid rapid correction    Patient is clinically improving and there is no clear indication to change patient's status to inpatient. The severity of illness, intensity of service provided, expected LOS and risk for adverse outcome make the care appropriate for observation.      This document was produced using voice recognition software       The information on this document is developed by the utilization review team in order for the business office to ensure compliance.  This only denotes the appropriateness of proper admission status and does not reflect the quality of care rendered.         The definitions of  Inpatient Status and Observation Status used in making the determination above are those provided in the CMS Coverage Manual, Chapter 1 and Chapter 6, section 70.4.      Sincerely,     CECE MONTALVO MD    System Medical Director  Utilization Management  [facility].

## 2023-07-21 NOTE — PROGRESS NOTES
Pt A/Ox4 pleasant lady. Pt is Independent in room.  Reg diet tolerated. Na+ low.  BG checks done and insulin given.  VSS on RA denies pain.  Diuretics held. Had 2 BM's this shift, voiding fine.  Pt is ready to go home, waiting on Na+ levels to improve. Pt not hungry for lunch, wanting to nap.

## 2023-07-21 NOTE — PLAN OF CARE
"PRIMARY DIAGNOSIS: Hyponatremia/Confusion   OUTPATIENT/OBSERVATION GOALS TO BE MET BEFORE DISCHARGE:  ADLs back to baseline: Yes    Activity and level of assistance: Ambulating independently.    Pain status: Improved-controlled with oral pain medications.    Return to near baseline physical activity: Yes     Discharge Planner Nurse   Safe discharge environment identified: Yes  Barriers to discharge: Yes       Entered by: Shy Del Valle RN 07/21/2023 4:48 AM     Please review provider order for any additional goals.   Nurse to notify provider when observation goals have been met and patient is ready for discharge.    Vitals are Temp: 97.9  F (36.6  C) Temp src: Oral BP: 133/64 Pulse: 69   Resp: 18 SpO2: 95 %.  Patient is Alert and Oriented x4. Stated she is experiencing some \"brain fog\". They are independent with no assistive devices .  Pt is a Diabetic diet.  They are complaining of 5/10 pain in their lower abdomen. Stated this pain is her baseline.  Declines need for intervention.  Patient has Normal Saline 0.9% running at 125 mL per hour. Patient stated she was feeling anxious, PRN Xanax administered. Sodium recheck q4hrs. Patient is resting in bed.     "

## 2023-07-21 NOTE — PLAN OF CARE
Sodium noted to be 130 on recheck up from 121, will stop NS and recheck Na at 10 am to ensure she doesn't overcorrect.

## 2023-07-22 VITALS
HEIGHT: 60 IN | TEMPERATURE: 97.9 F | HEART RATE: 69 BPM | SYSTOLIC BLOOD PRESSURE: 141 MMHG | RESPIRATION RATE: 16 BRPM | WEIGHT: 170 LBS | OXYGEN SATURATION: 96 % | DIASTOLIC BLOOD PRESSURE: 67 MMHG | BODY MASS INDEX: 33.38 KG/M2

## 2023-07-22 LAB
GLUCOSE BLDC GLUCOMTR-MCNC: 135 MG/DL (ref 70–99)
GLUCOSE BLDC GLUCOMTR-MCNC: 142 MG/DL (ref 70–99)
GLUCOSE BLDC GLUCOMTR-MCNC: 209 MG/DL (ref 70–99)
GLUCOSE BLDC GLUCOMTR-MCNC: 266 MG/DL (ref 70–99)
GLUCOSE BLDC GLUCOMTR-MCNC: 75 MG/DL (ref 70–99)
HOLD SPECIMEN: NORMAL
SODIUM SERPL-SCNC: 128 MMOL/L (ref 136–145)

## 2023-07-22 PROCEDURE — 99239 HOSP IP/OBS DSCHRG MGMT >30: CPT | Performed by: HOSPITALIST

## 2023-07-22 PROCEDURE — 84295 ASSAY OF SERUM SODIUM: CPT | Performed by: STUDENT IN AN ORGANIZED HEALTH CARE EDUCATION/TRAINING PROGRAM

## 2023-07-22 PROCEDURE — 250N000013 HC RX MED GY IP 250 OP 250 PS 637: Performed by: INTERNAL MEDICINE

## 2023-07-22 PROCEDURE — 250N000013 HC RX MED GY IP 250 OP 250 PS 637: Performed by: STUDENT IN AN ORGANIZED HEALTH CARE EDUCATION/TRAINING PROGRAM

## 2023-07-22 PROCEDURE — G0378 HOSPITAL OBSERVATION PER HR: HCPCS

## 2023-07-22 PROCEDURE — 36415 COLL VENOUS BLD VENIPUNCTURE: CPT | Performed by: STUDENT IN AN ORGANIZED HEALTH CARE EDUCATION/TRAINING PROGRAM

## 2023-07-22 PROCEDURE — 82962 GLUCOSE BLOOD TEST: CPT

## 2023-07-22 RX ORDER — CHLORTHALIDONE 25 MG/1
25 TABLET ORAL EVERY OTHER DAY
Qty: 30 TABLET | Refills: 1 | Status: SHIPPED | OUTPATIENT
Start: 2023-07-22 | End: 2023-08-01

## 2023-07-22 RX ADMIN — FLUOXETINE 40 MG: 20 CAPSULE ORAL at 07:44

## 2023-07-22 RX ADMIN — ATORVASTATIN CALCIUM 20 MG: 10 TABLET, FILM COATED ORAL at 07:44

## 2023-07-22 RX ADMIN — INSULIN GLARGINE 48 UNITS: 100 INJECTION, SOLUTION SUBCUTANEOUS at 08:29

## 2023-07-22 RX ADMIN — POLYETHYLENE GLYCOL 3350 17 G: 17 POWDER, FOR SOLUTION ORAL at 07:44

## 2023-07-22 RX ADMIN — CARVEDILOL 6.25 MG: 6.25 TABLET, FILM COATED ORAL at 07:45

## 2023-07-22 RX ADMIN — ACETAMINOPHEN 650 MG: 325 TABLET, FILM COATED ORAL at 12:43

## 2023-07-22 RX ADMIN — SENNOSIDES AND DOCUSATE SODIUM 1 TABLET: 50; 8.6 TABLET ORAL at 07:44

## 2023-07-22 RX ADMIN — GABAPENTIN 300 MG: 100 CAPSULE ORAL at 07:44

## 2023-07-22 RX ADMIN — BUSPIRONE HYDROCHLORIDE 45 MG: 15 TABLET ORAL at 07:44

## 2023-07-22 ASSESSMENT — ACTIVITIES OF DAILY LIVING (ADL)
ADLS_ACUITY_SCORE: 31
ADLS_ACUITY_SCORE: 31
ADLS_ACUITY_SCORE: 37
ADLS_ACUITY_SCORE: 31
ADLS_ACUITY_SCORE: 37

## 2023-07-22 NOTE — PLAN OF CARE
Patient's After Visit Summary was reviewed with patient and/or spouse.   Patient verbalized understanding of After Visit Summary, recommended follow up and was given an opportunity to ask questions.   Discharge medications sent home with patient/family: YES   Discharged with spouse    OBSERVATION patient END time: [id number]

## 2023-07-22 NOTE — PLAN OF CARE
PRIMARY DIAGNOSIS: Abnormal Labs  OUTPATIENT/OBSERVATION GOALS TO BE MET BEFORE DISCHARGE:  1. ADLs back to baseline: No    2. Activity and level of assistance: Ambulating independently.    3. Pain status: Pain free.    4. Return to near baseline physical activity: No     Discharge Planner Nurse   Safe discharge environment identified: Yes  Barriers to discharge: Yes       Entered by: Shukri Boswell RN 07/22/2023     Vitals are Temp: 98.3  F (36.8  C) Temp src: Oral BP: 113/58 Pulse: 66   Resp: 16 SpO2: 95 %.    Patient is Alert and Oriented x4. She is independent with no assistive devices .  Pt is on a Diabetic diet. Patient denies  pain.  Patient is Saline locked. Patient is doing well in the room. No fresh  Complain. Will continue rendering cares    Please review provider order for any additional goals.   Nurse to notify provider when observation goals have been met and patient is ready for discharge.

## 2023-07-22 NOTE — DISCHARGE SUMMARY
"[facility]    Discharge Summary  Hospitalist    Date of Admission:  7/20/2023  Date of Discharge:  7/22/2023  Provider:  Papi Easton MD  Date of Service (when I last saw the patient): 07/22/23    Discharge Diagnoses   Symptomatic hyponatremia likely secondary to diuretic therapy.  Acute Kidney Injury from volume depletion  Essential hypertension  Type 2 Diabetes Mellitus  Hyperlipidemia  Nutritional status BMI 33.2.  Other medical issues:  Past Medical History:   Diagnosis Date     311     1-2 yrs, [facility] started zoloft 50 mg daily     Diabetes (H)      Gastroesophageal reflux disease      Headache(784.0)     4-5 yrs-migraines     Heart murmur      Hypertension      Myalgia and myositis, unspecified     suspects fibromyalgia, took celebrex for a while     Other chronic pain      Pancreatic disease      Papanicolaou smear of cervix with atypical squamous cells of undetermined significance (ASC-US) 05, 07, 08, 11, 14, 06/22/17    neg hpv     Pure hypercholesterolemia 10/1/2012       History of Present Illness   Vanessa Mota is an 63 year old female who presented with weakness.  Please see the admission history and physical for full details.  Patient hospital stay.  Well-controlled, she has recovered from the initial symptoms with total resolution, and sodium has progressed and is currently 128 today form initial critical low level 116.  In retrospect, this seems to be the consequence of diuretic and others home meds\" as well as salt restriction diet.  At this point her renal function is returning to baseline.  She is in condition to discharge to home, resume home medication with exception of chlorthalidone that will be every other day until she sees her primary care physician.  Patient has been instructed do not restrict dietary salt until she is seen in the clinic and a BMP is done.    Hospital Course   Vanessa Mota is a 63 year old female with past medical history significant " for hypertension, hyperlipidemia, and type 2 diabetes mellitus who presented to [facility] on 7/20/2023 with hyponatremia.     Hyponatremia  Sodium initially 116 -->121-->131-->127 after fluids stopped due to rapid correction. Rechecking now. Was on chlortalidone outpatient, which is likely etiology. Continue fluids for correction to normal now if recheck is low, as patient outside window or rapid correction.     Acute Kidney Injury  Creatinine initially 1.4, increased from prior 1.2-1.28. Suspect possibly chronic kidney disease stage I, but possibly LAURA from ARB. Off ARB and monitoring.  Creatinine   Date Value Ref Range Status   07/21/2023 1.28 (H) 0.51 - 0.95 mg/dL Final   10/30/2020 0.77 0.52 - 1.04 mg/dL Final        Hypertension: Hold PTA ARB w/w LAURA. Hold atenolol. Carvedilol 6.25mg BID + amlodipine.  Type 2 Diabetes Mellitus: Basal insulin + MDSSI. Holding sulfonylurea + metformin.  Hyperlipidemia: Continue PTA statin         # Discharge Pain Plan:    - Patient currently has NO PAIN and is not being prescribed pain medications on discharge.      Significant Results and Procedures   See below     Pending Results      Unresulted Labs Ordered in the Past 30 Days of this Admission     No orders found from 6/20/2023 to 7/21/2023.          Code Status   Full Code       Primary Care Physician   Ramona Ann Aaseby-Aguilera    GEN:  Alert, oriented x 3, appears comfortable, NAD.  HEENT:  Normocephalic/atraumatic, no scleral icterus, no nasal discharge, mouth moist.  CV:  Regular rate and rhythm, no murmur or JVD.  S1 + S2 noted, no S3 or S4.  LUNGS:  Clear to auscultation bilaterally without rales/rhonchi/wheezing/retractions.  Symmetric chest rise on inhalation noted.  ABD:  Active bowel sounds, soft, non-tender/non-distended.  No rebound/guarding/rigidity.  EXT:  No edema or cyanosis.  No joint synovitis noted.  SKIN:  Dry to touch, no exanthems noted in the visualized areas.     Discharge Disposition    Discharged to home    Consultations This Hospital Stay   None    Time Spent on this Encounter   I, Papi Easton MD, personally saw the patient today and spent greater than 30 minutes discharging this patient.     Discharge Orders      Reason for your hospital stay    Severe hyponatremia.  Acute kidney injury.     Follow-up and recommended labs and tests     Follow up with primary care provider, Ramona Ann Aaseby-Aguilera, within 7 days for hospital follow- up.  The following labs/tests are recommended: BMP.     Activity    Your activity upon discharge: activity as tolerated     Diet    Follow this diet upon discharge:       Moderate Consistent Carb (60 g CHO per Meal) Diet     Discharge Medications   Current Discharge Medication List      CONTINUE these medications which have CHANGED    Details   chlorthalidone (HYGROTON) 25 MG tablet Take 1 tablet (25 mg) by mouth every other day  Qty: 30 tablet, Refills: 1    Associated Diagnoses: Benign essential hypertension         CONTINUE these medications which have NOT CHANGED    Details   acetaminophen (TYLENOL) 325 MG tablet Take 650 mg by mouth daily as needed for mild pain      ALPRAZolam (XANAX) 0.5 MG tablet Take 0.25 mg by mouth daily as needed for anxiety      amLODIPine (NORVASC) 10 MG tablet Take 1 tablet (10 mg) by mouth At Bedtime  Qty: 90 tablet, Refills: 0    Associated Diagnoses: Benign essential hypertension      atenolol (TENORMIN) 25 MG tablet TAKE 1 TABLET BY MOUTH IN THE MORNING  Qty: 30 tablet, Refills: 11    Associated Diagnoses: Uncontrolled hypertension; Palpitations      atorvastatin (LIPITOR) 20 MG tablet TAKE 1 TABLET DAILY  Qty: 90 tablet, Refills: 0    Associated Diagnoses: Hyperlipidemia LDL goal <100      busPIRone HCl (BUSPAR) 30 MG tablet Take 45 mg by mouth 2 times daily  Qty: 90 tablet, Refills: 0    Associated Diagnoses: Anxiety; Mild major depression (H)      cyclobenzaprine (FLEXERIL) 10 MG tablet Take 1 tablet (10 mg) by mouth 3  times daily as needed for muscle spasms  Qty: 30 tablet, Refills: 0    Associated Diagnoses: Fibromyalgia      FLUoxetine (PROZAC) 40 MG capsule Take 40 mg by mouth daily      gabapentin (NEURONTIN) 300 MG capsule Take 1 capsule (300 mg) by mouth 3 times daily  Qty: 90 capsule, Refills: 11    Associated Diagnoses: Fibromyalgia      glipiZIDE (GLUCOTROL XL) 10 MG 24 hr tablet TAKE 1 TABLET EVERY 12     HOURS.  Qty: 180 tablet, Refills: 0    Associated Diagnoses: Type 2 diabetes mellitus without complication, with long-term current use of insulin (H)      insulin glargine (BASAGLAR KWIKPEN) 100 UNIT/ML pen INJECT 48 UNITS SUBCUTANEOUSLY IN THE MORNING  Qty: 15 mL, Refills: 11    Comments: If Basaglar is not covered by insurance, may substitute Lantus or Semglee or other insulin glargine product per insurance preference at same dose and frequency.    Associated Diagnoses: Type 2 diabetes mellitus without complication, with long-term current use of insulin (H)      losartan (COZAAR) 100 MG tablet Take 1 tablet (100 mg) by mouth daily  Qty: 90 tablet, Refills: 3    Associated Diagnoses: Benign essential hypertension      metFORMIN (GLUCOPHAGE XR) 500 MG 24 hr tablet TAKE 4 TABLETS BY MOUTH ONCE DAILY WITH SUPPER  Qty: 180 tablet, Refills: 0    Associated Diagnoses: Type 2 diabetes mellitus without complication, with long-term current use of insulin (H)      ondansetron (ZOFRAN ODT) 4 MG ODT tab DISSOLVE 1 TABLET IN MOUTH EVERY 8 HOURS AS NEEDED FOR NAUSEA  Qty: 30 tablet, Refills: 0    Associated Diagnoses: Nausea      spironolactone (ALDACTONE) 25 MG tablet Take 1 tablet (25 mg) by mouth daily  Qty: 90 tablet, Refills: 3    Associated Diagnoses: Benign essential hypertension      SUMAtriptan (IMITREX) 100 MG tablet TAKE 1 TABLET BY MOUTH AT ONSET OF HEADACHE FOR MIGRAINE. MAY REPEAT DOSE AFTER 2 HOURS AS NEEDED. MAX OF 2 TABLETS PER 24 HOURS  Qty: 10 tablet, Refills: 1    Associated Diagnoses: Migraine without aura and  without status migrainosus, not intractable      !! blood glucose (CANDIDA CONTOUR) test strip Use to test blood sugars 2 times daily or as directed.  Qty: 100 strip, Refills: 2    Associated Diagnoses: Type 2 diabetes, HbA1c goal < 7% (H)      blood glucose (NO BRAND SPECIFIED) lancets standard Use to test blood sugar 2 times daily or as directed.  Dispense as covered by insurance  Qty: 100 each, Refills: 3    Associated Diagnoses: Type 2 diabetes, HbA1c goal < 7% (H); Type 2 diabetes mellitus without complication (H)      blood glucose monitoring (NO BRAND SPECIFIED) meter device kit Use to test blood sugar 2 times daily or as directed.  Dispense brand as covered by insurance  Qty: 1 kit, Refills: 0    Associated Diagnoses: Type 2 diabetes, HbA1c goal < 7% (H); Type 2 diabetes mellitus without complication (H)      !! blood glucose monitoring (NO BRAND SPECIFIED) test strip Use to test blood sugar 2 times daily or as directed.  Dispense Ultra One touch strips per insurance coverage  Qty: 200 each, Refills: 3    Associated Diagnoses: Type 2 diabetes mellitus without complication (H); Type 2 diabetes, HbA1c goal < 7% (H); Type 2 diabetes mellitus without complication, with long-term current use of insulin (H)      RELION PEN NEEDLES 31G X 6 MM miscellaneous USE 1 DAILY AT BEDTIME OR AS DIRECTED.  Qty: 90 each, Refills: 0    Associated Diagnoses: Type 2 diabetes mellitus without complication, with long-term current use of insulin (H)       !! - Potential duplicate medications found. Please discuss with provider.        Allergies   Allergies   Allergen Reactions     Wellbutrin [Bupropion Hcl] Rash     Data   Most Recent 3 CBC's:Recent Labs   Lab Test 07/20/23 1959 07/20/23 1958 01/31/23  1615 01/05/22  1640   WBC  --  12.5* 9.5 11.2*   HGB 11.9 11.6* 12.4 13.8   MCV  --  84 85 81   PLT  --  264 307 290      Most Recent 3 BMP's:  Recent Labs   Lab Test 07/22/23  0819 07/22/23  0611 07/22/23  0607 07/22/23  0450  07/21/23  2141 07/21/23  1746 07/21/23  1220 07/21/23  0939 07/21/23  0803 07/21/23  0605 07/21/23  0030 07/20/23 1959 07/20/23 1958 07/19/23  1414   NA  --  128*  --   --   --  126*  --  127*  --  131*  131*   < > 120* 121* 116*   POTASSIUM  --   --   --   --   --   --   --   --   --  3.7  --  4.2 4.4 4.9   CHLORIDE  --   --   --   --   --   --   --   --   --  97*  --  88* 85* 84*   CO2  --   --   --   --   --   --   --   --   --  22  --   --  19* 20*   BUN  --   --   --   --   --   --   --   --   --  17.9  --  22 22.1 14.5   CR  --   --   --   --   --   --   --   --   --  1.28*  --  1.6* 1.40* 1.28*   ANIONGAP  --   --   --   --   --   --   --   --   --  12  --   --  17* 12   LIZBETH  --   --   --   --   --   --   --   --   --  9.4  --   --  9.4 9.8   *  --  209* 75   < >  --    < >  --    < > 70   < > 196* 193* 298*    < > = values in this interval not displayed.     Most Recent 2 LFT's:  Recent Labs   Lab Test 07/20/23 1958 01/31/23  1615   AST 27 33   ALT 22 30   ALKPHOS 115* 93   BILITOTAL 0.2 0.2     Most Recent INR's and Anticoagulation Dosing History:  Anticoagulation Dose History      No data to display       Most Recent 3 Troponin's:No lab results found.  Most Recent Cholesterol Panel:  Recent Labs   Lab Test 01/31/23  1615   CHOL 155   LDL 74   HDL 54   TRIG 133     Most Recent 6 Bacteria Isolates From Any Culture (See EPIC Reports for Culture Details):No lab results found.  Most Recent TSH, T4 and A1c Labs:  Recent Labs   Lab Test 07/19/23  1414 01/05/22  1640 09/24/21  1620   TSH  --   --  1.99   A1C 8.0*   < > 6.9*    < > = values in this interval not displayed.     Results for orders placed or performed during the hospital encounter of 07/20/23   CT Head w/o Contrast    Narrative    EXAM: CT HEAD W/O CONTRAST  LOCATION: [facility]  DATE: 7/20/2023    INDICATION: posterior head trauma 3 days ago, nonfocal neuro exam  COMPARISON: 03/27/2008  TECHNIQUE: Routine CT Head  without IV contrast. Multiplanar reformats. Dose reduction techniques were used.    FINDINGS:  INTRACRANIAL CONTENTS: No intracranial hemorrhage, extraaxial collection, or mass effect.  Tiny chronic infarction anterior aspect right basal ganglia. Mild presumed chronic small vessel ischemic changes. Normal ventricles and sulci.     VISUALIZED ORBITS/SINUSES/MASTOIDS: No intraorbital abnormality. No paranasal sinus mucosal disease. No middle ear or mastoid effusion.    BONES/SOFT TISSUES: No acute abnormality.      Impression    IMPRESSION:  1.  No acute intracranial process.     CT Abdomen Pelvis w/o Contrast    Narrative    EXAM: CT ABDOMEN PELVIS W/O CONTRAST  LOCATION: [facility]  DATE: 7/20/2023    INDICATION: Multiple weeks abd pain bloating, Cr too high for contrast  COMPARISON: CT abdomen and pelvis 02/15/2023.  TECHNIQUE: CT scan of the abdomen and pelvis was performed without IV contrast. Multiplanar reformats were obtained. Dose reduction techniques were used.  CONTRAST: None.    FINDINGS:   LOWER CHEST: Normal.    HEPATOBILIARY: Normal.    PANCREAS: Normal.    SPLEEN: No acute abnormality. Stable small peripherally calcified splenic artery aneurysm measuring 10 mm.    ADRENAL GLANDS: Normal.    KIDNEYS/BLADDER: No obstructing stone. No hydronephrosis. No acute renal abnormality. Stable bladder wall calcification inferiorly at the midline.    BOWEL: Normal appendix. No acute bowel abnormality. Moderate stool throughout the colon. Colonic diverticula.    LYMPH NODES: Normal.    VASCULATURE: Unremarkable.    PELVIC ORGANS: Normal.    MUSCULOSKELETAL: Spine degenerative changes.      Impression    IMPRESSION:   1.  No acute abnormality identified.  2.  Moderate stool seen throughout the colon.             Disclaimer: This note consists of symbols derived from keyboarding, dictation and/or voice recognition software. As a result, there may be errors in the script that have gone  undetected. Please consider this when interpreting information found in this chart.

## 2023-07-22 NOTE — PLAN OF CARE
PRIMARY DIAGNOSIS: Abnormal Labs  OUTPATIENT/OBSERVATION GOALS TO BE MET BEFORE DISCHARGE:  1. ADLs back to baseline: No    2. Activity and level of assistance: Ambulating independently.    3. Pain status: Pain free.    4. Return to near baseline physical activity: No     Discharge Planner Nurse   Safe discharge environment identified: Yes  Barriers to discharge: Yes       Entered by: Shukri Boswell RN 07/22/2023     Vitals are Temp: 98.2  F (36.8  C) Temp src: Oral BP: 135/62 Pulse: 69   Resp: 16 SpO2: 98 %.    Patient is Alert and Oriented x4. She is independent with no assistive devices .  Pt is on a Diabetic diet. Patient denies  pain.  Patient is Saline locked. Patient is doing well in the room.BS confirm low for 75, cracker and apple juice offered. recheck to be 209 this morning.    Please review provider order for any additional goals.   Nurse to notify provider when observation goals have been met and patient is ready for discharge.

## 2023-07-22 NOTE — PLAN OF CARE
PRIMARY DIAGNOSIS: HYPONATREMIA  OUTPATIENT/OBSERVATION GOALS TO BE MET BEFORE DISCHARGE:  1. ADLs back to baseline: Yes    2. Activity and level of assistance: Ambulating independently.    3. Pain status: Pain free.    4. Return to near baseline physical activity: Yes     Discharge Planner Nurse   Safe discharge environment identified: Yes  Barriers to discharge: Yes       Entered by: Eulogio Valencia RN 07/22/2023 12:34 PM     Please review provider order for any additional goals.   Nurse to notify provider when observation goals have been met and patient is ready for discharge.         A&O X4. VSS. Independent, ambulated in room. Tolerating diabetic diet and oral medications. Saline locked. ACHS blood checks, 135 this morning no correction needed. Sodium 128. Fluids held due to rapid increase of sodium. Pt denies pain, nausea, vomiting, and diarrhea. Possible discharge today. Bed in lowest position and call light within reach.

## 2023-07-22 NOTE — PLAN OF CARE
PRIMARY DIAGNOSIS: HYPONATREMIA  OUTPATIENT/OBSERVATION GOALS TO BE MET BEFORE DISCHARGE:  1. ADLs back to baseline: Yes    2. Activity and level of assistance: Ambulating independently.    3. Pain status: Pain free.    4. Return to near baseline physical activity: Yes     Discharge Planner Nurse   Safe discharge environment identified: Yes  Barriers to discharge: Yes       Entered by: Eulogio Valencia RN 07/22/2023 12:37 PM     Please review provider order for any additional goals.   Nurse to notify provider when observation goals have been met and patient is ready for discharge.         A&O X4. VSS. Independent, ambulated in room. Tolerating diabetic diet and oral medications. Saline locked. [facility] 135 this morning no correction needed. Sodium 128. Fluids held due to rapid increase of sodium. Pt denies pain, nausea, vomiting, and diarrhea. Possible discharge today. Bed in lowest position and call light within reach.       Blood pressure (!) 141/67, pulse 69, temperature 97.9  F (36.6  C), temperature source Oral, resp. rate 16, height 1.524 m (5'), weight 77.1 kg (170 lb), SpO2 96 %, not currently breastfeeding.

## 2023-07-24 ENCOUNTER — TELEPHONE (OUTPATIENT)
Dept: FAMILY MEDICINE | Facility: CLINIC | Age: 63
End: 2023-07-24
Payer: COMMERCIAL

## 2023-07-24 NOTE — TELEPHONE ENCOUNTER
MTM referral from: [facility] visit (referral by provider)    MTM referral outreach attempt #1 on July 24, 2023 at 12:16 PM      Outcome: Patient is not interested at this time, will route to MTM Pharmacist/Provider as an FYI. Thank you for the referral.     Use private pay for the carrier/Plan on the flowsheet    Ramon Boggs    Reason for Referral:  worsening blood sugars   
negative

## 2023-07-24 NOTE — TELEPHONE ENCOUNTER
Reason for Call:  Appointment Request    Patient requesting this type of appt:  Hospital/ED Follow-Up     Requested provider: Aaseby-Aguilera, Ramona Ann    Reason patient unable to be scheduled: Not within requested timeframe    When does patient want to be seen/preferred time: 3-7 days    Comments: Patient was hospitalized for low sodium this past weekend.  She was discharged from the hospital on 7/23/2023 and was told to be seen by her pcp within 7 days.  Patient is not available for appointments before 1:30 (arrival time), patient does not want to see someone besides Ramona Aaseby- Aguilera.  The first available appointment that I have is in August     Could we send this information to you in [facility] or would you prefer to receive a phone call?:   Patient would prefer a phone call   Okay to leave a detailed message?: Yes at Cell number on file:    Telephone Information:   Mobile [phone]       Call taken on 7/24/2023 at 11:29 AM by Ximena Dwyer

## 2023-08-01 ENCOUNTER — OFFICE VISIT (OUTPATIENT)
Dept: FAMILY MEDICINE | Facility: CLINIC | Age: 63
End: 2023-08-01
Payer: COMMERCIAL

## 2023-08-01 VITALS
HEART RATE: 66 BPM | SYSTOLIC BLOOD PRESSURE: 128 MMHG | OXYGEN SATURATION: 97 % | RESPIRATION RATE: 14 BRPM | HEIGHT: 60 IN | BODY MASS INDEX: 32.98 KG/M2 | DIASTOLIC BLOOD PRESSURE: 70 MMHG | TEMPERATURE: 98.4 F | WEIGHT: 168 LBS

## 2023-08-01 DIAGNOSIS — E87.1 HYPONATREMIA: Primary | ICD-10-CM

## 2023-08-01 LAB
ANION GAP SERPL CALCULATED.3IONS-SCNC: 10 MMOL/L (ref 7–15)
BUN SERPL-MCNC: 12.9 MG/DL (ref 8–23)
CALCIUM SERPL-MCNC: 9.8 MG/DL (ref 8.8–10.2)
CHLORIDE SERPL-SCNC: 99 MMOL/L (ref 98–107)
CREAT SERPL-MCNC: 1.1 MG/DL (ref 0.51–0.95)
DEPRECATED HCO3 PLAS-SCNC: 22 MMOL/L (ref 22–29)
GFR SERPL CREATININE-BSD FRML MDRD: 57 ML/MIN/1.73M2
GLUCOSE SERPL-MCNC: 205 MG/DL (ref 70–99)
POTASSIUM SERPL-SCNC: 4.8 MMOL/L (ref 3.4–5.3)
SODIUM SERPL-SCNC: 131 MMOL/L (ref 136–145)

## 2023-08-01 PROCEDURE — 36415 COLL VENOUS BLD VENIPUNCTURE: CPT | Performed by: PHYSICIAN ASSISTANT

## 2023-08-01 PROCEDURE — 80048 BASIC METABOLIC PNL TOTAL CA: CPT | Performed by: PHYSICIAN ASSISTANT

## 2023-08-01 PROCEDURE — 99213 OFFICE O/P EST LOW 20 MIN: CPT | Performed by: PHYSICIAN ASSISTANT

## 2023-08-01 ASSESSMENT — PATIENT HEALTH QUESTIONNAIRE - PHQ9: SUM OF ALL RESPONSES TO PHQ QUESTIONS 1-9: 14

## 2023-08-01 NOTE — PROGRESS NOTES
Assessment & Plan     Hyponatremia  Well recheck sodium level.  Blood pressure is within normal limits off of chlorthalidone and well advised to stay off this. Follow-up in 3-6 months   - Basic metabolic panel         MED REC REQUIRED  Post Medication Reconciliation Status:  Discharge medications reconciled and changed, see notes/orders  MEDICATIONS:        - Discontinue chlorthalidone       - Continue other medications without change    Ramona Ann Aaseby-Aguilera, PA-C  [facility] SHAMEKA Mendez is a 63 year old, presenting for the following health issues:  Hospital F/U      HPI   Was seen for lower abdominal bloating and discomfort and found to have a very low sodium level.  Was sent to ED and then hospitalized.  Hypernatronemia resolved and was thought to be due to her diuretic and salt restriction diet.     Hospital Follow-up Visit:    Hospital/Nursing Home/IP Rehab Facility: [facility]  Date of Admission: 7-20-23  Date of Discharge: 7-22-23  Reason(s) for Admission: confusion    Was your hospitalization related to COVID-19? No   Problems taking medications regularly:  None  Medication changes since discharge: None  Problems adhering to non-medication therapy:  None    Summary of hospitalization:  [facility] discharge summary reviewed  Diagnostic Tests/Treatments reviewed.  Follow up needed: none  Other Healthcare Providers Involved in Patient s Care:         None  Update since discharge: improved.         Plan of care communicated with patient                   Review of Systems   Constitutional, HEENT, cardiovascular, pulmonary, gi and gu systems are negative, except as otherwise noted.      Objective    /70 (BP Location: Right arm, Patient Position: Chair, Cuff Size: Adult Regular)   Pulse 66   Temp 98.4  F (36.9  C) (Oral)   Resp 14   Ht 1.524 m (5')   Wt 76.2 kg (168 lb)   SpO2 97%   BMI 32.81 kg/m    Body mass index is  32.81 kg/m .  Physical Exam   GENERAL: healthy, alert and no distress  HENT: ear canals and TM's normal, nose and mouth without ulcers or lesions  NECK: no adenopathy, no asymmetry, masses, or scars and thyroid normal to palpation  RESP: lungs clear to auscultation - no rales, rhonchi or wheezes  CV: regular rate and rhythm, normal S1 S2, no S3 or S4, no murmur, click or rub, no peripheral edema and peripheral pulses strong  ABDOMEN: soft, nontender, no hepatosplenomegaly, no masses and bowel sounds normal  MS: no gross musculoskeletal defects noted, no edema

## 2023-08-17 DIAGNOSIS — Z79.4 TYPE 2 DIABETES MELLITUS WITHOUT COMPLICATION, WITH LONG-TERM CURRENT USE OF INSULIN (H): ICD-10-CM

## 2023-08-17 DIAGNOSIS — E11.9 TYPE 2 DIABETES MELLITUS WITHOUT COMPLICATION, WITH LONG-TERM CURRENT USE OF INSULIN (H): ICD-10-CM

## 2023-08-17 RX ORDER — METFORMIN HCL 500 MG
TABLET, EXTENDED RELEASE 24 HR ORAL
Qty: 180 TABLET | Refills: 0 | OUTPATIENT
Start: 2023-08-17

## 2023-08-17 NOTE — TELEPHONE ENCOUNTER
Medication sent to a different pharmacy on 7/19/23 for a 90 day supply,  medication refill denied.    Low Dose Naltrexone Counseling- I discussed with the patient the potential risks and side effects of low dose naltrexone including but not limited to: more vivid dreams, headaches, nausea, vomiting, abdominal pain, fatigue, dizziness, and anxiety.

## 2023-08-30 DIAGNOSIS — E11.9 TYPE 2 DIABETES MELLITUS WITHOUT COMPLICATION, WITH LONG-TERM CURRENT USE OF INSULIN (H): ICD-10-CM

## 2023-08-30 DIAGNOSIS — Z79.4 TYPE 2 DIABETES MELLITUS WITHOUT COMPLICATION, WITH LONG-TERM CURRENT USE OF INSULIN (H): ICD-10-CM

## 2023-08-30 RX ORDER — METFORMIN HCL 500 MG
TABLET, EXTENDED RELEASE 24 HR ORAL
Qty: 180 TABLET | Refills: 1 | Status: SHIPPED | OUTPATIENT
Start: 2023-08-30 | End: 2023-11-01

## 2023-08-30 NOTE — TELEPHONE ENCOUNTER
Medication Question or Refill    Contacts         Type Contact Phone/Fax    08/30/2023 10:38 AM CDT Phone (Incoming) Vaenssa Mota (Self)             What medication are you calling about (include dose and sig)?: Metforam ER    Preferred Pharmacy:   [facility] [address] [place], [place] - [address] [facility]  [address] [facility] [address]  Phone: [phone] Fax: [phone]    [facility] - [address]ARRETT Fournier - [facility] AT Portal to Registered [facility]  [place] [address]  Phone: [phone] Fax: [phone]      Controlled Substance Agreement on file:   CSA -- Patient Level:    CSA: None found at the patient level.       Who prescribed the medication?: Metforman ER    Do you need a refill? Yes    When did you use the medication last? Last night    Patient offered an appointment? Yes: 9/20    Do you have any questions or concerns?  No but wants 3 mos thru [facility] mailorder      Could we send this information to you in [facility] or would you prefer to receive a phone call?:   Patient would prefer a phone call   Okay to leave a detailed message?: Yes at Cell number on file:    Telephone Information:   Mobile [phone]

## 2023-09-20 ENCOUNTER — OFFICE VISIT (OUTPATIENT)
Dept: FAMILY MEDICINE | Facility: CLINIC | Age: 63
End: 2023-09-20
Payer: COMMERCIAL

## 2023-09-20 VITALS
TEMPERATURE: 97.9 F | DIASTOLIC BLOOD PRESSURE: 68 MMHG | SYSTOLIC BLOOD PRESSURE: 118 MMHG | BODY MASS INDEX: 34.05 KG/M2 | HEIGHT: 59 IN | OXYGEN SATURATION: 98 % | RESPIRATION RATE: 16 BRPM | HEART RATE: 60 BPM | WEIGHT: 168.9 LBS

## 2023-09-20 DIAGNOSIS — E11.9 TYPE 2 DIABETES MELLITUS WITHOUT COMPLICATION, WITH LONG-TERM CURRENT USE OF INSULIN (H): Primary | ICD-10-CM

## 2023-09-20 DIAGNOSIS — Z79.4 TYPE 2 DIABETES MELLITUS WITHOUT COMPLICATION, WITH LONG-TERM CURRENT USE OF INSULIN (H): Primary | ICD-10-CM

## 2023-09-20 DIAGNOSIS — R19.7 DIARRHEA, UNSPECIFIED TYPE: ICD-10-CM

## 2023-09-20 DIAGNOSIS — R79.89 LOW SERUM SODIUM: ICD-10-CM

## 2023-09-20 LAB — HBA1C MFR BLD: 8.2 % (ref 0–5.6)

## 2023-09-20 PROCEDURE — 99214 OFFICE O/P EST MOD 30 MIN: CPT | Performed by: PHYSICIAN ASSISTANT

## 2023-09-20 PROCEDURE — 83036 HEMOGLOBIN GLYCOSYLATED A1C: CPT | Performed by: PHYSICIAN ASSISTANT

## 2023-09-20 PROCEDURE — 80048 BASIC METABOLIC PNL TOTAL CA: CPT | Performed by: PHYSICIAN ASSISTANT

## 2023-09-20 PROCEDURE — 86364 TISS TRNSGLTMNASE EA IG CLAS: CPT | Performed by: PHYSICIAN ASSISTANT

## 2023-09-20 PROCEDURE — 36415 COLL VENOUS BLD VENIPUNCTURE: CPT | Performed by: PHYSICIAN ASSISTANT

## 2023-09-20 ASSESSMENT — PATIENT HEALTH QUESTIONNAIRE - PHQ9
SUM OF ALL RESPONSES TO PHQ QUESTIONS 1-9: 10
SUM OF ALL RESPONSES TO PHQ QUESTIONS 1-9: 10
10. IF YOU CHECKED OFF ANY PROBLEMS, HOW DIFFICULT HAVE THESE PROBLEMS MADE IT FOR YOU TO DO YOUR WORK, TAKE CARE OF THINGS AT HOME, OR GET ALONG WITH OTHER PEOPLE: SOMEWHAT DIFFICULT

## 2023-09-20 NOTE — COMMUNITY RESOURCES LIST (ENGLISH)
09/20/2023   [facility]  N/A  For questions about this resource list or additional care needs, please contact your primary care clinic or care manager.  Phone: [phone]   Email: N/A   Address: [address] [place] [address]   Hours: N/A        Transportation       Free or low-cost transportation  1  First30Days. Distance: 1.13 miles      In-Person   [address] [facility] [address] [place] [address]  Language: English  Hours: Mon - Fri 7:00 AM - 5:00 PM  Fees: Free   Phone: (948) 534-3184 Email: [email or web address] Website: https://Soompi/     2  [employer] [facility] Circulator Bus Distance: 14.07 miles      In-Person   [address] [place] [address]  Language: English  Hours: Tue 9:00 AM - 2:00 PM  Fees: Self Pay   Phone: (400) 513-2520 Email: info@Xueersi Website: http://www.Energie Etiche.[email or web address]/     Transportation to medical appointments  3  [place] Mobility Distance: 4.39 miles      In-Person   [address] [facility] [address] [place] [address]  Language: French, English, Oromo, Citizen of the [place]  Hours: Mon - Sat 5:00 AM - 9:00 PM  Fees: Insurance, Self Pay   Phone: (615) 591-9973 Email: info@[employer] Website: http://www.[employer]     4  Assisted Transport Distance: 9.94 miles      In-Person   [address] [place] [address]  Language: English, Sri Lankan  Hours: Mon - Sun Appt. Only  Fees: Self Pay   Phone: (427) 836-3032 Email: alonso@cottonTracks Website: http://www.cottonTracks/          Important Numbers & Websites       Emergency Services   911  City Services   311  Poison Control   (778) 511-9092  Suicide Prevention Lifeline   (426) 181-2689 (TALK)  Child Abuse Hotline   (747) 397-4606 (4-A-Child)  Sexual Assault Hotline   (736) 610-2069 (HOPE)  National Runaway Safeline   (910) 432-8977 (RUNAWAY)  All-Options Talkline   (838) 982-3884  Substance Abuse Referral   (100)  392-5362 (HELP)

## 2023-09-20 NOTE — PROGRESS NOTES
"  Assessment & Plan     Type 2 diabetes mellitus without complication, with long-term current use of insulin (H)  Wondering if metformin is causing her bowel issues. Will get A1c today.  Advised follow-up with diabetic educator and may want to discontinue metformin.    - Basic metabolic panel  - Hemoglobin A1c  - AMB Adult Diabetes Educator Referral; Future    Diarrhea, unspecified type  ? Metformin?    - Adult GI  Referral - Consult Only; Future  - Tissue transglutaminase malathi IgA and IgG; Future  - Tissue transglutaminase malathi IgA and IgG               Ramona Ann Aaseby-Aguilera, PA-C  [facility] SHAMEKA Mendez is a 63 year old, presenting for the following health issues:  Follow Up (Hyponatremia) and Colitis (IBS)        9/20/2023     2:52 PM   Additional Questions   Roomed by LAUREN Foster   Accompanied by Self       Colitis    History of Present Illness       Reason for visit:  Hyponatremia    She eats 2-3 servings of fruits and vegetables daily.She consumes 3 sweetened beverage(s) daily.She exercises with enough effort to increase her heart rate 20 to 29 minutes per day.  She exercises with enough effort to increase her heart rate 5 days per week.   She is taking medications regularly.             Review of Systems   Constitutional, HEENT, cardiovascular, pulmonary, gi and gu systems are negative, except as otherwise noted.      Objective    /68 (BP Location: Right arm, Patient Position: Sitting, Cuff Size: Adult Regular)   Pulse 60   Temp 97.9  F (36.6  C) (Oral)   Resp 16   Ht 1.499 m (4' 11\")   Wt 76.6 kg (168 lb 14.4 oz)   SpO2 98%   BMI 34.11 kg/m    Body mass index is 34.11 kg/m .  Physical Exam   GENERAL: healthy, alert and no distress  HENT: ear canals and TM's normal, nose and mouth without ulcers or lesions  RESP: lungs clear to auscultation - no rales, rhonchi or wheezes  CV: regular rate and rhythm, normal S1 S2, no S3 or S4, no murmur, click " or rub, no peripheral edema and peripheral pulses strong  ABDOMEN: soft, nontender, no hepatosplenomegaly, no masses and bowel sounds normal  MS: no gross musculoskeletal defects noted, no edema

## 2023-09-21 LAB
ANION GAP SERPL CALCULATED.3IONS-SCNC: 12 MMOL/L (ref 7–15)
BUN SERPL-MCNC: 9.6 MG/DL (ref 8–23)
CALCIUM SERPL-MCNC: 9.9 MG/DL (ref 8.8–10.2)
CHLORIDE SERPL-SCNC: 101 MMOL/L (ref 98–107)
CREAT SERPL-MCNC: 1.24 MG/DL (ref 0.51–0.95)
DEPRECATED HCO3 PLAS-SCNC: 24 MMOL/L (ref 22–29)
EGFRCR SERPLBLD CKD-EPI 2021: 49 ML/MIN/1.73M2
GLUCOSE SERPL-MCNC: 149 MG/DL (ref 70–99)
POTASSIUM SERPL-SCNC: 4.9 MMOL/L (ref 3.4–5.3)
SODIUM SERPL-SCNC: 137 MMOL/L (ref 136–145)

## 2023-09-22 DIAGNOSIS — R11.0 NAUSEA: ICD-10-CM

## 2023-09-22 DIAGNOSIS — I10 BENIGN ESSENTIAL HYPERTENSION: ICD-10-CM

## 2023-09-22 LAB
TTG IGA SER-ACNC: <0.2 U/ML
TTG IGG SER-ACNC: <0.6 U/ML

## 2023-09-22 RX ORDER — SPIRONOLACTONE 25 MG/1
25 TABLET ORAL DAILY
Qty: 90 TABLET | Refills: 3 | Status: SHIPPED | OUTPATIENT
Start: 2023-09-22 | End: 2024-09-20

## 2023-09-22 RX ORDER — ONDANSETRON 4 MG/1
4 TABLET, ORALLY DISINTEGRATING ORAL EVERY 8 HOURS PRN
Qty: 30 TABLET | Refills: 0 | Status: SHIPPED | OUTPATIENT
Start: 2023-09-22 | End: 2024-02-20

## 2023-09-26 ENCOUNTER — TELEPHONE (OUTPATIENT)
Dept: FAMILY MEDICINE | Facility: CLINIC | Age: 63
End: 2023-09-26
Payer: COMMERCIAL

## 2023-09-26 NOTE — TELEPHONE ENCOUNTER
Summary:    Patient is due/failing the following:   Eye exam    Reviewed:    [] CARE EVERYWHERE  [] LAST OV NOTE   [] FYI TAB  [] MYCHART ACTIVE?  [] LAST PANEL ENCOUNTER  [] FUTURE APPTS  [] IMMUNIZATIONS  [] Media Tab            Action needed:   Received eye exam from clinic and will have provider sign off on retinopathy status    Type of outreach:    None, routed to provider for review.                                                                               Alisha Wang/[facility]

## 2023-10-17 DIAGNOSIS — M79.7 FIBROMYALGIA: ICD-10-CM

## 2023-10-17 RX ORDER — CYCLOBENZAPRINE HCL 10 MG
TABLET ORAL
Qty: 30 TABLET | Refills: 0 | Status: SHIPPED | OUTPATIENT
Start: 2023-10-17 | End: 2023-11-21

## 2023-10-23 NOTE — TELEPHONE ENCOUNTER
Negative for retinopathy, report sent to abstraction    Alisha Wang/[facility]---[facility]

## 2023-10-25 DIAGNOSIS — I10 BENIGN ESSENTIAL HYPERTENSION: ICD-10-CM

## 2023-10-26 RX ORDER — LOSARTAN POTASSIUM 100 MG/1
100 TABLET ORAL DAILY
Qty: 90 TABLET | Refills: 1 | Status: SHIPPED | OUTPATIENT
Start: 2023-10-26 | End: 2024-06-20

## 2023-10-31 ENCOUNTER — TELEPHONE (OUTPATIENT)
Dept: FAMILY MEDICINE | Facility: CLINIC | Age: 63
End: 2023-10-31
Payer: COMMERCIAL

## 2023-10-31 DIAGNOSIS — E11.9 TYPE 2 DIABETES MELLITUS WITHOUT COMPLICATION, WITH LONG-TERM CURRENT USE OF INSULIN (H): Primary | ICD-10-CM

## 2023-10-31 DIAGNOSIS — Z79.4 TYPE 2 DIABETES MELLITUS WITHOUT COMPLICATION, WITH LONG-TERM CURRENT USE OF INSULIN (H): Primary | ICD-10-CM

## 2023-10-31 DIAGNOSIS — I10 HYPERTENSION GOAL BP (BLOOD PRESSURE) < 140/90: ICD-10-CM

## 2023-10-31 NOTE — TELEPHONE ENCOUNTER
Was to follow up with diabetic education, but has not. Says not able to take Metformin anymore as is giving her bad diarrhea. Patient self-stopped medication one month ago.     Discussed with me that she got different insulin when in hospital but we reviewed she has been seen by PCP twice since then and this has been reviewed, medication inpatient can differ from outpatient. Vanessa understands this.     Has been on Metformin for years she says. Feels diarrhea was caused when Metformin was increased. Says no diarrhea with 2 tabs per day but has if with 4 tabs a day. Says she stopped due to diarrhea about one month ago and diarrhea is now subsided.     Recommended follow up with Diabetic Ed or MTM pharmacy or both. I feel like MTM would be great for her also as she is on multiple medications--referral placed as may need further education to enhance understanding of current medications in addition to diabetic management.  Vanessa sounded very interested. Also stressed to follow up with diabetic educator as well. Encouraged to make appointments today, numbers provided.     Hasn't checked blood sugar recently but last check was around 130 off Metformin. Discussed ways to remember to do this daily and Vanessa says she will try to be better about this.     Routing to Ramona Aaseby-Aguilera, PA-C to review and advise if any further recommendations at this time.     Suzette Bradford R.N.

## 2023-11-01 ENCOUNTER — TELEPHONE (OUTPATIENT)
Dept: FAMILY MEDICINE | Facility: CLINIC | Age: 63
End: 2023-11-01
Payer: COMMERCIAL

## 2023-11-01 NOTE — TELEPHONE ENCOUNTER
MTM referral from: [facility] visit (referral by provider)    MTM referral outreach attempt #1 on November 1, 2023 at 11:20 AM      Outcome: Patient is not interested at this time because mtm not covered by insurance, will route to MTM Pharmacist/Provider as an FYI. Thank you for the referral.     Use private pay  for the carrier/Plan on the flowsheet      Landy Mata - [facility]

## 2023-11-01 NOTE — TELEPHONE ENCOUNTER
Chart review given private patient and patient declined. From review possible diarrhea concern from metformin. I would agree given circumstance she should follow-up with CDE referral. Including RN as well who was working with patient yesterday.

## 2023-11-18 DIAGNOSIS — I10 BENIGN ESSENTIAL HYPERTENSION: ICD-10-CM

## 2023-11-20 ENCOUNTER — NURSE TRIAGE (OUTPATIENT)
Dept: FAMILY MEDICINE | Facility: CLINIC | Age: 63
End: 2023-11-20
Payer: COMMERCIAL

## 2023-11-20 RX ORDER — AMLODIPINE BESYLATE 10 MG/1
10 TABLET ORAL AT BEDTIME
Qty: 90 TABLET | Refills: 0 | Status: SHIPPED | OUTPATIENT
Start: 2023-11-20 | End: 2024-02-01

## 2023-11-20 NOTE — TELEPHONE ENCOUNTER
Called pt, pt currently under LV provider care, pt has not seen Dr Romero, has appointment tomorrow with April Coming-MD Greg, pt agrees to below, informed of POD note, recommend call back LV provider if needed today, pt will need to establish care with Pretty Trna MD for ongoing needs  Ivanna Bryan RN, BSN  [facility]

## 2023-11-20 NOTE — TELEPHONE ENCOUNTER
Provider out of office today, printed and set on another provider desk to review. Suzette Bradford R.N.

## 2023-11-20 NOTE — TELEPHONE ENCOUNTER
Noted, will discuss tomorrow at visit.Provider Response to 2nd Level Triage Request    I have reviewed the RN documentation. My recommendation is:  Refer to ED if needed overnight.

## 2023-11-20 NOTE — TELEPHONE ENCOUNTER
I helped out over lunch for POD and I agree with the same plan take 12 units of basaglar now and then continue with her 48 units in the AM. The patient did stop metformin on her own and per my discussion with RN has low health literacy with Diabetes management and will need to keep her follow up appointment tomorrow and discuss management and medication therapy.   Sarita Little NP on 11/20/2023 at 12:50 PM

## 2023-11-20 NOTE — TELEPHONE ENCOUNTER
Return call to Jinny. She asked me to call back again, so I waited again and then called her back.     Ate 1/4 sandwich and coke (regular) advised this has a lot of sugar.     Again expressed she quit taking metformin due to diarrhea. Also noted Metformin was discontinued when she started having issues with it, but no further medications changes noted at that time.     Blood sugar. 453 now. Says has been one hour since she ate. Says diabetic educator was not helpful.     Reviewed with Sarita Little NP new sugar levels. To take insulin as directed below from Dr. Alston but can so night time dose now since insulin is long acting. I also reviewed with her no more sugary drinks or snacks today and if begins to feel worse can always call back or be seen in . Encouraged drinking plenty of water. Encouraged to call with questions or changes.     Feel Jinny does not have good understanding of medications and diabetes. Did briefly discuss with her that diabetic education should be helpful. Also reviewed why important to take medications as directed and follow up with provider if things aren't working, and seriousness of condition if sugars not controlled long term. Says she has been taking 63 units of insulin in the morning because the prescribed 48 units were not keeping blood sugar down.     Says she feels better after talking to be, writer feels patient may do well with patient and gradual education with teach back method.     Will route to provider seeing Jinny tomorrow.     Suzette Bradford R.N.

## 2023-11-20 NOTE — TELEPHONE ENCOUNTER
Call to Vanessa--she is in the middle of something and will take her blood sugar again after. We agreed I will return a call to her in about 30 min and she feels she can have result at that time. Suzette Bradford R.N.

## 2023-11-20 NOTE — TELEPHONE ENCOUNTER
Situation   Patient called to send a message or  talk with her pcp about her diabetic medication.     Assessment   Patient states her insurance is not covering Basaglar insulin anymore. She needs Lantus ordered.     2. The patient is stating her insulin needs to be increased to 63 units     3. She quit metformin about a week ago because it caused diarrhea     Her blood sugar was 309 today ( fasting) she has not eaten yet.   Is going to take the basaglar when she eats breakfast today- typically around 11am.   Has not missed any doses of the insulin.      Yesterday 11/19 her blood sugar was 238  Friday he blood sugar was 258  Has been testing her blood sugar once a day.     The patient states ketones are present in urine today per the test strip- her test strip does not give a value.     Review high blood sugar symptoms- Patient denied any high blood sugar symptoms.     Recommendations.   Patient has already scheduled an appointment tomorrow in [place]-advised to keep.   Advised to watch simple sugar intake today, eat a balanced breakfast and take her insulin   advised to take blood sugar before meals, as needed for symptoms and bedtime  If symptoms develop call triage, off hour if symptoms and or BG's > 400 go to ER  Advised will send to pcp and staff will get back to her.       Reason for Disposition   Patient wants to be seen   Caller has NON-URGENT medication question about med that PCP prescribed and triager unable to answer question   Blood glucose > 300 mg/dL (16.7 mmol/L)   Blood glucose > 240 mg/dL (13.3 mmol/L) AND urine ketones moderate-large (or more than 1+)     Her test strips just say present, does not give a value.   Blood glucose > 240 mg/dL (13.3 mmol/L) and blood ketones > 1.4 mmol/L     Her test strip states present does not give value    Additional Information   Negative: Unconscious or difficult to awaken   Negative: Acting confused (e.g., disoriented, slurred speech)   Negative: Very weak  (can't stand)   Negative: Sounds like a life-threatening emergency to the triager   Negative: Vomiting and signs of dehydration (e.g., very dry mouth, lightheaded, dark urine)   Negative: Blood glucose > 240 mg/dL (13.3 mmol/L) and rapid breathing   Negative: Blood glucose > 500 mg/dL (27.8 mmol/L)   Negative: Vomiting lasting > 4 hours   Negative: Patient sounds very sick or weak to the triager   Negative: Fever > 100.4 F (38.0 C)   Negative: Caller has URGENT medication or insulin pump question and triager unable to answer question   Negative: Blood glucose > 400 mg/dL (22.2 mmol/L)   Negative: Blood glucose > 300 mg/dL (16.7 mmol/L) AND two or more times in a row   Negative: Urine ketones moderate - large (or blood ketones > 1.4 mmol/L)   Negative: Symptoms of high blood sugar (e.g., abnormally thirsty, frequent urination, weight loss) and not able to test blood glucose, AND pregnant   Negative: Blood glucose > 240 mg/dL (13.3 mmol/L) AND pregnant   Negative: Symptoms of high blood sugar (e.g., abnormally thirsty, frequent urination, weight loss) and not able to test blood glucose   Negative: New-onset diabetes mellitus suspected (e.g., frequent urination, weak, weight loss)    Protocols used: Diabetes - High Blood Sugar-A-OH

## 2023-11-20 NOTE — TELEPHONE ENCOUNTER
Provider Response to 2nd Level Triage Request    I have reviewed the RN documentation. My recommendation is:    Call to see how her BS is now, ensure lower - she should take 12 U at bedtime tonight, continue 48 U in AM. Keep appt with FP tomorrow.

## 2023-11-21 ENCOUNTER — OFFICE VISIT (OUTPATIENT)
Dept: FAMILY MEDICINE | Facility: CLINIC | Age: 63
End: 2023-11-21
Payer: COMMERCIAL

## 2023-11-21 VITALS
BODY MASS INDEX: 33.57 KG/M2 | DIASTOLIC BLOOD PRESSURE: 72 MMHG | TEMPERATURE: 98.5 F | HEART RATE: 95 BPM | RESPIRATION RATE: 18 BRPM | HEIGHT: 60 IN | WEIGHT: 171 LBS | SYSTOLIC BLOOD PRESSURE: 144 MMHG | OXYGEN SATURATION: 98 %

## 2023-11-21 DIAGNOSIS — N18.31 CHRONIC KIDNEY DISEASE, STAGE 3A (H): ICD-10-CM

## 2023-11-21 DIAGNOSIS — Z79.4 TYPE 2 DIABETES MELLITUS WITH HYPERGLYCEMIA, WITH LONG-TERM CURRENT USE OF INSULIN (H): Primary | ICD-10-CM

## 2023-11-21 DIAGNOSIS — E11.65 TYPE 2 DIABETES MELLITUS WITH HYPERGLYCEMIA, WITH LONG-TERM CURRENT USE OF INSULIN (H): Primary | ICD-10-CM

## 2023-11-21 DIAGNOSIS — M79.7 FIBROMYALGIA: ICD-10-CM

## 2023-11-21 PROBLEM — R41.0 CONFUSION: Status: RESOLVED | Noted: 2023-07-20 | Resolved: 2023-11-21

## 2023-11-21 PROBLEM — N28.9 RENAL INSUFFICIENCY: Status: RESOLVED | Noted: 2023-07-20 | Resolved: 2023-11-21

## 2023-11-21 PROBLEM — R73.9 HYPERGLYCEMIA: Status: RESOLVED | Noted: 2023-07-20 | Resolved: 2023-11-21

## 2023-11-21 PROBLEM — E87.1 HYPONATREMIA: Status: RESOLVED | Noted: 2023-07-20 | Resolved: 2023-11-21

## 2023-11-21 PROBLEM — S09.90XA CLOSED HEAD INJURY, INITIAL ENCOUNTER: Status: RESOLVED | Noted: 2023-07-20 | Resolved: 2023-11-21

## 2023-11-21 PROCEDURE — 99215 OFFICE O/P EST HI 40 MIN: CPT | Performed by: FAMILY MEDICINE

## 2023-11-21 RX ORDER — CYCLOBENZAPRINE HCL 10 MG
TABLET ORAL
Qty: 30 TABLET | Refills: 3 | Status: SHIPPED | OUTPATIENT
Start: 2023-11-21 | End: 2024-03-20

## 2023-11-21 RX ORDER — GLIPIZIDE 10 MG/1
TABLET, FILM COATED, EXTENDED RELEASE ORAL
Qty: 180 TABLET | Refills: 1 | Status: SHIPPED | OUTPATIENT
Start: 2023-11-21 | End: 2023-12-19

## 2023-11-21 NOTE — PROGRESS NOTES
Assessment & Plan     Type 2 diabetes mellitus with hyperglycemia, with long-term current use of insulin (H)  Discussed the importance of dietary changes and this being the foundation of diabetes management.  Discussed her diet and recommended she keep a food diary of everything she is putting in her mouth, every candy, so we can start tracking carbohydrates.  She was agreeable to going to diabetes education for further help with nutrition.  Will see if we can get CGM for her, Ariel 2 sent to pharmacy, will see what coverage looks like.  Could consider Ariel 3 or Dexcom.  Remain off Metformin.  Continue Glipizide BID.  Recommend she try to switch Basaglar dosing to BID and see if we get any better coverage of her sugars later in the day.  Will add Rybelsus 3 mg daily and see if she can tolerate this.  If not, we discussed adding Jardiance or Januvia.  Recheck labs in one month.  - AMB Adult Diabetes Educator Referral; Future  - Semaglutide (RYBELSUS) 3 MG tablet; Take 3 mg by mouth daily  - glipiZIDE (GLUCOTROL XL) 10 MG 24 hr tablet; TAKE 1 TABLET EVERY 12     HOURS.  - insulin glargine (LANTUS PEN) 100 UNIT/ML pen; Inject 32 Units Subcutaneous 2 times daily  - Hemoglobin A1c; Future  - Semaglutide (RYBELSUS) 3 MG tablet; Take 3 mg by mouth daily  - Continuous Blood Gluc  (FREESTYLE ARIEL 2 READER) BRET; 1 each once for 1 dose Use to read blood sugars per 's instructions.  - Continuous Blood Gluc Sensor (FREESTYLE ARIEL 2 SENSOR) MISC; 1 each every 14 days Use 1 sensor every 14 days. Use to read blood sugars per 's instructions.    Chronic kidney disease, stage 3a (H)  Stable, labs up to date.    Fibromyalgia  Refill for PRN use.  - cyclobenzaprine (FLEXERIL) 10 MG tablet; TAKE 1 TABLET 3 TIMES DAILYAS NEEDED FOR MUSCLE SPASMS      43 minutes spent by me on the date of the encounter doing chart review, history and exam, documentation and further activities per the note       See  Patient Instructions    Pretty Garza MD  [facility] SHASHANK Mendez is a 63 year old, presenting for the following health issues:  Diabetes        11/21/2023     2:07 PM   Additional Questions   Roomed by Ishan MCINTOSH CMA       History of Present Illness       Reason for visit:  Diabeties my blood    She eats 2-3 servings of fruits and vegetables daily.She consumes 3 sweetened beverage(s) daily.She exercises with enough effort to increase her heart rate 20 to 29 minutes per day.  She exercises with enough effort to increase her heart rate 4 days per week.   She is taking medications regularly.     Patient is not currently taking Metformin Hydrochloride 500 mg, would like to try other diabetes medication to get blood sugar under control.      Diabetes Follow-up    How often are you checking your blood sugar? Two times daily  Blood sugar testing frequency justification:   on insulin  What time of day are you checking your blood sugars (select all that apply)?  Before meals  Have you had any blood sugars above 200?  Yes   Have you had any blood sugars below 70?  Yes   What symptoms do you notice when your blood sugar is low?  Sweaty, hungry, disoriented,  What concerns do you have today about your diabetes? None and Blood sugar is often over 200   Do you have any of these symptoms? (Select all that apply)  Burning in feet and Excessive thirst    Currently taking Glipizide 10 mg, Basaglar.  Stopped Metformin due to diarrhea, in the first part of November, did okay managing sugars at first, but now has been high, up to 425, lowest was above 170.      Tried Ozempic, also made her sick.  Metformin was taken all four tabs after dinner, but did tolerate BID dosing better, still had diarrhea though.  Currently taking 64 units of basaglar.  Fasting this morning was 187, took 66 units this morning.  Needs lantus sent in instead of basaglar.    She reports being addicted to coca-cola, which is  "causing sugars to spike.  She reports eating pasta, breads, candies throughout the day.    BP Readings from Last 2 Encounters:   11/21/23 (!) 144/72   09/20/23 118/68     Hemoglobin A1C (%)   Date Value   09/20/2023 8.2 (H)   07/19/2023 8.0 (H)   10/30/2020 7.1 (H)   02/28/2020 7.0 (H)     LDL Cholesterol Calculated (mg/dL)   Date Value   01/31/2023 74   08/18/2022 62   10/30/2020 55   06/25/2019 47         Review of Systems   Constitutional, HEENT, cardiovascular, pulmonary, gi and gu systems are negative, except as otherwise noted.      Objective    BP (!) 144/72 (BP Location: Right arm, Patient Position: Sitting, Cuff Size: Adult Large)   Pulse 95   Temp 98.5  F (36.9  C) (Oral)   Resp 18   Ht 1.518 m (4' 11.75\")   Wt 77.6 kg (171 lb)   SpO2 98%   BMI 33.68 kg/m    Body mass index is 33.68 kg/m .  Physical Exam   GENERAL: healthy, alert and no distress  PSYCH: mentation appears normal, affect normal/bright    Office Visit on 09/20/2023   Component Date Value Ref Range Status    Sodium 09/20/2023 137  136 - 145 mmol/L Final    Potassium 09/20/2023 4.9  3.4 - 5.3 mmol/L Final    Chloride 09/20/2023 101  98 - 107 mmol/L Final    Carbon Dioxide (CO2) 09/20/2023 24  22 - 29 mmol/L Final    Anion Gap 09/20/2023 12  7 - 15 mmol/L Final    Urea Nitrogen 09/20/2023 9.6  8.0 - 23.0 mg/dL Final    Creatinine 09/20/2023 1.24 (H)  0.51 - 0.95 mg/dL Final    Calcium 09/20/2023 9.9  8.8 - 10.2 mg/dL Final    Glucose 09/20/2023 149 (H)  70 - 99 mg/dL Final    GFR Estimate 09/20/2023 49 (L)  >60 mL/min/1.73m2 Final    Hemoglobin A1C 09/20/2023 8.2 (H)  0.0 - 5.6 % Final    Normal <5.7%   Prediabetes 5.7-6.4%    Diabetes 6.5% or higher     Note: Adopted from ADA consensus guidelines.    Tissue Transglutaminase Antibody I* 09/20/2023 <0.2  <7.0 U/mL Final    Negative- The tTG-IgA assay has limited utility for patients with decreased levels of IgA. Screening for celiac disease should include IgA testing to rule out selective " IgA deficiency and to guide selection and interpretation of serological testing. tTG-IgG testing may be positive in celiac disease patients with IgA deficiency.    Tissue Transglutaminase Antibody I* 09/20/2023 <0.6  <7.0 U/mL Final    Negative

## 2023-11-21 NOTE — PATIENT INSTRUCTIONS
Start food diary, write down everything that goes in your mouth.      Split insulin into twice daily dosing, 32 units.  Take Glipizide twice per day.  Take Semaglutide (Rybelsus) in the morning.    Stop Coca-cola! None : )

## 2023-11-21 NOTE — COMMUNITY RESOURCES LIST (ENGLISH)
11/21/2023   [facility]  N/A  For questions about this resource list or additional care needs, please contact your primary care clinic or care manager.  Phone: [phone]   Email: N/A   Address: [address] [place] [address]   Hours: N/A        Financial Stability       Utility payment assistance  1  [facility] Distance: 2.46 miles      In-Person, Phone/Virtual   [address] [place] [address]  Language: English  Hours: Mon 4:00 PM - 6:00 PM , Tue 11:00 AM - 1:00 PM , Wed 4:00 PM - 6:00 PM , Thu 10:30 AM - 12:30 PM  Fees: Free   Phone: (606) 585-2979 Email: resources@TheRanking.com.org Website: [email or web address].org/mission/mission-outpost/     2  [address] [place] [address] Distance: 3.94 miles      In-Person, Phone/Virtual   [address] [place] [address]  Language: English  Hours: Mon - Thu 9:00 AM - 4:00 PM  Fees: Free   Phone: (243) 534-7596 Email: info@[employer]Link[employer] Website: https://Zafu.org/          Transportation       Free or low-cost transportation  3  [employer]at[employer]. Distance: 1.13 miles      In-Person   [address] [facility] [address] [place] [address]  Language: English  Hours: Mon - Fri 7:00 AM - 5:00 PM  Fees: Free   Phone: (449) 510-5706 Email: Precognate@SuddenValues Website: https://VOSS.Bosideng/     4  [facility] Circulator Bus Distance: 14.07 miles      In-Person   [address] [place] [place] [address]  Language: English  Hours: Tue 9:00 AM - 2:00 PM  Fees: Self Pay   Phone: (452) 873-7691 Email: info@POPRAGEOUS Website: http://www.Tig[employer] Pharm[employer]neSimGym.org/     Transportation to medical appointments  5  [employer] Mob[employer] Distance: 4.39 miles      In-Person   [address] [place] [address]  Language: Hebrew, English, Oromo, Panamanian  Hours: Mon - Sat 5:00 AM - 9:00 PM  Fees: Insurance, Self Pay   Phone: (227) 690-9912 Email:  info@[employer] Website: http://www.[employer]/     6  Assisted Transport Distance: 9.94 miles      In-Person   [address] [place] [address]  Language: English, Haitian  Hours: Mon - Sun Appt. Only  Fees: Self Pay   Phone: (153) 522-1320 Email: alonso@Gr[employer] Website: http://www.GroundedPower/          Important Numbers & Websites       Emergency Services   [address]  [place] [address]  Poison Control   (621) 379-3692  Suicide Prevention Lifeline   (625) 838-7417 (TALK)  Child Abuse Hotline   (899) 193-6447 (4-A-Child)  Sexual Assault Hotline   (140) 464-6438 (HOPE)  National Runaway Safeline   (879) 961-5110 (RUNAWAY)  All-Options Talkline   (691) 406-9758  Substance Abuse Referral   (574) 618-1727 (HELP)

## 2023-11-22 ENCOUNTER — TELEPHONE (OUTPATIENT)
Dept: FAMILY MEDICINE | Facility: CLINIC | Age: 63
End: 2023-11-22

## 2023-11-22 ENCOUNTER — TELEPHONE (OUTPATIENT)
Dept: FAMILY MEDICINE | Facility: CLINIC | Age: 63
End: 2023-11-22
Payer: COMMERCIAL

## 2023-11-22 DIAGNOSIS — E11.65 TYPE 2 DIABETES MELLITUS WITH HYPERGLYCEMIA, WITH LONG-TERM CURRENT USE OF INSULIN (H): Primary | ICD-10-CM

## 2023-11-22 DIAGNOSIS — Z79.4 TYPE 2 DIABETES MELLITUS WITH HYPERGLYCEMIA, WITH LONG-TERM CURRENT USE OF INSULIN (H): Primary | ICD-10-CM

## 2023-11-22 RX ORDER — ACYCLOVIR 400 MG/1
1 TABLET ORAL
Qty: 3 EACH | Refills: 5 | Status: SHIPPED | OUTPATIENT
Start: 2023-11-22 | End: 2024-03-20

## 2023-11-22 RX ORDER — ACYCLOVIR 400 MG/1
1 TABLET ORAL ONCE
Qty: 1 EACH | Refills: 0 | Status: SHIPPED | OUTPATIENT
Start: 2023-11-22 | End: 2023-11-22

## 2023-11-22 NOTE — TELEPHONE ENCOUNTER
PRIOR AUTHORIZATION DENIED    Medication: FREESTYLE CM 2 SENSOR [facility]  Insurance Company: [employer] - Phone [phone] Fax [phone]  Denial Date: 11/21/2023  Denial Rational:     Appeal Information:     Patient Notified: No

## 2023-11-22 NOTE — TELEPHONE ENCOUNTER
Please call patient and let her know that the Ariel CGM was denied as it is not on her formulary.  They will cover the Dexcom sensors, so I sent that in for her instead.

## 2023-11-22 NOTE — TELEPHONE ENCOUNTER
Message #1 left for patient to return call to triage nurse     Elsa Thurman, Registered Nurse  [facility]

## 2023-11-22 NOTE — TELEPHONE ENCOUNTER
Dr. Nick Garza   Please see JARRETT robertson     Thank you   Elsa Thurman, Registered Nurse  [facility]

## 2023-11-22 NOTE — TELEPHONE ENCOUNTER
Patient calling back.  Advised of below.  Advised to call [facility] in 24 hours to follow-up.  Patient agrees with plan.  Shanae Huang RN

## 2023-11-22 NOTE — TELEPHONE ENCOUNTER
Dr. Nick Garza   Please see JARRETT robertson     Thank you   Elsa Thurman, Registered Nurse  [facility]

## 2023-11-22 NOTE — TELEPHONE ENCOUNTER
PRIOR AUTHORIZATION DENIED    Medication: FREESTYLE CM 2 READER [facility]  Insurance Company: [employer] - Phone [phone] Fax [phone]  Denial Date: 11/21/2023  Denial Rational:     Appeal Information:     Patient Notified: No

## 2023-12-03 DIAGNOSIS — M79.7 FIBROMYALGIA: ICD-10-CM

## 2023-12-04 RX ORDER — GABAPENTIN 300 MG/1
CAPSULE ORAL
Qty: 90 CAPSULE | Refills: 8 | Status: SHIPPED | OUTPATIENT
Start: 2023-12-04 | End: 2024-07-17

## 2023-12-08 DIAGNOSIS — I10 UNCONTROLLED HYPERTENSION: ICD-10-CM

## 2023-12-08 DIAGNOSIS — R00.2 PALPITATIONS: ICD-10-CM

## 2023-12-08 DIAGNOSIS — E78.5 HYPERLIPIDEMIA LDL GOAL <100: ICD-10-CM

## 2023-12-08 RX ORDER — ATENOLOL 25 MG/1
25 TABLET ORAL EVERY MORNING
Qty: 30 TABLET | Refills: 11 | Status: SHIPPED | OUTPATIENT
Start: 2023-12-08 | End: 2023-12-11

## 2023-12-08 RX ORDER — ATORVASTATIN CALCIUM 20 MG/1
20 TABLET, FILM COATED ORAL DAILY
Qty: 90 TABLET | Refills: 0 | Status: SHIPPED | OUTPATIENT
Start: 2023-12-08 | End: 2024-02-20

## 2023-12-09 DIAGNOSIS — R00.2 PALPITATIONS: ICD-10-CM

## 2023-12-09 DIAGNOSIS — I10 UNCONTROLLED HYPERTENSION: ICD-10-CM

## 2023-12-11 ENCOUNTER — NURSE TRIAGE (OUTPATIENT)
Dept: FAMILY MEDICINE | Facility: CLINIC | Age: 63
End: 2023-12-11
Payer: COMMERCIAL

## 2023-12-11 RX ORDER — ATENOLOL 25 MG/1
25 TABLET ORAL DAILY
Qty: 90 TABLET | Refills: 3 | Status: SHIPPED | OUTPATIENT
Start: 2023-12-11 | End: 2023-12-29

## 2023-12-11 NOTE — TELEPHONE ENCOUNTER
Provider sent refill for 30 day supply and 11 refills. Sent prescription for 90 day supply with 3 refills per request

## 2023-12-11 NOTE — TELEPHONE ENCOUNTER
Patient is planning to stay with Dr. Nick Garza    S: abdominal pain  B: Started Rybelsus 11/21/23. Read that she should call her doctor if she developed abdominal pain.  A: upper left abdomen, constant stabbing pain, minimal nausea,   5/10 on 1-10 pain scale, tender to palpation,   Last bowel movement today was normal.  In menopause    This is a listed side effect of Rybelsus    Denies: chest pain, shortness of breath, emesis, diarrhea, constipation, interference with activities of daily living, fever, sweating, urination, vaginal symptoms, injury,    R: RN protocol recommends Emergency Department.  Patient does not agree with this disposition but does agree to go if she develops worsening symptoms, chest pain, or shortness of breath.    Patient scheduled for in-person provider visit tomorrow AM.    Second Level Triage with Scotty Wade CNP who agrees patient can wait until tomorrow unless symptoms worsen.     Maggy Vargas RN     Reason for Disposition   Pain lasting > 10 minutes and over 50 years old    Additional Information   Negative: SEVERE difficulty breathing (e.g., struggling for each breath, speaks in single words)   Negative: Shock suspected (e.g., cold/pale/clammy skin, too weak to stand, low BP, rapid pulse)   Negative: Difficult to awaken or acting confused (e.g., disoriented, slurred speech)   Negative: Passed out (i.e., lost consciousness, collapsed and was not responding)   Negative: Visible sweat on face or sweat is dripping down   Negative: Sounds like a life-threatening emergency to the triager   Negative: Followed an abdomen (stomach) injury   Negative: Chest pain   Negative: Abdominal pain and pregnant < 20 weeks   Negative: Abdominal pain and pregnant 20 or more weeks   Negative: Abdomen bloating or swelling are main symptoms   Negative: SEVERE abdominal pain (e.g., excruciating)    Protocols used: Abdominal Pain - Upper-A-[place]

## 2023-12-12 ENCOUNTER — OFFICE VISIT (OUTPATIENT)
Dept: FAMILY MEDICINE | Facility: CLINIC | Age: 63
End: 2023-12-12
Payer: COMMERCIAL

## 2023-12-12 VITALS
SYSTOLIC BLOOD PRESSURE: 136 MMHG | WEIGHT: 168 LBS | DIASTOLIC BLOOD PRESSURE: 72 MMHG | HEIGHT: 59 IN | RESPIRATION RATE: 14 BRPM | TEMPERATURE: 98 F | OXYGEN SATURATION: 97 % | HEART RATE: 68 BPM | BODY MASS INDEX: 33.87 KG/M2

## 2023-12-12 DIAGNOSIS — R10.12 LUQ ABDOMINAL PAIN: Primary | ICD-10-CM

## 2023-12-12 DIAGNOSIS — Z79.4 TYPE 2 DIABETES MELLITUS WITH HYPERGLYCEMIA, WITH LONG-TERM CURRENT USE OF INSULIN (H): ICD-10-CM

## 2023-12-12 DIAGNOSIS — E11.65 TYPE 2 DIABETES MELLITUS WITH HYPERGLYCEMIA, WITH LONG-TERM CURRENT USE OF INSULIN (H): ICD-10-CM

## 2023-12-12 DIAGNOSIS — E66.811 CLASS 1 OBESITY DUE TO EXCESS CALORIES WITH SERIOUS COMORBIDITY AND BODY MASS INDEX (BMI) OF 33.0 TO 33.9 IN ADULT: ICD-10-CM

## 2023-12-12 DIAGNOSIS — E66.09 CLASS 1 OBESITY DUE TO EXCESS CALORIES WITH SERIOUS COMORBIDITY AND BODY MASS INDEX (BMI) OF 33.0 TO 33.9 IN ADULT: ICD-10-CM

## 2023-12-12 LAB
BASOPHILS # BLD AUTO: 0 10E3/UL (ref 0–0.2)
BASOPHILS NFR BLD AUTO: 0 %
EOSINOPHIL # BLD AUTO: 0.1 10E3/UL (ref 0–0.7)
EOSINOPHIL NFR BLD AUTO: 1 %
ERYTHROCYTE [DISTWIDTH] IN BLOOD BY AUTOMATED COUNT: 12.6 % (ref 10–15)
HBA1C MFR BLD: 7.4 % (ref 0–5.6)
HCT VFR BLD AUTO: 37.5 % (ref 35–47)
HGB BLD-MCNC: 12.7 G/DL (ref 11.7–15.7)
IMM GRANULOCYTES # BLD: 0 10E3/UL
IMM GRANULOCYTES NFR BLD: 0 %
LYMPHOCYTES # BLD AUTO: 1.7 10E3/UL (ref 0.8–5.3)
LYMPHOCYTES NFR BLD AUTO: 16 %
MCH RBC QN AUTO: 29.5 PG (ref 26.5–33)
MCHC RBC AUTO-ENTMCNC: 33.9 G/DL (ref 31.5–36.5)
MCV RBC AUTO: 87 FL (ref 78–100)
MONOCYTES # BLD AUTO: 0.7 10E3/UL (ref 0–1.3)
MONOCYTES NFR BLD AUTO: 6 %
NEUTROPHILS # BLD AUTO: 8.6 10E3/UL (ref 1.6–8.3)
NEUTROPHILS NFR BLD AUTO: 77 %
PLATELET # BLD AUTO: 241 10E3/UL (ref 150–450)
RBC # BLD AUTO: 4.3 10E6/UL (ref 3.8–5.2)
WBC # BLD AUTO: 11.1 10E3/UL (ref 4–11)

## 2023-12-12 PROCEDURE — 83690 ASSAY OF LIPASE: CPT

## 2023-12-12 PROCEDURE — 80053 COMPREHEN METABOLIC PANEL: CPT

## 2023-12-12 PROCEDURE — 36415 COLL VENOUS BLD VENIPUNCTURE: CPT

## 2023-12-12 PROCEDURE — 99213 OFFICE O/P EST LOW 20 MIN: CPT

## 2023-12-12 PROCEDURE — 83036 HEMOGLOBIN GLYCOSYLATED A1C: CPT

## 2023-12-12 PROCEDURE — 85025 COMPLETE CBC W/AUTO DIFF WBC: CPT

## 2023-12-12 ASSESSMENT — PATIENT HEALTH QUESTIONNAIRE - PHQ9
10. IF YOU CHECKED OFF ANY PROBLEMS, HOW DIFFICULT HAVE THESE PROBLEMS MADE IT FOR YOU TO DO YOUR WORK, TAKE CARE OF THINGS AT HOME, OR GET ALONG WITH OTHER PEOPLE: SOMEWHAT DIFFICULT
SUM OF ALL RESPONSES TO PHQ QUESTIONS 1-9: 7
SUM OF ALL RESPONSES TO PHQ QUESTIONS 1-9: 7

## 2023-12-12 NOTE — PATIENT INSTRUCTIONS
Lab work today     Abdominal ultrasound     For constipation, miralax, increase water (shoot for 50-60oz/day at least), continue benefiber

## 2023-12-12 NOTE — PROGRESS NOTES
Assessment & Plan     (R10.12) LUQ abdominal pain  (primary encounter diagnosis)  (E66.09,  Z68.33) Class 1 obesity due to excess calories with serious comorbidity and body mass index (BMI) of 33.0 to 33.9 in adult  (E11.65,  Z79.4) Type 2 diabetes mellitus with hyperglycemia, with long-term current use of insulin (H)  Comment: Will get lab work to further evaluate along with an abdominal ultrasound.  I do suspect patient could be having constipation given she does not drink much water and is using fiber supplement.  Patient also recently started Rybelsus for her diabetes and obesity which could be contributing to patient's pain as well as further constipating patient.  Lab work to rule in/out any pancreatitis.  Given patient is having to strain to have bowel movements suggest that she increase her water, utilize MiraLAX as needed, continue with fiber supplement.  Follow-up with lab results and ultrasound and whether further direction is necessary. Patient fully understands and is agreeable with plan of care, at this point patient will follow up as needed unless acute concerns arise in the meantime.  Plan: Comprehensive metabolic panel (BMP + Alb, Alk         Phos, ALT, AST, Total. Bili, TP), CBC with         platelets and differential, Lipase, US Abdomen         Complete    20 minutes spent by me on the date of the encounter doing chart review, history and exam, documentation and further activities per the note        SIA Marks CNP [facility]    Loretta Mendez is a 63 year old, presenting for the following health issues:  Abdominal Pain        11/21/2023     2:07 PM   Additional Questions   Roomed by Ishan MCINTOSH CMA     History of Present Illness       Diabetes:   She presents for follow up of diabetes.  She is checking home blood glucose one time daily.   She checks blood glucose before meals.  Blood glucose is sometimes over 200 and sometimes under 70. She is aware of  hypoglycemia symptoms including shakiness, weakness and confusion.   She is concerned about other.    She is not experiencing numbness or burning in feet, excessive thirst, blurry vision, weight changes or redness, sores or blisters on feet.           She eats 2-3 servings of fruits and vegetables daily.She consumes 0 sweetened beverage(s) daily.She exercises with enough effort to increase her heart rate 30 to 60 minutes per day.  She exercises with enough effort to increase her heart rate 4 days per week.   She is taking medications regularly.     Pain History:  When did you first notice your pain? week   Have you seen anyone else for your pain? No  How has your pain affected your ability to work? Not applicable  Where in your body do you have pain? Abdominal/Flank Pain  Onset/Duration: week  Description:   Character: Stabbing, tender to the touch  Location: left upper quadrant  Radiation: None  Intensity: 5/10  Progression of Symptoms:  same and constant  Accompanying Signs & Symptoms:  Fever/Chills: No  Gas/Bloating: No  Nausea: YES  Vomitting: No  Diarrhea: No  Constipation: No  Dysuria or Hematuria: No  History:   Trauma: No  Previous similar pain: No  Previous tests done: none  Precipitating factors:   Does the pain change with:     Food: No    Bowel Movement: No-was painful to have a BM, felt pressure    Urination: No   Other factors:  No  Therapies tried and outcome: None  No LMP recorded. Patient is postmenopausal.    Pain is new.  Currently taking rybelsus, started last month.   Currently at 3 mg   Does not drink a lot of water  Take benefiber supplement  Having to strain w/ BM's typically lately    Review of Systems   Constitutional, cardiovascular, pulmonary, gi and gu systems are negative, except as otherwise noted.      Objective    There were no vitals taken for this visit.  There is no height or weight on file to calculate BMI.  Physical Exam  Vitals and nursing note reviewed.   Constitutional:        General: She is not in acute distress.     Appearance: Normal appearance. She is obese. She is not ill-appearing.   Cardiovascular:      Rate and Rhythm: Normal rate and regular rhythm.      Heart sounds: No murmur heard.     No friction rub. No gallop.   Pulmonary:      Effort: Pulmonary effort is normal. No respiratory distress.      Breath sounds: No wheezing, rhonchi or rales.   Abdominal:      General: Bowel sounds are decreased. There is no distension.      Palpations: Abdomen is soft.      Tenderness: There is abdominal tenderness in the left upper quadrant. There is no guarding. Negative signs include Diana's sign.   Skin:     General: Skin is warm and dry.   Neurological:      Mental Status: She is alert.   Psychiatric:         Mood and Affect: Mood normal.         Behavior: Behavior normal. Behavior is cooperative.         Thought Content: Thought content normal.         Judgment: Judgment normal.

## 2023-12-13 LAB
ALBUMIN SERPL BCG-MCNC: 4.7 G/DL (ref 3.5–5.2)
ALP SERPL-CCNC: 88 U/L (ref 40–150)
ALT SERPL W P-5'-P-CCNC: 33 U/L (ref 0–50)
ANION GAP SERPL CALCULATED.3IONS-SCNC: 12 MMOL/L (ref 7–15)
AST SERPL W P-5'-P-CCNC: 32 U/L (ref 0–45)
BILIRUB SERPL-MCNC: 0.4 MG/DL
BUN SERPL-MCNC: 20.7 MG/DL (ref 8–23)
CALCIUM SERPL-MCNC: 10 MG/DL (ref 8.8–10.2)
CHLORIDE SERPL-SCNC: 98 MMOL/L (ref 98–107)
CREAT SERPL-MCNC: 1.36 MG/DL (ref 0.51–0.95)
DEPRECATED HCO3 PLAS-SCNC: 24 MMOL/L (ref 22–29)
EGFRCR SERPLBLD CKD-EPI 2021: 44 ML/MIN/1.73M2
GLUCOSE SERPL-MCNC: 191 MG/DL (ref 70–99)
LIPASE SERPL-CCNC: 15 U/L (ref 13–60)
POTASSIUM SERPL-SCNC: 4.9 MMOL/L (ref 3.4–5.3)
PROT SERPL-MCNC: 7.7 G/DL (ref 6.4–8.3)
SODIUM SERPL-SCNC: 134 MMOL/L (ref 135–145)

## 2023-12-14 ENCOUNTER — NURSE TRIAGE (OUTPATIENT)
Dept: NURSING | Facility: CLINIC | Age: 63
End: 2023-12-14
Payer: COMMERCIAL

## 2023-12-14 ENCOUNTER — TELEPHONE (OUTPATIENT)
Dept: FAMILY MEDICINE | Facility: CLINIC | Age: 63
End: 2023-12-14
Payer: COMMERCIAL

## 2023-12-14 NOTE — TELEPHONE ENCOUNTER
Scotty Wade APRN CNP  P Cr Triage  Please notify patient of lab results:  -lipase normal  -CBC showing mild elevation of WBC but improved from previous. However, may want to trend this depending on symptoms. Follow up at upcoming visit w/ Dr. Nick Garza.    -kidney function panel indicating kidney function a bit worse, ensure adequate hydration. Follow up on this at upcoming visit w/ Dr. Nick Gazra  -liver function panel stable  -will follow up on results of abdominal ultrasound when obtained.    SIA Marks CNP

## 2023-12-14 NOTE — TELEPHONE ENCOUNTER
Patient returning call:  I read her the following:  Scotty Wade APRN CNP  P Cr Triage  Please notify patient of lab results:  -lipase normal  -CBC showing mild elevation of WBC but improved from previous. However, may want to trend this depending on symptoms. Follow up at upcoming visit w/ Dr. Nick Garza.    -kidney function panel indicating kidney function a bit worse, ensure adequate hydration. Follow up on this at upcoming visit w/ Dr. Nick Garza  -liver function panel stable  -will follow up on results of abdominal ultrasound when obtained.    SIA Marks CNP    She has no further questions.  Maryuri Hernandez RN  [facility]    Reason for Disposition   Follow-up information-only call to recent contact, no triage required    Additional Information   Negative: New-onset or worsening symptoms, see that protocol (e.g., diarrhea, runny nose, sore throat)   Negative: Medicine question not related to refill or renewal   Negative: Requesting a renewal or refill of a medicine patient is currently taking   Negative: Questions or concerns about high blood pressure   Negative: Nursing judgment   Negative: Nursing judgment   Negative: Nursing judgment   Negative: Requesting lab results and adult stable (no new symptoms, not worsening)   Negative: Requesting referral to a specialist   Negative: Questions about durable medical equipment ordered and triager unable to answer   Negative: Requesting regular office appointment and adult stable (no new symptoms, not worsening)   Negative: General information question, no triage required and triager able to answer question   Negative: Question about upcoming scheduled surgery, procedure or test, no triage required, and triager able to answer question   Negative: Caller is not with the adult (patient) AND patient has probable NON-URGENT symptoms   Negative: Health information question, no triage required and triager able to answer question    Protocols used:  Information Only Call - No Triage-A-OH

## 2023-12-14 NOTE — TELEPHONE ENCOUNTER
Patient returning call:  I read her the following:  Scotty Wade APRN CNP  P Cr Triage  Please notify patient of lab results:  -lipase normal  -CBC showing mild elevation of WBC but improved from previous. However, may want to trend this depending on symptoms. Follow up at upcoming visit w/ Dr. Nick Garza.    -kidney function panel indicating kidney function a bit worse, ensure adequate hydration. Follow up on this at upcoming visit w/ Dr. Nick Garza  -liver function panel stable  -will follow up on results of abdominal ultrasound when obtained.    SIA Marks CNP    She has no further questions.  Maryuri Hernandez RN  [facility]

## 2023-12-14 NOTE — TELEPHONE ENCOUNTER
Message #1 left for patient to return call to triage nurse     Elsa Thurman, Registered Nurse  [facility]

## 2023-12-19 ENCOUNTER — OFFICE VISIT (OUTPATIENT)
Dept: FAMILY MEDICINE | Facility: CLINIC | Age: 63
End: 2023-12-19
Payer: COMMERCIAL

## 2023-12-19 ENCOUNTER — ALLIED HEALTH/NURSE VISIT (OUTPATIENT)
Dept: FAMILY MEDICINE | Facility: CLINIC | Age: 63
End: 2023-12-19

## 2023-12-19 VITALS
HEART RATE: 70 BPM | DIASTOLIC BLOOD PRESSURE: 73 MMHG | BODY MASS INDEX: 33.67 KG/M2 | RESPIRATION RATE: 22 BRPM | SYSTOLIC BLOOD PRESSURE: 137 MMHG | WEIGHT: 167 LBS | OXYGEN SATURATION: 99 % | TEMPERATURE: 98.1 F | HEIGHT: 59 IN

## 2023-12-19 VITALS — SYSTOLIC BLOOD PRESSURE: 122 MMHG | DIASTOLIC BLOOD PRESSURE: 58 MMHG

## 2023-12-19 DIAGNOSIS — Z79.4 TYPE 2 DIABETES MELLITUS WITH HYPERGLYCEMIA, WITH LONG-TERM CURRENT USE OF INSULIN (H): Primary | ICD-10-CM

## 2023-12-19 DIAGNOSIS — Z01.30 BP CHECK: Primary | ICD-10-CM

## 2023-12-19 DIAGNOSIS — E11.65 TYPE 2 DIABETES MELLITUS WITH HYPERGLYCEMIA, WITH LONG-TERM CURRENT USE OF INSULIN (H): Primary | ICD-10-CM

## 2023-12-19 DIAGNOSIS — G47.00 INSOMNIA, UNSPECIFIED TYPE: ICD-10-CM

## 2023-12-19 PROCEDURE — 99207 PR NO CHARGE NURSE ONLY: CPT | Performed by: PHYSICIAN ASSISTANT

## 2023-12-19 PROCEDURE — 99214 OFFICE O/P EST MOD 30 MIN: CPT | Performed by: FAMILY MEDICINE

## 2023-12-19 RX ORDER — TRAZODONE HYDROCHLORIDE 50 MG/1
50-100 TABLET, FILM COATED ORAL
Qty: 30 TABLET | Refills: 0 | Status: SHIPPED | OUTPATIENT
Start: 2023-12-19 | End: 2024-01-11

## 2023-12-19 RX ORDER — GLIPIZIDE 5 MG/1
5 TABLET, FILM COATED, EXTENDED RELEASE ORAL 2 TIMES DAILY
Qty: 180 TABLET | Refills: 1 | Status: SHIPPED | OUTPATIENT
Start: 2023-12-19 | End: 2024-06-20

## 2023-12-19 ASSESSMENT — PATIENT HEALTH QUESTIONNAIRE - PHQ9
SUM OF ALL RESPONSES TO PHQ QUESTIONS 1-9: 7
SUM OF ALL RESPONSES TO PHQ QUESTIONS 1-9: 7
10. IF YOU CHECKED OFF ANY PROBLEMS, HOW DIFFICULT HAVE THESE PROBLEMS MADE IT FOR YOU TO DO YOUR WORK, TAKE CARE OF THINGS AT HOME, OR GET ALONG WITH OTHER PEOPLE: SOMEWHAT DIFFICULT

## 2023-12-19 NOTE — PROGRESS NOTES
Assessment & Plan     Type 2 diabetes mellitus with hyperglycemia, with long-term current use of insulin (H)  Will continue Rybelsus at current dose.  Recommend we decrease the Glipizide dose to see if we can get less low sugars, can adjust insulin dosing if sugars increase.  Repeat labs, follow up in 3 months or sooner as needed.  - glipiZIDE (GLUCOTROL XL) 5 MG 24 hr tablet; Take 1 tablet (5 mg) by mouth 2 times daily TAKE 1 TABLET EVERY 12     HOURS.  - Semaglutide (RYBELSUS) 3 MG tablet; Take 3 mg by mouth daily  - Basic metabolic panel  (Ca, Cl, CO2, Creat, Gluc, K, Na, BUN); Future    Insomnia, unspecified type  Will try Trazodone for sleep, follow up if not improving.  - traZODone (DESYREL) 50 MG tablet; Take 1-2 tablets ( mg) by mouth nightly as needed for sleep      17 minutes spent by me on the date of the encounter doing chart review, history and exam, documentation and further activities per the note       See Patient Instructions    Pretty Garza MD  [facility]    Loretta Mendez is a 63 year old, presenting for the following health issues:  Diabetes        12/19/2023     1:31 PM   Additional Questions   Roomed by Ishan MCINTOSH CMA       History of Present Illness       Diabetes:   She presents for follow up of diabetes.  She is checking home blood glucose one time daily.   She checks blood glucose before meals.  Blood glucose is sometimes over 200 and sometimes under 70. She is aware of hypoglycemia symptoms including shakiness, weakness and confusion.   She is concerned about other.    She is not experiencing numbness or burning in feet, excessive thirst, blurry vision, weight changes or redness, sores or blisters on feet.           She eats 2-3 servings of fruits and vegetables daily.She consumes 0 sweetened beverage(s) daily.She exercises with enough effort to increase her heart rate 30 to 60 minutes per day.  She exercises with enough effort to increase her  heart rate 4 days per week.   She is taking medications regularly.       Kerry is working for her, blood sugars are looking good, AM fasting is 70-80s.  Woke up overnight feeling low blood sugar.  Takes Glipizide in AM.  Keeping track of what she eats.  Taking 25 units Lantus BID from 32.  A1c improved to 7.4    Kidney function has slowly creeping up, needs to recheck.    Abdominal pain has improved a little bit.    She is having trouble sleeping, took Ambien in the past. Would like to try something for sleep now.    Current Outpatient Medications   Medication Sig Dispense Refill    acetaminophen (TYLENOL) 325 MG tablet Take 650 mg by mouth daily as needed for mild pain      ALPRAZolam (XANAX) 0.5 MG tablet Take 0.25 mg by mouth daily as needed for anxiety      amLODIPine (NORVASC) 10 MG tablet TAKE 1 TABLET AT BEDTIME 90 tablet 0    atenolol (TENORMIN) 25 MG tablet Take 1 tablet (25 mg) by mouth daily 90 tablet 3    atorvastatin (LIPITOR) 20 MG tablet Take 1 tablet (20 mg) by mouth daily 90 tablet 0    blood glucose (CANDIDA CONTOUR) test strip Use to test blood sugars 2 times daily or as directed. 100 strip 2    blood glucose (NO BRAND SPECIFIED) lancets standard Use to test blood sugar 2 times daily or as directed.  Dispense as covered by insurance 100 each 3    blood glucose monitoring (NO BRAND SPECIFIED) meter device kit Use to test blood sugar 2 times daily or as directed.  Dispense brand as covered by insurance 1 kit 0    blood glucose monitoring (NO BRAND SPECIFIED) test strip Use to test blood sugar 2 times daily or as directed.  Dispense Ultra One touch strips per insurance coverage 200 each 3    busPIRone HCl (BUSPAR) 30 MG tablet Take 45 mg by mouth 2 times daily 90 tablet 0    Continuous Blood Gluc Sensor (DEXCOM G7 SENSOR) MISC 1 each every 10 days Change sensor every 10 days 3 each 5    Continuous Blood Gluc Sensor (FREESTYLE CM 2 SENSOR) MISC 1 each every 14 days Use 1 sensor every 14 days. Use  "to read blood sugars per 's instructions. 2 each 5    cyclobenzaprine (FLEXERIL) 10 MG tablet TAKE 1 TABLET 3 TIMES DAILYAS NEEDED FOR MUSCLE SPASMS 30 tablet 3    FLUoxetine (PROZAC) 40 MG capsule Take 40 mg by mouth daily      gabapentin (NEURONTIN) 300 MG capsule TAKE 1 CAPSULE 3 TIMES A   DAY 90 capsule 8    glipiZIDE (GLUCOTROL XL) 5 MG 24 hr tablet Take 1 tablet (5 mg) by mouth 2 times daily TAKE 1 TABLET EVERY 12     HOURS. 180 tablet 1    insulin glargine (LANTUS PEN) 100 UNIT/ML pen Inject 32 Units Subcutaneous 2 times daily 60 mL 1    losartan (COZAAR) 100 MG tablet TAKE 1 TABLET DAILY 90 tablet 1    ondansetron (ZOFRAN ODT) 4 MG ODT tab Take 1 tablet (4 mg) by mouth every 8 hours as needed for nausea 30 tablet 0    RELION PEN NEEDLES 31G X 6 MM miscellaneous USE 1 DAILY AT BEDTIME OR AS DIRECTED. 90 each 0    Semaglutide (RYBELSUS) 3 MG tablet Take 3 mg by mouth daily 90 tablet 1    Semaglutide (RYBELSUS) 3 MG tablet Take 3 mg by mouth daily 30 tablet 0    spironolactone (ALDACTONE) 25 MG tablet Take 1 tablet (25 mg) by mouth daily 90 tablet 3    SUMAtriptan (IMITREX) 100 MG tablet TAKE 1 TABLET BY MOUTH AT ONSET OF HEADACHE FOR MIGRAINE. MAY REPEAT DOSE AFTER 2 HOURS AS NEEDED. MAX OF 2 TABLETS PER 24 HOURS 10 tablet 1    traZODone (DESYREL) 50 MG tablet Take 1-2 tablets ( mg) by mouth nightly as needed for sleep 30 tablet 0           Review of Systems   Constitutional, HEENT, cardiovascular, pulmonary, gi and gu systems are negative, except as otherwise noted.      Objective    /73 (BP Location: Right arm, Patient Position: Sitting, Cuff Size: Adult Regular)   Pulse 70   Temp 98.1  F (36.7  C) (Oral)   Resp 22   Ht 1.499 m (4' 11\")   Wt 75.8 kg (167 lb)   SpO2 99%   BMI 33.73 kg/m    Body mass index is 33.73 kg/m .  Physical Exam   GENERAL: healthy, alert and no distress  PSYCH: mentation appears normal, affect normal/bright    Office Visit on 12/12/2023   Component Date " Value Ref Range Status    Hemoglobin A1C 12/12/2023 7.4 (H)  0.0 - 5.6 % Final    Normal <5.7%   Prediabetes 5.7-6.4%    Diabetes 6.5% or higher     Note: Adopted from ADA consensus guidelines.    Sodium 12/12/2023 134 (L)  135 - 145 mmol/L Final    Reference intervals for this test were updated on 09/26/2023 to more accurately reflect our healthy population. There may be differences in the flagging of prior results with similar values performed with this method. Interpretation of those prior results can be made in the context of the updated reference intervals.     Potassium 12/12/2023 4.9  3.4 - 5.3 mmol/L Final    Carbon Dioxide (CO2) 12/12/2023 24  22 - 29 mmol/L Final    Anion Gap 12/12/2023 12  7 - 15 mmol/L Final    Urea Nitrogen 12/12/2023 20.7  8.0 - 23.0 mg/dL Final    Creatinine 12/12/2023 1.36 (H)  0.51 - 0.95 mg/dL Final    GFR Estimate 12/12/2023 44 (L)  >60 mL/min/1.73m2 Final    Calcium 12/12/2023 10.0  8.8 - 10.2 mg/dL Final    Chloride 12/12/2023 98  98 - 107 mmol/L Final    Glucose 12/12/2023 191 (H)  70 - 99 mg/dL Final    Alkaline Phosphatase 12/12/2023 88  40 - 150 U/L Final    Reference intervals for this test were updated on 11/14/2023 to more accurately reflect our healthy population. There may be differences in the flagging of prior results with similar values performed with this method. Interpretation of those prior results can be made in the context of the updated reference intervals.    AST 12/12/2023 32  0 - 45 U/L Final    Reference intervals for this test were updated on 6/12/2023 to more accurately reflect our healthy population. There may be differences in the flagging of prior results with similar values performed with this method. Interpretation of those prior results can be made in the context of the updated reference intervals.    ALT 12/12/2023 33  0 - 50 U/L Final    Reference intervals for this test were updated on 6/12/2023 to more accurately reflect our healthy population.  There may be differences in the flagging of prior results with similar values performed with this method. Interpretation of those prior results can be made in the context of the updated reference intervals.      Protein Total 12/12/2023 7.7  6.4 - 8.3 g/dL Final    Albumin 12/12/2023 4.7  3.5 - 5.2 g/dL Final    Bilirubin Total 12/12/2023 0.4  <=1.2 mg/dL Final    Lipase 12/12/2023 15  13 - 60 U/L Final    WBC Count 12/12/2023 11.1 (H)  4.0 - 11.0 10e3/uL Final    RBC Count 12/12/2023 4.30  3.80 - 5.20 10e6/uL Final    Hemoglobin 12/12/2023 12.7  11.7 - 15.7 g/dL Final    Hematocrit 12/12/2023 37.5  35.0 - 47.0 % Final    MCV 12/12/2023 87  78 - 100 fL Final    MCH 12/12/2023 29.5  26.5 - 33.0 pg Final    MCHC 12/12/2023 33.9  31.5 - 36.5 g/dL Final    RDW 12/12/2023 12.6  10.0 - 15.0 % Final    Platelet Count 12/12/2023 241  150 - 450 10e3/uL Final    % Neutrophils 12/12/2023 77  % Final    % Lymphocytes 12/12/2023 16  % Final    % Monocytes 12/12/2023 6  % Final    % Eosinophils 12/12/2023 1  % Final    % Basophils 12/12/2023 0  % Final    % Immature Granulocytes 12/12/2023 0  % Final    Absolute Neutrophils 12/12/2023 8.6 (H)  1.6 - 8.3 10e3/uL Final    Absolute Lymphocytes 12/12/2023 1.7  0.8 - 5.3 10e3/uL Final    Absolute Monocytes 12/12/2023 0.7  0.0 - 1.3 10e3/uL Final    Absolute Eosinophils 12/12/2023 0.1  0.0 - 0.7 10e3/uL Final    Absolute Basophils 12/12/2023 0.0  0.0 - 0.2 10e3/uL Final    Absolute Immature Granulocytes 12/12/2023 0.0  <=0.4 10e3/uL Final

## 2023-12-19 NOTE — COMMUNITY RESOURCES LIST (ENGLISH)
12/19/2023   [facility]  N/A  For questions about this resource list or additional care needs, please contact your primary care clinic or care manager.  Phone: [phone]   Email: N/A   Address: [address] [place] [id number]   Hours: N/A        Financial Stability       Utility payment assistance  1  [facility] Distance: 2.46 miles      In-Person, Phone/Virtual   [address] [place] [address]  Language: English  Hours: Mon 4:00 PM - 6:00 PM , Tue 11:00 AM - 1:00 PM , Wed 4:00 PM - 6:00 PM , Thu 10:30 AM - 12:30 PM  Fees: Free   Phone: (294) 483-9044 Email: resources@[employer].org Website: https://[facility].org/mission/mission-outpost/     2  [address] [place] [address] Distance: 3.94 miles      In-Person, Phone/Virtual   [address] [place] [address]  Language: English  Hours: Mon - Thu 9:00 AM - 4:00 PM  Fees: Free   Phone: (190) 425-2991 Email: info@[employer] Website: https://[employer].org/          Transportation       Free or low-cost transportation  3  [employer]. Distance: 1.13 miles      In-Person   [address] [facility] [address] [place] [address]  Language: English  Hours: Mon - Fri 7:00 AM - 5:00 PM  Fees: Free   Phone: (892) 385-5608 Email: DoPay@[employer] Website: https://[employer]/     4  [facility] [employer] Distance: 14.07 miles      In-Person   [address] [place] [address]  Language: English  Hours: Tue 9:00 AM - 2:00 PM  Fees: Self Pay   Phone: (576) 596-8924 Email: info@[employer] Website: http://www.[employer].org/     Transportation to medical appointments  5  [place] [employer] Distance: 4.39 miles      In-Person   [address] [place] [address]  Language: Spanish, English, Oromo, Central African  Hours: Mon - Sat 5:00 AM - 9:00 PM  Fees: Insurance, Self Pay   Phone: (901) 758-5375 Email:  info@[employer] Website: http://www.[employer]/     6  [employer] Distance: 9.94 miles      In-Person   [address] [place] [address]  Language: English, Mexican  Hours: Mon - Sun Appt. Only  Fees: Self Pay   Phone: (252) 978-9646 Email: alonso@[employer] Website: http://www.[employer]/          Important Numbers & Websites       Emergency Services   [address] [address]  Poison Control   (784) 722-2042  Suicide Prevention Lifeline   (106) 195-9226 (TALK)  Child Abuse Hotline   (554) 687-9425 (4-A-Child)  Sexual Assault Hotline   (117) 853-4540 (HOPE)  National Runaway Safeline   (435) 379-1812 (RUNAWAY)  All-Options Talkline   (681) 884-9621  Substance Abuse Referral   (798) 953-3167 (HELP)

## 2023-12-19 NOTE — PROGRESS NOTES
Vanessa Mota was evaluated at [facility] on December 19, 2023 at which time her blood pressure was:    BP Readings from Last 1 Encounters:   12/19/23 122/58     No data recorded      Reviewed lifestyle modifications for blood pressure control and reduction: including making healthy food choices, managing weight, getting regular exercise, smoking cessation, reducing alcohol consumption, monitoring blood pressure regularly.     Symptoms: None    BP Goal:< 140/90 mmHg    BP Assessment:  BP at goal    Potential Reasons for BP too high: NA - Not applicable    BP Follow-Up Plan: Recheck BP in 6 months at pharmacy    Recommendation to Provider: None at this time    Note completed by: Keyona Lofton RPH   
I have personally evaluated and examined the patient. The Attending was available to me as a supervising provider if needed.

## 2023-12-27 DIAGNOSIS — I10 UNCONTROLLED HYPERTENSION: ICD-10-CM

## 2023-12-27 DIAGNOSIS — R00.2 PALPITATIONS: ICD-10-CM

## 2023-12-27 NOTE — TELEPHONE ENCOUNTER
Inoappsbisi message sent to the patient clarifying what pharmacy she would like to use for her medications: [facility] or [facility].     Will postpone encounter and follow up with patient tomorrow.    Tata Rowe RN

## 2023-12-28 RX ORDER — ATENOLOL 25 MG/1
25 TABLET ORAL EVERY MORNING
Qty: 30 TABLET | Refills: 0 | OUTPATIENT
Start: 2023-12-28

## 2023-12-28 NOTE — TELEPHONE ENCOUNTER
Received Fwd: Powert message back from the patient...        Will refuse refill request to [facility] since a new script was sent to [facility] on 12/11/23 for 90 tablets with 3 additional refills on file.     Tata Rowe RN

## 2023-12-29 ENCOUNTER — TELEPHONE (OUTPATIENT)
Dept: FAMILY MEDICINE | Facility: CLINIC | Age: 63
End: 2023-12-29
Payer: COMMERCIAL

## 2023-12-29 DIAGNOSIS — I10 UNCONTROLLED HYPERTENSION: ICD-10-CM

## 2023-12-29 DIAGNOSIS — R00.2 PALPITATIONS: ICD-10-CM

## 2023-12-29 RX ORDER — ATENOLOL 25 MG/1
25 TABLET ORAL DAILY
Qty: 30 TABLET | Refills: 0 | Status: SHIPPED | OUTPATIENT
Start: 2023-12-29 | End: 2024-01-20

## 2023-12-29 NOTE — TELEPHONE ENCOUNTER
Aaseby-Aguilera, Ramona Ann, EMMA  [facility] Team (Ma-Vitaliy)Just now (2:13 PM)     RA  Sent in script     Patient was notified.    Monika Campa,

## 2023-12-29 NOTE — TELEPHONE ENCOUNTER
- Patient calls and LM on PAL RN Line in the [facility]    - States that she had prescription sent through [facility], but patient states that she has not received the medication yet.     - Patient reports that today she ran out of her Atenolol.     - Patient states that she uses [facility] as her back up pharmacy while she is waiting for the mail order to deliver.     - Patient is requesting a temporary supply sent to [facility] while she waits for the [facility] one to arrive.     - Pended prescription below and routing to PCP as this was previously denied. Please advise if OK for temp supply until [facility] delivers.    - Please route back to triage team through [facility] to call patient back to advise.    Marcial Snider, CONNER  Patient Advocate Liaison (PAL)  [facility]    12/29/2023 at 12:58 PM

## 2024-01-04 DIAGNOSIS — E11.65 TYPE 2 DIABETES MELLITUS WITH HYPERGLYCEMIA, WITH LONG-TERM CURRENT USE OF INSULIN (H): ICD-10-CM

## 2024-01-04 DIAGNOSIS — Z79.4 TYPE 2 DIABETES MELLITUS WITH HYPERGLYCEMIA, WITH LONG-TERM CURRENT USE OF INSULIN (H): ICD-10-CM

## 2024-01-04 DIAGNOSIS — R00.2 PALPITATIONS: ICD-10-CM

## 2024-01-04 DIAGNOSIS — I10 UNCONTROLLED HYPERTENSION: ICD-10-CM

## 2024-01-04 NOTE — TELEPHONE ENCOUNTER
Due to insulin refill request, please call to confirm.  Pharmacy should have refill on file.    Josseline Krishna RN, BSN  [facility]

## 2024-01-04 NOTE — TELEPHONE ENCOUNTER
I thought process was to deny as has refills or too early.  Why are these being sent to call pharmacy?  Please clarify if process has changed.  Shanae Huang RN

## 2024-01-04 NOTE — TELEPHONE ENCOUNTER
Should have refill on file at pharmacy.  Please call pharmacy to confirm and update pt.    Josseline Krishna RN, BSN  [facility]

## 2024-01-05 RX ORDER — ATENOLOL 25 MG/1
25 TABLET ORAL DAILY
Qty: 30 TABLET | Refills: 0 | OUTPATIENT
Start: 2024-01-05

## 2024-01-05 NOTE — TELEPHONE ENCOUNTER
Medication Question or Refill    Contacts         Type Contact Phone/Fax    01/05/2024 10:00 AM CST Phone (Outgoing) Vanessa Mota (Self) [phone] (M)    Left Message             What medication are you calling about (include dose and sig)?: insulin glargine (LANTUS PEN) 100 UNIT/ML pen & atenolol (TENORMIN) 25 MG tablet     Preferred Pharmacy:     [facility] - [address] [address] - [facility] AT [address] [facility]  [place] [address]  Phone: [phone] Fax: [phone]      Controlled Substance Agreement on file:   CSA -- Patient Level:    CSA: None found at the patient level.       Who prescribed the medication?: Dr. Nick Garza & PCP    Do you need a refill? Yes    When did you use the medication last? unknown    Patient offered an appointment? No    Do you have any questions or concerns?  No      Could we send this information to you in [facility] or would you prefer to receive a phone call?:   Patient would prefer a phone call   Okay to leave a detailed message?: Yes at Cell number on file:    Telephone Information:   Mobile [phone]

## 2024-01-11 DIAGNOSIS — Z79.4 TYPE 2 DIABETES MELLITUS WITHOUT COMPLICATION, WITH LONG-TERM CURRENT USE OF INSULIN (H): ICD-10-CM

## 2024-01-11 DIAGNOSIS — G47.00 INSOMNIA, UNSPECIFIED TYPE: ICD-10-CM

## 2024-01-11 DIAGNOSIS — E11.9 TYPE 2 DIABETES MELLITUS WITHOUT COMPLICATION, WITH LONG-TERM CURRENT USE OF INSULIN (H): ICD-10-CM

## 2024-01-11 RX ORDER — INSULIN GLARGINE 100 [IU]/ML
INJECTION, SOLUTION SUBCUTANEOUS
Qty: 30 ML | Refills: 0 | Status: SHIPPED | OUTPATIENT
Start: 2024-01-11 | End: 2024-02-21

## 2024-01-11 RX ORDER — TRAZODONE HYDROCHLORIDE 50 MG/1
50-100 TABLET, FILM COATED ORAL
Qty: 30 TABLET | Refills: 0 | Status: SHIPPED | OUTPATIENT
Start: 2024-01-11 | End: 2024-09-20

## 2024-01-11 NOTE — TELEPHONE ENCOUNTER
Pt calling to check on status of refill request for insulin glargine.    She informs [facility] mail order will not arrive for another 3 -4 weeks around the first of February and requests rx be sent to [facility] in [place] instead. She informs rx was last prescribed by April Coming MD Greg.    Pt also requests refill on her trazodone.    T'd up meds and pharmacy and routing refill request to provider last seen for this.    Janel SAWYER RN

## 2024-01-17 DIAGNOSIS — R00.2 PALPITATIONS: ICD-10-CM

## 2024-01-17 DIAGNOSIS — I10 UNCONTROLLED HYPERTENSION: ICD-10-CM

## 2024-01-17 NOTE — TELEPHONE ENCOUNTER
Upon chart review, a short supply was sent to the [facility] until patient received a longer supply from her mail order pharmacy.     Will route to triage to please contact the patient and clarify if she is needing another short term supply sent to the [facility].     Thank you,  Tata Rowe RN

## 2024-01-17 NOTE — TELEPHONE ENCOUNTER
Called #   Telephone Information:   Mobile [phone]       Pt is looking for a short supply again as the mail order has not come yet     She would like this to be sent over to Dr. Nick Loera office         Devi Monterroso RN, BSN  [facility]

## 2024-01-18 ENCOUNTER — PATIENT OUTREACH (OUTPATIENT)
Dept: CARE COORDINATION | Facility: CLINIC | Age: 64
End: 2024-01-18
Payer: COMMERCIAL

## 2024-01-20 RX ORDER — ATENOLOL 25 MG/1
25 TABLET ORAL DAILY
Qty: 90 TABLET | Refills: 2 | Status: SHIPPED | OUTPATIENT
Start: 2024-01-20 | End: 2024-03-20

## 2024-01-22 ENCOUNTER — MYC MEDICAL ADVICE (OUTPATIENT)
Dept: INTERNAL MEDICINE | Facility: CLINIC | Age: 64
End: 2024-01-22
Payer: COMMERCIAL

## 2024-01-31 DIAGNOSIS — I10 BENIGN ESSENTIAL HYPERTENSION: ICD-10-CM

## 2024-02-01 RX ORDER — AMLODIPINE BESYLATE 10 MG/1
10 TABLET ORAL AT BEDTIME
Qty: 90 TABLET | Refills: 0 | Status: SHIPPED | OUTPATIENT
Start: 2024-02-01 | End: 2024-04-16

## 2024-02-04 ENCOUNTER — HEALTH MAINTENANCE LETTER (OUTPATIENT)
Age: 64
End: 2024-02-04

## 2024-02-18 DIAGNOSIS — E78.5 HYPERLIPIDEMIA LDL GOAL <100: ICD-10-CM

## 2024-02-20 ENCOUNTER — TRANSFERRED RECORDS (OUTPATIENT)
Dept: MULTI SPECIALTY CLINIC | Facility: CLINIC | Age: 64
End: 2024-02-20

## 2024-02-20 DIAGNOSIS — R11.0 NAUSEA: ICD-10-CM

## 2024-02-20 LAB — RETINOPATHY: NORMAL

## 2024-02-20 RX ORDER — ONDANSETRON 4 MG/1
4 TABLET, ORALLY DISINTEGRATING ORAL EVERY 8 HOURS PRN
Qty: 30 TABLET | Refills: 0 | Status: SHIPPED | OUTPATIENT
Start: 2024-02-20 | End: 2024-07-12

## 2024-02-20 RX ORDER — ATORVASTATIN CALCIUM 20 MG/1
20 TABLET, FILM COATED ORAL DAILY
Qty: 90 TABLET | Refills: 0 | Status: SHIPPED | OUTPATIENT
Start: 2024-02-20 | End: 2024-05-03

## 2024-02-21 DIAGNOSIS — E11.9 TYPE 2 DIABETES MELLITUS WITHOUT COMPLICATION, WITH LONG-TERM CURRENT USE OF INSULIN (H): ICD-10-CM

## 2024-02-21 DIAGNOSIS — Z79.4 TYPE 2 DIABETES MELLITUS WITHOUT COMPLICATION, WITH LONG-TERM CURRENT USE OF INSULIN (H): ICD-10-CM

## 2024-02-21 RX ORDER — INSULIN GLARGINE 100 [IU]/ML
INJECTION, SOLUTION SUBCUTANEOUS
Qty: 30 ML | Refills: 0 | Status: SHIPPED | OUTPATIENT
Start: 2024-02-21 | End: 2024-04-01

## 2024-02-22 DIAGNOSIS — R11.0 NAUSEA: ICD-10-CM

## 2024-02-22 RX ORDER — ONDANSETRON 4 MG/1
4 TABLET, ORALLY DISINTEGRATING ORAL EVERY 8 HOURS PRN
Qty: 30 TABLET | Refills: 0 | OUTPATIENT
Start: 2024-02-22

## 2024-03-20 ENCOUNTER — OFFICE VISIT (OUTPATIENT)
Dept: FAMILY MEDICINE | Facility: CLINIC | Age: 64
End: 2024-03-20
Payer: COMMERCIAL

## 2024-03-20 VITALS
HEART RATE: 62 BPM | DIASTOLIC BLOOD PRESSURE: 73 MMHG | SYSTOLIC BLOOD PRESSURE: 117 MMHG | TEMPERATURE: 96.9 F | RESPIRATION RATE: 16 BRPM | BODY MASS INDEX: 35.2 KG/M2 | OXYGEN SATURATION: 95 % | WEIGHT: 174.6 LBS | HEIGHT: 59 IN

## 2024-03-20 DIAGNOSIS — C43.62: ICD-10-CM

## 2024-03-20 DIAGNOSIS — I10 BENIGN HYPERTENSION: ICD-10-CM

## 2024-03-20 DIAGNOSIS — E11.65 TYPE 2 DIABETES MELLITUS WITH HYPERGLYCEMIA, WITH LONG-TERM CURRENT USE OF INSULIN (H): Primary | ICD-10-CM

## 2024-03-20 DIAGNOSIS — F33.0 MILD EPISODE OF RECURRENT MAJOR DEPRESSIVE DISORDER (H): ICD-10-CM

## 2024-03-20 DIAGNOSIS — M79.7 FIBROMYALGIA: ICD-10-CM

## 2024-03-20 DIAGNOSIS — N18.31 CHRONIC KIDNEY DISEASE, STAGE 3A (H): ICD-10-CM

## 2024-03-20 DIAGNOSIS — R00.2 PALPITATIONS: ICD-10-CM

## 2024-03-20 DIAGNOSIS — Z79.4 TYPE 2 DIABETES MELLITUS WITH HYPERGLYCEMIA, WITH LONG-TERM CURRENT USE OF INSULIN (H): Primary | ICD-10-CM

## 2024-03-20 DIAGNOSIS — Z12.31 VISIT FOR SCREENING MAMMOGRAM: ICD-10-CM

## 2024-03-20 PROBLEM — F32.0 MILD MAJOR DEPRESSION (H): Status: ACTIVE | Noted: 2024-03-20

## 2024-03-20 PROBLEM — E66.812 CLASS 2 SEVERE OBESITY DUE TO EXCESS CALORIES WITH SERIOUS COMORBIDITY IN ADULT (H): Status: ACTIVE | Noted: 2024-03-20

## 2024-03-20 PROBLEM — R10.9 ABDOMINAL PAIN, UNSPECIFIED ABDOMINAL LOCATION: Status: RESOLVED | Noted: 2023-07-20 | Resolved: 2024-03-20

## 2024-03-20 PROBLEM — E66.01 CLASS 2 SEVERE OBESITY DUE TO EXCESS CALORIES WITH SERIOUS COMORBIDITY IN ADULT (H): Status: ACTIVE | Noted: 2024-03-20

## 2024-03-20 LAB — HBA1C MFR BLD: 8 % (ref 0–5.6)

## 2024-03-20 PROCEDURE — 82043 UR ALBUMIN QUANTITATIVE: CPT | Performed by: FAMILY MEDICINE

## 2024-03-20 PROCEDURE — 80048 BASIC METABOLIC PNL TOTAL CA: CPT | Performed by: FAMILY MEDICINE

## 2024-03-20 PROCEDURE — 99207 PR FOOT EXAM NO CHARGE: CPT | Performed by: FAMILY MEDICINE

## 2024-03-20 PROCEDURE — 36415 COLL VENOUS BLD VENIPUNCTURE: CPT | Performed by: FAMILY MEDICINE

## 2024-03-20 PROCEDURE — 84443 ASSAY THYROID STIM HORMONE: CPT | Performed by: FAMILY MEDICINE

## 2024-03-20 PROCEDURE — 99214 OFFICE O/P EST MOD 30 MIN: CPT | Performed by: FAMILY MEDICINE

## 2024-03-20 PROCEDURE — 82570 ASSAY OF URINE CREATININE: CPT | Performed by: FAMILY MEDICINE

## 2024-03-20 PROCEDURE — 83036 HEMOGLOBIN GLYCOSYLATED A1C: CPT | Performed by: FAMILY MEDICINE

## 2024-03-20 RX ORDER — ATENOLOL 25 MG/1
25 TABLET ORAL DAILY
Qty: 90 TABLET | Refills: 3 | Status: SHIPPED | OUTPATIENT
Start: 2024-03-20

## 2024-03-20 RX ORDER — CYCLOBENZAPRINE HCL 10 MG
TABLET ORAL
Qty: 30 TABLET | Refills: 3 | Status: SHIPPED | OUTPATIENT
Start: 2024-03-20 | End: 2024-08-01

## 2024-03-20 ASSESSMENT — ANXIETY QUESTIONNAIRES
GAD7 TOTAL SCORE: 2
1. FEELING NERVOUS, ANXIOUS, OR ON EDGE: SEVERAL DAYS
4. TROUBLE RELAXING: SEVERAL DAYS
3. WORRYING TOO MUCH ABOUT DIFFERENT THINGS: NOT AT ALL
IF YOU CHECKED OFF ANY PROBLEMS ON THIS QUESTIONNAIRE, HOW DIFFICULT HAVE THESE PROBLEMS MADE IT FOR YOU TO DO YOUR WORK, TAKE CARE OF THINGS AT HOME, OR GET ALONG WITH OTHER PEOPLE: SOMEWHAT DIFFICULT
7. FEELING AFRAID AS IF SOMETHING AWFUL MIGHT HAPPEN: NOT AT ALL
GAD7 TOTAL SCORE: 2
2. NOT BEING ABLE TO STOP OR CONTROL WORRYING: NOT AT ALL
5. BEING SO RESTLESS THAT IT IS HARD TO SIT STILL: NOT AT ALL
7. FEELING AFRAID AS IF SOMETHING AWFUL MIGHT HAPPEN: NOT AT ALL
GAD7 TOTAL SCORE: 2
6. BECOMING EASILY ANNOYED OR IRRITABLE: NOT AT ALL
8. IF YOU CHECKED OFF ANY PROBLEMS, HOW DIFFICULT HAVE THESE MADE IT FOR YOU TO DO YOUR WORK, TAKE CARE OF THINGS AT HOME, OR GET ALONG WITH OTHER PEOPLE?: SOMEWHAT DIFFICULT

## 2024-03-20 ASSESSMENT — PAIN SCALES - GENERAL: PAINLEVEL: NO PAIN (0)

## 2024-03-20 NOTE — PROGRESS NOTES
"  Assessment & Plan     Type 2 diabetes mellitus with hyperglycemia, with long-term current use of insulin (H)  Due for labs, recommendations pending results.  Will increase Rybelsus to 7 mg for better control of sugars and possibly decreasing insulin dose.  Follow up in 3 months or sooner as needed.  - Semaglutide (RYBELSUS) 7 MG tablet; Take 1 tablet (7 mg) by mouth daily  - Hemoglobin A1c; Future  - Basic metabolic panel  (Ca, Cl, CO2, Creat, Gluc, K, Na, BUN); Future  - TSH with free T4 reflex; Future  - Albumin Random Urine Quantitative with Creat Ratio; Future  - FOOT EXAM  - Basic metabolic panel  (Ca, Cl, CO2, Creat, Gluc, K, Na, BUN)  - Hemoglobin A1c  - TSH with free T4 reflex  - Albumin Random Urine Quantitative with Creat Ratio    Fibromyalgia  Refill for PRN use  - cyclobenzaprine (FLEXERIL) 10 MG tablet; TAKE 1 TABLET 3 TIMES DAILY AS NEEDED FOR MUSCLE SPASMS    Benign Hypertension  Controlled, continue.  - atenolol (TENORMIN) 25 MG tablet; Take 1 tablet (25 mg) by mouth daily    Palpitations  Controlled, continue.  - atenolol (TENORMIN) 25 MG tablet; Take 1 tablet (25 mg) by mouth daily    Chronic kidney disease, stage 3a (H)  Due for labs, recommendations pending results.  - Albumin Random Urine Quantitative with Creat Ratio; Future  - Albumin Random Urine Quantitative with Creat Ratio    Visit for screening mammogram  - MA Screen Bilateral w/Max; Future    Mild episode of Recurrent Major Depressive Disorder (H)  Controlled, continue current medication.    Malignant melanoma of left wrist (H)  S/p removal, does not follow with dermatology but checks her skin.      17 minutes spent by me on the date of the encounter doing chart review, history and exam, documentation and further activities per the note      BMI  Estimated body mass index is 35.26 kg/m  as calculated from the following:    Height as of this encounter: 1.499 m (4' 11\").    Weight as of this encounter: 79.2 kg (174 lb 9.6 oz). "         See Patient Instructions    Loretta Mendez is a 63 year old, presenting for the following health issues:  Diabetes        3/20/2024     1:39 PM   Additional Questions   Roomed by Rosanne MCGREGOR MA     Via the Health Maintenance questionnaire, the patient has reported the following services have been completed -Eye Exam, this information has been sent to the abstraction team.  History of Present Illness       Reason for visit:  Diabetes    She eats 2-3 servings of fruits and vegetables daily.She consumes 0 sweetened beverage(s) daily.She exercises with enough effort to increase her heart rate 30 to 60 minutes per day.  She exercises with enough effort to increase her heart rate 4 days per week.   She is taking medications regularly.     Diabetes Follow-up    How often are you checking your blood sugar? Continuous glucose monitor  What time of day are you checking your blood sugars (select all that apply)?  Not applicable  Have you had any blood sugars above 200?  Yes couple  Have you had any blood sugars below 70?  Yes Once  What symptoms do you notice when your blood sugar is low?  Confusion and sweaty and hungry.  What concerns do you have today about your diabetes? Other: Having a little trouble keeping it ine normal range and was wondering about increasing dose of Rybelsus   Do you have any of these symptoms? (Select all that apply)  No numbness or tingling in feet.  No redness, sores or blisters on feet.  No complaints of excessive thirst.  No reports of blurry vision.  No significant changes to weight.    Having some trouble with blood sugars.  Wondering about increasing Rybelsus.  She had lost some weight, then gained it back.    Insulin 31 BID right now.    Due for fasting lipids.    BP Readings from Last 2 Encounters:   03/20/24 117/73   12/19/23 122/58     Hemoglobin A1C (%)   Date Value   12/12/2023 7.4 (H)   09/20/2023 8.2 (H)   10/30/2020 7.1 (H)   02/28/2020 7.0 (H)     LDL Cholesterol Calculated  "(mg/dL)   Date Value   01/31/2023 74   08/18/2022 62   10/30/2020 55   06/25/2019 47               Objective    /73 (BP Location: Right arm, Patient Position: Sitting, Cuff Size: Adult Regular)   Pulse 62   Temp 96.9  F (36.1  C) (Temporal)   Resp 16   Ht 1.499 m (4' 11\")   Wt 79.2 kg (174 lb 9.6 oz)   SpO2 95%   BMI 35.26 kg/m    Body mass index is 35.26 kg/m .  Physical Exam   GENERAL: alert and no distress  PSYCH: mentation appears normal, affect normal/bright  Diabetic foot exam: normal DP and PT pulses, no trophic changes or ulcerative lesions, normal sensory exam, and normal monofilament exam    Office Visit on 12/12/2023   Component Date Value Ref Range Status    Hemoglobin A1C 12/12/2023 7.4 (H)  0.0 - 5.6 % Final    Normal <5.7%   Prediabetes 5.7-6.4%    Diabetes 6.5% or higher     Note: Adopted from ADA consensus guidelines.    Sodium 12/12/2023 134 (L)  135 - 145 mmol/L Final    Reference intervals for this test were updated on 09/26/2023 to more accurately reflect our healthy population. There may be differences in the flagging of prior results with similar values performed with this method. Interpretation of those prior results can be made in the context of the updated reference intervals.     Potassium 12/12/2023 4.9  3.4 - 5.3 mmol/L Final    Carbon Dioxide (CO2) 12/12/2023 24  22 - 29 mmol/L Final    Anion Gap 12/12/2023 12  7 - 15 mmol/L Final    Urea Nitrogen 12/12/2023 20.7  8.0 - 23.0 mg/dL Final    Creatinine 12/12/2023 1.36 (H)  0.51 - 0.95 mg/dL Final    GFR Estimate 12/12/2023 44 (L)  >60 mL/min/1.73m2 Final    Calcium 12/12/2023 10.0  8.8 - 10.2 mg/dL Final    Chloride 12/12/2023 98  98 - 107 mmol/L Final    Glucose 12/12/2023 191 (H)  70 - 99 mg/dL Final    Alkaline Phosphatase 12/12/2023 88  40 - 150 U/L Final    Reference intervals for this test were updated on 11/14/2023 to more accurately reflect our healthy population. There may be differences in the flagging of prior " results with similar values performed with this method. Interpretation of those prior results can be made in the context of the updated reference intervals.    AST 12/12/2023 32  0 - 45 U/L Final    Reference intervals for this test were updated on 6/12/2023 to more accurately reflect our healthy population. There may be differences in the flagging of prior results with similar values performed with this method. Interpretation of those prior results can be made in the context of the updated reference intervals.    ALT 12/12/2023 33  0 - 50 U/L Final    Reference intervals for this test were updated on 6/12/2023 to more accurately reflect our healthy population. There may be differences in the flagging of prior results with similar values performed with this method. Interpretation of those prior results can be made in the context of the updated reference intervals.      Protein Total 12/12/2023 7.7  6.4 - 8.3 g/dL Final    Albumin 12/12/2023 4.7  3.5 - 5.2 g/dL Final    Bilirubin Total 12/12/2023 0.4  <=1.2 mg/dL Final    Lipase 12/12/2023 15  13 - 60 U/L Final    WBC Count 12/12/2023 11.1 (H)  4.0 - 11.0 10e3/uL Final    RBC Count 12/12/2023 4.30  3.80 - 5.20 10e6/uL Final    Hemoglobin 12/12/2023 12.7  11.7 - 15.7 g/dL Final    Hematocrit 12/12/2023 37.5  35.0 - 47.0 % Final    MCV 12/12/2023 87  78 - 100 fL Final    MCH 12/12/2023 29.5  26.5 - 33.0 pg Final    MCHC 12/12/2023 33.9  31.5 - 36.5 g/dL Final    RDW 12/12/2023 12.6  10.0 - 15.0 % Final    Platelet Count 12/12/2023 241  150 - 450 10e3/uL Final    % Neutrophils 12/12/2023 77  % Final    % Lymphocytes 12/12/2023 16  % Final    % Monocytes 12/12/2023 6  % Final    % Eosinophils 12/12/2023 1  % Final    % Basophils 12/12/2023 0  % Final    % Immature Granulocytes 12/12/2023 0  % Final    Absolute Neutrophils 12/12/2023 8.6 (H)  1.6 - 8.3 10e3/uL Final    Absolute Lymphocytes 12/12/2023 1.7  0.8 - 5.3 10e3/uL Final    Absolute Monocytes 12/12/2023 0.7  0.0 -  1.3 10e3/uL Final    Absolute Eosinophils 12/12/2023 0.1  0.0 - 0.7 10e3/uL Final    Absolute Basophils 12/12/2023 0.0  0.0 - 0.2 10e3/uL Final    Absolute Immature Granulocytes 12/12/2023 0.0  <=0.4 10e3/uL Final         Signed Electronically by: Pretty Garza MD

## 2024-03-21 LAB
ANION GAP SERPL CALCULATED.3IONS-SCNC: 11 MMOL/L (ref 7–15)
BUN SERPL-MCNC: 20.8 MG/DL (ref 8–23)
CALCIUM SERPL-MCNC: 9.8 MG/DL (ref 8.8–10.2)
CHLORIDE SERPL-SCNC: 100 MMOL/L (ref 98–107)
CREAT SERPL-MCNC: 1.39 MG/DL (ref 0.51–0.95)
CREAT UR-MCNC: 26.1 MG/DL
DEPRECATED HCO3 PLAS-SCNC: 25 MMOL/L (ref 22–29)
EGFRCR SERPLBLD CKD-EPI 2021: 43 ML/MIN/1.73M2
GLUCOSE SERPL-MCNC: 262 MG/DL (ref 70–99)
MICROALBUMIN UR-MCNC: <12 MG/L
MICROALBUMIN/CREAT UR: NORMAL MG/G{CREAT}
POTASSIUM SERPL-SCNC: 5.5 MMOL/L (ref 3.4–5.3)
SODIUM SERPL-SCNC: 136 MMOL/L (ref 135–145)
TSH SERPL DL<=0.005 MIU/L-ACNC: 2.01 UIU/ML (ref 0.3–4.2)

## 2024-04-01 DIAGNOSIS — E11.9 TYPE 2 DIABETES MELLITUS WITHOUT COMPLICATION, WITH LONG-TERM CURRENT USE OF INSULIN (H): ICD-10-CM

## 2024-04-01 DIAGNOSIS — Z79.4 TYPE 2 DIABETES MELLITUS WITHOUT COMPLICATION, WITH LONG-TERM CURRENT USE OF INSULIN (H): ICD-10-CM

## 2024-04-01 RX ORDER — INSULIN GLARGINE 100 [IU]/ML
INJECTION, SOLUTION SUBCUTANEOUS
Qty: 30 ML | Refills: 0 | Status: SHIPPED | OUTPATIENT
Start: 2024-04-01 | End: 2024-05-13

## 2024-04-13 DIAGNOSIS — I10 BENIGN ESSENTIAL HYPERTENSION: ICD-10-CM

## 2024-04-14 ENCOUNTER — HEALTH MAINTENANCE LETTER (OUTPATIENT)
Age: 64
End: 2024-04-14

## 2024-04-16 RX ORDER — AMLODIPINE BESYLATE 10 MG/1
10 TABLET ORAL AT BEDTIME
Qty: 90 TABLET | Refills: 0 | Status: SHIPPED | OUTPATIENT
Start: 2024-04-16 | End: 2024-06-28

## 2024-04-24 ENCOUNTER — TELEPHONE (OUTPATIENT)
Dept: FAMILY MEDICINE | Facility: CLINIC | Age: 64
End: 2024-04-24
Payer: COMMERCIAL

## 2024-04-24 NOTE — TELEPHONE ENCOUNTER
Pt would like a letter to not  have to do Jury Duty, with her health issues and anxiety she doesn't think it would be good for her to have to go and drive t [place] every day. Please call her back at [phone]

## 2024-05-02 ENCOUNTER — MYC MEDICAL ADVICE (OUTPATIENT)
Dept: FAMILY MEDICINE | Facility: CLINIC | Age: 64
End: 2024-05-02
Payer: COMMERCIAL

## 2024-05-02 DIAGNOSIS — E78.5 HYPERLIPIDEMIA LDL GOAL <100: ICD-10-CM

## 2024-05-02 NOTE — TELEPHONE ENCOUNTER
Patient Quality Outreach    Patient is due for the following:   Breast Cancer Screening - Mammogram    Next Steps:   No follow up needed at this time.    Type of outreach:    Sent c-crowd message.    Next Steps:  Reach out within 90 days via [employer].    Max number of attempts reached: No. Will try again in 90 days if patient still on fail list.    Questions for provider review:    None           Kellie Tobias CMA  Chart routed to Care Team.

## 2024-05-03 ENCOUNTER — VIRTUAL VISIT (OUTPATIENT)
Dept: FAMILY MEDICINE | Facility: CLINIC | Age: 64
End: 2024-05-03
Payer: COMMERCIAL

## 2024-05-03 DIAGNOSIS — Z02.9 ADMINISTRATIVE ENCOUNTER: Primary | ICD-10-CM

## 2024-05-03 PROCEDURE — 99213 OFFICE O/P EST LOW 20 MIN: CPT | Mod: 95 | Performed by: FAMILY MEDICINE

## 2024-05-03 RX ORDER — ATORVASTATIN CALCIUM 20 MG/1
20 TABLET, FILM COATED ORAL DAILY
Qty: 90 TABLET | Refills: 0 | Status: SHIPPED | OUTPATIENT
Start: 2024-05-03 | End: 2024-06-21

## 2024-05-03 ASSESSMENT — ANXIETY QUESTIONNAIRES
3. WORRYING TOO MUCH ABOUT DIFFERENT THINGS: SEVERAL DAYS
8. IF YOU CHECKED OFF ANY PROBLEMS, HOW DIFFICULT HAVE THESE MADE IT FOR YOU TO DO YOUR WORK, TAKE CARE OF THINGS AT HOME, OR GET ALONG WITH OTHER PEOPLE?: SOMEWHAT DIFFICULT
2. NOT BEING ABLE TO STOP OR CONTROL WORRYING: SEVERAL DAYS
7. FEELING AFRAID AS IF SOMETHING AWFUL MIGHT HAPPEN: SEVERAL DAYS
7. FEELING AFRAID AS IF SOMETHING AWFUL MIGHT HAPPEN: SEVERAL DAYS
GAD7 TOTAL SCORE: 5
1. FEELING NERVOUS, ANXIOUS, OR ON EDGE: SEVERAL DAYS
4. TROUBLE RELAXING: SEVERAL DAYS
GAD7 TOTAL SCORE: 5
IF YOU CHECKED OFF ANY PROBLEMS ON THIS QUESTIONNAIRE, HOW DIFFICULT HAVE THESE PROBLEMS MADE IT FOR YOU TO DO YOUR WORK, TAKE CARE OF THINGS AT HOME, OR GET ALONG WITH OTHER PEOPLE: SOMEWHAT DIFFICULT
GAD7 TOTAL SCORE: 5
6. BECOMING EASILY ANNOYED OR IRRITABLE: NOT AT ALL
5. BEING SO RESTLESS THAT IT IS HARD TO SIT STILL: NOT AT ALL

## 2024-05-03 ASSESSMENT — PATIENT HEALTH QUESTIONNAIRE - PHQ9
SUM OF ALL RESPONSES TO PHQ QUESTIONS 1-9: 5
10. IF YOU CHECKED OFF ANY PROBLEMS, HOW DIFFICULT HAVE THESE PROBLEMS MADE IT FOR YOU TO DO YOUR WORK, TAKE CARE OF THINGS AT HOME, OR GET ALONG WITH OTHER PEOPLE: SOMEWHAT DIFFICULT
SUM OF ALL RESPONSES TO PHQ QUESTIONS 1-9: 5

## 2024-05-03 NOTE — LETTER
May 3, 2024      Vanessa Mota  [address] [place] [address]        To Whom It May Concern:    Vanessa Mota is a patient in my clinic.  She has multiple medical conditions that would interfere with her ability to sit on a jury. Please excuse her from jury duty.  If you need specific details regarding the medical conditions you may contact my office and additional information can be provided.        Sincerely,        Pretty Garza MD

## 2024-05-03 NOTE — PROGRESS NOTES
"Vanessa is a 63 year old who is being evaluated via a billable video visit.    How would you like to obtain your AVS? MyChart  If the video visit is dropped, the invitation should be resent by: Text to cell phone: [phone]  Will anyone else be joining your video visit? No      Assessment & Plan     Administrative encounter  Letter written asking to excuse patient from jury duty due to medical conditions.  Will fax to number below.      15 minutes spent by me on the date of the encounter doing chart review, history and exam, documentation and further activities per the note      BMI  Estimated body mass index is 35.26 kg/m  as calculated from the following:    Height as of 3/20/24: 1.499 m (4' 11\").    Weight as of 3/20/24: 79.2 kg (174 lb 9.6 oz).       See Patient Instructions    Subjective   Vanessa is a 63 year old, presenting for the following health issues:  Forms (Jury duty)        5/3/2024    11:44 AM   Additional Questions   Roomed by Elena Ervin         5/3/2024   Forms   Any forms needing to be completed Yes     PT would like link sent to her phone: [phone]    Via the Health Maintenance questionnaire, the patient has reported the following services have been completed -Eye Exam, this information has been sent to the abstraction team.  History of Present Illness       Reason for visit:  Forms    She eats 2-3 servings of fruits and vegetables daily.She consumes 0 sweetened beverage(s) daily.She exercises with enough effort to increase her heart rate 30 to 60 minutes per day.  She exercises with enough effort to increase her heart rate 5 days per week.   She is taking medications regularly.     PT would like to be excused from jury duty.      She is concerned about her blood sugars, that if she has a low sugar she would not be able to get a snack quick enough, or if the food that is provided would be low carbohydrate.  She additionally has issues with urinary incontinence, and she does not get much " "notice before she has to go to the bathroom.  She is concerned about incontinence episodes during jury duty or not being able to get to the restroom fast enough.  She has significant anxiety related to these medical conditions, which also affects her ability to perform jury duty.     Fax number [phone]          Objective    Vitals - Patient Reported  Weight (Patient Reported): 77.1 kg (170 lb)  Height (Patient Reported): 149.9 cm (4' 11\")  BMI (Based on Pt Reported Ht/Wt): 34.34  Pain Score: No Pain (0)        Physical Exam   GENERAL: alert and no distress  EYES: Eyes grossly normal to inspection.  No discharge or erythema, or obvious scleral/conjunctival abnormalities.  RESP: No audible wheeze, cough, or visible cyanosis.    SKIN: Visible skin clear. No significant rash, abnormal pigmentation or lesions.  NEURO: Cranial nerves grossly intact.  Mentation and speech appropriate for age.  PSYCH: Appropriate affect, tone, and pace of words        Video-Visit Details    Type of service:  Video Visit   Originating Location (pt. Location): Home    Distant Location (provider location):  Off-site  Platform used for Video Visit: Jonatan  Signed Electronically by: Pretty Garza MD    "

## 2024-05-11 DIAGNOSIS — E11.65 TYPE 2 DIABETES MELLITUS WITH HYPERGLYCEMIA, WITH LONG-TERM CURRENT USE OF INSULIN (H): ICD-10-CM

## 2024-05-11 DIAGNOSIS — Z79.4 TYPE 2 DIABETES MELLITUS WITHOUT COMPLICATION, WITH LONG-TERM CURRENT USE OF INSULIN (H): ICD-10-CM

## 2024-05-11 DIAGNOSIS — E11.9 TYPE 2 DIABETES MELLITUS WITHOUT COMPLICATION, WITH LONG-TERM CURRENT USE OF INSULIN (H): ICD-10-CM

## 2024-05-11 DIAGNOSIS — Z79.4 TYPE 2 DIABETES MELLITUS WITH HYPERGLYCEMIA, WITH LONG-TERM CURRENT USE OF INSULIN (H): ICD-10-CM

## 2024-05-13 RX ORDER — GLIPIZIDE 5 MG/1
TABLET, FILM COATED, EXTENDED RELEASE ORAL
Qty: 180 TABLET | Refills: 1 | OUTPATIENT
Start: 2024-05-13

## 2024-05-13 RX ORDER — INSULIN GLARGINE 100 [IU]/ML
INJECTION, SOLUTION SUBCUTANEOUS
Qty: 30 ML | Refills: 1 | Status: SHIPPED | OUTPATIENT
Start: 2024-05-13 | End: 2024-07-25

## 2024-05-15 DIAGNOSIS — Z79.4 TYPE 2 DIABETES MELLITUS WITHOUT COMPLICATION, WITH LONG-TERM CURRENT USE OF INSULIN (H): ICD-10-CM

## 2024-05-15 DIAGNOSIS — E11.9 TYPE 2 DIABETES MELLITUS WITHOUT COMPLICATION, WITH LONG-TERM CURRENT USE OF INSULIN (H): ICD-10-CM

## 2024-05-15 RX ORDER — INSULIN GLARGINE 100 [IU]/ML
INJECTION, SOLUTION SUBCUTANEOUS
Qty: 30 ML | Refills: 0 | OUTPATIENT
Start: 2024-05-15

## 2024-05-21 DIAGNOSIS — E11.9 TYPE 2 DIABETES MELLITUS WITHOUT COMPLICATION, WITH LONG-TERM CURRENT USE OF INSULIN (H): ICD-10-CM

## 2024-05-21 DIAGNOSIS — Z79.4 TYPE 2 DIABETES MELLITUS WITHOUT COMPLICATION, WITH LONG-TERM CURRENT USE OF INSULIN (H): ICD-10-CM

## 2024-05-21 RX ORDER — PEN NEEDLE, DIABETIC 32GX 5/32"
NEEDLE, DISPOSABLE MISCELLANEOUS
Qty: 90 EACH | Refills: 1 | Status: SHIPPED | OUTPATIENT
Start: 2024-05-21

## 2024-05-21 NOTE — TELEPHONE ENCOUNTER
RN spoke to patient, she uses the pen needles for insulin 1 x daily   Had extra at home so just now needing refill   Has not missed any doses     Elsa Thurman, Registered Nurse  [facility]

## 2024-05-21 NOTE — TELEPHONE ENCOUNTER
Lexis Bravo, RN   to Cr Triage   MB    5/21/24 10:38 AM  Appears possible gap in treatment, please call patient to inquire then route reason for refill request and encounter to prescribing provider for review, thanks!

## 2024-06-20 ENCOUNTER — TELEPHONE (OUTPATIENT)
Dept: FAMILY MEDICINE | Facility: CLINIC | Age: 64
End: 2024-06-20

## 2024-06-20 ENCOUNTER — OFFICE VISIT (OUTPATIENT)
Dept: FAMILY MEDICINE | Facility: CLINIC | Age: 64
End: 2024-06-20
Payer: COMMERCIAL

## 2024-06-20 VITALS
OXYGEN SATURATION: 95 % | WEIGHT: 174.6 LBS | RESPIRATION RATE: 18 BRPM | SYSTOLIC BLOOD PRESSURE: 137 MMHG | TEMPERATURE: 98.2 F | HEIGHT: 60 IN | HEART RATE: 70 BPM | BODY MASS INDEX: 34.28 KG/M2 | DIASTOLIC BLOOD PRESSURE: 68 MMHG

## 2024-06-20 DIAGNOSIS — E11.9 TYPE 2 DIABETES MELLITUS WITHOUT COMPLICATION, WITH LONG-TERM CURRENT USE OF INSULIN (H): Primary | ICD-10-CM

## 2024-06-20 DIAGNOSIS — Z79.4 TYPE 2 DIABETES MELLITUS WITHOUT COMPLICATION, WITH LONG-TERM CURRENT USE OF INSULIN (H): Primary | ICD-10-CM

## 2024-06-20 DIAGNOSIS — E66.811 CLASS 1 OBESITY DUE TO EXCESS CALORIES WITH SERIOUS COMORBIDITY AND BODY MASS INDEX (BMI) OF 34.0 TO 34.9 IN ADULT: ICD-10-CM

## 2024-06-20 DIAGNOSIS — E78.5 HYPERLIPIDEMIA LDL GOAL <100: ICD-10-CM

## 2024-06-20 DIAGNOSIS — N18.31 CHRONIC KIDNEY DISEASE, STAGE 3A (H): ICD-10-CM

## 2024-06-20 DIAGNOSIS — I10 BENIGN ESSENTIAL HYPERTENSION: ICD-10-CM

## 2024-06-20 DIAGNOSIS — E66.09 CLASS 1 OBESITY DUE TO EXCESS CALORIES WITH SERIOUS COMORBIDITY AND BODY MASS INDEX (BMI) OF 34.0 TO 34.9 IN ADULT: ICD-10-CM

## 2024-06-20 PROBLEM — E66.812 CLASS 2 SEVERE OBESITY DUE TO EXCESS CALORIES WITH SERIOUS COMORBIDITY IN ADULT (H): Status: RESOLVED | Noted: 2024-03-20 | Resolved: 2024-06-20

## 2024-06-20 PROBLEM — E66.01 CLASS 2 SEVERE OBESITY DUE TO EXCESS CALORIES WITH SERIOUS COMORBIDITY IN ADULT (H): Status: RESOLVED | Noted: 2024-03-20 | Resolved: 2024-06-20

## 2024-06-20 LAB
CHOLEST SERPL-MCNC: 137 MG/DL
HBA1C MFR BLD: 7.2 % (ref 0–5.6)
HDLC SERPL-MCNC: 49 MG/DL
HOLD SPECIMEN: NORMAL
HOLD SPECIMEN: NORMAL
LDLC SERPL CALC-MCNC: 68 MG/DL
NONHDLC SERPL-MCNC: 88 MG/DL
TRIGL SERPL-MCNC: 98 MG/DL

## 2024-06-20 PROCEDURE — 36415 COLL VENOUS BLD VENIPUNCTURE: CPT | Performed by: FAMILY MEDICINE

## 2024-06-20 PROCEDURE — 99214 OFFICE O/P EST MOD 30 MIN: CPT | Performed by: FAMILY MEDICINE

## 2024-06-20 PROCEDURE — 80061 LIPID PANEL: CPT | Performed by: FAMILY MEDICINE

## 2024-06-20 PROCEDURE — G2211 COMPLEX E/M VISIT ADD ON: HCPCS | Performed by: FAMILY MEDICINE

## 2024-06-20 PROCEDURE — 83036 HEMOGLOBIN GLYCOSYLATED A1C: CPT | Performed by: FAMILY MEDICINE

## 2024-06-20 RX ORDER — GLIPIZIDE 5 MG/1
5 TABLET, FILM COATED, EXTENDED RELEASE ORAL 2 TIMES DAILY
Qty: 180 TABLET | Refills: 1 | Status: SHIPPED | OUTPATIENT
Start: 2024-06-20

## 2024-06-20 RX ORDER — LOSARTAN POTASSIUM 100 MG/1
100 TABLET ORAL DAILY
Qty: 90 TABLET | Refills: 3 | Status: SHIPPED | OUTPATIENT
Start: 2024-06-20 | End: 2024-07-05

## 2024-06-20 ASSESSMENT — ANXIETY QUESTIONNAIRES
IF YOU CHECKED OFF ANY PROBLEMS ON THIS QUESTIONNAIRE, HOW DIFFICULT HAVE THESE PROBLEMS MADE IT FOR YOU TO DO YOUR WORK, TAKE CARE OF THINGS AT HOME, OR GET ALONG WITH OTHER PEOPLE: SOMEWHAT DIFFICULT
3. WORRYING TOO MUCH ABOUT DIFFERENT THINGS: SEVERAL DAYS
1. FEELING NERVOUS, ANXIOUS, OR ON EDGE: SEVERAL DAYS
5. BEING SO RESTLESS THAT IT IS HARD TO SIT STILL: SEVERAL DAYS
GAD7 TOTAL SCORE: 5
8. IF YOU CHECKED OFF ANY PROBLEMS, HOW DIFFICULT HAVE THESE MADE IT FOR YOU TO DO YOUR WORK, TAKE CARE OF THINGS AT HOME, OR GET ALONG WITH OTHER PEOPLE?: SOMEWHAT DIFFICULT
GAD7 TOTAL SCORE: 5
7. FEELING AFRAID AS IF SOMETHING AWFUL MIGHT HAPPEN: NOT AT ALL
2. NOT BEING ABLE TO STOP OR CONTROL WORRYING: SEVERAL DAYS
7. FEELING AFRAID AS IF SOMETHING AWFUL MIGHT HAPPEN: NOT AT ALL
4. TROUBLE RELAXING: SEVERAL DAYS
6. BECOMING EASILY ANNOYED OR IRRITABLE: NOT AT ALL

## 2024-06-20 ASSESSMENT — PATIENT HEALTH QUESTIONNAIRE - PHQ9: SUM OF ALL RESPONSES TO PHQ QUESTIONS 1-9: 4

## 2024-06-20 NOTE — TELEPHONE ENCOUNTER
Patient calling to see if she should take her Rybelsus prior to her fasting labs/ appointment for today.       Per chart review she has a provider visit this afternoon.     Patient states she is not going to take the medication.     If her provider wants her to take it, please let us know to contact her.     Thank you    CONNER Grande on 6/20/2024 at 9:37 AM

## 2024-06-20 NOTE — PROGRESS NOTES
"  Assessment & Plan     Type 2 diabetes mellitus without complication, with long-term current use of insulin (H)  A1c improved to 7.2.  Will increase Rybelsus to 14 mg and follow up in 3 months.  Could consider stopping/decreasing Glipizide depending on A1c.  Continue current medications.  - Hemoglobin A1c; Future  - Basic metabolic panel  (Ca, Cl, CO2, Creat, Gluc, K, Na, BUN); Future  - Hemoglobin A1c  - Semaglutide (RYBELSUS) 14 MG tablet; Take 14 mg by mouth daily  - glipiZIDE (GLUCOTROL XL) 5 MG 24 hr tablet; Take 1 tablet (5 mg) by mouth 2 times daily    Chronic kidney disease, stage 3a (H)  Due for labs, recommendations pending results. Could consider addition of SGLT2i in future.  - Basic metabolic panel  (Ca, Cl, CO2, Creat, Gluc, K, Na, BUN); Future    Class 1 obesity due to excess calories with serious comorbidity and body mass index (BMI) of 34.0 to 34.9 in adult  Working on diet and lifestyle changes, continue.  Monitor with Rybelsus increase.    Hyperlipidemia LDL goal <100  Due for labs, recommendations pending results.  - Lipid panel reflex to direct LDL Fasting; Future  - Lipid panel reflex to direct LDL Fasting    Benign essential hypertension  Controlled, continue current medications.  - losartan (COZAAR) 100 MG tablet; Take 1 tablet (100 mg) by mouth daily      The longitudinal plan of care for the diagnosis(es)/condition(s) as documented were addressed during this visit. Due to the added complexity in care, I will continue to support Vanessa in the subsequent management and with ongoing continuity of care.    BMI  Estimated body mass index is 34.67 kg/m  as calculated from the following:    Height as of this encounter: 1.511 m (4' 11.5\").    Weight as of this encounter: 79.2 kg (174 lb 9.6 oz).   Weight management plan: Discussed healthy diet and exercise guidelines      See Patient Instructions    Subjective   Vanessa is a 63 year old, presenting for the following health issues:  Depression, Anxiety, " Diabetes, Lipids, and Hypertension        6/20/2024     1:09 PM   Additional Questions   Roomed by Maggie Gamboa     History of Present Illness       Mental Health Follow-up:  Patient presents to follow-up on Depression & Anxiety.Patient's depression since last visit has been:  Good  The patient is not having other symptoms associated with depression.  Patient's anxiety since last visit has been:  Better  The patient is not having other symptoms associated with anxiety.  Any significant life events: No and health concerns  Patient is not feeling anxious or having panic attacks.  Patient has no concerns about alcohol or drug use.    Diabetes:   She presents for follow up of diabetes.  She is checking home blood glucose three times daily.   She checks blood glucose before meals, before and after meals and at bedtime.  Blood glucose is sometimes over 200 and sometimes under 70. She is aware of hypoglycemia symptoms including shakiness, weakness and confusion.    She has no concerns regarding her diabetes at this time.   She is not experiencing numbness or burning in feet, excessive thirst, blurry vision, weight changes or redness, sores or blisters on feet.           Hyperlipidemia:  She presents for follow up of hyperlipidemia.   She is taking medication to lower cholesterol. She is not having myalgia or other side effects to statin medications.    Hypertension: She presents for follow up of hypertension.  She does not check blood pressure  regularly outside of the clinic. Outpatient blood pressures have not been over 140/90. She does not follow a low salt diet.     She eats 2-3 servings of fruits and vegetables daily.She consumes 1 sweetened beverage(s) daily.She exercises with enough effort to increase her heart rate 30 to 60 minutes per day.  She exercises with enough effort to increase her heart rate 4 days per week.   She is taking medications regularly.       Here for follow up.    She has not taken her  Rybelsus today yet, is fasting today for labs.  Needs Glipizide refilled.  Occasionally has a stomach ache, gets better though.  She is not losing weight, tolerating the Rybelsus okay.        3/20/2024     1:32 PM 5/3/2024    11:39 AM 6/20/2024     1:56 PM   PHQ   PHQ-9 Total Score 7 5 4   Q9: Thoughts of better off dead/self-harm past 2 weeks Not at all Not at all Not at all         3/20/2024     1:32 PM 5/3/2024    11:40 AM 6/20/2024     1:03 PM   NATALIIA-7 SCORE   Total Score 2 (minimal anxiety) 5 (mild anxiety) 5 (mild anxiety)   Total Score 2 5 5           Current Outpatient Medications   Medication Sig Dispense Refill    acetaminophen (TYLENOL) 325 MG tablet Take 650 mg by mouth daily as needed for mild pain      ALPRAZolam (XANAX) 0.5 MG tablet Take 0.25 mg by mouth daily as needed for anxiety      amLODIPine (NORVASC) 10 MG tablet TAKE 1 TABLET AT BEDTIME 90 tablet 0    atenolol (TENORMIN) 25 MG tablet Take 1 tablet (25 mg) by mouth daily 90 tablet 3    atorvastatin (LIPITOR) 20 MG tablet TAKE 1 TABLET DAILY 90 tablet 0    blood glucose (NO BRAND SPECIFIED) lancets standard Use to test blood sugar 2 times daily or as directed.  Dispense as covered by insurance 100 each 3    blood glucose monitoring (NO BRAND SPECIFIED) meter device kit Use to test blood sugar 2 times daily or as directed.  Dispense brand as covered by insurance 1 kit 0    blood glucose monitoring (NO BRAND SPECIFIED) test strip Use to test blood sugar 2 times daily or as directed.  Dispense Ultra One touch strips per insurance coverage 200 each 3    busPIRone HCl (BUSPAR) 30 MG tablet Take 45 mg by mouth 2 times daily 90 tablet 0    cyclobenzaprine (FLEXERIL) 10 MG tablet TAKE 1 TABLET 3 TIMES DAILY AS NEEDED FOR MUSCLE SPASMS 30 tablet 3    FLUoxetine (PROZAC) 40 MG capsule Take 40 mg by mouth daily      gabapentin (NEURONTIN) 300 MG capsule TAKE 1 CAPSULE 3 TIMES A   DAY 90 capsule 8    glipiZIDE (GLUCOTROL XL) 5 MG 24 hr tablet Take 1 tablet  "(5 mg) by mouth 2 times daily 180 tablet 1    insulin glargine 100 UNIT/ML pen INJECT 78 UNITS SUBCUTANEOUSLY IN THE MORNING (Patient taking differently: INJECT 32 UNITS SUBCUTANEOUSLY IN THE MORNING and 30 in the evening) 30 mL 1    insulin pen needle (RELION PEN NEEDLES) 31G X 6 MM miscellaneous Use 1 pen needles daily or as directed. 90 each 1    losartan (COZAAR) 100 MG tablet Take 1 tablet (100 mg) by mouth daily 90 tablet 3    ondansetron (ZOFRAN ODT) 4 MG ODT tab Take 1 tablet (4 mg) by mouth every 8 hours as needed for nausea 30 tablet 0    Semaglutide (RYBELSUS) 14 MG tablet Take 14 mg by mouth daily 90 tablet 1    spironolactone (ALDACTONE) 25 MG tablet Take 1 tablet (25 mg) by mouth daily 90 tablet 3    SUMAtriptan (IMITREX) 100 MG tablet TAKE 1 TABLET BY MOUTH AT ONSET OF HEADACHE FOR MIGRAINE. MAY REPEAT DOSE AFTER 2 HOURS AS NEEDED. MAX OF 2 TABLETS PER 24 HOURS 10 tablet 1    traZODone (DESYREL) 50 MG tablet Take 1-2 tablets ( mg) by mouth nightly as needed for sleep 30 tablet 0           Objective    /68 (BP Location: Right arm, Patient Position: Sitting, Cuff Size: Adult Regular)   Pulse 70   Temp 98.2  F (36.8  C) (Oral)   Resp 18   Ht 1.511 m (4' 11.5\")   Wt 79.2 kg (174 lb 9.6 oz)   LMP  (LMP Unknown)   SpO2 95%   BMI 34.67 kg/m    Body mass index is 34.67 kg/m .  Physical Exam   GENERAL: alert and no distress  PSYCH: mentation appears normal, affect normal/bright    Results for orders placed or performed in visit on 06/20/24 (from the past 24 hour(s))   Hemoglobin A1c   Result Value Ref Range    Hemoglobin A1C 7.2 (H) 0.0 - 5.6 %   Extra Tube    Narrative    The following orders were created for panel order Extra Tube.  Procedure                               Abnormality         Status                     ---------                               -----------         ------                     Extra Serum Separator Tu...[[id number]]                      Final result             "   Extra Green Top (Lithium...[[id number]]                      Final result                 Please view results for these tests on the individual orders.   Extra Serum Separator Tube (SST)   Result Value Ref Range    Hold Specimen JIC    Extra Green Top (Lithium Heparin) Tube   Result Value Ref Range    Hold Specimen JIC            Signed Electronically by: Pretty Garza MD

## 2024-06-21 DIAGNOSIS — E78.5 HYPERLIPIDEMIA LDL GOAL <100: ICD-10-CM

## 2024-06-21 RX ORDER — ATORVASTATIN CALCIUM 20 MG/1
20 TABLET, FILM COATED ORAL DAILY
Qty: 90 TABLET | Refills: 3 | Status: SHIPPED | OUTPATIENT
Start: 2024-06-21

## 2024-06-27 DIAGNOSIS — I10 BENIGN ESSENTIAL HYPERTENSION: ICD-10-CM

## 2024-06-28 RX ORDER — AMLODIPINE BESYLATE 10 MG/1
10 TABLET ORAL AT BEDTIME
Qty: 90 TABLET | Refills: 1 | Status: SHIPPED | OUTPATIENT
Start: 2024-06-28

## 2024-07-05 DIAGNOSIS — I10 BENIGN ESSENTIAL HYPERTENSION: ICD-10-CM

## 2024-07-05 RX ORDER — LOSARTAN POTASSIUM 100 MG/1
100 TABLET ORAL DAILY
Qty: 90 TABLET | Refills: 2 | Status: SHIPPED | OUTPATIENT
Start: 2024-07-05

## 2024-07-05 RX ORDER — LOSARTAN POTASSIUM 100 MG/1
100 TABLET ORAL DAILY
Qty: 90 TABLET | Refills: 0 | OUTPATIENT
Start: 2024-07-05

## 2024-07-07 DIAGNOSIS — I10 BENIGN ESSENTIAL HYPERTENSION: ICD-10-CM

## 2024-07-08 RX ORDER — SPIRONOLACTONE 25 MG/1
25 TABLET ORAL DAILY
Qty: 90 TABLET | Refills: 3 | OUTPATIENT
Start: 2024-07-08

## 2024-07-12 DIAGNOSIS — R11.0 NAUSEA: ICD-10-CM

## 2024-07-12 RX ORDER — ONDANSETRON 4 MG/1
4 TABLET, ORALLY DISINTEGRATING ORAL EVERY 8 HOURS PRN
Qty: 30 TABLET | Refills: 0 | Status: SHIPPED | OUTPATIENT
Start: 2024-07-12

## 2024-07-14 DIAGNOSIS — M79.7 FIBROMYALGIA: ICD-10-CM

## 2024-07-17 RX ORDER — GABAPENTIN 300 MG/1
CAPSULE ORAL
Qty: 90 CAPSULE | Refills: 5 | Status: SHIPPED | OUTPATIENT
Start: 2024-07-17

## 2024-07-25 DIAGNOSIS — E11.9 TYPE 2 DIABETES MELLITUS WITHOUT COMPLICATION, WITH LONG-TERM CURRENT USE OF INSULIN (H): ICD-10-CM

## 2024-07-25 DIAGNOSIS — Z79.4 TYPE 2 DIABETES MELLITUS WITHOUT COMPLICATION, WITH LONG-TERM CURRENT USE OF INSULIN (H): ICD-10-CM

## 2024-07-25 RX ORDER — INSULIN GLARGINE 100 [IU]/ML
INJECTION, SOLUTION SUBCUTANEOUS
Qty: 30 ML | Refills: 1 | Status: SHIPPED | OUTPATIENT
Start: 2024-07-25 | End: 2024-09-23

## 2024-07-29 ENCOUNTER — PATIENT OUTREACH (OUTPATIENT)
Dept: CARE COORDINATION | Facility: CLINIC | Age: 64
End: 2024-07-29
Payer: COMMERCIAL

## 2024-07-31 DIAGNOSIS — M79.7 FIBROMYALGIA: ICD-10-CM

## 2024-08-01 RX ORDER — CYCLOBENZAPRINE HCL 10 MG
TABLET ORAL
Qty: 30 TABLET | Refills: 3 | Status: SHIPPED | OUTPATIENT
Start: 2024-08-01

## 2024-09-20 DIAGNOSIS — I10 BENIGN ESSENTIAL HYPERTENSION: ICD-10-CM

## 2024-09-20 DIAGNOSIS — G47.00 INSOMNIA, UNSPECIFIED TYPE: ICD-10-CM

## 2024-09-20 DIAGNOSIS — E11.9 TYPE 2 DIABETES MELLITUS WITHOUT COMPLICATION, WITH LONG-TERM CURRENT USE OF INSULIN (H): ICD-10-CM

## 2024-09-20 DIAGNOSIS — Z79.4 TYPE 2 DIABETES MELLITUS WITHOUT COMPLICATION, WITH LONG-TERM CURRENT USE OF INSULIN (H): ICD-10-CM

## 2024-09-20 RX ORDER — AMLODIPINE BESYLATE 10 MG/1
10 TABLET ORAL AT BEDTIME
Qty: 90 TABLET | Refills: 1 | OUTPATIENT
Start: 2024-09-20

## 2024-09-20 RX ORDER — SPIRONOLACTONE 25 MG/1
25 TABLET ORAL DAILY
Qty: 90 TABLET | Refills: 3 | Status: SHIPPED | OUTPATIENT
Start: 2024-09-20

## 2024-09-20 RX ORDER — GLIPIZIDE 5 MG/1
5 TABLET, FILM COATED, EXTENDED RELEASE ORAL 2 TIMES DAILY
Qty: 180 TABLET | Refills: 1 | OUTPATIENT
Start: 2024-09-20

## 2024-09-20 RX ORDER — TRAZODONE HYDROCHLORIDE 50 MG/1
TABLET, FILM COATED ORAL
Qty: 180 TABLET | Refills: 1 | Status: SHIPPED | OUTPATIENT
Start: 2024-09-20

## 2024-09-23 ENCOUNTER — OFFICE VISIT (OUTPATIENT)
Dept: FAMILY MEDICINE | Facility: CLINIC | Age: 64
End: 2024-09-23
Payer: COMMERCIAL

## 2024-09-23 VITALS
OXYGEN SATURATION: 95 % | HEART RATE: 71 BPM | DIASTOLIC BLOOD PRESSURE: 68 MMHG | RESPIRATION RATE: 16 BRPM | HEIGHT: 60 IN | TEMPERATURE: 97.9 F | BODY MASS INDEX: 34.77 KG/M2 | WEIGHT: 177.1 LBS | SYSTOLIC BLOOD PRESSURE: 115 MMHG

## 2024-09-23 DIAGNOSIS — N39.3 FEMALE STRESS INCONTINENCE: ICD-10-CM

## 2024-09-23 DIAGNOSIS — Z53.20 LUNG CANCER SCREENING DECLINED BY PATIENT: ICD-10-CM

## 2024-09-23 DIAGNOSIS — E11.9 TYPE 2 DIABETES MELLITUS WITHOUT COMPLICATION, WITH LONG-TERM CURRENT USE OF INSULIN (H): Primary | ICD-10-CM

## 2024-09-23 DIAGNOSIS — Z79.4 TYPE 2 DIABETES MELLITUS WITHOUT COMPLICATION, WITH LONG-TERM CURRENT USE OF INSULIN (H): Primary | ICD-10-CM

## 2024-09-23 DIAGNOSIS — Z78.0 POST-MENOPAUSAL: ICD-10-CM

## 2024-09-23 DIAGNOSIS — E66.01 CLASS 2 SEVERE OBESITY DUE TO EXCESS CALORIES WITH SERIOUS COMORBIDITY AND BODY MASS INDEX (BMI) OF 35.0 TO 35.9 IN ADULT (H): ICD-10-CM

## 2024-09-23 DIAGNOSIS — E66.812 CLASS 2 SEVERE OBESITY DUE TO EXCESS CALORIES WITH SERIOUS COMORBIDITY AND BODY MASS INDEX (BMI) OF 35.0 TO 35.9 IN ADULT (H): ICD-10-CM

## 2024-09-23 DIAGNOSIS — N18.31 CHRONIC KIDNEY DISEASE, STAGE 3A (H): ICD-10-CM

## 2024-09-23 LAB
ANION GAP SERPL CALCULATED.3IONS-SCNC: 10 MMOL/L (ref 7–15)
BUN SERPL-MCNC: 15.5 MG/DL (ref 8–23)
CALCIUM SERPL-MCNC: 10.2 MG/DL (ref 8.8–10.4)
CHLORIDE SERPL-SCNC: 101 MMOL/L (ref 98–107)
CREAT SERPL-MCNC: 1.2 MG/DL (ref 0.51–0.95)
EGFRCR SERPLBLD CKD-EPI 2021: 51 ML/MIN/1.73M2
EST. AVERAGE GLUCOSE BLD GHB EST-MCNC: 151 MG/DL
GLUCOSE SERPL-MCNC: 199 MG/DL (ref 70–99)
HBA1C MFR BLD: 6.9 % (ref 0–5.6)
HCO3 SERPL-SCNC: 25 MMOL/L (ref 22–29)
POTASSIUM SERPL-SCNC: 4.7 MMOL/L (ref 3.4–5.3)
SODIUM SERPL-SCNC: 136 MMOL/L (ref 135–145)

## 2024-09-23 PROCEDURE — 80048 BASIC METABOLIC PNL TOTAL CA: CPT | Performed by: FAMILY MEDICINE

## 2024-09-23 PROCEDURE — 99214 OFFICE O/P EST MOD 30 MIN: CPT | Performed by: FAMILY MEDICINE

## 2024-09-23 PROCEDURE — G2211 COMPLEX E/M VISIT ADD ON: HCPCS | Performed by: FAMILY MEDICINE

## 2024-09-23 PROCEDURE — 36415 COLL VENOUS BLD VENIPUNCTURE: CPT | Performed by: FAMILY MEDICINE

## 2024-09-23 PROCEDURE — 83036 HEMOGLOBIN GLYCOSYLATED A1C: CPT | Performed by: FAMILY MEDICINE

## 2024-09-23 RX ORDER — TRAZODONE HYDROCHLORIDE 50 MG/1
TABLET, FILM COATED ORAL
Qty: 180 TABLET | Refills: 1 | Status: CANCELLED | OUTPATIENT
Start: 2024-09-23

## 2024-09-23 RX ORDER — AMLODIPINE BESYLATE 10 MG/1
10 TABLET ORAL AT BEDTIME
Qty: 90 TABLET | Refills: 1 | Status: CANCELLED | OUTPATIENT
Start: 2024-09-23

## 2024-09-23 RX ORDER — GLIPIZIDE 5 MG/1
5 TABLET, FILM COATED, EXTENDED RELEASE ORAL 2 TIMES DAILY
Qty: 180 TABLET | Refills: 1 | Status: CANCELLED | OUTPATIENT
Start: 2024-09-23

## 2024-09-23 RX ORDER — INSULIN GLARGINE 100 [IU]/ML
INJECTION, SOLUTION SUBCUTANEOUS
Qty: 30 ML | Refills: 1 | Status: SHIPPED | OUTPATIENT
Start: 2024-09-23

## 2024-09-23 RX ORDER — INSULIN GLARGINE 100 [IU]/ML
INJECTION, SOLUTION SUBCUTANEOUS
Qty: 30 ML | Refills: 1 | Status: CANCELLED | OUTPATIENT
Start: 2024-09-23

## 2024-09-23 RX ORDER — ONDANSETRON 4 MG/1
4 TABLET, ORALLY DISINTEGRATING ORAL EVERY 8 HOURS PRN
Qty: 30 TABLET | Refills: 0 | Status: CANCELLED | OUTPATIENT
Start: 2024-09-23

## 2024-09-23 ASSESSMENT — PAIN SCALES - GENERAL: PAINLEVEL: NO PAIN (0)

## 2024-09-23 NOTE — PROGRESS NOTES
Assessment & Plan     Type 2 diabetes mellitus without complication, with long-term current use of insulin (H)  Labs today, continue current regimen and add Jardiance given renal benefit and patient suspicion of A1c being higher.  Will follow up in 3 months or sooner as needed.  - Hemoglobin A1c; Future  - insulin glargine 100 UNIT/ML pen; INJECT 78 UNITS SUBCUTANEOUSLY IN THE MORNING  - empagliflozin (JARDIANCE) 10 MG TABS tablet; Take 1 tablet (10 mg) by mouth daily.  - Basic metabolic panel  (Ca, Cl, CO2, Creat, Gluc, K, Na, BUN)  - Hemoglobin A1c    Chronic kidney disease, stage 3a (H)  As above  - empagliflozin (JARDIANCE) 10 MG TABS tablet; Take 1 tablet (10 mg) by mouth daily.  - Basic metabolic panel  (Ca, Cl, CO2, Creat, Gluc, K, Na, BUN)    Class 2 severe obesity due to excess calories with serious comorbidity and body mass index (BMI) of 35.0 to 35.9 in adult (H)  No weight lost on Rybelsus, continue dietary monitoring.  Follow up in 3 months or sooner as needed.    Post-menopausal  - DX Bone Density; Future    Female stress incontinence  Patient declines Pelvic Floor PT.  Monitor.    Lung cancer screening declined by patient  Declines today, still occasionally smoking.    The longitudinal plan of care for the diagnosis(es)/condition(s) as documented were addressed during this visit. Due to the added complexity in care, I will continue to support Vanessa in the subsequent management and with ongoing continuity of care.      Nicotine/Tobacco Cessation  She reports that she has been smoking cigarettes. She started smoking about 14 years ago. She has quit using smokeless tobacco.  Nicotine/Tobacco Cessation Plan  Information offered: Patient not interested at this time        See Patient Instructions    Subjective   Vanessa is a 64 year old, presenting for the following health issues:  Diabetes, Lipids, Hypertension, and Incontinence (Daily issue)        9/23/2024     1:43 PM   Additional Questions   Roomed by  Tricia Beltrán, EMT-B     History of Present Illness       Diabetes:   She presents for follow up of diabetes.  She is checking home blood glucose two times daily.   She checks blood glucose before meals and at bedtime.  Blood glucose is sometimes over 200 and sometimes under 70. She is aware of hypoglycemia symptoms including shakiness, dizziness, weakness, lethargy and confusion.   She is concerned about blood sugar frequently over 200.    She is not experiencing numbness or burning in feet, excessive thirst, blurry vision, weight changes or redness, sores or blisters on feet.           Hyperlipidemia:  She presents for follow up of hyperlipidemia.   She is taking medication to lower cholesterol. She is not having myalgia or other side effects to statin medications.    Hypertension: She presents for follow up of hypertension.  She does not check blood pressure  regularly outside of the clinic. Outpatient blood pressures have not been over 140/90. She does not follow a low salt diet.     She eats 2-3 servings of fruits and vegetables daily.She consumes 1 sweetened beverage(s) daily.She exercises with enough effort to increase her heart rate 20 to 29 minutes per day.  She exercises with enough effort to increase her heart rate 5 days per week.   She is taking medications regularly.         Medication Followup of Multiple   Taking Medication as prescribed: yes  Side Effects:  None  Medication Helping Symptoms:  yes    Did not refill Rybelsus, we discussed that we might want to add Jardiance to her medication.  She says we discussed stopping the Rybelsus to add Jardiance, so she did not refill the rybelsus, she has a few more days though.    Pt also reported issues with Incontinence. Especially when sneezing or coughing and happens in the middle of the night. She has never done Pelvic Floor PT, does not think she would be interested in this.      Current Outpatient Medications   Medication Sig Dispense Refill     acetaminophen (TYLENOL) 325 MG tablet Take 650 mg by mouth daily as needed for mild pain      ALPRAZolam (XANAX) 0.5 MG tablet Take 0.25 mg by mouth daily as needed for anxiety      amLODIPine (NORVASC) 10 MG tablet TAKE 1 TABLET AT BEDTIME 90 tablet 1    atenolol (TENORMIN) 25 MG tablet Take 1 tablet (25 mg) by mouth daily 90 tablet 3    atorvastatin (LIPITOR) 20 MG tablet Take 1 tablet (20 mg) by mouth daily 90 tablet 3    blood glucose (NO BRAND SPECIFIED) lancets standard Use to test blood sugar 2 times daily or as directed.  Dispense as covered by insurance 100 each 3    blood glucose monitoring (NO BRAND SPECIFIED) meter device kit Use to test blood sugar 2 times daily or as directed.  Dispense brand as covered by insurance 1 kit 0    blood glucose monitoring (NO BRAND SPECIFIED) test strip Use to test blood sugar 2 times daily or as directed.  Dispense Ultra One touch strips per insurance coverage 200 each 3    busPIRone HCl (BUSPAR) 30 MG tablet Take 45 mg by mouth 2 times daily 90 tablet 0    cyclobenzaprine (FLEXERIL) 10 MG tablet TAKE 1 TABLET 3 TIMES DAILYAS NEEDED FOR MUSCLE SPASMS 30 tablet 3    FLUoxetine (PROZAC) 40 MG capsule Take 40 mg by mouth daily      gabapentin (NEURONTIN) 300 MG capsule TAKE 1 CAPSULE 3 TIMES A   DAY 90 capsule 5    glipiZIDE (GLUCOTROL XL) 5 MG 24 hr tablet Take 1 tablet (5 mg) by mouth 2 times daily 180 tablet 1    insulin glargine 100 UNIT/ML pen INJECT 78 UNITS SUBCUTANEOUSLY IN THE MORNING 30 mL 1    insulin pen needle (RELION PEN NEEDLES) 31G X 6 MM miscellaneous Use 1 pen needles daily or as directed. 90 each 1    losartan (COZAAR) 100 MG tablet Take 1 tablet (100 mg) by mouth daily 90 tablet 2    ondansetron (ZOFRAN ODT) 4 MG ODT tab Take 1 tablet (4 mg) by mouth every 8 hours as needed for nausea 30 tablet 0    Semaglutide (RYBELSUS) 14 MG tablet Take 14 mg by mouth daily 90 tablet 1    spironolactone (ALDACTONE) 25 MG tablet TAKE 1 TABLET DAILY 90 tablet 3     "SUMAtriptan (IMITREX) 100 MG tablet TAKE 1 TABLET BY MOUTH AT ONSET OF HEADACHE FOR MIGRAINE. MAY REPEAT DOSE AFTER 2 HOURS AS NEEDED. MAX OF 2 TABLETS PER 24 HOURS 10 tablet 1    traZODone (DESYREL) 50 MG tablet TAKE 1 TO 2 TABLETS NIGHTLYAS NEEDED FOR SLEEP 180 tablet 1           Objective    /68 (BP Location: Right arm, Patient Position: Sitting, Cuff Size: Adult Regular)   Pulse 71   Temp 97.9  F (36.6  C) (Oral)   Resp 16   Ht 1.511 m (4' 11.5\")   Wt 80.3 kg (177 lb 1.6 oz)   LMP  (LMP Unknown)   SpO2 95%   BMI 35.17 kg/m    Body mass index is 35.17 kg/m .  Physical Exam   GENERAL: alert and no distress  PSYCH: mentation appears normal, affect normal/bright    Office Visit on 06/20/2024   Component Date Value Ref Range Status    Hemoglobin A1C 06/20/2024 7.2 (H)  0.0 - 5.6 % Final    Normal <5.7%   Prediabetes 5.7-6.4%    Diabetes 6.5% or higher     Note: Adopted from ADA consensus guidelines.    Hold Specimen 06/20/2024 JI   Final    Hold Specimen 06/20/2024 JI   Final    Cholesterol 06/20/2024 137  <200 mg/dL Final    Triglycerides 06/20/2024 98  <150 mg/dL Final    Direct Measure HDL 06/20/2024 49 (L)  >=50 mg/dL Final    LDL Cholesterol Calculated 06/20/2024 68  <=100 mg/dL Final    Non HDL Cholesterol 06/20/2024 88  <130 mg/dL Final           Signed Electronically by: Pretty Garza MD    "

## 2024-09-23 NOTE — PATIENT INSTRUCTIONS
Continue Rybelsus, 14 mg once daily.  Start Jardiance, 10 mg once daily.  Continue all other medications as usual.

## 2024-10-29 DIAGNOSIS — E11.9 TYPE 2 DIABETES MELLITUS WITHOUT COMPLICATION, WITH LONG-TERM CURRENT USE OF INSULIN (H): ICD-10-CM

## 2024-10-29 DIAGNOSIS — Z79.4 TYPE 2 DIABETES MELLITUS WITHOUT COMPLICATION, WITH LONG-TERM CURRENT USE OF INSULIN (H): ICD-10-CM

## 2024-10-29 DIAGNOSIS — R11.0 NAUSEA: ICD-10-CM

## 2024-10-29 DIAGNOSIS — R00.2 PALPITATIONS: ICD-10-CM

## 2024-10-29 DIAGNOSIS — I10 BENIGN HYPERTENSION: ICD-10-CM

## 2024-10-29 RX ORDER — ONDANSETRON 4 MG/1
TABLET, ORALLY DISINTEGRATING ORAL
Qty: 30 TABLET | Refills: 0 | Status: SHIPPED | OUTPATIENT
Start: 2024-10-29

## 2024-10-29 RX ORDER — ATENOLOL 25 MG/1
25 TABLET ORAL DAILY
Qty: 90 TABLET | Refills: 3 | OUTPATIENT
Start: 2024-10-29

## 2024-10-29 RX ORDER — ORAL SEMAGLUTIDE 14 MG/1
TABLET ORAL DAILY
Qty: 90 TABLET | Refills: 1 | OUTPATIENT
Start: 2024-10-29

## 2024-11-07 DIAGNOSIS — Z79.4 TYPE 2 DIABETES MELLITUS WITHOUT COMPLICATION, WITH LONG-TERM CURRENT USE OF INSULIN (H): ICD-10-CM

## 2024-11-07 DIAGNOSIS — E11.9 TYPE 2 DIABETES MELLITUS WITHOUT COMPLICATION, WITH LONG-TERM CURRENT USE OF INSULIN (H): ICD-10-CM

## 2024-11-08 RX ORDER — GLIPIZIDE 5 MG/1
5 TABLET, FILM COATED, EXTENDED RELEASE ORAL 2 TIMES DAILY
Qty: 180 TABLET | Refills: 1 | OUTPATIENT
Start: 2024-11-08

## 2024-12-06 DIAGNOSIS — M79.7 FIBROMYALGIA: ICD-10-CM

## 2024-12-10 DIAGNOSIS — E11.9 TYPE 2 DIABETES MELLITUS WITHOUT COMPLICATION, WITH LONG-TERM CURRENT USE OF INSULIN (H): ICD-10-CM

## 2024-12-10 DIAGNOSIS — Z79.4 TYPE 2 DIABETES MELLITUS WITHOUT COMPLICATION, WITH LONG-TERM CURRENT USE OF INSULIN (H): ICD-10-CM

## 2024-12-10 RX ORDER — INSULIN GLARGINE 100 [IU]/ML
INJECTION, SOLUTION SUBCUTANEOUS
Qty: 30 ML | Refills: 2 | Status: SHIPPED | OUTPATIENT
Start: 2024-12-10

## 2024-12-10 RX ORDER — GABAPENTIN 300 MG/1
CAPSULE ORAL
Qty: 90 CAPSULE | Refills: 5 | Status: SHIPPED | OUTPATIENT
Start: 2024-12-10

## 2024-12-12 DIAGNOSIS — E11.9 TYPE 2 DIABETES MELLITUS WITHOUT COMPLICATION, WITH LONG-TERM CURRENT USE OF INSULIN (H): ICD-10-CM

## 2024-12-12 DIAGNOSIS — N18.31 CHRONIC KIDNEY DISEASE, STAGE 3A (H): ICD-10-CM

## 2024-12-12 DIAGNOSIS — Z79.4 TYPE 2 DIABETES MELLITUS WITHOUT COMPLICATION, WITH LONG-TERM CURRENT USE OF INSULIN (H): ICD-10-CM

## 2024-12-12 RX ORDER — EMPAGLIFLOZIN 10 MG/1
10 TABLET, FILM COATED ORAL DAILY
Qty: 90 TABLET | Refills: 0 | Status: SHIPPED | OUTPATIENT
Start: 2024-12-12

## 2024-12-25 DIAGNOSIS — I10 BENIGN HYPERTENSION: ICD-10-CM

## 2024-12-25 DIAGNOSIS — R00.2 PALPITATIONS: ICD-10-CM

## 2024-12-26 RX ORDER — ATENOLOL 25 MG/1
25 TABLET ORAL DAILY
Qty: 90 TABLET | Refills: 3 | OUTPATIENT
Start: 2024-12-26

## 2024-12-30 NOTE — TELEPHONE ENCOUNTER
Patient read mychart message aware needs to schedule    Penelope Nunez/      
Rosi message sent to patient to schedule    Penelope Nunez/      
Routing refill request to provider for review/approval because:  Grazyna given x1 and patient did not follow up, please advise  Was to follow up in April     Team please call to schedule   Fatuma Lewis RN         
FRANK

## 2025-01-02 ENCOUNTER — TELEPHONE (OUTPATIENT)
Dept: FAMILY MEDICINE | Facility: CLINIC | Age: 65
End: 2025-01-02
Payer: COMMERCIAL

## 2025-01-02 NOTE — TELEPHONE ENCOUNTER
Patient Quality Outreach    Patient is due for the following:   Breast Cancer Screening - Mammogram  Depression  -  PHQ-9 needed  Physical Preventive Adult Physical    Action(s) Taken:   Schedule a office visit for mammogram and  Adult Preventative    Type of outreach:    Sent [employer] message.    Questions for provider review:    None           Bina Manuel MA

## 2025-01-07 ENCOUNTER — OFFICE VISIT (OUTPATIENT)
Dept: FAMILY MEDICINE | Facility: CLINIC | Age: 65
End: 2025-01-07
Payer: COMMERCIAL

## 2025-01-07 VITALS
TEMPERATURE: 97.6 F | BODY MASS INDEX: 33.71 KG/M2 | OXYGEN SATURATION: 94 % | DIASTOLIC BLOOD PRESSURE: 66 MMHG | HEART RATE: 65 BPM | RESPIRATION RATE: 16 BRPM | SYSTOLIC BLOOD PRESSURE: 126 MMHG | HEIGHT: 60 IN | WEIGHT: 171.7 LBS

## 2025-01-07 DIAGNOSIS — C43.62: ICD-10-CM

## 2025-01-07 DIAGNOSIS — Z79.4 TYPE 2 DIABETES MELLITUS WITHOUT COMPLICATION, WITH LONG-TERM CURRENT USE OF INSULIN (H): Primary | ICD-10-CM

## 2025-01-07 DIAGNOSIS — E11.9 TYPE 2 DIABETES MELLITUS WITHOUT COMPLICATION, WITH LONG-TERM CURRENT USE OF INSULIN (H): Primary | ICD-10-CM

## 2025-01-07 DIAGNOSIS — N18.31 CHRONIC KIDNEY DISEASE, STAGE 3A (H): ICD-10-CM

## 2025-01-07 DIAGNOSIS — E66.01 CLASS 2 SEVERE OBESITY DUE TO EXCESS CALORIES WITH SERIOUS COMORBIDITY AND BODY MASS INDEX (BMI) OF 35.0 TO 35.9 IN ADULT (H): ICD-10-CM

## 2025-01-07 DIAGNOSIS — R21 RASH: ICD-10-CM

## 2025-01-07 DIAGNOSIS — M79.7 FIBROMYALGIA: ICD-10-CM

## 2025-01-07 DIAGNOSIS — F33.1 MODERATE RECURRENT MAJOR DEPRESSION (H): ICD-10-CM

## 2025-01-07 DIAGNOSIS — E66.812 CLASS 2 SEVERE OBESITY DUE TO EXCESS CALORIES WITH SERIOUS COMORBIDITY AND BODY MASS INDEX (BMI) OF 35.0 TO 35.9 IN ADULT (H): ICD-10-CM

## 2025-01-07 DIAGNOSIS — G47.00 INSOMNIA, UNSPECIFIED TYPE: ICD-10-CM

## 2025-01-07 LAB
ANION GAP SERPL CALCULATED.3IONS-SCNC: 9 MMOL/L (ref 7–15)
BUN SERPL-MCNC: 18.5 MG/DL (ref 8–23)
CALCIUM SERPL-MCNC: 9.9 MG/DL (ref 8.8–10.4)
CHLORIDE SERPL-SCNC: 103 MMOL/L (ref 98–107)
CREAT SERPL-MCNC: 1.57 MG/DL (ref 0.51–0.95)
EGFRCR SERPLBLD CKD-EPI 2021: 37 ML/MIN/1.73M2
EST. AVERAGE GLUCOSE BLD GHB EST-MCNC: 134 MG/DL
GLUCOSE SERPL-MCNC: 136 MG/DL (ref 70–99)
HBA1C MFR BLD: 6.3 % (ref 0–5.6)
HCO3 SERPL-SCNC: 26 MMOL/L (ref 22–29)
HGB BLD-MCNC: 14.3 G/DL (ref 11.7–15.7)
POTASSIUM SERPL-SCNC: 4.4 MMOL/L (ref 3.4–5.3)
SODIUM SERPL-SCNC: 138 MMOL/L (ref 135–145)

## 2025-01-07 PROCEDURE — 80048 BASIC METABOLIC PNL TOTAL CA: CPT | Performed by: FAMILY MEDICINE

## 2025-01-07 PROCEDURE — 83036 HEMOGLOBIN GLYCOSYLATED A1C: CPT | Performed by: FAMILY MEDICINE

## 2025-01-07 PROCEDURE — G2211 COMPLEX E/M VISIT ADD ON: HCPCS | Performed by: FAMILY MEDICINE

## 2025-01-07 PROCEDURE — 36415 COLL VENOUS BLD VENIPUNCTURE: CPT | Performed by: FAMILY MEDICINE

## 2025-01-07 PROCEDURE — 99214 OFFICE O/P EST MOD 30 MIN: CPT | Performed by: FAMILY MEDICINE

## 2025-01-07 PROCEDURE — 85018 HEMOGLOBIN: CPT | Performed by: FAMILY MEDICINE

## 2025-01-07 RX ORDER — TRAZODONE HYDROCHLORIDE 100 MG/1
100-200 TABLET ORAL AT BEDTIME
Qty: 180 TABLET | Refills: 1 | Status: SHIPPED | OUTPATIENT
Start: 2025-01-07

## 2025-01-07 ASSESSMENT — ANXIETY QUESTIONNAIRES
GAD7 TOTAL SCORE: 14
GAD7 TOTAL SCORE: 14
6. BECOMING EASILY ANNOYED OR IRRITABLE: MORE THAN HALF THE DAYS
2. NOT BEING ABLE TO STOP OR CONTROL WORRYING: MORE THAN HALF THE DAYS
7. FEELING AFRAID AS IF SOMETHING AWFUL MIGHT HAPPEN: MORE THAN HALF THE DAYS
GAD7 TOTAL SCORE: 14
4. TROUBLE RELAXING: MORE THAN HALF THE DAYS
1. FEELING NERVOUS, ANXIOUS, OR ON EDGE: MORE THAN HALF THE DAYS
7. FEELING AFRAID AS IF SOMETHING AWFUL MIGHT HAPPEN: MORE THAN HALF THE DAYS
8. IF YOU CHECKED OFF ANY PROBLEMS, HOW DIFFICULT HAVE THESE MADE IT FOR YOU TO DO YOUR WORK, TAKE CARE OF THINGS AT HOME, OR GET ALONG WITH OTHER PEOPLE?: SOMEWHAT DIFFICULT
5. BEING SO RESTLESS THAT IT IS HARD TO SIT STILL: MORE THAN HALF THE DAYS
3. WORRYING TOO MUCH ABOUT DIFFERENT THINGS: MORE THAN HALF THE DAYS
IF YOU CHECKED OFF ANY PROBLEMS ON THIS QUESTIONNAIRE, HOW DIFFICULT HAVE THESE PROBLEMS MADE IT FOR YOU TO DO YOUR WORK, TAKE CARE OF THINGS AT HOME, OR GET ALONG WITH OTHER PEOPLE: SOMEWHAT DIFFICULT

## 2025-01-07 ASSESSMENT — PATIENT HEALTH QUESTIONNAIRE - PHQ9
SUM OF ALL RESPONSES TO PHQ QUESTIONS 1-9: 14
SUM OF ALL RESPONSES TO PHQ QUESTIONS 1-9: 14
10. IF YOU CHECKED OFF ANY PROBLEMS, HOW DIFFICULT HAVE THESE PROBLEMS MADE IT FOR YOU TO DO YOUR WORK, TAKE CARE OF THINGS AT HOME, OR GET ALONG WITH OTHER PEOPLE: VERY DIFFICULT

## 2025-01-07 NOTE — PROGRESS NOTES
Assessment & Plan     Type 2 diabetes mellitus without complication, with long-term current use of insulin (H)  A1c improved to 6.3, well-controlled.  Praised patient.  Recommend continuing Rybelsus, glipizide, and Jardiance at current doses.  Recommend decreasing insulin dosing, will taper down to 30 units twice daily.  Monitor sugars.  Patient will provide follow-up in 4 weeks or sooner as needed.  - Hemoglobin A1c; Future  - Basic metabolic panel  (Ca, Cl, CO2, Creat, Gluc, K, Na, BUN); Future  - Basic metabolic panel  (Ca, Cl, CO2, Creat, Gluc, K, Na, BUN)  - Hemoglobin A1c    Fibromyalgia  Increase gabapentin to 600 mg 3 times daily as needed.  If this is too sedating patient will provide update and we can try 400 mg 3 times daily as needed.  Follow-up in 4 weeks or sooner as needed.    Chronic kidney disease, stage 3a (H)  Labs have been stable, continue to monitor.  On losartan.  - Hemoglobin; Future  - Hemoglobin    Insomnia, unspecified type  Increase trazodone to 1 to 2 tablets nightly for sleep.  - traZODone (DESYREL) 100 MG tablet; Take 1-2 tablets (100-200 mg) by mouth at bedtime.    Moderate recurrent major depression (H)  On fluoxetine and buspirone, follows with psychiatry.  Follow-up as directed.    Malignant melanoma of left wrist (H)  Status post removal.  Patient does not follow with dermatology but monitors her skin for changes.    Class 2 severe obesity due to excess calories with serious comorbidity and body mass index (BMI) of 35.0 to 35.9 in adult (H)  Weight is down 6 pounds from last visit, current BMI 34.1.  Continue to work on dietary and lifestyle changes.    Rash  Unclear etiology of rash.  Not bothersome to patient.  Recommend continued monitoring.  If not improving will refer to dermatology.    The longitudinal plan of care for the diagnosis(es)/condition(s) as documented were addressed during this visit. Due to the added complexity in care, I will continue to support Vanessa in the  "subsequent management and with ongoing continuity of care.      BMI  Estimated body mass index is 34.1 kg/m  as calculated from the following:    Height as of this encounter: 1.511 m (4' 11.5\").    Weight as of this encounter: 77.9 kg (171 lb 11.2 oz).             Loretta Mendez is a 64 year old, presenting for the following health issues:  Diabetes and Derm Problem (Patient states she has noticed some weird markings- red and white blotches on her back for the last couple of months now.)        1/7/2025     2:06 PM   Additional Questions   Roomed by Bina SCHMIDT     History of Present Illness       Reason for visit:  Routine visit.   She is taking medications regularly.         Diabetes Follow-up    How often are you checking your blood sugar? Two times daily  Blood sugar testing frequency justification:  Uncontrolled diabetes  What time of day are you checking your blood sugars (select all that apply)?  At bedtime and before morning meal  Have you had any blood sugars above 200?  No  Have you had any blood sugars below 70?  No  What symptoms do you notice when your blood sugar is low?  None  What concerns do you have today about your diabetes? None   Do you have any of these symptoms? (Select all that apply)  Redness, sores, or blisters on feet- left underneath big toe, callus or corn    Insulin Glargine 38 units twice daily.    She is having more aches and pains with her fibromyalgia, wondering if her Gabapentin could be increased for the pains.  She is taking Trazodone for sleep but wondering if this can be increased, as 100 mg is not always enough.    BP Readings from Last 2 Encounters:   01/07/25 126/66   09/23/24 115/68     Hemoglobin A1C (%)   Date Value   01/07/2025 6.3 (H)   09/23/2024 6.9 (H)   10/30/2020 7.1 (H)   02/28/2020 7.0 (H)     LDL Cholesterol Calculated (mg/dL)   Date Value   06/20/2024 68   01/31/2023 74   10/30/2020 55   06/25/2019 47       Rash  Duration of complaint: several months   On her " "back, for a couple months.  Not painful or itchy.        Objective    /66 (BP Location: Right arm, Patient Position: Sitting, Cuff Size: Adult Regular)   Pulse 65   Temp 97.6  F (36.4  C) (Oral)   Resp 16   Ht 1.511 m (4' 11.5\")   Wt 77.9 kg (171 lb 11.2 oz)   LMP  (LMP Unknown)   SpO2 94%   BMI 34.10 kg/m    Body mass index is 34.1 kg/m .  Physical Exam   GENERAL: alert and no distress  SKIN: See photo.  Blanches minimally, flat, not raised.  PSYCH: mentation appears normal, affect normal/bright    Results for orders placed or performed in visit on 01/07/25 (from the past 24 hours)   Hemoglobin   Result Value Ref Range    Hemoglobin 14.3 11.7 - 15.7 g/dL   Hemoglobin A1c   Result Value Ref Range    Estimated Average Glucose 134 (H) <117 mg/dL    Hemoglobin A1C 6.3 (H) 0.0 - 5.6 %           Signed Electronically by: Pretty Garza MD    "

## 2025-01-07 NOTE — PATIENT INSTRUCTIONS
Increase Trazodone to 100-200 mg at bedtime for sleep.  Increase Gabapentin to 600 mg three times per day as needed. Provide update in 4 weeks on how this is going for you. Let me know sooner if you would like to try 400 mg tablets instead of 600 mg.  Decrease insulin by 2 units every 3-4 days until you are at 30 units twice per day. Continue the Jardiance, Glipizide, and Rybelsus at current doses.  1-8-25: 36 units in AM, 38 units in PM  1-10-25: 36 units in AM, and 36 units in PM  1-12-25: 34 units in AM and 36 units in PM  1-14-25: 34 units in AM and 34 units in PM  1-16-25: 32 units in AM and 34 units in PM  1-18-25: 32 units and AM and 32 units in PM  1-20-25: 30 units in AM and 32 units in PM  1-22-25: 30 units in AM and 30 units in PM  Provide update on fasting sugars at the end of the month (1-30-25)

## 2025-01-09 DIAGNOSIS — Z79.4 TYPE 2 DIABETES MELLITUS WITHOUT COMPLICATION, WITH LONG-TERM CURRENT USE OF INSULIN (H): Primary | ICD-10-CM

## 2025-01-09 DIAGNOSIS — E11.9 TYPE 2 DIABETES MELLITUS WITHOUT COMPLICATION, WITH LONG-TERM CURRENT USE OF INSULIN (H): Primary | ICD-10-CM

## 2025-01-09 DIAGNOSIS — N18.31 CHRONIC KIDNEY DISEASE, STAGE 3A (H): ICD-10-CM

## 2025-02-24 NOTE — TELEPHONE ENCOUNTER
"Requested Prescriptions   Pending Prescriptions Disp Refills     SUMAtriptan (IMITREX) 100 MG tablet [Pharmacy Med Name: SUMATRIPTAN 100MG TAB] 10 tablet 1    Last Written Prescription Date:  01/16/2019  Last Fill Quantity: 1 nasal spray,  # refills: 0   Last office visit: 10/4/2018 with prescribing provider:  10/04/2018   Future Office Visit:     Sig: TAKE 1 TABLET BY MOUTH ONCE DAILY. MAY REPEAT AFTER 2 HOURS IF NEEDED. MAX OF 2 TABLETS PER 24 HOURS.    Serotonin Agonists Failed - 2/11/2019  5:30 AM       Failed - Serotonin Agonist request needs review.    Please review patient's record. If patient has had 8 or more treatments in the past month, please forward to provider.         Passed - Blood pressure under 140/90 in past 12 months    BP Readings from Last 3 Encounters:   10/04/18 133/70   09/06/18 130/82   05/14/18 142/82                Passed - Recent (12 mo) or future (30 days) visit within the authorizing provider's specialty    Patient had office visit in the last 12 months or has a visit in the next 30 days with authorizing provider or within the authorizing provider's specialty.  See \"Patient Info\" tab in inbasket, or \"Choose Columns\" in Meds & Orders section of the refill encounter.             Passed - Medication is active on med list       Passed - Patient is age 18 or older       Passed - No active pregnancy on record       Passed - No positive pregnancy test in past 12 months        Myron SCHULZT  "
Prescription approved per [facility] Refill Protocol.    Fatuma Lewis RN    
24-Feb-2025 21:30

## 2025-02-27 ENCOUNTER — TELEPHONE (OUTPATIENT)
Dept: FAMILY MEDICINE | Facility: CLINIC | Age: 65
End: 2025-02-27
Payer: COMMERCIAL

## 2025-02-27 NOTE — TELEPHONE ENCOUNTER
Patient Quality Outreach    Patient is due for the following:   Breast Cancer Screening - Mammogram    Action(s) Taken:   Patient has upcoming appointment, these items will be addressed at that time.    Type of outreach:    Chart review performed, no outreach needed.    Questions for provider review:    None           Mariluz Jolly, [facility]

## 2025-03-02 ENCOUNTER — HEALTH MAINTENANCE LETTER (OUTPATIENT)
Age: 65
End: 2025-03-02

## 2025-03-19 DIAGNOSIS — Z79.4 TYPE 2 DIABETES MELLITUS WITHOUT COMPLICATION, WITH LONG-TERM CURRENT USE OF INSULIN (H): ICD-10-CM

## 2025-03-19 DIAGNOSIS — N18.31 CHRONIC KIDNEY DISEASE, STAGE 3A (H): ICD-10-CM

## 2025-03-19 DIAGNOSIS — E11.9 TYPE 2 DIABETES MELLITUS WITHOUT COMPLICATION, WITH LONG-TERM CURRENT USE OF INSULIN (H): ICD-10-CM

## 2025-04-03 DIAGNOSIS — Z79.4 TYPE 2 DIABETES MELLITUS WITHOUT COMPLICATION, WITH LONG-TERM CURRENT USE OF INSULIN (H): ICD-10-CM

## 2025-04-03 DIAGNOSIS — E11.9 TYPE 2 DIABETES MELLITUS WITHOUT COMPLICATION, WITH LONG-TERM CURRENT USE OF INSULIN (H): ICD-10-CM

## 2025-04-03 RX ORDER — INSULIN GLARGINE 100 [IU]/ML
INJECTION, SOLUTION SUBCUTANEOUS
Qty: 45 ML | Refills: 0 | Status: SHIPPED | OUTPATIENT
Start: 2025-04-03

## 2025-04-09 ENCOUNTER — OFFICE VISIT (OUTPATIENT)
Dept: FAMILY MEDICINE | Facility: CLINIC | Age: 65
End: 2025-04-09
Payer: COMMERCIAL

## 2025-04-09 VITALS
HEIGHT: 60 IN | OXYGEN SATURATION: 99 % | WEIGHT: 174.8 LBS | TEMPERATURE: 98 F | BODY MASS INDEX: 34.32 KG/M2 | SYSTOLIC BLOOD PRESSURE: 133 MMHG | DIASTOLIC BLOOD PRESSURE: 63 MMHG | RESPIRATION RATE: 16 BRPM | HEART RATE: 70 BPM

## 2025-04-09 DIAGNOSIS — G43.009 MIGRAINE WITHOUT AURA AND WITHOUT STATUS MIGRAINOSUS, NOT INTRACTABLE: ICD-10-CM

## 2025-04-09 DIAGNOSIS — R00.2 PALPITATIONS: ICD-10-CM

## 2025-04-09 DIAGNOSIS — Z87.891 HISTORY OF TOBACCO USE, PRESENTING HAZARDS TO HEALTH: ICD-10-CM

## 2025-04-09 DIAGNOSIS — E78.5 HYPERLIPIDEMIA LDL GOAL <100: ICD-10-CM

## 2025-04-09 DIAGNOSIS — Z79.4 TYPE 2 DIABETES MELLITUS WITHOUT COMPLICATION, WITH LONG-TERM CURRENT USE OF INSULIN (H): ICD-10-CM

## 2025-04-09 DIAGNOSIS — Z79.4 TYPE 2 DIABETES MELLITUS WITHOUT COMPLICATION, WITH LONG-TERM CURRENT USE OF INSULIN (H): Primary | ICD-10-CM

## 2025-04-09 DIAGNOSIS — N18.31 CHRONIC KIDNEY DISEASE, STAGE 3A (H): ICD-10-CM

## 2025-04-09 DIAGNOSIS — M79.7 FIBROMYALGIA: ICD-10-CM

## 2025-04-09 DIAGNOSIS — Z12.31 VISIT FOR SCREENING MAMMOGRAM: ICD-10-CM

## 2025-04-09 DIAGNOSIS — I10 BENIGN ESSENTIAL HYPERTENSION: ICD-10-CM

## 2025-04-09 DIAGNOSIS — Z12.31 VISIT FOR SCREENING MAMMOGRAM: Primary | ICD-10-CM

## 2025-04-09 DIAGNOSIS — E11.9 TYPE 2 DIABETES MELLITUS WITHOUT COMPLICATION, WITH LONG-TERM CURRENT USE OF INSULIN (H): ICD-10-CM

## 2025-04-09 DIAGNOSIS — C43.62: ICD-10-CM

## 2025-04-09 DIAGNOSIS — I10 BENIGN HYPERTENSION: ICD-10-CM

## 2025-04-09 DIAGNOSIS — E11.9 TYPE 2 DIABETES MELLITUS WITHOUT COMPLICATION, WITH LONG-TERM CURRENT USE OF INSULIN (H): Primary | ICD-10-CM

## 2025-04-09 LAB
ANION GAP SERPL CALCULATED.3IONS-SCNC: 13 MMOL/L (ref 7–15)
BUN SERPL-MCNC: 24 MG/DL (ref 8–23)
CALCIUM SERPL-MCNC: 10.2 MG/DL (ref 8.8–10.4)
CHLORIDE SERPL-SCNC: 102 MMOL/L (ref 98–107)
CREAT SERPL-MCNC: 1.56 MG/DL (ref 0.51–0.95)
CREAT UR-MCNC: 69 MG/DL
EGFRCR SERPLBLD CKD-EPI 2021: 37 ML/MIN/1.73M2
EST. AVERAGE GLUCOSE BLD GHB EST-MCNC: 137 MG/DL
GLUCOSE SERPL-MCNC: 157 MG/DL (ref 70–99)
HBA1C MFR BLD: 6.4 % (ref 0–5.6)
HCO3 SERPL-SCNC: 25 MMOL/L (ref 22–29)
MICROALBUMIN UR-MCNC: <12 MG/L
MICROALBUMIN/CREAT UR: NORMAL MG/G{CREAT}
POTASSIUM SERPL-SCNC: 4.9 MMOL/L (ref 3.4–5.3)
SODIUM SERPL-SCNC: 140 MMOL/L (ref 135–145)

## 2025-04-09 PROCEDURE — 82570 ASSAY OF URINE CREATININE: CPT | Performed by: FAMILY MEDICINE

## 2025-04-09 PROCEDURE — G2211 COMPLEX E/M VISIT ADD ON: HCPCS | Performed by: FAMILY MEDICINE

## 2025-04-09 PROCEDURE — 83036 HEMOGLOBIN GLYCOSYLATED A1C: CPT | Performed by: FAMILY MEDICINE

## 2025-04-09 PROCEDURE — 3075F SYST BP GE 130 - 139MM HG: CPT | Performed by: FAMILY MEDICINE

## 2025-04-09 PROCEDURE — 99214 OFFICE O/P EST MOD 30 MIN: CPT | Performed by: FAMILY MEDICINE

## 2025-04-09 PROCEDURE — 80048 BASIC METABOLIC PNL TOTAL CA: CPT | Performed by: FAMILY MEDICINE

## 2025-04-09 PROCEDURE — 82043 UR ALBUMIN QUANTITATIVE: CPT | Performed by: FAMILY MEDICINE

## 2025-04-09 PROCEDURE — 36415 COLL VENOUS BLD VENIPUNCTURE: CPT | Performed by: FAMILY MEDICINE

## 2025-04-09 PROCEDURE — 3078F DIAST BP <80 MM HG: CPT | Performed by: FAMILY MEDICINE

## 2025-04-09 RX ORDER — ATORVASTATIN CALCIUM 20 MG/1
20 TABLET, FILM COATED ORAL DAILY
Qty: 90 TABLET | Refills: 3 | Status: SHIPPED | OUTPATIENT
Start: 2025-04-09 | End: 2025-04-09

## 2025-04-09 RX ORDER — ORAL SEMAGLUTIDE 14 MG/1
14 TABLET ORAL DAILY
Qty: 90 TABLET | Refills: 3 | Status: SHIPPED | OUTPATIENT
Start: 2025-04-09

## 2025-04-09 RX ORDER — SUMATRIPTAN SUCCINATE 100 MG/1
TABLET ORAL
Qty: 10 TABLET | Refills: 1 | Status: SHIPPED | OUTPATIENT
Start: 2025-04-09

## 2025-04-09 RX ORDER — AMLODIPINE BESYLATE 10 MG/1
10 TABLET ORAL AT BEDTIME
Qty: 90 TABLET | Refills: 3 | Status: SHIPPED | OUTPATIENT
Start: 2025-04-09

## 2025-04-09 RX ORDER — ATORVASTATIN CALCIUM 20 MG/1
20 TABLET, FILM COATED ORAL DAILY
Qty: 90 TABLET | Refills: 3 | Status: SHIPPED | OUTPATIENT
Start: 2025-04-09

## 2025-04-09 RX ORDER — AMLODIPINE BESYLATE 10 MG/1
10 TABLET ORAL AT BEDTIME
Qty: 90 TABLET | Refills: 3 | Status: SHIPPED | OUTPATIENT
Start: 2025-04-09 | End: 2025-04-09

## 2025-04-09 RX ORDER — GABAPENTIN 300 MG/1
600 CAPSULE ORAL 3 TIMES DAILY
Qty: 540 CAPSULE | Refills: 3 | Status: SHIPPED | OUTPATIENT
Start: 2025-04-09

## 2025-04-09 RX ORDER — LOSARTAN POTASSIUM 100 MG/1
100 TABLET ORAL DAILY
Qty: 90 TABLET | Refills: 3 | Status: SHIPPED | OUTPATIENT
Start: 2025-04-09

## 2025-04-09 RX ORDER — ATENOLOL 25 MG/1
25 TABLET ORAL DAILY
Qty: 90 TABLET | Refills: 3 | Status: SHIPPED | OUTPATIENT
Start: 2025-04-09 | End: 2025-04-09

## 2025-04-09 RX ORDER — ATENOLOL 25 MG/1
25 TABLET ORAL DAILY
Qty: 90 TABLET | Refills: 3 | Status: SHIPPED | OUTPATIENT
Start: 2025-04-09

## 2025-04-09 RX ORDER — ORAL SEMAGLUTIDE 14 MG/1
14 TABLET ORAL DAILY
Qty: 90 TABLET | Refills: 1 | Status: SHIPPED | OUTPATIENT
Start: 2025-04-09 | End: 2025-04-09

## 2025-04-09 ASSESSMENT — ANXIETY QUESTIONNAIRES
4. TROUBLE RELAXING: SEVERAL DAYS
6. BECOMING EASILY ANNOYED OR IRRITABLE: SEVERAL DAYS
3. WORRYING TOO MUCH ABOUT DIFFERENT THINGS: SEVERAL DAYS
8. IF YOU CHECKED OFF ANY PROBLEMS, HOW DIFFICULT HAVE THESE MADE IT FOR YOU TO DO YOUR WORK, TAKE CARE OF THINGS AT HOME, OR GET ALONG WITH OTHER PEOPLE?: VERY DIFFICULT
GAD7 TOTAL SCORE: 8
IF YOU CHECKED OFF ANY PROBLEMS ON THIS QUESTIONNAIRE, HOW DIFFICULT HAVE THESE PROBLEMS MADE IT FOR YOU TO DO YOUR WORK, TAKE CARE OF THINGS AT HOME, OR GET ALONG WITH OTHER PEOPLE: VERY DIFFICULT
GAD7 TOTAL SCORE: 8
5. BEING SO RESTLESS THAT IT IS HARD TO SIT STILL: SEVERAL DAYS
1. FEELING NERVOUS, ANXIOUS, OR ON EDGE: MORE THAN HALF THE DAYS
GAD7 TOTAL SCORE: 8
7. FEELING AFRAID AS IF SOMETHING AWFUL MIGHT HAPPEN: SEVERAL DAYS
2. NOT BEING ABLE TO STOP OR CONTROL WORRYING: SEVERAL DAYS
7. FEELING AFRAID AS IF SOMETHING AWFUL MIGHT HAPPEN: SEVERAL DAYS

## 2025-04-09 ASSESSMENT — PATIENT HEALTH QUESTIONNAIRE - PHQ9: SUM OF ALL RESPONSES TO PHQ QUESTIONS 1-9: 15

## 2025-04-09 NOTE — PROGRESS NOTES
"  {PROVIDER CHARTING PREFERENCE:194697}    Loretta Mendez is a 64 year old, presenting for the following health issues:  Diabetes, Depression, and Anxiety        4/9/2025     1:04 PM   Additional Questions   Roomed by Maggie Gamboa     History of Present Illness       Mental Health Follow-up:  Patient presents to follow-up on Depression & Anxiety.Patient's depression since last visit has been:  Worse  The patient is having other symptoms associated with depression.  Patient's anxiety since last visit has been:  Worse  The patient is having other symptoms associated with anxiety.  Any significant life events: grief or loss  Patient is feeling anxious or having panic attacks.  Patient has no concerns about alcohol or drug use.    Diabetes:   She presents for follow up of diabetes.  She is checking home blood glucose two times daily.   She checks blood glucose before meals and at bedtime.  Blood glucose is sometimes over 200 and sometimes under 70. She is aware of hypoglycemia symptoms including shakiness, dizziness, weakness, lethargy and confusion.   She is concerned about blood sugar frequently over 200.    She is not experiencing numbness or burning in feet, excessive thirst, blurry vision, weight changes or redness, sores or blisters on feet. The patient has not had a diabetic eye exam in the last 12 months.          She eats 0-1 servings of fruits and vegetables daily.She consumes 1 sweetened beverage(s) daily.She exercises with enough effort to increase her heart rate 20 to 29 minutes per day.  She exercises with enough effort to increase her heart rate 4 days per week.   She is taking medications regularly.      Has not been doing as well with her diabetes.  She tries to eat well during the day but \"messes up\" in the evenings.  She was doing better before, but lately has been eating more.    She had to put her dog down a couple weeks ago, she was 12 years.    All medications to [facility] except insulin, " to [facility].    Increased gabapentin was helpful.    Current Outpatient Medications   Medication Sig Dispense Refill    acetaminophen (TYLENOL) 325 MG tablet Take 650 mg by mouth daily as needed for mild pain      ALPRAZolam (XANAX) 0.5 MG tablet Take 0.25 mg by mouth daily as needed for anxiety      amLODIPine (NORVASC) 10 MG tablet TAKE 1 TABLET AT BEDTIME 90 tablet 1    atenolol (TENORMIN) 25 MG tablet Take 1 tablet (25 mg) by mouth daily. 90 tablet 0    atorvastatin (LIPITOR) 20 MG tablet Take 1 tablet (20 mg) by mouth daily 90 tablet 3    blood glucose (NO BRAND SPECIFIED) lancets standard Use to test blood sugar 2 times daily or as directed.  Dispense as covered by insurance 100 each 3    blood glucose monitoring (NO BRAND SPECIFIED) meter device kit Use to test blood sugar 2 times daily or as directed.  Dispense brand as covered by insurance 1 kit 0    blood glucose monitoring (NO BRAND SPECIFIED) test strip Use to test blood sugar 2 times daily or as directed.  Dispense Ultra One touch strips per insurance coverage 200 each 3    busPIRone HCl (BUSPAR) 30 MG tablet Take 45 mg by mouth 2 times daily 90 tablet 0    cyclobenzaprine (FLEXERIL) 10 MG tablet TAKE 1 TABLET 3 TIMES DAILYAS NEEDED FOR MUSCLE SPASMS 30 tablet 3    empagliflozin (JARDIANCE) 10 MG TABS tablet Take 1 tablet (10 mg) by mouth daily. 90 tablet 0    FLUoxetine (PROZAC) 40 MG capsule Take 40 mg by mouth daily      gabapentin (NEURONTIN) 300 MG capsule TAKE 1 CAPSULE 3 TIMES A   DAY 90 capsule 5    glipiZIDE (GLUCOTROL XL) 5 MG 24 hr tablet TAKE 1 TABLET TWICE A  tablet 1    insulin glargine 100 UNIT/ML pen INJECT 78 UNITS SUBCUTANEOUSLY IN THE MORNING 45 mL 0    insulin pen needle (RELION PEN NEEDLES) 31G X 6 MM miscellaneous Use 1 pen needles daily or as directed. 90 each 1    losartan (COZAAR) 100 MG tablet Take 1 tablet (100 mg) by mouth daily 90 tablet 2    ondansetron (ZOFRAN ODT) 4 MG ODT tab PLACE 1 TABLET ON THE      TONGUE AND  "ALLOW TO        DISSOLVE EVERY 8 HOURS AS  NEEDED FOR NAUSEA 30 tablet 0    RYBELSUS 14 MG tablet TAKE 1 TABLET DAILY 90 tablet 1    spironolactone (ALDACTONE) 25 MG tablet TAKE 1 TABLET DAILY 90 tablet 3    SUMAtriptan (IMITREX) 100 MG tablet TAKE 1 TABLET BY MOUTH AT ONSET OF HEADACHE FOR MIGRAINE. MAY REPEAT DOSE AFTER 2 HOURS AS NEEDED. MAX OF 2 TABLETS PER 24 HOURS 10 tablet 1    traZODone (DESYREL) 100 MG tablet Take 1-2 tablets (100-200 mg) by mouth at bedtime. 180 tablet 1           Objective    /63 (BP Location: Right arm, Patient Position: Sitting, Cuff Size: Adult Regular)   Pulse 70   Temp 98  F (36.7  C) (Oral)   Resp 16   Ht 1.511 m (4' 11.5\")   Wt 79.3 kg (174 lb 12.8 oz)   LMP  (LMP Unknown)   SpO2 99%   BMI 34.71 kg/m    Body mass index is 34.71 kg/m .  Physical Exam   {Exam List (Optional):263361}    {Diagnostic Test Results (Optional):191759}        Signed Electronically by: Pretty Garza MD  {Email feedback regarding this note to [email or web address]   :[id number]}  " "to test blood sugar 2 times daily or as directed.  Dispense brand as covered by insurance 1 kit 0    blood glucose monitoring (NO BRAND SPECIFIED) test strip Use to test blood sugar 2 times daily or as directed.  Dispense Ultra One touch strips per insurance coverage 200 each 3    busPIRone HCl (BUSPAR) 30 MG tablet Take 45 mg by mouth 2 times daily 90 tablet 0    cyclobenzaprine (FLEXERIL) 10 MG tablet TAKE 1 TABLET 3 TIMES DAILYAS NEEDED FOR MUSCLE SPASMS 30 tablet 3    empagliflozin (JARDIANCE) 10 MG TABS tablet Take 1 tablet (10 mg) by mouth daily. 90 tablet 0    FLUoxetine (PROZAC) 40 MG capsule Take 40 mg by mouth daily      gabapentin (NEURONTIN) 300 MG capsule TAKE 1 CAPSULE 3 TIMES A   DAY 90 capsule 5    glipiZIDE (GLUCOTROL XL) 5 MG 24 hr tablet TAKE 1 TABLET TWICE A  tablet 1    insulin glargine 100 UNIT/ML pen INJECT 78 UNITS SUBCUTANEOUSLY IN THE MORNING 45 mL 0    insulin pen needle (RELION PEN NEEDLES) 31G X 6 MM miscellaneous Use 1 pen needles daily or as directed. 90 each 1    losartan (COZAAR) 100 MG tablet Take 1 tablet (100 mg) by mouth daily 90 tablet 2    ondansetron (ZOFRAN ODT) 4 MG ODT tab PLACE 1 TABLET ON THE      TONGUE AND ALLOW TO        DISSOLVE EVERY 8 HOURS AS  NEEDED FOR NAUSEA 30 tablet 0    RYBELSUS 14 MG tablet TAKE 1 TABLET DAILY 90 tablet 1    spironolactone (ALDACTONE) 25 MG tablet TAKE 1 TABLET DAILY 90 tablet 3    SUMAtriptan (IMITREX) 100 MG tablet TAKE 1 TABLET BY MOUTH AT ONSET OF HEADACHE FOR MIGRAINE. MAY REPEAT DOSE AFTER 2 HOURS AS NEEDED. MAX OF 2 TABLETS PER 24 HOURS 10 tablet 1    traZODone (DESYREL) 100 MG tablet Take 1-2 tablets (100-200 mg) by mouth at bedtime. 180 tablet 1           Objective    /63 (BP Location: Right arm, Patient Position: Sitting, Cuff Size: Adult Regular)   Pulse 70   Temp 98  F (36.7  C) (Oral)   Resp 16   Ht 1.511 m (4' 11.5\")   Wt 79.3 kg (174 lb 12.8 oz)   LMP  (LMP Unknown)   SpO2 99%   BMI 34.71 kg/m    Body mass " index is 34.71 kg/m .  Physical Exam   GENERAL: alert and no distress  PSYCH: mentation appears normal, affect normal/bright    Office Visit on 01/07/2025   Component Date Value Ref Range Status    Hemoglobin 01/07/2025 14.3  11.7 - 15.7 g/dL Final    Sodium 01/07/2025 138  135 - 145 mmol/L Final    Potassium 01/07/2025 4.4  3.4 - 5.3 mmol/L Final    Chloride 01/07/2025 103  98 - 107 mmol/L Final    Carbon Dioxide (CO2) 01/07/2025 26  22 - 29 mmol/L Final    Anion Gap 01/07/2025 9  7 - 15 mmol/L Final    Urea Nitrogen 01/07/2025 18.5  8.0 - 23.0 mg/dL Final    Creatinine 01/07/2025 1.57 (H)  0.51 - 0.95 mg/dL Final    GFR Estimate 01/07/2025 37 (L)  >60 mL/min/1.73m2 Final    eGFR calculated using 2021 CKD-EPI equation.    Calcium 01/07/2025 9.9  8.8 - 10.4 mg/dL Final    Reference intervals for this test were updated on 7/16/2024 to reflect our healthy population more accurately. There may be differences in the flagging of prior results with similar values performed with this method. Those prior results can be interpreted in the context of the updated reference intervals.    Glucose 01/07/2025 136 (H)  70 - 99 mg/dL Final    Estimated Average Glucose 01/07/2025 134 (H)  <117 mg/dL Final    Hemoglobin A1C 01/07/2025 6.3 (H)  0.0 - 5.6 % Final    Normal <5.7%   Prediabetes 5.7-6.4%    Diabetes 6.5% or higher     Note: Adopted from ADA consensus guidelines.           Signed Electronically by: Pretty Garza MD

## 2025-04-10 ENCOUNTER — PATIENT OUTREACH (OUTPATIENT)
Dept: CARE COORDINATION | Facility: CLINIC | Age: 65
End: 2025-04-10
Payer: COMMERCIAL

## 2025-04-19 ENCOUNTER — HEALTH MAINTENANCE LETTER (OUTPATIENT)
Age: 65
End: 2025-04-19

## 2025-04-28 NOTE — TELEPHONE ENCOUNTER
"Requested Prescriptions   Pending Prescriptions Disp Refills     losartan (COZAAR) 50 MG tablet [Pharmacy Med Name: LOSARTAN 50MG TAB]  Last Written Prescription Date:  10/04/2018  Last Fill Quantity: 90,  # refills: 1   Last office visit: 10/4/2018 with prescribing provider:  10/04/2018   Future Office Visit:     30 tablet 1     Sig: TAKE 1 TABLET BY MOUTH ONCE DAILY       Angiotensin-II Receptors Passed - 4/29/2019  4:00 PM        Passed - Blood pressure under 140/90 in past 12 months     BP Readings from Last 3 Encounters:   10/04/18 133/70   09/06/18 130/82   05/14/18 142/82                 Passed - Recent (12 mo) or future (30 days) visit within the authorizing provider's specialty     Patient had office visit in the last 12 months or has a visit in the next 30 days with authorizing provider or within the authorizing provider's specialty.  See \"Patient Info\" tab in inbasket, or \"Choose Columns\" in Meds & Orders section of the refill encounter.              Passed - Medication is active on med list        Passed - Patient is age 18 or older        Passed - No active pregnancy on record        Passed - Normal serum creatinine on file in past 12 months     Recent Labs   Lab Test 09/06/18  1441   CR 0.74             Passed - Normal serum potassium on file in past 12 months     Recent Labs   Lab Test 09/06/18  1441   POTASSIUM 3.9                    Passed - No positive pregnancy test in past 12 months        metFORMIN (GLUCOPHAGE) 500 MG tablet [Pharmacy Med Name: METFORMIN 500MG TAB]  Last Written Prescription Date:  03/27/2019  Last Fill Quantity: 120,  # refills: 0   Last office visit: 10/4/2018 with prescribing provider:  10/04/2018   Future Office Visit:     120 tablet 0     Sig: TAKE 2 TABLETS BY MOUTH TWICE DAILY WITH MEALS       Biguanide Agents Failed - 4/29/2019  4:00 PM        Failed - Blood pressure less than 140/90 in past 6 months     BP Readings from Last 3 Encounters:   10/04/18 133/70   09/06/18 " Today's Plan:     - Stay well hydrated, move positions slowly.   - Follow-up with Dr. Isaacs in 6 months, sooner if needed.      If you have questions or concerns please call my nurse team:  Vangie RN ([phone]) Allison RN ([phone]) or Billie RN ([phone])    After Hours: [phone], option #2, ask for cardiologist on-call    Scheduling phone number: [phone]    Reminder: Please bring in all current medications, over the counter supplements and vitamin bottles to your next appointment.-    It was a pleasure seeing you today!     Denisa Carranza, CNP  4/28/2025    -     "130/82   05/14/18 142/82                 Failed - Patient has documented A1c within the specified period of time.     If HgbA1C is 8 or greater, it needs to be on file within the past 3 months.  If less than 8, must be on file within the past 6 months.     Recent Labs   Lab Test 09/06/18  1441   A1C 6.9*             Failed - Recent (6 mo) or future (30 days) visit within the authorizing provider's specialty     Patient had office visit in the last 6 months or has a visit in the next 30 days with authorizing provider or within the authorizing provider's specialty.  See \"Patient Info\" tab in inbasket, or \"Choose Columns\" in Meds & Orders section of the refill encounter.            Passed - Patient has documented LDL within the past 12 mos.     Recent Labs   Lab Test 09/06/18  1441   LDL 49             Passed - Patient has had a Microalbumin in the past 15 mos.     Recent Labs   Lab Test 09/06/18  1445   MICROL 66   UMALCR 23.09             Passed - Patient is age 10 or older        Passed - Patient's CR is NOT>1.4 OR Patient's EGFR is NOT<45 within past 12 mos.     Recent Labs   Lab Test 09/06/18  1441   GFRESTIMATED 81   GFRESTBLACK >90       Recent Labs   Lab Test 09/06/18  1441   CR 0.74             Passed - Patient does NOT have a diagnosis of CHF.        Passed - Medication is active on med list        Passed - Patient is not pregnant        Passed - Patient has not had a positive pregnancy test within the past 12 mos.           "

## 2025-05-24 DIAGNOSIS — Z79.4 TYPE 2 DIABETES MELLITUS WITHOUT COMPLICATION, WITH LONG-TERM CURRENT USE OF INSULIN (H): ICD-10-CM

## 2025-05-24 DIAGNOSIS — E11.9 TYPE 2 DIABETES MELLITUS WITHOUT COMPLICATION, WITH LONG-TERM CURRENT USE OF INSULIN (H): ICD-10-CM

## 2025-05-27 RX ORDER — INSULIN GLARGINE 100 [IU]/ML
INJECTION, SOLUTION SUBCUTANEOUS
Qty: 45 ML | Refills: 0 | Status: SHIPPED | OUTPATIENT
Start: 2025-05-27

## 2025-05-30 PROBLEM — E66.812 CLASS 2 SEVERE OBESITY DUE TO EXCESS CALORIES WITH SERIOUS COMORBIDITY IN ADULT (H): Status: RESOLVED | Noted: 2024-09-23 | Resolved: 2025-05-30

## 2025-05-30 PROBLEM — E66.01 CLASS 2 SEVERE OBESITY DUE TO EXCESS CALORIES WITH SERIOUS COMORBIDITY IN ADULT (H): Status: RESOLVED | Noted: 2024-09-23 | Resolved: 2025-05-30

## 2025-05-31 DIAGNOSIS — G47.00 INSOMNIA, UNSPECIFIED TYPE: ICD-10-CM

## 2025-06-02 RX ORDER — TRAZODONE HYDROCHLORIDE 100 MG/1
TABLET ORAL
Qty: 180 TABLET | Refills: 1 | OUTPATIENT
Start: 2025-06-02

## 2025-06-05 ENCOUNTER — PATIENT OUTREACH (OUTPATIENT)
Dept: FAMILY MEDICINE | Facility: CLINIC | Age: 65
End: 2025-06-05
Payer: COMMERCIAL

## 2025-06-05 NOTE — TELEPHONE ENCOUNTER
Patient Quality Outreach    Patient is due for the following:   Breast Cancer Screening - Mammogram    Action(s) Taken:   Schedule a office visit for Mammo    Type of outreach:    Phone, left message for patient/parent to call back.    Questions for provider review:    None         Elena Arciniega, [facility]  Chart routed to None.

## 2025-06-25 ENCOUNTER — PATIENT OUTREACH (OUTPATIENT)
Dept: CARE COORDINATION | Facility: CLINIC | Age: 65
End: 2025-06-25
Payer: COMMERCIAL

## 2025-06-27 DIAGNOSIS — I10 BENIGN ESSENTIAL HYPERTENSION: ICD-10-CM

## 2025-06-30 RX ORDER — SPIRONOLACTONE 25 MG/1
25 TABLET ORAL DAILY
Qty: 90 TABLET | Refills: 3 | OUTPATIENT
Start: 2025-06-30

## 2025-07-09 ASSESSMENT — PATIENT HEALTH QUESTIONNAIRE - PHQ9
10. IF YOU CHECKED OFF ANY PROBLEMS, HOW DIFFICULT HAVE THESE PROBLEMS MADE IT FOR YOU TO DO YOUR WORK, TAKE CARE OF THINGS AT HOME, OR GET ALONG WITH OTHER PEOPLE: SOMEWHAT DIFFICULT
SUM OF ALL RESPONSES TO PHQ QUESTIONS 1-9: 9
SUM OF ALL RESPONSES TO PHQ QUESTIONS 1-9: 9

## 2025-07-10 ENCOUNTER — OFFICE VISIT (OUTPATIENT)
Dept: FAMILY MEDICINE | Facility: CLINIC | Age: 65
End: 2025-07-10
Payer: COMMERCIAL

## 2025-07-10 VITALS
RESPIRATION RATE: 16 BRPM | HEIGHT: 60 IN | BODY MASS INDEX: 33.57 KG/M2 | SYSTOLIC BLOOD PRESSURE: 116 MMHG | TEMPERATURE: 98.1 F | DIASTOLIC BLOOD PRESSURE: 65 MMHG | OXYGEN SATURATION: 95 % | WEIGHT: 171 LBS | HEART RATE: 62 BPM

## 2025-07-10 DIAGNOSIS — G47.00 INSOMNIA, UNSPECIFIED TYPE: ICD-10-CM

## 2025-07-10 DIAGNOSIS — I10 BENIGN ESSENTIAL HYPERTENSION: ICD-10-CM

## 2025-07-10 DIAGNOSIS — E11.9 TYPE 2 DIABETES MELLITUS WITHOUT COMPLICATION, WITH LONG-TERM CURRENT USE OF INSULIN (H): Primary | ICD-10-CM

## 2025-07-10 DIAGNOSIS — Z79.4 TYPE 2 DIABETES MELLITUS WITHOUT COMPLICATION, WITH LONG-TERM CURRENT USE OF INSULIN (H): Primary | ICD-10-CM

## 2025-07-10 RX ORDER — SPIRONOLACTONE 25 MG/1
25 TABLET ORAL DAILY
Qty: 90 TABLET | Refills: 3 | Status: SHIPPED | OUTPATIENT
Start: 2025-07-10

## 2025-07-10 RX ORDER — TRAZODONE HYDROCHLORIDE 100 MG/1
100-200 TABLET ORAL AT BEDTIME
Qty: 180 TABLET | Refills: 1 | Status: SHIPPED | OUTPATIENT
Start: 2025-07-10

## 2025-07-10 RX ORDER — INSULIN GLARGINE 100 [IU]/ML
INJECTION, SOLUTION SUBCUTANEOUS
Qty: 45 ML | Refills: 0 | Status: SHIPPED | OUTPATIENT
Start: 2025-07-10

## 2025-07-10 RX ORDER — GLIPIZIDE 10 MG/1
10 TABLET, FILM COATED, EXTENDED RELEASE ORAL DAILY
Qty: 90 TABLET | Refills: 1 | Status: SHIPPED | OUTPATIENT
Start: 2025-07-10

## 2025-07-10 NOTE — PATIENT INSTRUCTIONS
Continue your insulin twice per day as is, up to 39 units twice daily.  Change Glipizide to 10 mg once daily.  You could try 25 mg (1 tablet) of benadry for middle of the night awakenings and see if that helps.  Can also try increasing to 200 mg to see if you sleep longer.

## 2025-07-10 NOTE — PROGRESS NOTES
Assessment & Plan     Type 2 diabetes mellitus without complication, with long-term current use of insulin (H)  Foot exam completed today.  Insulin refilled at twice daily dosing.  Patient has been tolerating the Jardiance, so we will continue 10 mg daily.  Will change glipizide to 110 mg tablet once daily.  Continue current medication regimen and follow-up in 3 months or sooner as needed.  No labs today.  - glipiZIDE (GLUCOTROL XL) 10 MG 24 hr tablet; Take 1 tablet (10 mg) by mouth daily.  - insulin glargine 100 UNIT/ML pen; INJECT 39 UNITS SUBCUTANEOUSLY TWICE DAILY.  - FOOT EXAM    Benign essential hypertension  Well-controlled, continue current medications  - spironolactone (ALDACTONE) 25 MG tablet; Take 1 tablet (25 mg) by mouth daily.    Insomnia, unspecified type  Recommend patient try 2 tablets to see if she can sleep longer than 4 to 5 hours, monitor for next day fatigue.  Discussed possibility of using a low-dose of Benadryl for night awakenings.  - traZODone (DESYREL) 100 MG tablet; Take 1-2 tablets (100-200 mg) by mouth at bedtime.    The longitudinal plan of care for the diagnosis(es)/condition(s) as documented were addressed during this visit. Due to the added complexity in care, I will continue to support Vanessa in the subsequent management and with ongoing continuity of care.    Follow-up   Return in about 3 months (around 10/10/2025) for Preventative Care Visit, Diabetes Recheck, With fasting labs.        Subjective   Vanessa is a 65 year old, presenting for the following health issues:  Diabetes        7/10/2025     3:05 PM   Additional Questions   Roomed by Elena NAPIER     History of Present Illness       Diabetes:   She presents for follow up of diabetes.  She is checking home blood glucose two times daily.   She checks blood glucose before and after meals.  Blood glucose is sometimes over 200 and sometimes under 70. She is aware of hypoglycemia symptoms including shakiness, dizziness, weakness and  confusion.   She is concerned about blood sugar frequently over 200.    She is not experiencing numbness or burning in feet, excessive thirst, blurry vision, weight changes or redness, sores or blisters on feet. The patient has not had a diabetic eye exam in the last 12 months.          She eats 2-3 servings of fruits and vegetables daily.She consumes 0 sweetened beverage(s) daily.She exercises with enough effort to increase her heart rate 30 to 60 minutes per day.    She is taking medications regularly.        Medication Followup of pagliflozin (JARDIANCE) 10 MG TABS tablet     Taking Medication as prescribed: yes  Side Effects:  Slight Nausea   Medication Helping Symptoms:  yes    She is having normal BM, somedays she has multiple BM, no diarrhea anymore though.  She is staying on Jardiance without problems.  Occasional nausea from time to time.    Insulin split up 38 BID.    200 mg Trazodone was too much, so 150 mg seems to work.  However, she wakes up after about 4-5 hours and has a hard time falling back asleep.    Current Outpatient Medications   Medication Sig Dispense Refill    acetaminophen (TYLENOL) 325 MG tablet Take 650 mg by mouth daily as needed for mild pain      ALPRAZolam (XANAX) 0.5 MG tablet Take 0.25 mg by mouth daily as needed for anxiety      amLODIPine (NORVASC) 10 MG tablet Take 1 tablet (10 mg) by mouth at bedtime. 90 tablet 3    atenolol (TENORMIN) 25 MG tablet Take 1 tablet (25 mg) by mouth daily. 90 tablet 3    atorvastatin (LIPITOR) 20 MG tablet Take 1 tablet (20 mg) by mouth daily. 90 tablet 3    blood glucose (NO BRAND SPECIFIED) lancets standard Use to test blood sugar 2 times daily or as directed.  Dispense as covered by insurance 100 each 3    blood glucose monitoring (NO BRAND SPECIFIED) meter device kit Use to test blood sugar 2 times daily or as directed.  Dispense brand as covered by insurance 1 kit 0    blood glucose monitoring (NO BRAND SPECIFIED) test strip Use to test blood  "sugar 2 times daily or as directed.  Dispense Ultra One touch strips per insurance coverage 200 each 3    busPIRone HCl (BUSPAR) 30 MG tablet Take 45 mg by mouth 2 times daily 90 tablet 0    cyclobenzaprine (FLEXERIL) 10 MG tablet TAKE 1 TABLET 3 TIMES DAILYAS NEEDED FOR MUSCLE SPASMS 30 tablet 3    empagliflozin (JARDIANCE) 10 MG TABS tablet Take 1 tablet (10 mg) by mouth daily. 90 tablet 3    FLUoxetine (PROZAC) 40 MG capsule Take 40 mg by mouth daily      gabapentin (NEURONTIN) 300 MG capsule Take 2 capsules (600 mg) by mouth 3 times daily. 540 capsule 3    glipiZIDE (GLUCOTROL XL) 10 MG 24 hr tablet Take 1 tablet (10 mg) by mouth daily. 90 tablet 1    insulin glargine 100 UNIT/ML pen INJECT 39 UNITS SUBCUTANEOUSLY TWICE DAILY. 45 mL 0    insulin pen needle (RELION PEN NEEDLES) 31G X 6 MM miscellaneous Use 1 pen needles daily or as directed. 90 each 1    losartan (COZAAR) 100 MG tablet Take 1 tablet (100 mg) by mouth daily. 90 tablet 3    ondansetron (ZOFRAN ODT) 4 MG ODT tab PLACE 1 TABLET ON THE      TONGUE AND ALLOW TO        DISSOLVE EVERY 8 HOURS AS  NEEDED FOR NAUSEA 30 tablet 0    spironolactone (ALDACTONE) 25 MG tablet Take 1 tablet (25 mg) by mouth daily. 90 tablet 3    SUMAtriptan (IMITREX) 100 MG tablet TAKE 1 TABLET BY MOUTH AT ONSET OF HEADACHE FOR MIGRAINE. MAY REPEAT DOSE AFTER 2 HOURS AS NEEDED. MAX OF 2 TABLETS PER 24 HOURS 10 tablet 1    traZODone (DESYREL) 100 MG tablet Take 1-2 tablets (100-200 mg) by mouth at bedtime. 180 tablet 1           Objective    /65 (BP Location: Right arm, Patient Position: Sitting, Cuff Size: Adult Regular)   Pulse 62   Temp 98.1  F (36.7  C) (Temporal)   Resp 16   Ht 1.511 m (4' 11.5\")   Wt 77.6 kg (171 lb)   LMP  (LMP Unknown)   SpO2 95%   BMI 33.96 kg/m    Body mass index is 33.96 kg/m .  Physical Exam   GENERAL: alert and no distress  Diabetic foot exam: normal DP and PT pulses, no trophic changes or ulcerative lesions, normal sensory exam, and " normal monofilament exam    Office Visit on 04/09/2025   Component Date Value Ref Range Status    Estimated Average Glucose 04/09/2025 137 (H)  <117 mg/dL Final    Hemoglobin A1C 04/09/2025 6.4 (H)  0.0 - 5.6 % Final    Normal <5.7%   Prediabetes 5.7-6.4%    Diabetes 6.5% or higher     Note: Adopted from ADA consensus guidelines.    Sodium 04/09/2025 140  135 - 145 mmol/L Final    Potassium 04/09/2025 4.9  3.4 - 5.3 mmol/L Final    Chloride 04/09/2025 102  98 - 107 mmol/L Final    Carbon Dioxide (CO2) 04/09/2025 25  22 - 29 mmol/L Final    Anion Gap 04/09/2025 13  7 - 15 mmol/L Final    Urea Nitrogen 04/09/2025 24.0 (H)  8.0 - 23.0 mg/dL Final    Creatinine 04/09/2025 1.56 (H)  0.51 - 0.95 mg/dL Final    GFR Estimate 04/09/2025 37 (L)  >60 mL/min/1.73m2 Final    eGFR calculated using 2021 CKD-EPI equation.    Calcium 04/09/2025 10.2  8.8 - 10.4 mg/dL Final    Glucose 04/09/2025 157 (H)  70 - 99 mg/dL Final    Creatinine Urine mg/dL 04/09/2025 69.0  mg/dL Final    The reference ranges have not been established in urine creatinine. The results should be integrated into the clinical context for interpretation.    Albumin Urine mg/L 04/09/2025 <12.0  mg/L Final    The reference ranges have not been established in urine albumin. The results should be integrated into the clinical context for interpretation.    Albumin Urine mg/g Cr 04/09/2025    Final    Unable to calculate, urine albumin and/or urine creatinine is outside detectable limits.  Microalbuminuria is defined as an albumin:creatinine ratio of 17 to 299 for males and 25 to 299 for females. A ratio of albumin:creatinine of 300 or higher is indicative of overt proteinuria.  Due to biologic variability, positive results should be confirmed by a second, first-morning random or 24-hour timed urine specimen. If there is discrepancy, a third specimen is recommended. When 2 out of 3 results are in the microalbuminuria range, this is evidence for incipient nephropathy  and warrants increased efforts at glucose control, blood pressure control, and institution of therapy with an angiotensin-converting-enzyme (ACE) inhibitor (if the patient can tolerate it).             Signed Electronically by: Pretty Garza MD

## 2025-08-05 DIAGNOSIS — R11.0 NAUSEA: ICD-10-CM

## 2025-08-05 DIAGNOSIS — M79.7 FIBROMYALGIA: ICD-10-CM

## 2025-08-05 RX ORDER — ONDANSETRON 4 MG/1
TABLET, ORALLY DISINTEGRATING ORAL
Qty: 30 TABLET | Refills: 0 | Status: SHIPPED | OUTPATIENT
Start: 2025-08-05

## 2025-08-05 RX ORDER — CYCLOBENZAPRINE HCL 10 MG
TABLET ORAL
Qty: 30 TABLET | Refills: 3 | Status: SHIPPED | OUTPATIENT
Start: 2025-08-05

## (undated) DEVICE — GLOVE PROTEXIS BLUE W/NEU-THERA 6.5  2D73EB65

## (undated) DEVICE — GLOVE PROTEXIS POWDER FREE SMT 6.0  2D72PT60X

## (undated) DEVICE — PACK MINOR LITHOTOMY RIDGES

## (undated) DEVICE — PREP CHLORHEXIDINE 4% 4OZ (HIBICLENS) 57504

## (undated) DEVICE — KIT ENDO TURNOVER/PROCEDURE W/CLEAN A SCOPE LINERS 103888

## (undated) DEVICE — KIT PROCEDURE FLUENT IN/OUT FLOWPAK TISS TRAP FLT-112S

## (undated) DEVICE — BASIN SET MINOR DISP

## (undated) DEVICE — PAD CHUX UNDERPAD 23X24" 7136

## (undated) DEVICE — SEAL SET MYOSURE ROD LENS SCOPE SINGLE USE 40-902

## (undated) DEVICE — SOL NACL 0.9% IRRIG 3000ML BAG 2B7477

## (undated) DEVICE — LINEN DRAPE 54X72" 5467

## (undated) DEVICE — SUCTION CURETTE 3MM ENDOMETRIAL MX140

## (undated) DEVICE — LINEN FULL SHEET 5511

## (undated) DEVICE — DEVICE TISSUE REMOVAL HYSTEROSCOPIC MYOSURE LITE 30-401LITE

## (undated) DEVICE — BAG CLEAR TRASH 1.3M 39X33" P4040C

## (undated) DEVICE — LINEN HALF SHEET 5512

## (undated) DEVICE — PREP POVIDONE IODINE SOLUTION 10% 120ML

## (undated) RX ORDER — CEFAZOLIN SODIUM/WATER 2 G/20 ML
SYRINGE (ML) INTRAVENOUS
Status: DISPENSED
Start: 2022-02-01

## (undated) RX ORDER — DEXAMETHASONE SODIUM PHOSPHATE 4 MG/ML
INJECTION, SOLUTION INTRA-ARTICULAR; INTRALESIONAL; INTRAMUSCULAR; INTRAVENOUS; SOFT TISSUE
Status: DISPENSED
Start: 2022-02-01

## (undated) RX ORDER — PROPOFOL 10 MG/ML
INJECTION, EMULSION INTRAVENOUS
Status: DISPENSED
Start: 2022-02-01

## (undated) RX ORDER — GLYCOPYRROLATE 0.2 MG/ML
INJECTION INTRAMUSCULAR; INTRAVENOUS
Status: DISPENSED
Start: 2022-02-01

## (undated) RX ORDER — ACETAMINOPHEN 325 MG/1
TABLET ORAL
Status: DISPENSED
Start: 2022-02-01

## (undated) RX ORDER — LIDOCAINE HYDROCHLORIDE 10 MG/ML
INJECTION, SOLUTION EPIDURAL; INFILTRATION; INTRACAUDAL; PERINEURAL
Status: DISPENSED
Start: 2022-02-01

## (undated) RX ORDER — ONDANSETRON 2 MG/ML
INJECTION INTRAMUSCULAR; INTRAVENOUS
Status: DISPENSED
Start: 2022-02-01

## (undated) RX ORDER — ATROPINE SULFATE 0.1 MG/ML
INJECTION INTRAVENOUS
Status: DISPENSED
Start: 2022-02-01

## (undated) RX ORDER — FENTANYL CITRATE 50 UG/ML
INJECTION, SOLUTION INTRAMUSCULAR; INTRAVENOUS
Status: DISPENSED
Start: 2022-02-01

## (undated) RX ORDER — KETOROLAC TROMETHAMINE 30 MG/ML
INJECTION, SOLUTION INTRAMUSCULAR; INTRAVENOUS
Status: DISPENSED
Start: 2022-02-01